# Patient Record
Sex: FEMALE | Race: WHITE | NOT HISPANIC OR LATINO | Employment: OTHER | URBAN - METROPOLITAN AREA
[De-identification: names, ages, dates, MRNs, and addresses within clinical notes are randomized per-mention and may not be internally consistent; named-entity substitution may affect disease eponyms.]

---

## 2018-09-06 ENCOUNTER — HOSPITAL ENCOUNTER (OUTPATIENT)
Dept: RADIOLOGY | Facility: HOSPITAL | Age: 63
Discharge: HOME | End: 2018-09-06
Payer: OTHER GOVERNMENT

## 2018-09-06 ENCOUNTER — TRANSCRIBE ORDERS (OUTPATIENT)
Dept: ADMINISTRATIVE | Facility: HOSPITAL | Age: 63
End: 2018-09-06

## 2018-09-06 ENCOUNTER — APPOINTMENT (OUTPATIENT)
Dept: LAB | Facility: HOSPITAL | Age: 63
End: 2018-09-06
Payer: OTHER GOVERNMENT

## 2018-09-06 DIAGNOSIS — E55.9 AVITAMINOSIS D: ICD-10-CM

## 2018-09-06 DIAGNOSIS — J45.20 MILD INTERMITTENT ASTHMATIC BRONCHITIS WITHOUT COMPLICATION: ICD-10-CM

## 2018-09-06 DIAGNOSIS — R05.9 COUGH: ICD-10-CM

## 2018-09-06 DIAGNOSIS — E78.5 HYPERLIPIDEMIA, UNSPECIFIED HYPERLIPIDEMIA TYPE: ICD-10-CM

## 2018-09-06 DIAGNOSIS — R31.9 HEMATURIA SYNDROME: ICD-10-CM

## 2018-09-06 DIAGNOSIS — R05.9 COUGH: Primary | ICD-10-CM

## 2018-09-06 LAB
25(OH)D3 SERPL-MCNC: 31.4 NG/ML (ref 30–100)
ALBUMIN SERPL BCP-MCNC: 3.9 G/DL (ref 3.5–5.7)
ALP SERPL-CCNC: 114 U/L (ref 55–165)
ALT SERPL W P-5'-P-CCNC: 20 U/L (ref 7–52)
ANION GAP SERPL CALCULATED.3IONS-SCNC: 7 MMOL/L (ref 4–13)
AST SERPL W P-5'-P-CCNC: 18 U/L (ref 13–39)
BASOPHILS # BLD AUTO: 0 THOUSANDS/ΜL (ref 0–0.1)
BASOPHILS NFR BLD AUTO: 0 % (ref 0–2)
BILIRUB SERPL-MCNC: 0.3 MG/DL (ref 0.2–1)
BILIRUB UR QL STRIP: NEGATIVE
BUN SERPL-MCNC: 14 MG/DL (ref 7–25)
CALCIUM SERPL-MCNC: 9.9 MG/DL (ref 8.6–10.5)
CHLORIDE SERPL-SCNC: 106 MMOL/L (ref 98–107)
CHOLEST SERPL-MCNC: 147 MG/DL (ref 0–200)
CLARITY UR: CLEAR
CO2 SERPL-SCNC: 24 MMOL/L (ref 21–31)
COLOR UR: YELLOW
CREAT SERPL-MCNC: 0.82 MG/DL (ref 0.6–1.2)
EOSINOPHIL # BLD AUTO: 0 THOUSAND/ΜL (ref 0–0.61)
EOSINOPHIL NFR BLD AUTO: 0 % (ref 0–5)
ERYTHROCYTE [DISTWIDTH] IN BLOOD BY AUTOMATED COUNT: 12.3 % (ref 11.5–14.5)
GFR SERPL CREATININE-BSD FRML MDRD: 76 ML/MIN/1.73SQ M
GLUCOSE P FAST SERPL-MCNC: 164 MG/DL (ref 65–99)
GLUCOSE UR STRIP-MCNC: NEGATIVE MG/DL
HCT VFR BLD AUTO: 38.3 % (ref 34.8–46.1)
HDLC SERPL-MCNC: 47 MG/DL (ref 40–60)
HGB BLD-MCNC: 12.6 G/DL (ref 12–16)
HGB UR QL STRIP.AUTO: NEGATIVE
KETONES UR STRIP-MCNC: NEGATIVE MG/DL
LDLC SERPL CALC-MCNC: 86 MG/DL (ref 75–193)
LEUKOCYTE ESTERASE UR QL STRIP: NEGATIVE
LYMPHOCYTES # BLD AUTO: 1.4 THOUSANDS/ΜL (ref 0.6–4.47)
LYMPHOCYTES NFR BLD AUTO: 12 % (ref 21–51)
MCH RBC QN AUTO: 29.7 PG (ref 26–34)
MCHC RBC AUTO-ENTMCNC: 32.8 G/DL (ref 31–37)
MCV RBC AUTO: 91 FL (ref 81–99)
MONOCYTES # BLD AUTO: 0.5 THOUSAND/ΜL (ref 0.17–1.22)
MONOCYTES NFR BLD AUTO: 4 % (ref 2–12)
NEUTROPHILS # BLD AUTO: 9.7 THOUSANDS/ΜL (ref 1.4–6.5)
NEUTS SEG NFR BLD AUTO: 84 % (ref 42–75)
NITRITE UR QL STRIP: NEGATIVE
NONHDLC SERPL-MCNC: 100 MG/DL
NRBC BLD AUTO-RTO: 0 /100 WBCS
PH UR STRIP.AUTO: 6 [PH] (ref 5–8)
PLATELET # BLD AUTO: 276 THOUSANDS/UL (ref 149–390)
PMV BLD AUTO: 10.1 FL (ref 8.6–11.7)
POTASSIUM SERPL-SCNC: 4.2 MMOL/L (ref 3.5–5.5)
PROT SERPL-MCNC: 7.2 G/DL (ref 6.4–8.9)
PROT UR STRIP-MCNC: NEGATIVE MG/DL
RBC # BLD AUTO: 4.23 MILLION/UL (ref 3.9–5.2)
SODIUM SERPL-SCNC: 137 MMOL/L (ref 134–143)
SP GR UR STRIP.AUTO: 1.02 (ref 1–1.03)
TRIGL SERPL-MCNC: 69 MG/DL (ref 44–166)
UROBILINOGEN UR QL STRIP.AUTO: 0.2 E.U./DL
WBC # BLD AUTO: 11.6 THOUSAND/UL (ref 4.8–10.8)

## 2018-09-06 PROCEDURE — 80053 COMPREHEN METABOLIC PANEL: CPT

## 2018-09-06 PROCEDURE — 71046 X-RAY EXAM CHEST 2 VIEWS: CPT

## 2018-09-06 PROCEDURE — 82306 VITAMIN D 25 HYDROXY: CPT

## 2018-09-06 PROCEDURE — 80061 LIPID PANEL: CPT

## 2018-09-06 PROCEDURE — 36415 COLL VENOUS BLD VENIPUNCTURE: CPT

## 2018-09-06 PROCEDURE — 85025 COMPLETE CBC W/AUTO DIFF WBC: CPT

## 2018-09-06 PROCEDURE — 81003 URINALYSIS AUTO W/O SCOPE: CPT

## 2019-09-11 ENCOUNTER — TELEPHONE (OUTPATIENT)
Dept: NEPHROLOGY | Facility: CLINIC | Age: 64
End: 2019-09-11

## 2019-09-11 NOTE — TELEPHONE ENCOUNTER
Also albuterol 0 083% 4 times a day     Pt was given 0 063% the last time it was filled can she use that?

## 2019-09-12 DIAGNOSIS — E78.2 MIXED HYPERLIPIDEMIA: Primary | ICD-10-CM

## 2019-09-12 DIAGNOSIS — J98.01 BRONCHOSPASM: ICD-10-CM

## 2019-09-12 RX ORDER — SIMVASTATIN 40 MG
40 TABLET ORAL
Qty: 90 TABLET | Refills: 1 | Status: SHIPPED | OUTPATIENT
Start: 2019-09-12 | End: 2019-09-26

## 2019-09-12 RX ORDER — ALBUTEROL SULFATE 2.5 MG/3ML
2.5 SOLUTION RESPIRATORY (INHALATION) 4 TIMES DAILY
Qty: 120 VIAL | Refills: 11 | Status: SHIPPED | OUTPATIENT
Start: 2019-09-12 | End: 2022-03-26 | Stop reason: SDUPTHER

## 2019-09-13 ENCOUNTER — APPOINTMENT (EMERGENCY)
Dept: RADIOLOGY | Facility: HOSPITAL | Age: 64
End: 2019-09-13
Payer: MEDICARE

## 2019-09-13 ENCOUNTER — HOSPITAL ENCOUNTER (EMERGENCY)
Facility: HOSPITAL | Age: 64
Discharge: HOME | End: 2019-09-13
Attending: FAMILY MEDICINE | Admitting: FAMILY MEDICINE
Payer: MEDICARE

## 2019-09-13 VITALS
HEIGHT: 60 IN | OXYGEN SATURATION: 98 % | TEMPERATURE: 98.6 F | DIASTOLIC BLOOD PRESSURE: 72 MMHG | SYSTOLIC BLOOD PRESSURE: 160 MMHG | HEART RATE: 80 BPM | BODY MASS INDEX: 25.52 KG/M2 | RESPIRATION RATE: 16 BRPM | WEIGHT: 130 LBS

## 2019-09-13 DIAGNOSIS — J44.1 ACUTE EXACERBATION OF CHRONIC OBSTRUCTIVE PULMONARY DISEASE (COPD) (HCC): Primary | ICD-10-CM

## 2019-09-13 DIAGNOSIS — I10 HYPERTENSION: ICD-10-CM

## 2019-09-13 DIAGNOSIS — J06.9 URI (UPPER RESPIRATORY INFECTION): ICD-10-CM

## 2019-09-13 LAB
ALBUMIN SERPL BCP-MCNC: 4.4 G/DL (ref 3.5–5.7)
ALP SERPL-CCNC: 110 U/L (ref 55–165)
ALT SERPL W P-5'-P-CCNC: 13 U/L (ref 7–52)
ANION GAP SERPL CALCULATED.3IONS-SCNC: 8 MMOL/L (ref 4–13)
APTT PPP: 32 SECONDS (ref 23–37)
AST SERPL W P-5'-P-CCNC: 15 U/L (ref 13–39)
BASOPHILS # BLD AUTO: 0.1 THOUSANDS/ΜL (ref 0–0.1)
BASOPHILS NFR BLD AUTO: 1 % (ref 0–2)
BILIRUB SERPL-MCNC: 1 MG/DL (ref 0.2–1)
BILIRUB UR QL STRIP: NEGATIVE
BUN SERPL-MCNC: 13 MG/DL (ref 7–25)
CALCIUM SERPL-MCNC: 9.6 MG/DL (ref 8.6–10.5)
CHLORIDE SERPL-SCNC: 107 MMOL/L (ref 98–107)
CLARITY UR: CLEAR
CO2 SERPL-SCNC: 23 MMOL/L (ref 21–31)
COLOR UR: YELLOW
CREAT SERPL-MCNC: 0.71 MG/DL (ref 0.6–1.2)
EOSINOPHIL # BLD AUTO: 0.5 THOUSAND/ΜL (ref 0–0.61)
EOSINOPHIL NFR BLD AUTO: 4 % (ref 0–5)
ERYTHROCYTE [DISTWIDTH] IN BLOOD BY AUTOMATED COUNT: 12.9 % (ref 11.5–14.5)
FLUAV AG SPEC QL: NOT DETECTED
FLUBV AG SPEC QL: NOT DETECTED
GFR SERPL CREATININE-BSD FRML MDRD: 90 ML/MIN/1.73SQ M
GLUCOSE SERPL-MCNC: 130 MG/DL (ref 65–99)
GLUCOSE UR STRIP-MCNC: NEGATIVE MG/DL
HCT VFR BLD AUTO: 41.5 % (ref 42–47)
HGB BLD-MCNC: 13.7 G/DL (ref 12–16)
HGB UR QL STRIP.AUTO: NEGATIVE
INR PPP: 1.01 (ref 0.9–1.5)
KETONES UR STRIP-MCNC: NEGATIVE MG/DL
LACTATE SERPL-SCNC: 1 MMOL/L (ref 0.5–2)
LEUKOCYTE ESTERASE UR QL STRIP: NEGATIVE
LYMPHOCYTES # BLD AUTO: 2.1 THOUSANDS/ΜL (ref 0.6–4.47)
LYMPHOCYTES NFR BLD AUTO: 14 % (ref 21–51)
MCH RBC QN AUTO: 30.5 PG (ref 26–34)
MCHC RBC AUTO-ENTMCNC: 33.1 G/DL (ref 31–37)
MCV RBC AUTO: 92 FL (ref 81–99)
MONOCYTES # BLD AUTO: 1.2 THOUSAND/ΜL (ref 0.17–1.22)
MONOCYTES NFR BLD AUTO: 8 % (ref 2–12)
NEUTROPHILS # BLD AUTO: 10.7 THOUSANDS/ΜL (ref 1.4–6.5)
NEUTS SEG NFR BLD AUTO: 73 % (ref 42–75)
NITRITE UR QL STRIP: NEGATIVE
PH UR STRIP.AUTO: 7 [PH]
PLATELET # BLD AUTO: 187 THOUSANDS/UL (ref 149–390)
PMV BLD AUTO: 9.6 FL (ref 8.6–11.7)
POTASSIUM SERPL-SCNC: 3.9 MMOL/L (ref 3.5–5.5)
PROT SERPL-MCNC: 7.3 G/DL (ref 6.4–8.9)
PROT UR STRIP-MCNC: NEGATIVE MG/DL
PROTHROMBIN TIME: 11.7 SECONDS (ref 10.2–13)
RBC # BLD AUTO: 4.51 MILLION/UL (ref 3.9–5.2)
RSV B RNA SPEC QL NAA+PROBE: NOT DETECTED
SODIUM SERPL-SCNC: 138 MMOL/L (ref 134–143)
SP GR UR STRIP.AUTO: <=1.005 (ref 1–1.03)
UROBILINOGEN UR QL STRIP.AUTO: 0.2 E.U./DL
WBC # BLD AUTO: 14.5 THOUSAND/UL (ref 4.8–10.8)

## 2019-09-13 PROCEDURE — 96361 HYDRATE IV INFUSION ADD-ON: CPT

## 2019-09-13 PROCEDURE — 83605 ASSAY OF LACTIC ACID: CPT | Performed by: PHYSICIAN ASSISTANT

## 2019-09-13 PROCEDURE — 99284 EMERGENCY DEPT VISIT MOD MDM: CPT

## 2019-09-13 PROCEDURE — 85610 PROTHROMBIN TIME: CPT | Performed by: PHYSICIAN ASSISTANT

## 2019-09-13 PROCEDURE — 87631 RESP VIRUS 3-5 TARGETS: CPT | Performed by: PHYSICIAN ASSISTANT

## 2019-09-13 PROCEDURE — 36415 COLL VENOUS BLD VENIPUNCTURE: CPT | Performed by: PHYSICIAN ASSISTANT

## 2019-09-13 PROCEDURE — 85730 THROMBOPLASTIN TIME PARTIAL: CPT | Performed by: PHYSICIAN ASSISTANT

## 2019-09-13 PROCEDURE — 93005 ELECTROCARDIOGRAM TRACING: CPT

## 2019-09-13 PROCEDURE — 85025 COMPLETE CBC W/AUTO DIFF WBC: CPT | Performed by: PHYSICIAN ASSISTANT

## 2019-09-13 PROCEDURE — 81003 URINALYSIS AUTO W/O SCOPE: CPT | Performed by: PHYSICIAN ASSISTANT

## 2019-09-13 PROCEDURE — 87040 BLOOD CULTURE FOR BACTERIA: CPT | Performed by: PHYSICIAN ASSISTANT

## 2019-09-13 PROCEDURE — 80053 COMPREHEN METABOLIC PANEL: CPT | Performed by: PHYSICIAN ASSISTANT

## 2019-09-13 PROCEDURE — 71045 X-RAY EXAM CHEST 1 VIEW: CPT

## 2019-09-13 PROCEDURE — 96365 THER/PROPH/DIAG IV INF INIT: CPT

## 2019-09-13 RX ORDER — CEFTRIAXONE 1 G/50ML
1000 INJECTION, SOLUTION INTRAVENOUS ONCE
Status: COMPLETED | OUTPATIENT
Start: 2019-09-13 | End: 2019-09-13

## 2019-09-13 RX ORDER — ASPIRIN 81 MG/1
81 TABLET, CHEWABLE ORAL DAILY
COMMUNITY

## 2019-09-13 RX ORDER — ALPRAZOLAM 0.25 MG/1
TABLET ORAL
COMMUNITY
End: 2021-03-02 | Stop reason: SDUPTHER

## 2019-09-13 RX ORDER — EPINEPHRINE 0.3 MG/.3ML
INJECTION SUBCUTANEOUS
Refills: 0 | COMMUNITY
Start: 2019-06-07 | End: 2020-05-04 | Stop reason: SDUPTHER

## 2019-09-13 RX ORDER — OMEPRAZOLE 20 MG/1
20 CAPSULE, DELAYED RELEASE ORAL 2 TIMES DAILY
COMMUNITY
End: 2020-05-04 | Stop reason: SDUPTHER

## 2019-09-13 RX ORDER — MONTELUKAST SODIUM 10 MG/1
TABLET ORAL
Refills: 0 | COMMUNITY
Start: 2019-08-30 | End: 2020-07-29

## 2019-09-13 RX ORDER — MELOXICAM 15 MG/1
15 TABLET ORAL DAILY
Refills: 0 | COMMUNITY
Start: 2019-06-06 | End: 2020-02-05 | Stop reason: SDUPTHER

## 2019-09-13 RX ORDER — CEFDINIR 300 MG/1
300 CAPSULE ORAL EVERY 12 HOURS SCHEDULED
Qty: 14 CAPSULE | Refills: 0 | Status: SHIPPED | OUTPATIENT
Start: 2019-09-13 | End: 2019-09-20

## 2019-09-13 RX ORDER — ALBUTEROL SULFATE 0.63 MG/3ML
SOLUTION RESPIRATORY (INHALATION)
Refills: 0 | COMMUNITY
Start: 2019-07-03 | End: 2020-11-05

## 2019-09-13 RX ORDER — THEOPHYLLINE 300 MG/1
TABLET, EXTENDED RELEASE ORAL
Refills: 0 | COMMUNITY
Start: 2019-08-30 | End: 2020-08-25

## 2019-09-13 RX ORDER — FEXOFENADINE HYDROCHLORIDE 60 MG/1
60 TABLET, FILM COATED ORAL DAILY
COMMUNITY
End: 2020-05-06 | Stop reason: SDUPTHER

## 2019-09-13 RX ADMIN — CEFTRIAXONE 1000 MG: 1 INJECTION, SOLUTION INTRAVENOUS at 11:17

## 2019-09-13 RX ADMIN — SODIUM CHLORIDE 1000 ML: 0.9 INJECTION, SOLUTION INTRAVENOUS at 10:12

## 2019-09-13 NOTE — ED PROVIDER NOTES
History  Chief Complaint   Patient presents with    Sore Throat     yesterday started     Wheezing     took albuterol neb at 0600     Cough     green sputum      79-year-old female presents to emergency room with complaint of shortness of breath cough with productive yellow-green sputum which she states began yesterday  She also admits to fevers and chills  Denies chest pressure or pain denies nausea vomiting abdominal pain or urinary complaints  Patient states I always get pneumonia  Patient denies tobacco abuse or history of smoking with states she worked in [employer] for many years and has lung damage  No known sick contacts, she tried ibuprofen at home last night for the aches and fever presents this morning for further evaluation  History provided by:  Patient  Cough   Cough characteristics:  Productive  Sputum characteristics:  Green and yellow  Severity:  Moderate  Onset quality:  Sudden  Duration:  1 day  Timing:  Constant  Progression:  Worsening  Chronicity:  New  Smoker: no    Context: upper respiratory infection    Relieved by:  Nothing  Worsened by:  Nothing  Ineffective treatments: Ibuprofen    Associated symptoms: chills, fever, rhinorrhea, shortness of breath, sinus congestion and wheezing    Associated symptoms: no chest pain, no diaphoresis, no ear pain, no headaches, no myalgias, no rash and no sore throat    Fever:     Duration:  1 day    Timing:  Constant    Temp source:  Subjective    Progression:  Unchanged  Rhinorrhea:     Quality:  Yellow    Severity:  Mild    Duration:  1 day    Timing:  Constant    Progression:  Worsening  Shortness of breath:     Severity:  Mild    Onset quality:  Sudden    Duration:  1 day    Timing:  Constant    Progression:  Unchanged  Wheezing:     Severity:  Mild    Onset quality:  Sudden    Duration:  1 day    Timing:  Constant    Progression:  Unchanged    Chronicity:  New  Risk factors: no recent travel          Allergies   Allergen Reactions    Azithromycin     Darvon [Propoxyphene]          Prior to Admission Medications   Prescriptions Last Dose Informant Patient Reported? Taking? ALPRAZolam (XANAX) 0 25 mg tablet   Yes Yes   Sig: Take by mouth daily at bedtime as needed for anxiety   EPINEPHrine (EPIPEN) 0 3 mg/0 3 mL SOAJ   Yes No   albuterol (2 5 mg/3 mL) 0 083 % nebulizer solution   No No   Sig: Take 1 vial (2 5 mg total) by nebulization 4 (four) times a day   albuterol (ACCUNEB) 0 63 MG/3ML nebulizer solution   Yes No   Sig: inhale contents of 1 vial in nebulizer every 6 hours if needed for shortness of breath   aspirin 81 mg chewable tablet   Yes Yes   Sig: Chew 81 mg daily   fexofenadine (ALLEGRA) 60 MG tablet   Yes Yes   Sig: Take 60 mg by mouth daily   meloxicam (MOBIC) 15 mg tablet   Yes No   Sig: Take 15 mg by mouth daily   montelukast (SINGULAIR) 10 mg tablet   Yes No   omeprazole (PriLOSEC) 20 mg delayed release capsule   Yes Yes   Sig: Take 20 mg by mouth 2 (two) times a day   simvastatin (ZOCOR) 40 mg tablet   No No   Sig: Take 1 tablet (40 mg total) by mouth daily at bedtime   theophylline (THEODUR) 300 mg 12 hr tablet   Yes No      Facility-Administered Medications: None       Past Medical History:   Diagnosis Date    Asthma        Past Surgical History:   Procedure Laterality Date    CHOLECYSTECTOMY      HYSTERECTOMY         History reviewed  No pertinent family history  I have reviewed and agree with the history as documented  Social History     Tobacco Use    Smoking status: Never Smoker    Smokeless tobacco: Never Used   Substance Use Topics    Alcohol use: Never     Frequency: Never    Drug use: Never        Review of Systems   Constitutional: Positive for chills and fever  Negative for diaphoresis and fatigue  HENT: Positive for rhinorrhea  Negative for congestion, ear pain, sneezing and sore throat  Respiratory: Positive for cough, shortness of breath and wheezing  Negative for stridor      Cardiovascular: Negative for chest pain, palpitations and leg swelling  Gastrointestinal: Negative for abdominal distention, abdominal pain, blood in stool, constipation, diarrhea, nausea and vomiting  Genitourinary: Negative for difficulty urinating, dysuria, frequency, hematuria and urgency  Musculoskeletal: Negative for gait problem, myalgias and neck pain  Skin: Negative for rash  Neurological: Negative for dizziness, syncope, weakness, light-headedness and headaches  All other systems reviewed and are negative  Physical Exam  Physical Exam   Constitutional: She is oriented to person, place, and time  She appears well-developed and well-nourished  HENT:   Head: Normocephalic and atraumatic  Right Ear: Hearing, tympanic membrane and ear canal normal  Tympanic membrane is not erythematous  Left Ear: Hearing, tympanic membrane and ear canal normal  Tympanic membrane is not erythematous  Nose: Mucosal edema and rhinorrhea present  No epistaxis  Mouth/Throat: Uvula is midline, oropharynx is clear and moist and mucous membranes are normal  No oropharyngeal exudate, posterior oropharyngeal edema or posterior oropharyngeal erythema  Eyes: Pupils are equal, round, and reactive to light  EOM are normal    Neck: Normal range of motion  Neck supple  No JVD present  No tracheal deviation present  Cardiovascular: Normal rate, regular rhythm, normal heart sounds and intact distal pulses  Pulmonary/Chest: Effort normal and breath sounds normal  No stridor  No respiratory distress  She has no wheezes  She has no rhonchi  Abdominal: Soft  Bowel sounds are normal  She exhibits no distension  There is no tenderness  Musculoskeletal: Normal range of motion  She exhibits no edema or tenderness  Neurological: She is alert and oriented to person, place, and time  Skin: Skin is warm and dry  No rash noted  No erythema  Nursing note and vitals reviewed        Vital Signs  ED Triage Vitals [09/13/19 0951] Temperature Pulse Respirations Blood Pressure SpO2   100 1 °F (37 8 °C) 86 16 162/74 98 %      Temp Source Heart Rate Source Patient Position - Orthostatic VS BP Location FiO2 (%)   Temporal Monitor Sitting Right arm --      Pain Score       No Pain           Vitals:    09/13/19 0951 09/13/19 1216   BP: 162/74 160/72   Pulse: 86 80   Patient Position - Orthostatic VS: Sitting Sitting         Visual Acuity      ED Medications  Medications   sodium chloride 0 9 % bolus 1,000 mL (0 mL Intravenous Stopped 9/13/19 1119)   cefTRIAXone (ROCEPHIN) IVPB (premix) 1,000 mg (0 mg Intravenous Stopped 9/13/19 1209)       Diagnostic Studies  Results Reviewed     Procedure Component Value Units Date/Time    UA w Reflex to Microscopic w Reflex to Culture [[id number]]  (Abnormal) Collected:  09/13/19 1122    Lab Status:  Final result Specimen:  Urine, Clean Catch Updated:  09/13/19 1128     Color, UA Yellow     Clarity, UA Clear     Specific Gravity, UA <=1 005     pH, UA 7 0     Leukocytes, UA Negative     Nitrite, UA Negative     Protein, UA Negative mg/dl      Glucose, UA Negative mg/dl      Ketones, UA Negative mg/dl      Urobilinogen, UA 0 2 E U /dl      Bilirubin, UA Negative     Blood, UA Negative    Lactic acid, plasma [[id number]]  (Normal) Collected:  09/13/19 1005    Lab Status:  Final result Specimen:  Blood from Hand, Left Updated:  09/13/19 1046     LACTIC ACID 1 0 mmol/L     Narrative:       Result may be elevated if tourniquet was used during collection      Comprehensive metabolic panel [[id number]]  (Abnormal) Collected:  09/13/19 1005    Lab Status:  Final result Specimen:  Blood from Hand, Left Updated:  09/13/19 1046     Sodium 138 mmol/L      Potassium 3 9 mmol/L      Chloride 107 mmol/L      CO2 23 mmol/L      ANION GAP 8 mmol/L      BUN 13 mg/dL      Creatinine 0 71 mg/dL      Glucose 130 mg/dL      Calcium 9 6 mg/dL      AST 15 U/L      ALT 13 U/L      Alkaline Phosphatase 110 U/L      Total Protein 7 3 g/dL Albumin 4 4 g/dL      Total Bilirubin 1 00 mg/dL      eGFR 90 ml/min/1 73sq m     Narrative:       National Kidney Disease Foundation guidelines for Chronic Kidney Disease (CKD):     Stage 1 with normal or high GFR (GFR > 90 mL/min/1 73 square meters)    Stage 2 Mild CKD (GFR = 60-89 mL/min/1 73 square meters)    Stage 3A Moderate CKD (GFR = 45-59 mL/min/1 73 square meters)    Stage 3B Moderate CKD (GFR = 30-44 mL/min/1 73 square meters)    Stage 4 Severe CKD (GFR = 15-29 mL/min/1 73 square meters)    Stage 5 End Stage CKD (GFR <15 mL/min/1 73 square meters)  Note: GFR calculation is accurate only with a steady state creatinine    Protime-INR [[id number]]  (Normal) Collected:  09/13/19 1005    Lab Status:  Final result Specimen:  Blood from Hand, Left Updated:  09/13/19 1037     Protime 11 7 seconds      INR 1 01    APTT [[id number]]  (Normal) Collected:  09/13/19 1005    Lab Status:  Final result Specimen:  Blood from Hand, Left Updated:  09/13/19 1037     PTT 32 seconds     CBC and differential [[id number]]  (Abnormal) Collected:  09/13/19 1005    Lab Status:  Final result Specimen:  Blood from Hand, Left Updated:  09/13/19 1020     WBC 14 50 Thousand/uL      RBC 4 51 Million/uL      Hemoglobin 13 7 g/dL      Hematocrit 41 5 %      MCV 92 fL      MCH 30 5 pg      MCHC 33 1 g/dL      RDW 12 9 %      MPV 9 6 fL      Platelets 440 Thousands/uL      Neutrophils Relative 73 %      Lymphocytes Relative 14 %      Monocytes Relative 8 %      Eosinophils Relative 4 %      Basophils Relative 1 %      Neutrophils Absolute 10 70 Thousands/µL      Lymphocytes Absolute 2 10 Thousands/µL      Monocytes Absolute 1 20 Thousand/µL      Eosinophils Absolute 0 50 Thousand/µL      Basophils Absolute 0 10 Thousands/µL     Blood culture #2 [[id number]] Collected:  09/13/19 1000    Lab Status:   In process Specimen:  Blood from Arm, Left Updated:  09/13/19 1013    Blood culture #1 [[id number]] Collected:  09/13/19 1005    Lab Status: In process Specimen:  Blood from Hand, Left Updated:  09/13/19 1013    Influenza A/B and RSV by PCR [[id number]] Collected:  09/13/19 1012    Lab Status: In process Specimen:  Nasopharyngeal Updated:  09/13/19 1012                 XR chest 1 view portable   ED Interpretation by Ericka Hess PA-C (09/13 1104)   No acute infiltrate noted      Final Result by Hussein Valentino DO (09/13 1216)      No acute cardiopulmonary disease  Workstation performed: [id number]                    Procedures  ECG 12 Lead Documentation Only  Date/Time: 9/13/2019 11:06 AM  Performed by: Ericka Hess PA-C  Authorized by: Ericka Hess PA-C     Indications / Diagnosis:  Shortness of breath  ECG reviewed by me, the ED Provider: yes (& Dr Katty Maier)    Patient location:  ED  Previous ECG:     Previous ECG:  Unavailable  Interpretation:     Interpretation: non-specific    Rate:     ECG rate:  79    ECG rate assessment: normal    Rhythm:     Rhythm: sinus rhythm    QRS:     QRS axis:  Normal  Conduction:     Conduction: normal    ST segments:     ST segments:  Normal  T waves:     T waves: non-specific             ED Course  ED Course as of Sep 13 1222   Fri Sep 13, 2019   1218 No acute infiltrate noted on chest x-ray  Will diagnosis COPD exacerbation in treat with IV Rocephin while inpatient and discharged on MADRID PASCUAL  Patient is allergic to azithromycin  Recommendation for patient to follow up outpatient with PCP return to emergency room for any acute worsening symptoms                                    MDM  Number of Diagnoses or Management Options  Acute exacerbation of chronic obstructive pulmonary disease (COPD) ([facility] [address] ): new and requires workup  Hypertension:   URI (upper respiratory infection): new and requires workup     Amount and/or Complexity of Data Reviewed  Clinical lab tests: ordered and reviewed  Tests in the radiology section of CPT®: ordered and reviewed  Independent visualization of images, tracings, or specimens: yes    Risk of Complications, Morbidity, and/or Mortality  Presenting problems: moderate  Diagnostic procedures: moderate  Management options: moderate    Patient Progress  Patient progress: stable      Disposition  Final diagnoses:   Acute exacerbation of chronic obstructive pulmonary disease (COPD) ([place] [address] )   Hypertension   URI (upper respiratory infection)     Time reflects when diagnosis was documented in both MDM as applicable and the Disposition within this note     Time User Action Codes Description Comment    9/13/2019 12:15 PM Kyle Uribe Add [J44 1] Acute exacerbation of chronic obstructive pulmonary disease (COPD) ([place] [address] )     9/13/2019 12:17 PM Kyle Morris Add [I10] Hypertension     9/13/2019 12:18 PM Kyle Morris Add [J06 9] URI (upper respiratory infection)       ED Disposition     ED Disposition Condition Date/Time Comment    Discharge Stable Fri Sep 13, 2019 12:15 PM Lakeisha Trejo discharge to home/self care              Follow-up Information     Follow up With Specialties Details Why Luis Snowden MD Nephrology Schedule an appointment as soon as possible for a visit in 1 week If symptoms worsen return to [address] [place] [address]  [phone]            Discharge Medication List as of 9/13/2019 12:18 PM      START taking these medications    Details   cefdinir (OMNICEF) 300 mg capsule Take 1 capsule (300 mg total) by mouth every 12 (twelve) hours for 7 days, Starting Fri 9/13/2019, Until Fri 9/20/2019, Normal         CONTINUE these medications which have NOT CHANGED    Details   ALPRAZolam (XANAX) 0 25 mg tablet Take by mouth daily at bedtime as needed for anxiety, Historical Med      aspirin 81 mg chewable tablet Chew 81 mg daily, Historical Med      fexofenadine (ALLEGRA) 60 MG tablet Take 60 mg by mouth daily, Historical Med      omeprazole (PriLOSEC) 20 mg delayed release capsule Take 20 mg by mouth 2 (two) times a day, Historical Med albuterol (2 5 mg/3 mL) 0 083 % nebulizer solution Take 1 vial (2 5 mg total) by nebulization 4 (four) times a day, Starting Thu 9/12/2019, Normal      albuterol (ACCUNEB) 0 63 MG/3ML nebulizer solution inhale contents of 1 vial in nebulizer every 6 hours if needed for shortness of breath, Historical Med      EPINEPHrine (EPIPEN) 0 3 mg/0 3 mL SOAJ Starting Fri 6/7/2019, Historical Med      meloxicam (MOBIC) 15 mg tablet Take 15 mg by mouth daily, Starting Thu 6/6/2019, Historical Med      montelukast (SINGULAIR) 10 mg tablet Starting Fri 8/30/2019, Historical Med      simvastatin (ZOCOR) 40 mg tablet Take 1 tablet (40 mg total) by mouth daily at bedtime, Starting Thu 9/12/2019, Normal      theophylline (THEODUR) 300 mg 12 hr tablet Starting Fri 8/30/2019, Historical Med           No discharge procedures on file      ED Provider  Electronically Signed by           Susan Holt PA-C  09/13/19 6760

## 2019-09-14 LAB
ATRIAL RATE: 79 BPM
P AXIS: 62 DEGREES
PR INTERVAL: 192 MS
QRS AXIS: 3 DEGREES
QRSD INTERVAL: 84 MS
QT INTERVAL: 374 MS
QTC INTERVAL: 428 MS
T WAVE AXIS: -6 DEGREES
VENTRICULAR RATE: 79 BPM

## 2019-09-14 PROCEDURE — 93010 ELECTROCARDIOGRAM REPORT: CPT | Performed by: INTERNAL MEDICINE

## 2019-09-18 LAB
BACTERIA BLD CULT: NORMAL
BACTERIA BLD CULT: NORMAL

## 2019-09-26 ENCOUNTER — OFFICE VISIT (OUTPATIENT)
Dept: NEPHROLOGY | Facility: CLINIC | Age: 64
End: 2019-09-26
Payer: MEDICARE

## 2019-09-26 VITALS
DIASTOLIC BLOOD PRESSURE: 80 MMHG | BODY MASS INDEX: 27.13 KG/M2 | WEIGHT: 138.2 LBS | SYSTOLIC BLOOD PRESSURE: 160 MMHG | HEIGHT: 60 IN

## 2019-09-26 DIAGNOSIS — J45.40 MODERATE PERSISTENT ASTHMA, UNSPECIFIED WHETHER COMPLICATED: ICD-10-CM

## 2019-09-26 DIAGNOSIS — E78.5 HYPERLIPIDEMIA LDL GOAL <100: ICD-10-CM

## 2019-09-26 DIAGNOSIS — I10 ESSENTIAL HYPERTENSION: Primary | ICD-10-CM

## 2019-09-26 DIAGNOSIS — Z12.31 ENCOUNTER FOR SCREENING MAMMOGRAM FOR BREAST CANCER: ICD-10-CM

## 2019-09-26 DIAGNOSIS — J45.901 ASTHMATIC BRONCHITIS WITH ACUTE EXACERBATION, UNSPECIFIED ASTHMA SEVERITY, UNSPECIFIED WHETHER PERSISTENT: ICD-10-CM

## 2019-09-26 PROBLEM — J45.909 MODERATE ASTHMA: Status: ACTIVE | Noted: 2019-09-26

## 2019-09-26 PROCEDURE — 99214 OFFICE O/P EST MOD 30 MIN: CPT | Performed by: INTERNAL MEDICINE

## 2019-09-26 RX ORDER — ROSUVASTATIN CALCIUM 20 MG/1
20 TABLET, COATED ORAL DAILY
Qty: 30 TABLET | Refills: 5 | Status: SHIPPED | OUTPATIENT
Start: 2019-09-26 | End: 2020-02-05 | Stop reason: SDUPTHER

## 2019-09-26 RX ORDER — AMLODIPINE BESYLATE 5 MG/1
5 TABLET ORAL DAILY
Qty: 30 TABLET | Refills: 5 | Status: SHIPPED | OUTPATIENT
Start: 2019-09-26 | End: 2020-02-05 | Stop reason: SDUPTHER

## 2019-09-26 NOTE — PATIENT INSTRUCTIONS
Start amlodipine 5mg dialy for blood pressure  Watch for leg puffiness  Your lungs are clear today  Changed simvastatin to rosuvastatin as there is no drug interaction

## 2019-09-26 NOTE — PROGRESS NOTES
[address] [facility], [place]    Name: Amadeo Adams  YOB: 1955      Assessment/Plan:    No problem-specific Assessment & Plan notes found for this encounter  Problem List Items Addressed This Visit        Respiratory    Asthmatic bronchitis with acute exacerbation    Relevant Medications    fluticasone-salmeterol (ADVAIR DISKUS, WIXELA INHUB) 250-50 mcg/dose inhaler    Moderate asthma    Relevant Medications    fluticasone-salmeterol (ADVAIR DISKUS, WIXELA INHUB) 250-50 mcg/dose inhaler       Other    Hyperlipidemia LDL goal <100    Relevant Medications    rosuvastatin (CRESTOR) 20 MG tablet    Encounter for screening mammogram for breast cancer      Other Visit Diagnoses     Essential hypertension    -  Primary    will start amlodipine 5mg daily  She may have a little puffiness in ankles end of day    Relevant Medications    amLODIPine (NORVASC) 5 mg tablet            Subjective:      Patient ID: Amadeo Adams is a 71 y o  female  HPI Has well controlled asthma  No current attacks  No issues with any medications  She is exercising by walking daily and following a low salt diet  She was in the ED with bronchitis and was treated with antibiotics  She s discharged on [address] and feels better        The following portions of the patient's history were reviewed and updated as appropriate: allergies, current medications, past family history, past medical history, past social history, past surgical history and problem list     Review of Systems   Constitutional: Negative for activity change, appetite change, fatigue and unexpected weight change  HENT: Negative for congestion, ear discharge, ear pain, trouble swallowing and voice change  Eyes: Negative for pain, discharge and visual disturbance  Respiratory: Negative for cough, chest tightness, shortness of breath and wheezing      Cardiovascular: Negative for chest pain, palpitations and leg swelling  Gastrointestinal: Negative for abdominal pain, blood in stool, constipation, diarrhea, nausea and vomiting  Endocrine: Negative for heat intolerance, polydipsia, polyphagia and polyuria  Genitourinary: Negative for decreased urine volume, difficulty urinating, frequency and urgency  Musculoskeletal: Negative for arthralgias, back pain and gait problem  Skin: Negative for color change and rash  Neurological: Negative for dizziness, weakness and headaches  Social History     Socioeconomic History    Marital status:       Spouse name: None    Number of children: None    Years of education: None    Highest education level: None   Occupational History    None   Social Needs    Financial resource strain: None    Food insecurity:     Worry: None     Inability: None    Transportation needs:     Medical: None     Non-medical: None   Tobacco Use    Smoking status: Never Smoker    Smokeless tobacco: Never Used   Substance and Sexual Activity    Alcohol use: Never     Frequency: Never    Drug use: Never    Sexual activity: None   Lifestyle    Physical activity:     Days per week: None     Minutes per session: None    Stress: None   Relationships    Social connections:     Talks on phone: None     Gets together: None     Attends [employer] service: None     Active member of club or organization: None     Attends meetings of clubs or organizations: None     Relationship status: None    Intimate partner violence:     Fear of current or ex partner: None     Emotionally abused: None     Physically abused: None     Forced sexual activity: None   Other Topics Concern    None   Social History Narrative    None     Past Medical History:   Diagnosis Date    Asthma      Past Surgical History:   Procedure Laterality Date    CHOLECYSTECTOMY      HYSTERECTOMY         Current Outpatient Medications:     albuterol (2 5 mg/3 mL) 0 083 % nebulizer solution, Take 1 vial (2 5 mg total) by nebulization 4 (four) times a day, Disp: 120 vial, Rfl: 11    albuterol (ACCUNEB) 0 63 MG/3ML nebulizer solution, inhale contents of 1 vial in nebulizer every 6 hours if needed for shortness of breath, Disp: , Rfl: 0    ALPRAZolam (XANAX) 0 25 mg tablet, Take by mouth daily at bedtime as needed for anxiety, Disp: , Rfl:     aspirin 81 mg chewable tablet, Chew 81 mg daily, Disp: , Rfl:     EPINEPHrine (EPIPEN) 0 3 mg/0 3 mL SOAJ, , Disp: , Rfl: 0    fexofenadine (ALLEGRA) 60 MG tablet, Take 60 mg by mouth daily, Disp: , Rfl:     fluticasone-salmeterol (ADVAIR DISKUS, WIXELA INHUB) 250-50 mcg/dose inhaler, Inhale 1 puff 2 (two) times a day Rinse mouth after use , Disp: , Rfl:     meloxicam (MOBIC) 15 mg tablet, Take 15 mg by mouth daily, Disp: , Rfl: 0    montelukast (SINGULAIR) 10 mg tablet, , Disp: , Rfl: 0    omeprazole (PriLOSEC) 20 mg delayed release capsule, Take 20 mg by mouth 2 (two) times a day, Disp: , Rfl:     theophylline (THEODUR) 300 mg 12 hr tablet, , Disp: , Rfl: 0    amLODIPine (NORVASC) 5 mg tablet, Take 1 tablet (5 mg total) by mouth daily, Disp: 30 tablet, Rfl: 5    rosuvastatin (CRESTOR) 20 MG tablet, Take 1 tablet (20 mg total) by mouth daily, Disp: 30 tablet, Rfl: 5     Lab Results   Component Value Date    SODIUM 138 09/13/2019    K 3 9 09/13/2019     09/13/2019    CO2 23 09/13/2019    AGAP 8 09/13/2019    BUN 13 09/13/2019    CREATININE 0 71 09/13/2019    GLUC 130 (H) 09/13/2019    GLUF 164 (H) 09/06/2018    CALCIUM 9 6 09/13/2019    AST 15 09/13/2019    ALT 13 09/13/2019    ALKPHOS 110 09/13/2019    TP 7 3 09/13/2019    TBILI 1 00 09/13/2019    EGFR 90 09/13/2019     Lab Results   Component Value Date    WBC 14 50 (H) 09/13/2019    HGB 13 7 09/13/2019    HCT 41 5 (L) 09/13/2019    MCV 92 09/13/2019     09/13/2019     Lab Results   Component Value Date    CHOLESTEROL 147 09/06/2018     Lab Results   Component Value Date    HDL 47 09/06/2018     Lab Results   Component Value Date    LDLCALC 86 09/06/2018     Lab Results   Component Value Date    TRIG 69 09/06/2018     No results found for: CHOLHDL  No results found for: [id number], TSH        Objective:      /80 (BP Location: Left arm, Patient Position: Sitting, Cuff Size: Standard)   Ht 5' (1 524 m)   Wt 62 7 kg (138 lb 3 2 oz)   BMI 26 99 kg/m²     158/80 end exam     Physical Exam   Constitutional: She is oriented to person, place, and time  She appears well-developed and well-nourished  No distress  HENT:   Head: Normocephalic  Right Ear: External ear normal    Left Ear: External ear normal    Nose: Nose normal    Mouth/Throat: Oropharynx is clear and moist    Eyes: Pupils are equal, round, and reactive to light  Conjunctivae are normal    Cardiovascular: Normal rate, regular rhythm and normal heart sounds  No murmur heard  Pulmonary/Chest: Effort normal and breath sounds normal  No respiratory distress  She has no wheezes  She has no rales  Abdominal: Soft  Bowel sounds are normal  She exhibits no distension  There is no tenderness  There is no rebound and no guarding  Musculoskeletal: Normal range of motion  She exhibits no edema  Neurological: She is alert and oriented to person, place, and time  Skin: Skin is warm and dry  Capillary refill takes less than 2 seconds  She is not diaphoretic  Psychiatric: She has a normal mood and affect

## 2019-10-03 ENCOUNTER — HOSPITAL ENCOUNTER (OUTPATIENT)
Dept: MAMMOGRAPHY | Facility: HOSPITAL | Age: 64
Discharge: HOME | End: 2019-10-03
Attending: INTERNAL MEDICINE
Payer: MEDICARE

## 2019-10-03 VITALS — BODY MASS INDEX: 27.48 KG/M2 | WEIGHT: 140 LBS | HEIGHT: 60 IN

## 2019-10-03 DIAGNOSIS — Z12.31 ENCOUNTER FOR SCREENING MAMMOGRAM FOR BREAST CANCER: ICD-10-CM

## 2019-10-03 PROCEDURE — 77063 BREAST TOMOSYNTHESIS BI: CPT

## 2019-10-03 PROCEDURE — 77067 SCR MAMMO BI INCL CAD: CPT

## 2019-10-04 ENCOUNTER — TELEPHONE (OUTPATIENT)
Dept: NEPHROLOGY | Facility: CLINIC | Age: 64
End: 2019-10-04

## 2019-10-07 ENCOUNTER — OFFICE VISIT (OUTPATIENT)
Dept: NEPHROLOGY | Facility: CLINIC | Age: 64
End: 2019-10-07
Payer: MEDICARE

## 2019-10-07 VITALS
HEIGHT: 60 IN | BODY MASS INDEX: 26.93 KG/M2 | SYSTOLIC BLOOD PRESSURE: 144 MMHG | DIASTOLIC BLOOD PRESSURE: 84 MMHG | HEART RATE: 67 BPM | WEIGHT: 137.2 LBS

## 2019-10-07 DIAGNOSIS — N18.2 STAGE 2 CHRONIC KIDNEY DISEASE: ICD-10-CM

## 2019-10-07 DIAGNOSIS — Z12.11 SCREENING FOR COLON CANCER: ICD-10-CM

## 2019-10-07 DIAGNOSIS — E78.5 HYPERLIPIDEMIA LDL GOAL <100: Primary | ICD-10-CM

## 2019-10-07 DIAGNOSIS — I10 ESSENTIAL HYPERTENSION: ICD-10-CM

## 2019-10-07 DIAGNOSIS — J45.40 MODERATE PERSISTENT ASTHMA WITHOUT COMPLICATION: ICD-10-CM

## 2019-10-07 PROCEDURE — 99214 OFFICE O/P EST MOD 30 MIN: CPT | Performed by: INTERNAL MEDICINE

## 2019-10-07 NOTE — PROGRESS NOTES
[address] [facility], [place]    Name: Radha Bee  YOB: 1955      Assessment/Plan:    No problem-specific Assessment & Plan notes found for this encounter  Problem List Items Addressed This Visit        Respiratory    Moderate asthma       Cardiovascular and Mediastinum    Essential hypertension       Other    Hyperlipidemia LDL goal <100 - Primary      Other Visit Diagnoses     Stage 2 chronic kidney disease        Screening for colon cancer        Relevant Orders    Ambulatory referral to Gastroenterology            Subjective:      Patient ID: Radha Bee is a 71 y o  female  HPI    She has been feeling well No problems with medications  She is getting exercise and following a low salt diet  The following portions of the patient's history were reviewed and updated as appropriate: allergies, current medications, past family history, past medical history, past social history, past surgical history and problem list     Review of Systems   Constitutional: Negative for activity change, appetite change, fatigue and unexpected weight change  HENT: Negative for congestion, ear discharge, ear pain, trouble swallowing and voice change  Eyes: Negative for pain, discharge and visual disturbance  Respiratory: Negative for cough, chest tightness, shortness of breath and wheezing  Cardiovascular: Negative for chest pain, palpitations and leg swelling  Gastrointestinal: Negative for abdominal pain, blood in stool, constipation, diarrhea, nausea and vomiting  Endocrine: Negative for heat intolerance, polydipsia, polyphagia and polyuria  Genitourinary: Negative for decreased urine volume, difficulty urinating, frequency and urgency  Musculoskeletal: Negative for arthralgias, back pain and gait problem  Skin: Negative for color change and rash  Neurological: Negative for dizziness, weakness and headaches           Social History Socioeconomic History    Marital status:       Spouse name: None    Number of children: None    Years of education: None    Highest education level: None   Occupational History    Occupation: Homemaker   Social Needs    Financial resource strain: None    Food insecurity:     Worry: None     Inability: None    Transportation needs:     Medical: None     Non-medical: None   Tobacco Use    Smoking status: Never Smoker    Smokeless tobacco: Never Used   Substance and Sexual Activity    Alcohol use: Never     Frequency: Never    Drug use: Never    Sexual activity: None   Lifestyle    Physical activity:     Days per week: None     Minutes per session: None    Stress: None   Relationships    Social connections:     Talks on phone: None     Gets together: None     Attends Baptism service: None     Active member of club or organization: None     Attends meetings of clubs or organizations: None     Relationship status: None    Intimate partner violence:     Fear of current or ex partner: None     Emotionally abused: None     Physically abused: None     Forced sexual activity: None   Other Topics Concern    None   Social History Narrative    None     Past Medical History:   Diagnosis Date    Acute bronchitis     Acute pharyngitis     Anxiety state     Arthropathy of ankle and foot     and/or    Asthma     Asthma without status asthmaticus     Carotid artery occlusion     [facility])     Cough     Disorder of shoulder     Dyspnea     Hand joint pain     Heartburn     Herpes simplex without complication     Hyperlipidemia     Infection of skin and subcutaneous tissue     Localized superficial swelling of skin     Low back pain     Lymphadenopathy     Malaise and fatigue     Neck pain     Osteoarthritis     Pleurisy without effusion or active tuberculosis     Pneumonia     Right upper quadrant pain     Shoulder joint pain     Skin eruption     Vitamin D deficiency     Vomiting      Past Surgical History:   Procedure Laterality Date    BREAST BIOPSY Left 2017 benign    CHOLECYSTECTOMY      CHOLECYSTECTOMY  03/2014    Dr Cloud Neither     COLONOSCOPY  02/2013    WNL    HYSTERECTOMY      Total        Current Outpatient Medications:     albuterol (2 5 mg/3 mL) 0 083 % nebulizer solution, Take 1 vial (2 5 mg total) by nebulization 4 (four) times a day, Disp: 120 vial, Rfl: 11    ALPRAZolam (XANAX) 0 25 mg tablet, Take by mouth daily at bedtime as needed for anxiety, Disp: , Rfl:     amLODIPine (NORVASC) 5 mg tablet, Take 1 tablet (5 mg total) by mouth daily, Disp: 30 tablet, Rfl: 5    aspirin 81 mg chewable tablet, Chew 81 mg daily, Disp: , Rfl:     EPINEPHrine (EPIPEN) 0 3 mg/0 3 mL SOAJ, , Disp: , Rfl: 0    fexofenadine (ALLEGRA) 60 MG tablet, Take 60 mg by mouth daily, Disp: , Rfl:     fluticasone-salmeterol (ADVAIR DISKUS, WIXELA INHUB) 250-50 mcg/dose inhaler, Inhale 1 puff 2 (two) times a day Rinse mouth after use , Disp: , Rfl:     meloxicam (MOBIC) 15 mg tablet, Take 15 mg by mouth daily, Disp: , Rfl: 0    montelukast (SINGULAIR) 10 mg tablet, , Disp: , Rfl: 0    omeprazole (PriLOSEC) 20 mg delayed release capsule, Take 20 mg by mouth 2 (two) times a day, Disp: , Rfl:     rosuvastatin (CRESTOR) 20 MG tablet, Take 1 tablet (20 mg total) by mouth daily, Disp: 30 tablet, Rfl: 5    theophylline (THEODUR) 300 mg 12 hr tablet, , Disp: , Rfl: 0    albuterol (ACCUNEB) 0 63 MG/3ML nebulizer solution, inhale contents of 1 vial in nebulizer every 6 hours if needed for shortness of breath, Disp: , Rfl: 0    Lab Results   Component Value Date    SODIUM 138 09/13/2019    K 3 9 09/13/2019     09/13/2019    CO2 23 09/13/2019    AGAP 8 09/13/2019    BUN 13 09/13/2019    CREATININE 0 71 09/13/2019    GLUC 130 (H) 09/13/2019    GLUF 164 (H) 09/06/2018    CALCIUM 9 6 09/13/2019    AST 15 09/13/2019    ALT 13 09/13/2019    ALKPHOS 110 09/13/2019    TP 7 3 09/13/2019    TBILI 1 00 09/13/2019    EGFR 90 09/13/2019     Lab Results   Component Value Date    WBC 14 50 (H) 09/13/2019    HGB 13 7 09/13/2019    HCT 41 5 (L) 09/13/2019    MCV 92 09/13/2019     09/13/2019     Lab Results   Component Value Date    CHOLESTEROL 147 09/06/2018     Lab Results   Component Value Date    HDL 47 09/06/2018     Lab Results   Component Value Date    LDLCALC 86 09/06/2018     Lab Results   Component Value Date    TRIG 69 09/06/2018     No results found for: CHOLHDL  No results found for: [id number], TSH  Lab Results   Component Value Date    CALCIUM 9 6 09/13/2019     No results found for: SPEP, UPEP  No results found for: Db [place]    Colonoscopy 2014  Normal  Mammogram Oct 3, 2019  Eye: will make appt    Objective:      /84 (BP Location: Left arm, Patient Position: Sitting, Cuff Size: Standard)   Pulse 67   Ht 5' (1 524 m)   Wt 62 2 kg (137 lb 3 2 oz)   BMI 26 80 kg/m²     128/78 end exam  Home - not monitored     Physical Exam   Constitutional: She is oriented to person, place, and time  She appears well-developed and well-nourished  No distress  HENT:   Head: Normocephalic  Right Ear: External ear normal    Left Ear: External ear normal    Nose: Nose normal    Mouth/Throat: Oropharynx is clear and moist    Eyes: Pupils are equal, round, and reactive to light  Conjunctivae are normal    Cardiovascular: Normal rate, regular rhythm and normal heart sounds  No murmur heard  Pulmonary/Chest: Effort normal and breath sounds normal  No respiratory distress  She has no wheezes  She has no rales  Abdominal: Soft  Bowel sounds are normal  She exhibits no distension  There is no tenderness  There is no rebound and no guarding  Musculoskeletal: Normal range of motion  She exhibits no edema  Neurological: She is alert and oriented to person, place, and time  Skin: Skin is warm and dry  Capillary refill takes less than 2 seconds  She is not diaphoretic  Psychiatric: She has a normal mood and affect

## 2019-10-10 ENCOUNTER — APPOINTMENT (EMERGENCY)
Dept: RADIOLOGY | Facility: HOSPITAL | Age: 64
End: 2019-10-10
Payer: COMMERCIAL

## 2019-10-10 ENCOUNTER — HOSPITAL ENCOUNTER (EMERGENCY)
Facility: HOSPITAL | Age: 64
Discharge: HOME | End: 2019-10-10
Attending: EMERGENCY MEDICINE | Admitting: EMERGENCY MEDICINE
Payer: COMMERCIAL

## 2019-10-10 VITALS
HEIGHT: 60 IN | OXYGEN SATURATION: 99 % | HEART RATE: 103 BPM | RESPIRATION RATE: 18 BRPM | SYSTOLIC BLOOD PRESSURE: 124 MMHG | TEMPERATURE: 98.6 F | BODY MASS INDEX: 26.9 KG/M2 | WEIGHT: 137 LBS | DIASTOLIC BLOOD PRESSURE: 75 MMHG

## 2019-10-10 DIAGNOSIS — S80.00XA KNEE CONTUSION: ICD-10-CM
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
## 2019-10-10 PROCEDURE — 73564 X-RAY EXAM KNEE 4 OR MORE: CPT

## 2019-10-10 PROCEDURE — 99284 EMERGENCY DEPT VISIT MOD MDM: CPT
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
## 2019-12-30 ENCOUNTER — OFFICE VISIT (OUTPATIENT)
Dept: URGENT CARE | Facility: CLINIC | Age: 64
End: 2019-12-30
Payer: MEDICARE

## 2019-12-30 VITALS
BODY MASS INDEX: 26.9 KG/M2 | HEIGHT: 60 IN | TEMPERATURE: 98.5 F | DIASTOLIC BLOOD PRESSURE: 70 MMHG | HEART RATE: 73 BPM | RESPIRATION RATE: 18 BRPM | OXYGEN SATURATION: 98 % | SYSTOLIC BLOOD PRESSURE: 148 MMHG | WEIGHT: 137 LBS

## 2019-12-30 DIAGNOSIS — J45.901 ASTHMATIC BRONCHITIS WITH ACUTE EXACERBATION, UNSPECIFIED ASTHMA SEVERITY, UNSPECIFIED WHETHER PERSISTENT: Primary | ICD-10-CM

## 2019-12-30 PROCEDURE — 99213 OFFICE O/P EST LOW 20 MIN: CPT | Performed by: NURSE PRACTITIONER

## 2019-12-30 PROCEDURE — G0463 HOSPITAL OUTPT CLINIC VISIT: HCPCS | Performed by: NURSE PRACTITIONER

## 2019-12-30 RX ORDER — CEFDINIR 300 MG/1
300 CAPSULE ORAL EVERY 12 HOURS SCHEDULED
Qty: 20 CAPSULE | Refills: 0 | Status: SHIPPED | OUTPATIENT
Start: 2019-12-30 | End: 2020-01-09

## 2019-12-30 RX ORDER — BENZONATATE 100 MG/1
100 CAPSULE ORAL 3 TIMES DAILY PRN
Qty: 30 CAPSULE | Refills: 0 | Status: SHIPPED | OUTPATIENT
Start: 2019-12-30 | End: 2020-01-09

## 2019-12-30 RX ORDER — PREDNISONE 50 MG/1
50 TABLET ORAL DAILY
Qty: 5 TABLET | Refills: 0 | Status: SHIPPED | OUTPATIENT
Start: 2019-12-30 | End: 2020-01-04

## 2019-12-30 NOTE — PROGRESS NOTES
[facility]        NAME: Yamileth Valdovinos is a 71 y o  female  : 1955    MRN: [id number]  DATE: 2019  TIME: 1:22 PM    Assessment and Plan   Asthmatic bronchitis with acute exacerbation, unspecified asthma severity, unspecified whether persistent [J45 [id number]]  1  Asthmatic bronchitis with acute exacerbation, unspecified asthma severity, unspecified whether persistent  cefdinir (OMNICEF) 300 mg capsule    predniSONE 50 mg tablet    benzonatate (TESSALON PERLES) 100 mg capsule         Patient Instructions     Patient Instructions     Take the cefdinir/omnicef (antibiotic) and prednisone (steroid) as ordered until completed  Use your albuterol nebulizer or inhaler every 4-6 hours as needed for shortness of breath, chest tightness, wheezing or persistent cough  Use the Tessalon Perles up to 3x/day as needed for cough  Acute Bronchitis   AMBULATORY CARE:   Acute bronchitis  is swelling and irritation in the air passages of your lungs  This irritation may cause you to cough or have other breathing problems  Acute bronchitis often starts because of another illness, such as a cold or the flu  The illness spreads from your nose and throat to your windpipe and airways  Bronchitis is often called a chest cold  Acute bronchitis lasts about 3 to 6 weeks and is usually not a serious illness  Your cough can last for several weeks  You may have any of the following symptoms:   · A cough with sputum that may be clear, yellow, or green    · Feeling more tired than usual, and body aches    · A fever and chills    · Wheezing when you breathe    · A tight chest or pain when you breathe or cough  Seek care immediately if:   · You cough up blood  · Your lips or fingernails turn blue  · You feel like you are not getting enough air when you breathe  Contact your healthcare provider if:   · You have a fever  · Your breathing problems do not go away or get worse      · Your cough does not get better within 4 weeks  · You have questions or concerns about your condition or care  Self-care:   · Get more rest   Rest helps your body to heal  Slowly start to do more each day  Rest when you feel it is needed  · Avoid irritants in the air  Avoid chemicals, fumes, and dust  Wear a face mask if you must work around dust or fumes  Stay inside on days when air pollution levels are high  If you have allergies, stay inside when pollen counts are high  Do not use aerosol products, such as spray-on deodorant, bug spray, and hair spray  · Do not smoke or be around others who smoke  Nicotine and other chemicals in cigarettes and cigars damages the cilia that move mucus out of your lungs  Ask your healthcare provider for information if you currently smoke and need help to quit  E-cigarettes or smokeless tobacco still contain nicotine  Talk to your healthcare provider before you use these products  · Drink liquids as directed  Liquids help keep your air passages moist and help you cough up mucus  You may need to drink more liquids when you have acute bronchitis  Ask how much liquid to drink each day and which liquids are best for you  · Use a humidifier or vaporizer  Use a cool mist humidifier or a vaporizer to increase air moisture in your home  This may make it easier for you to breathe and help decrease your cough  Prevent acute bronchitis by doing the following:   · Get the vaccinations you need  Ask your healthcare provider if you should get vaccinated against the flu or pneumonia  · Prevent the spread of germs  You can decrease your risk of acute bronchitis and other illnesses by doing the following:     [facility] your hands often with soap and water  Carry germ-killing hand lotion or gel with you  You can use the lotion or gel to clean your hands when soap and water are not available      ¨ Do not touch your eyes, nose, or mouth unless you have washed your hands first     ¨ Always cover your mouth when you cough to prevent the spread of germs  It is best to cough into a tissue or your shirt sleeve instead of into your hand  Ask those around you cover their mouths when they cough  ¨ Try to avoid people who have a cold or the flu  If you are sick, stay away from others as much as possible  Medicines: Your healthcare provider may  give you any of the following:  · Ibuprofen or acetaminophen  are medicines that help lower your fever  They are available without a doctor's order  Ask your healthcare provider which medicine is right for you  Ask how much to take and how often to take it  Follow directions  These medicines can cause stomach bleeding if not taken correctly  Ibuprofen can cause kidney damage  Do not take ibuprofen if you have kidney disease, an ulcer, or allergies to aspirin  Acetaminophen can cause liver damage  Do not take more than 4,000 milligrams in 24 hours  · Decongestants  help loosen mucus in your lungs and make it easier to cough up  This can help you breathe easier  · Cough suppressants  decrease your urge to cough  If your cough produces mucus, do not take a cough suppressant unless your healthcare provider tells you to  Your healthcare provider may suggest that you take a cough suppressant at night so you can rest     · Inhalers  may be given  Your healthcare provider may give you one or more inhalers to help you breathe easier and cough less  An inhaler gives your medicine to open your airways  Ask your healthcare provider to show you how to use your inhaler correctly  Follow up with your healthcare provider as directed:  Write down questions you have so you will remember to ask them during your follow-up visits  © 2017 [employer] [employer]  Information is for End User's use only and may not be sold, redistributed or otherwise used for commercial purposes   All illustrations and images included in CareNotes® are the copyrighted property of [employer]  or [facility]  The above information is an  only  It is not intended as medical advice for individual conditions or treatments  Talk to your doctor, nurse or pharmacist before following any medical regimen to see if it is safe and effective for you  Follow up with PCP in 3-5 days  Proceed to  ER if symptoms worsen  Chief Complaint     Chief Complaint   Patient presents with    Cough     x 1 week         History of Present Illness       The patient reports upper respiratory congestion, cough, chest tightness, shortness of breath, wheezing x1 week, getting worse not better  She has a history of asthma as well as bronchitis  She states that this feels like it is developing into bronchitis  She has needed her nebulizer and rescue inhaler more often than usual   She reports she has plenty of albuterol for both  She presents to be seen  Review of Systems   Review of Systems   Constitutional: Positive for fatigue  HENT: Positive for congestion, rhinorrhea, sinus pressure, sinus pain and sore throat  Respiratory: Positive for cough, chest tightness, shortness of breath and wheezing  All other systems reviewed and are negative          Current Medications       Current Outpatient Medications:     albuterol (2 5 mg/3 mL) 0 083 % nebulizer solution, Take 1 vial (2 5 mg total) by nebulization 4 (four) times a day, Disp: 120 vial, Rfl: 11    ALPRAZolam (XANAX) 0 25 mg tablet, Take by mouth daily at bedtime as needed for anxiety, Disp: , Rfl:     amLODIPine (NORVASC) 5 mg tablet, Take 1 tablet (5 mg total) by mouth daily, Disp: 30 tablet, Rfl: 5    aspirin 81 mg chewable tablet, Chew 81 mg daily, Disp: , Rfl:     EPINEPHrine (EPIPEN) 0 3 mg/0 3 mL SOAJ, , Disp: , Rfl: 0    fexofenadine (ALLEGRA) 60 MG tablet, Take 60 mg by mouth daily, Disp: , Rfl:     fluticasone-salmeterol (ADVAIR DISKUS, WIXELA INHUB) 250-50 mcg/dose inhaler, Inhale 1 puff 2 (two) times a day Rinse mouth after use , Disp: , Rfl:     meloxicam (MOBIC) 15 mg tablet, Take 15 mg by mouth daily, Disp: , Rfl: 0    montelukast (SINGULAIR) 10 mg tablet, , Disp: , Rfl: 0    omeprazole (PriLOSEC) 20 mg delayed release capsule, Take 20 mg by mouth 2 (two) times a day, Disp: , Rfl:     rosuvastatin (CRESTOR) 20 MG tablet, Take 1 tablet (20 mg total) by mouth daily, Disp: 30 tablet, Rfl: 5    theophylline (THEODUR) 300 mg 12 hr tablet, , Disp: , Rfl: 0    albuterol (ACCUNEB) 0 63 MG/3ML nebulizer solution, inhale contents of 1 vial in nebulizer every 6 hours if needed for shortness of breath, Disp: , Rfl: 0    benzonatate (TESSALON PERLES) 100 mg capsule, Take 1 capsule (100 mg total) by mouth 3 (three) times a day as needed for cough for up to 10 days, Disp: 30 capsule, Rfl: 0    cefdinir (OMNICEF) 300 mg capsule, Take 1 capsule (300 mg total) by mouth every 12 (twelve) hours for 10 days, Disp: 20 capsule, Rfl: 0    predniSONE 50 mg tablet, Take 1 tablet (50 mg total) by mouth daily for 5 days, Disp: 5 tablet, Rfl: 0    Current Allergies     Allergies as of 12/30/2019 - Reviewed 12/30/2019   Allergen Reaction Noted    Azithromycin  09/13/2019    Darvon [propoxyphene]  09/13/2019            The following portions of the patient's history were reviewed and updated as appropriate: allergies, current medications, past family history, past medical history, past social history, past surgical history and problem list      Past Medical History:   Diagnosis Date    Acute bronchitis     Acute pharyngitis     Anxiety state     Arthropathy of ankle and foot     and/or    Asthma     Asthma without status asthmaticus     Carotid artery occlusion     COPD (chronic obstructive pulmonary disease) (HCC)     Cough     Disorder of shoulder     Dyspnea     Hand joint pain     Heartburn     Herpes simplex without complication     Hyperlipidemia     Infection of skin and subcutaneous tissue     Localized superficial swelling of skin     Low back pain     Lymphadenopathy     Malaise and fatigue     Neck pain     Osteoarthritis     Pleurisy without effusion or active tuberculosis     Pneumonia     Right upper quadrant pain     Shoulder joint pain     Skin eruption     Vitamin D deficiency     Vomiting        Past Surgical History:   Procedure Laterality Date    BREAST BIOPSY Left 2017 benign    CHOLECYSTECTOMY      CHOLECYSTECTOMY  03/2014    Dr Khadra Smith COLONOSCOPY  02/2013    WNL    HYSTERECTOMY      Total        Family History   Problem Relation Age of Onset    Diabetes Mother         Non-insulin dependent diabetes    Emphysema Father     No Known Problems Sister     No Known Problems Daughter     No Known Problems Maternal Grandmother     No Known Problems Paternal Grandmother     No Known Problems Sister     No Known Problems Sister     No Known Problems Daughter     No Known Problems Daughter     No Known Problems Maternal Aunt          Medications have been verified  Objective   /70   Pulse 73   Temp 98 5 °F (36 9 °C)   Resp 18   Ht 5' (1 524 m)   Wt 62 1 kg (137 lb)   SpO2 98%   BMI 26 76 kg/m²        Physical Exam     Physical Exam   Constitutional: She is oriented to person, place, and time  She appears well-developed and well-nourished  No distress  HENT:   Head: Normocephalic and atraumatic  Right Ear: Hearing, tympanic membrane, external ear and ear canal normal    Left Ear: Hearing, tympanic membrane, external ear and ear canal normal    Nose: Mucosal edema, rhinorrhea and sinus tenderness present  Right sinus exhibits maxillary sinus tenderness  Right sinus exhibits no frontal sinus tenderness  Left sinus exhibits maxillary sinus tenderness  Left sinus exhibits no frontal sinus tenderness  Mouth/Throat: Uvula is midline and mucous membranes are normal  Posterior oropharyngeal erythema present   No oropharyngeal exudate, posterior oropharyngeal edema or tonsillar abscesses  Tonsils are 1+ on the right  Tonsils are 1+ on the left  No tonsillar exudate  Eyes: Pupils are equal, round, and reactive to light  Neck: Normal range of motion  Neck supple  Cardiovascular: Normal rate, regular rhythm and normal heart sounds  Pulmonary/Chest: Effort normal  No accessory muscle usage or stridor  No apnea, no tachypnea and no bradypnea  No respiratory distress  She has decreased breath sounds (Slightly tight throughout)  She has wheezes (Scattered inspiratory and expiratory wheezes; mild to moderate)  She has no rhonchi  She has no rales  Patient reassures me that she has nebulizer machine at home and will do another treatment when she gets there  While she is short of breath, she is not in acute distress, so I agree this is a reasonable plan of action   Abdominal: Soft  Bowel sounds are normal  She exhibits no distension  There is no tenderness  Musculoskeletal: Normal range of motion  Neurological: She is alert and oriented to person, place, and time  Skin: Skin is warm and dry  Capillary refill takes less than 2 seconds  She is not diaphoretic  Psychiatric: She has a normal mood and affect  Her behavior is normal  Judgment and thought content normal    Nursing note and vitals reviewed

## 2019-12-30 NOTE — PATIENT INSTRUCTIONS
Take the cefdinir/omnicef (antibiotic) and prednisone (steroid) as ordered until completed  Use your albuterol nebulizer or inhaler every 4-6 hours as needed for shortness of breath, chest tightness, wheezing or persistent cough  Use the Tessalon Perles up to 3x/day as needed for cough  Acute Bronchitis   AMBULATORY CARE:   Acute bronchitis  is swelling and irritation in the air passages of your lungs  This irritation may cause you to cough or have other breathing problems  Acute bronchitis often starts because of another illness, such as a cold or the flu  The illness spreads from your nose and throat to your windpipe and airways  Bronchitis is often called a chest cold  Acute bronchitis lasts about 3 to 6 weeks and is usually not a serious illness  Your cough can last for several weeks  You may have any of the following symptoms:   · A cough with sputum that may be clear, yellow, or green    · Feeling more tired than usual, and body aches    · A fever and chills    · Wheezing when you breathe    · A tight chest or pain when you breathe or cough  Seek care immediately if:   · You cough up blood  · Your lips or fingernails turn blue  · You feel like you are not getting enough air when you breathe  Contact your healthcare provider if:   · You have a fever  · Your breathing problems do not go away or get worse  · Your cough does not get better within 4 weeks  · You have questions or concerns about your condition or care  Self-care:   · Get more rest   Rest helps your body to heal  Slowly start to do more each day  Rest when you feel it is needed  · Avoid irritants in the air  Avoid chemicals, fumes, and dust  Wear a face mask if you must work around dust or fumes  Stay inside on days when air pollution levels are high  If you have allergies, stay inside when pollen counts are high  Do not use aerosol products, such as spray-on deodorant, bug spray, and hair spray      · Do not smoke or be around others who smoke  Nicotine and other chemicals in cigarettes and cigars damages the cilia that move mucus out of your lungs  Ask your healthcare provider for information if you currently smoke and need help to quit  E-cigarettes or smokeless tobacco still contain nicotine  Talk to your healthcare provider before you use these products  · Drink liquids as directed  Liquids help keep your air passages moist and help you cough up mucus  You may need to drink more liquids when you have acute bronchitis  Ask how much liquid to drink each day and which liquids are best for you  · Use a humidifier or vaporizer  Use a cool mist humidifier or a vaporizer to increase air moisture in your home  This may make it easier for you to breathe and help decrease your cough  Prevent acute bronchitis by doing the following:   · Get the vaccinations you need  Ask your healthcare provider if you should get vaccinated against the flu or pneumonia  · Prevent the spread of germs  You can decrease your risk of acute bronchitis and other illnesses by doing the following:     [facility] [place] [facility] your hands often with soap and water  Carry germ-killing hand lotion or gel with you  You can use the lotion or gel to clean your hands when soap and water are not available  ¨ Do not touch your eyes, nose, or mouth unless you have washed your hands first     ¨ Always cover your mouth when you cough to prevent the spread of germs  It is best to cough into a tissue or your shirt sleeve instead of into your hand  Ask those around you cover their mouths when they cough  ¨ Try to avoid people who have a cold or the flu  If you are sick, stay away from others as much as possible  Medicines: Your healthcare provider may  give you any of the following:  · Ibuprofen or acetaminophen  are medicines that help lower your fever  They are available without a doctor's order  Ask your healthcare provider which medicine is right for you   Ask how much to take and how often to take it  Follow directions  These medicines can cause stomach bleeding if not taken correctly  Ibuprofen can cause kidney damage  Do not take ibuprofen if you have kidney disease, an ulcer, or allergies to aspirin  Acetaminophen can cause liver damage  Do not take more than 4,000 milligrams in 24 hours  · Decongestants  help loosen mucus in your lungs and make it easier to cough up  This can help you breathe easier  · Cough suppressants  decrease your urge to cough  If your cough produces mucus, do not take a cough suppressant unless your healthcare provider tells you to  Your healthcare provider may suggest that you take a cough suppressant at night so you can rest     · Inhalers  may be given  Your healthcare provider may give you one or more inhalers to help you breathe easier and cough less  An inhaler gives your medicine to open your airways  Ask your healthcare provider to show you how to use your inhaler correctly  Follow up with your healthcare provider as directed:  Write down questions you have so you will remember to ask them during your follow-up visits  © 2017 [facility] Information is for End User's use only and may not be sold, redistributed or otherwise used for commercial purposes  All illustrations and images included in CareNotes® are the copyrighted property of [employer]  or [employer]  The above information is an  only  It is not intended as medical advice for individual conditions or treatments  Talk to your doctor, nurse or pharmacist before following any medical regimen to see if it is safe and effective for you

## 2020-02-05 DIAGNOSIS — I10 ESSENTIAL HYPERTENSION: ICD-10-CM

## 2020-02-05 DIAGNOSIS — M19.90 OSTEOARTHRITIS, UNSPECIFIED OSTEOARTHRITIS TYPE, UNSPECIFIED SITE: Primary | ICD-10-CM

## 2020-02-05 DIAGNOSIS — E78.5 HYPERLIPIDEMIA LDL GOAL <100: ICD-10-CM

## 2020-02-05 RX ORDER — AMLODIPINE BESYLATE 5 MG/1
5 TABLET ORAL DAILY
Qty: 30 TABLET | Refills: 5 | Status: SHIPPED | OUTPATIENT
Start: 2020-02-05 | End: 2020-08-05

## 2020-02-05 RX ORDER — ROSUVASTATIN CALCIUM 20 MG/1
20 TABLET, COATED ORAL DAILY
Qty: 30 TABLET | Refills: 5 | Status: SHIPPED | OUTPATIENT
Start: 2020-02-05 | End: 2020-08-05

## 2020-02-05 RX ORDER — MELOXICAM 15 MG/1
15 TABLET ORAL DAILY
Qty: 30 TABLET | Refills: 5 | Status: SHIPPED | OUTPATIENT
Start: 2020-02-05 | End: 2020-08-05

## 2020-02-20 ENCOUNTER — OFFICE VISIT (OUTPATIENT)
Dept: URGENT CARE | Facility: CLINIC | Age: 65
End: 2020-02-20
Payer: MEDICARE

## 2020-02-20 VITALS
WEIGHT: 137 LBS | OXYGEN SATURATION: 95 % | HEART RATE: 90 BPM | DIASTOLIC BLOOD PRESSURE: 68 MMHG | SYSTOLIC BLOOD PRESSURE: 102 MMHG | BODY MASS INDEX: 26.9 KG/M2 | RESPIRATION RATE: 22 BRPM | TEMPERATURE: 98.5 F | HEIGHT: 60 IN

## 2020-02-20 DIAGNOSIS — J20.9 ACUTE BRONCHITIS WITH BRONCHOSPASM: Primary | ICD-10-CM

## 2020-02-20 PROCEDURE — G0463 HOSPITAL OUTPT CLINIC VISIT: HCPCS | Performed by: EMERGENCY MEDICINE

## 2020-02-20 PROCEDURE — 99213 OFFICE O/P EST LOW 20 MIN: CPT | Performed by: EMERGENCY MEDICINE

## 2020-02-20 RX ORDER — CEFUROXIME AXETIL 500 MG/1
500 TABLET ORAL EVERY 12 HOURS SCHEDULED
Qty: 14 TABLET | Refills: 0 | Status: SHIPPED | OUTPATIENT
Start: 2020-02-20 | End: 2020-02-27

## 2020-02-20 RX ORDER — PROMETHAZINE HYDROCHLORIDE AND CODEINE PHOSPHATE 6.25; 1 MG/5ML; MG/5ML
5 SYRUP ORAL EVERY 6 HOURS PRN
Qty: 120 ML | Refills: 0 | Status: SHIPPED | OUTPATIENT
Start: 2020-02-20 | End: 2020-02-26

## 2020-02-20 RX ORDER — PREDNISONE 20 MG/1
TABLET ORAL
Qty: 18 TABLET | Refills: 0 | Status: SHIPPED | OUTPATIENT
Start: 2020-02-20 | End: 2020-05-04 | Stop reason: ALTCHOICE

## 2020-02-20 NOTE — PROGRESS NOTES
[facility]        NAME: Tim Wallace is a 71 y o  female  : 1955    MRN: [id number]  DATE: 2020  TIME: 11:01 AM    Assessment and Plan   Acute bronchitis with bronchospasm [J20 9]  1  Acute bronchitis with bronchospasm  predniSONE 20 mg tablet    cefuroxime (CEFTIN) 500 mg tablet    promethazine-codeine (PHENERGAN WITH CODEINE) 6 25-10 mg/5 mL syrup         Patient Instructions       Follow up with PCP in 3-5 days  Patient Instructions     1  If worsening of your symptoms, onset of fever, or worsening shortness of breath, go directly to the ER for evaluation  2   See your physician in 3-5 days for recheck  Acute Bronchitis   WHAT YOU NEED TO KNOW:   Acute bronchitis is swelling and irritation in the air passages of your lungs  This irritation may cause you to cough or have other breathing problems  Acute bronchitis often starts because of another illness, such as a cold or the flu  The illness spreads from your nose and throat to your windpipe and airways  Bronchitis is often called a chest cold  Acute bronchitis lasts about 3 to 6 weeks and is usually not a serious illness  Your cough can last for several weeks  DISCHARGE INSTRUCTIONS:   Return to the emergency department if:   · You cough up blood  · Your lips or fingernails turn blue  · You feel like you are not getting enough air when you breathe  Contact your healthcare provider if:   · You have a fever  · Your breathing problems do not go away or get worse  · Your cough does not get better within 4 weeks  · You have questions or concerns about your condition or care  Self-care:   · Get more rest   Rest helps your body to heal  Slowly start to do more each day  Rest when you feel it is needed  · Avoid irritants in the air  Avoid chemicals, fumes, and dust  Wear a face mask if you must work around dust or fumes  Stay inside on days when air pollution levels are high   If you have allergies, stay inside when pollen counts are high  Do not use aerosol products, such as spray-on deodorant, bug spray, and hair spray  · Do not smoke or be around others who smoke  Nicotine and other chemicals in cigarettes and cigars damages the cilia that move mucus out of your lungs  Ask your healthcare provider for information if you currently smoke and need help to quit  E-cigarettes or smokeless tobacco still contain nicotine  Talk to your healthcare provider before you use these products  · Drink liquids as directed  Liquids help keep your air passages moist and help you cough up mucus  You may need to drink more liquids when you have acute bronchitis  Ask how much liquid to drink each day and which liquids are best for you  · Use a humidifier or vaporizer  Use a cool mist humidifier or a vaporizer to increase air moisture in your home  This may make it easier for you to breathe and help decrease your cough  Decrease risk for acute bronchitis:   · Get the vaccinations you need  Ask your healthcare provider if you should get vaccinated against the flu or pneumonia  · Prevent the spread of germs  You can decrease your risk of acute bronchitis and other illnesses by doing the following:     [facility] your hands often with soap and water  Carry germ-killing hand lotion or gel with you  You can use the lotion or gel to clean your hands when soap and water are not available  ¨ Do not touch your eyes, nose, or mouth unless you have washed your hands first     ¨ Always cover your mouth when you cough to prevent the spread of germs  It is best to cough into a tissue or your shirt sleeve instead of into your hand  Ask those around you cover their mouths when they cough  ¨ Try to avoid people who have a cold or the flu  If you are sick, stay away from others as much as possible  Medicines:   Your healthcare provider may  give you any of the following:  · Ibuprofen or acetaminophen  are medicines that help lower your fever  They are available without a doctor's order  Ask your healthcare provider which medicine is right for you  Ask how much to take and how often to take it  Follow directions  These medicines can cause stomach bleeding if not taken correctly  Ibuprofen can cause kidney damage  Do not take ibuprofen if you have kidney disease, an ulcer, or allergies to aspirin  Acetaminophen can cause liver damage  Do not take more than 4,000 milligrams in 24 hours  · Decongestants  help loosen mucus in your lungs and make it easier to cough up  This can help you breathe easier  · Cough suppressants  decrease your urge to cough  If your cough produces mucus, do not take a cough suppressant unless your healthcare provider tells you to  Your healthcare provider may suggest that you take a cough suppressant at night so you can rest     · Inhalers  may be given  Your healthcare provider may give you one or more inhalers to help you breathe easier and cough less  An inhaler gives your medicine to open your airways  Ask your healthcare provider to show you how to use your inhaler correctly  · Take your medicine as directed  Contact your healthcare provider if you think your medicine is not helping or if you have side effects  Tell him of her if you are allergic to any medicine  Keep a list of the medicines, vitamins, and herbs you take  Include the amounts, and when and why you take them  Bring the list or the pill bottles to follow-up visits  Carry your medicine list with you in case of an emergency  Follow up with your healthcare provider as directed:  Write down questions you have so you will remember to ask them during your follow-up visits  © 2017 [employer]  Information is for End User's use only and may not be sold, redistributed or otherwise used for commercial purposes   All illustrations and images included in CareNotes® are the copyrighted property of [employer]  or [employer]  The above information is an  only  It is not intended as medical advice for individual conditions or treatments  Talk to your doctor, nurse or pharmacist before following any medical regimen to see if it is safe and effective for you  Proceed to  ER if symptoms worsen  Chief Complaint     Chief Complaint   Patient presents with    Cough     cough, wheezing for 2 days    Shortness of Breath     for 2 days         History of Present Illness       This is a 27-year-old female who presents with 3 days of cough and wheezing  She has been coughing up green mucus  She has a history of asthma and has been using her nebulizer on an increased basis although she states that is working well for her  She has been using her nebulizer every 4-6 hours  She does have to get up at night to use the nebulizer an additional time  She is using albuterol at 0 83%  Patient has had no fever with this  She did not get a flu shot this year secondary to ill affects which she sustained last year  Patient has been unable to get in to see her physician for an appointment  Review of Systems   Review of Systems   Constitutional: Negative for fever  HENT: Positive for congestion  Negative for ear pain, rhinorrhea and sore throat  Eyes: Negative for discharge and redness  Respiratory: Positive for cough, shortness of breath and wheezing  Cardiovascular: Negative for chest pain  Gastrointestinal: Negative for diarrhea, nausea and vomiting  Genitourinary: Negative for difficulty urinating  Musculoskeletal: Negative for arthralgias and myalgias  Skin: Negative for rash  Neurological: Negative for dizziness           Current Medications       Current Outpatient Medications:     albuterol (2 5 mg/3 mL) 0 083 % nebulizer solution, Take 1 vial (2 5 mg total) by nebulization 4 (four) times a day, Disp: 120 vial, Rfl: 11    albuterol (ACCUNEB) 0 63 MG/3ML nebulizer solution, inhale contents of 1 vial in nebulizer every 6 hours if needed for shortness of breath, Disp: , Rfl: 0    ALPRAZolam (XANAX) 0 25 mg tablet, Take by mouth daily at bedtime as needed for anxiety, Disp: , Rfl:     amLODIPine (NORVASC) 5 mg tablet, Take 1 tablet (5 mg total) by mouth daily, Disp: 30 tablet, Rfl: 5    aspirin 81 mg chewable tablet, Chew 81 mg daily, Disp: , Rfl:     EPINEPHrine (EPIPEN) 0 3 mg/0 3 mL SOAJ, , Disp: , Rfl: 0    fexofenadine (ALLEGRA) 60 MG tablet, Take 60 mg by mouth daily, Disp: , Rfl:     fluticasone-salmeterol (ADVAIR DISKUS, WIXELA INHUB) 250-50 mcg/dose inhaler, Inhale 1 puff 2 (two) times a day Rinse mouth after use , Disp: , Rfl:     meloxicam (MOBIC) 15 mg tablet, Take 1 tablet (15 mg total) by mouth daily, Disp: 30 tablet, Rfl: 5    montelukast (SINGULAIR) 10 mg tablet, , Disp: , Rfl: 0    omeprazole (PriLOSEC) 20 mg delayed release capsule, Take 20 mg by mouth 2 (two) times a day, Disp: , Rfl:     rosuvastatin (CRESTOR) 20 MG tablet, Take 1 tablet (20 mg total) by mouth daily, Disp: 30 tablet, Rfl: 5    theophylline (THEODUR) 300 mg 12 hr tablet, , Disp: , Rfl: 0    cefuroxime (CEFTIN) 500 mg tablet, Take 1 tablet (500 mg total) by mouth every 12 (twelve) hours for 7 days, Disp: 14 tablet, Rfl: 0    predniSONE 20 mg tablet, Take 3 tabs daily for 3 days, then 2 tabs daily for 3 days then 1 tab daily for 3 days  , Disp: 18 tablet, Rfl: 0    promethazine-codeine (PHENERGAN WITH CODEINE) 6 25-10 mg/5 mL syrup, Take 5 mL by mouth every 6 (six) hours as needed for cough for up to 6 days, Disp: 120 mL, Rfl: 0    Current Allergies     Allergies as of 02/20/2020 - Reviewed 02/20/2020   Allergen Reaction Noted    Azithromycin  09/13/2019    Darvon [propoxyphene]  09/13/2019            The following portions of the patient's history were reviewed and updated as appropriate: allergies, current medications, past family history, past medical history, past social history, past surgical history and problem list      Past Medical History:   Diagnosis Date    Acute bronchitis     Acute pharyngitis     Anxiety state     Arthropathy of ankle and foot     and/or    Asthma     Asthma without status asthmaticus     Carotid artery occlusion     COPD (chronic obstructive pulmonary disease) (HCC)     Cough     Disorder of shoulder     Dyspnea     Hand joint pain     Heartburn     Herpes simplex without complication     Hyperlipidemia     Infection of skin and subcutaneous tissue     Localized superficial swelling of skin     Low back pain     Lymphadenopathy     Malaise and fatigue     Neck pain     Osteoarthritis     Pleurisy without effusion or active tuberculosis     Pneumonia     Right upper quadrant pain     Shoulder joint pain     Skin eruption     Vitamin D deficiency     Vomiting        Past Surgical History:   Procedure Laterality Date    BREAST BIOPSY Left 2017 benign    CHOLECYSTECTOMY      CHOLECYSTECTOMY  03/2014    Dr Alverto Hickman     COLONOSCOPY  02/2013    WNL    HYSTERECTOMY      Total        Family History   Problem Relation Age of Onset    Diabetes Mother         Non-insulin dependent diabetes    Emphysema Father     No Known Problems Sister     No Known Problems Daughter     No Known Problems Maternal Grandmother     No Known Problems Paternal Grandmother     No Known Problems Sister     No Known Problems Sister     No Known Problems Daughter     No Known Problems Daughter     No Known Problems Maternal Aunt          Medications have been verified  Objective   /68   Pulse 90   Temp 98 5 °F (36 9 °C) (Tympanic)   Resp 22   Ht 5' (1 524 m)   Wt 62 1 kg (137 lb)   SpO2 95%   BMI 26 76 kg/m²        Physical Exam     Physical Exam   Constitutional: She is oriented to person, place, and time  She appears well-developed and well-nourished  Pulse ox is 95% on room air  Patient is pink in room air and able speak in full sentences  HENT:   Head: Normocephalic and atraumatic  Right Ear: External ear normal    Left Ear: External ear normal    Nose: Nose normal    Mouth/Throat: Oropharynx is clear and moist  No oropharyngeal exudate or posterior oropharyngeal edema  Eyes: Pupils are equal, round, and reactive to light  Conjunctivae and EOM are normal    Neck: Normal range of motion  Neck supple  No thyromegaly present  Cardiovascular: Normal rate, regular rhythm and normal heart sounds  Exam reveals no gallop and no friction rub  No murmur heard  Pulmonary/Chest: Effort normal  No respiratory distress  She has decreased breath sounds  She has no wheezes  She has no rhonchi  She has no rales  She exhibits no retraction  Abdominal: Soft  Bowel sounds are normal  She exhibits no distension and no mass  There is no splenomegaly or hepatomegaly  There is no tenderness  There is no rebound and no guarding  Musculoskeletal: Normal range of motion  Right lower leg: Normal         Left lower leg: Normal    Lymphadenopathy:     She has no cervical adenopathy  Neurological: She is alert and oriented to person, place, and time  Skin: Skin is warm and dry  No rash noted  No erythema  Psychiatric: She has a normal mood and affect  Her behavior is normal    Nursing note and vitals reviewed

## 2020-02-20 NOTE — PATIENT INSTRUCTIONS
1   If worsening of your symptoms, onset of fever, or worsening shortness of breath, go directly to the ER for evaluation  2   See your physician in 3-5 days for recheck  Acute Bronchitis   WHAT YOU NEED TO KNOW:   Acute bronchitis is swelling and irritation in the air passages of your lungs  This irritation may cause you to cough or have other breathing problems  Acute bronchitis often starts because of another illness, such as a cold or the flu  The illness spreads from your nose and throat to your windpipe and airways  Bronchitis is often called a chest cold  Acute bronchitis lasts about 3 to 6 weeks and is usually not a serious illness  Your cough can last for several weeks  DISCHARGE INSTRUCTIONS:   Return to the emergency department if:   · You cough up blood  · Your lips or fingernails turn blue  · You feel like you are not getting enough air when you breathe  Contact your healthcare provider if:   · You have a fever  · Your breathing problems do not go away or get worse  · Your cough does not get better within 4 weeks  · You have questions or concerns about your condition or care  Self-care:   · Get more rest   Rest helps your body to heal  Slowly start to do more each day  Rest when you feel it is needed  · Avoid irritants in the air  Avoid chemicals, fumes, and dust  Wear a face mask if you must work around dust or fumes  Stay inside on days when air pollution levels are high  If you have allergies, stay inside when pollen counts are high  Do not use aerosol products, such as spray-on deodorant, bug spray, and hair spray  · Do not smoke or be around others who smoke  Nicotine and other chemicals in cigarettes and cigars damages the cilia that move mucus out of your lungs  Ask your healthcare provider for information if you currently smoke and need help to quit  E-cigarettes or smokeless tobacco still contain nicotine   Talk to your healthcare provider before you use these products  · Drink liquids as directed  Liquids help keep your air passages moist and help you cough up mucus  You may need to drink more liquids when you have acute bronchitis  Ask how much liquid to drink each day and which liquids are best for you  · Use a humidifier or vaporizer  Use a cool mist humidifier or a vaporizer to increase air moisture in your home  This may make it easier for you to breathe and help decrease your cough  Decrease risk for acute bronchitis:   · Get the vaccinations you need  Ask your healthcare provider if you should get vaccinated against the flu or pneumonia  · Prevent the spread of germs  You can decrease your risk of acute bronchitis and other illnesses by doing the following:     [facility] your hands often with soap and water  Carry germ-killing hand lotion or gel with you  You can use the lotion or gel to clean your hands when soap and water are not available  ¨ Do not touch your eyes, nose, or mouth unless you have washed your hands first     ¨ Always cover your mouth when you cough to prevent the spread of germs  It is best to cough into a tissue or your shirt sleeve instead of into your hand  Ask those around you cover their mouths when they cough  ¨ Try to avoid people who have a cold or the flu  If you are sick, stay away from others as much as possible  Medicines: Your healthcare provider may  give you any of the following:  · Ibuprofen or acetaminophen  are medicines that help lower your fever  They are available without a doctor's order  Ask your healthcare provider which medicine is right for you  Ask how much to take and how often to take it  Follow directions  These medicines can cause stomach bleeding if not taken correctly  Ibuprofen can cause kidney damage  Do not take ibuprofen if you have kidney disease, an ulcer, or allergies to aspirin  Acetaminophen can cause liver damage  Do not take more than 4,000 milligrams in 24 hours       · Decongestants help loosen mucus in your lungs and make it easier to cough up  This can help you breathe easier  · Cough suppressants  decrease your urge to cough  If your cough produces mucus, do not take a cough suppressant unless your healthcare provider tells you to  Your healthcare provider may suggest that you take a cough suppressant at night so you can rest     · Inhalers  may be given  Your healthcare provider may give you one or more inhalers to help you breathe easier and cough less  An inhaler gives your medicine to open your airways  Ask your healthcare provider to show you how to use your inhaler correctly  · Take your medicine as directed  Contact your healthcare provider if you think your medicine is not helping or if you have side effects  Tell him of her if you are allergic to any medicine  Keep a list of the medicines, vitamins, and herbs you take  Include the amounts, and when and why you take them  Bring the list or the pill bottles to follow-up visits  Carry your medicine list with you in case of an emergency  Follow up with your healthcare provider as directed:  Write down questions you have so you will remember to ask them during your follow-up visits  © 2017 [employer]  Information is for End User's use only and may not be sold, redistributed or otherwise used for commercial purposes  All illustrations and images included in CareNotes® are the copyrighted property of [email or web address]  or Ayush [employer]  The above information is an  only  It is not intended as medical advice for individual conditions or treatments  Talk to your doctor, nurse or pharmacist before following any medical regimen to see if it is safe and effective for you

## 2020-05-04 ENCOUNTER — OFFICE VISIT (OUTPATIENT)
Dept: NEPHROLOGY | Facility: CLINIC | Age: 65
End: 2020-05-04
Payer: MEDICARE

## 2020-05-04 VITALS
OXYGEN SATURATION: 98 % | HEART RATE: 76 BPM | WEIGHT: 142 LBS | DIASTOLIC BLOOD PRESSURE: 68 MMHG | BODY MASS INDEX: 27.88 KG/M2 | SYSTOLIC BLOOD PRESSURE: 128 MMHG | TEMPERATURE: 98.4 F | HEIGHT: 60 IN

## 2020-05-04 DIAGNOSIS — I10 ESSENTIAL HYPERTENSION: ICD-10-CM

## 2020-05-04 DIAGNOSIS — Z12.31 ENCOUNTER FOR SCREENING MAMMOGRAM FOR BREAST CANCER: ICD-10-CM

## 2020-05-04 DIAGNOSIS — N18.2 CHRONIC KIDNEY DISEASE (CKD) STAGE G2/A1, MILDLY DECREASED GLOMERULAR FILTRATION RATE (GFR) BETWEEN 60-89 ML/MIN/1.73 SQUARE METER AND ALBUMINURIA CREATININE RATIO LESS THAN 30 MG/G: ICD-10-CM

## 2020-05-04 DIAGNOSIS — N18.2 STAGE 2 CHRONIC KIDNEY DISEASE: Primary | ICD-10-CM

## 2020-05-04 DIAGNOSIS — E78.5 HYPERLIPIDEMIA LDL GOAL <100: ICD-10-CM

## 2020-05-04 DIAGNOSIS — J45.40 MODERATE PERSISTENT ASTHMA WITHOUT COMPLICATION: ICD-10-CM

## 2020-05-04 DIAGNOSIS — K21.9 GASTROESOPHAGEAL REFLUX DISEASE WITHOUT ESOPHAGITIS: ICD-10-CM

## 2020-05-04 LAB
SL AMB  POCT GLUCOSE, UA: NORMAL
SL AMB LEUKOCYTE ESTERASE,UA: NORMAL
SL AMB POCT BILIRUBIN,UA: NORMAL
SL AMB POCT BLOOD,UA: NORMAL
SL AMB POCT CLARITY,UA: NORMAL
SL AMB POCT COLOR,UA: YELLOW
SL AMB POCT KETONES,UA: NORMAL
SL AMB POCT NITRITE,UA: NORMAL
SL AMB POCT PH,UA: 5
SL AMB POCT SPECIFIC GRAVITY,UA: 1.01
SL AMB POCT URINE PROTEIN: NORMAL
SL AMB POCT UROBILINOGEN: 0.2

## 2020-05-04 PROCEDURE — 99214 OFFICE O/P EST MOD 30 MIN: CPT | Performed by: INTERNAL MEDICINE

## 2020-05-04 PROCEDURE — 81002 URINALYSIS NONAUTO W/O SCOPE: CPT | Performed by: INTERNAL MEDICINE

## 2020-05-04 RX ORDER — EPINEPHRINE 0.3 MG/.3ML
0.3 INJECTION SUBCUTANEOUS ONCE
Qty: 2 EACH | Refills: 1 | Status: SHIPPED | OUTPATIENT
Start: 2020-05-04 | End: 2022-08-02

## 2020-05-04 RX ORDER — OMEPRAZOLE 20 MG/1
20 CAPSULE, DELAYED RELEASE ORAL 2 TIMES DAILY
Qty: 180 CAPSULE | Refills: 1 | Status: SHIPPED | OUTPATIENT
Start: 2020-05-04 | End: 2020-10-28

## 2020-05-06 DIAGNOSIS — J30.1 ALLERGIC RHINITIS DUE TO POLLEN, UNSPECIFIED SEASONALITY: Primary | ICD-10-CM

## 2020-05-06 RX ORDER — FEXOFENADINE HYDROCHLORIDE 60 MG/1
60 TABLET, FILM COATED ORAL DAILY
Qty: 90 TABLET | Refills: 3 | Status: SHIPPED | OUTPATIENT
Start: 2020-05-06 | End: 2021-03-02 | Stop reason: ALTCHOICE

## 2020-05-14 ENCOUNTER — APPOINTMENT (OUTPATIENT)
Dept: LAB | Facility: HOSPITAL | Age: 65
End: 2020-05-14
Attending: INTERNAL MEDICINE
Payer: MEDICARE

## 2020-05-14 DIAGNOSIS — N18.2 STAGE 2 CHRONIC KIDNEY DISEASE: ICD-10-CM

## 2020-05-14 DIAGNOSIS — E78.5 HYPERLIPIDEMIA LDL GOAL <100: ICD-10-CM

## 2020-05-14 DIAGNOSIS — R31.9 HEMATURIA, UNSPECIFIED TYPE: Primary | ICD-10-CM

## 2020-05-14 LAB
ALBUMIN SERPL BCP-MCNC: 4.1 G/DL (ref 3.5–5.7)
ALP SERPL-CCNC: 86 U/L (ref 55–165)
ALT SERPL W P-5'-P-CCNC: 14 U/L (ref 7–52)
ANION GAP SERPL CALCULATED.3IONS-SCNC: 8 MMOL/L (ref 4–13)
AST SERPL W P-5'-P-CCNC: 15 U/L (ref 13–39)
BACTERIA UR QL AUTO: ABNORMAL /HPF
BASOPHILS # BLD AUTO: 0.1 THOUSANDS/ΜL (ref 0–0.1)
BASOPHILS NFR BLD AUTO: 1 % (ref 0–2)
BILIRUB SERPL-MCNC: 0.4 MG/DL (ref 0.2–1)
BILIRUB UR QL STRIP: NEGATIVE
BUN SERPL-MCNC: 24 MG/DL (ref 7–25)
CALCIUM SERPL-MCNC: 9 MG/DL (ref 8.6–10.5)
CHLORIDE SERPL-SCNC: 108 MMOL/L (ref 98–107)
CHOLEST SERPL-MCNC: 128 MG/DL (ref 0–200)
CLARITY UR: CLEAR
CO2 SERPL-SCNC: 25 MMOL/L (ref 21–31)
COLOR UR: YELLOW
CREAT SERPL-MCNC: 0.75 MG/DL (ref 0.6–1.2)
CREAT UR-MCNC: 18.1 MG/DL
CREAT UR-MCNC: 18.1 MG/DL
EOSINOPHIL # BLD AUTO: 0.7 THOUSAND/ΜL (ref 0–0.61)
EOSINOPHIL NFR BLD AUTO: 8 % (ref 0–5)
ERYTHROCYTE [DISTWIDTH] IN BLOOD BY AUTOMATED COUNT: 13.4 % (ref 11.5–14.5)
GFR SERPL CREATININE-BSD FRML MDRD: 83 ML/MIN/1.73SQ M
GLUCOSE P FAST SERPL-MCNC: 112 MG/DL (ref 65–99)
GLUCOSE UR STRIP-MCNC: NEGATIVE MG/DL
HCT VFR BLD AUTO: 41.8 % (ref 42–47)
HDLC SERPL-MCNC: 57 MG/DL
HGB BLD-MCNC: 13.6 G/DL (ref 12–16)
HGB UR QL STRIP.AUTO: ABNORMAL
KETONES UR STRIP-MCNC: NEGATIVE MG/DL
LDLC SERPL CALC-MCNC: 57 MG/DL (ref 0–100)
LEUKOCYTE ESTERASE UR QL STRIP: ABNORMAL
LYMPHOCYTES # BLD AUTO: 2.5 THOUSANDS/ΜL (ref 0.6–4.47)
LYMPHOCYTES NFR BLD AUTO: 27 % (ref 21–51)
MCH RBC QN AUTO: 29.8 PG (ref 26–34)
MCHC RBC AUTO-ENTMCNC: 32.6 G/DL (ref 31–37)
MCV RBC AUTO: 91 FL (ref 81–99)
MICROALBUMIN UR-MCNC: <5 MG/L (ref 0–20)
MICROALBUMIN/CREAT 24H UR: <28 MG/G CREATININE (ref 0–30)
MONOCYTES # BLD AUTO: 0.8 THOUSAND/ΜL (ref 0.17–1.22)
MONOCYTES NFR BLD AUTO: 9 % (ref 2–12)
NEUTROPHILS # BLD AUTO: 5.2 THOUSANDS/ΜL (ref 1.4–6.5)
NEUTS SEG NFR BLD AUTO: 56 % (ref 42–75)
NITRITE UR QL STRIP: NEGATIVE
NON-SQ EPI CELLS URNS QL MICRO: ABNORMAL /HPF
NONHDLC SERPL-MCNC: 71 MG/DL
PH UR STRIP.AUTO: 7 [PH]
PLATELET # BLD AUTO: 185 THOUSANDS/UL (ref 149–390)
PMV BLD AUTO: 10.4 FL (ref 8.6–11.7)
POTASSIUM SERPL-SCNC: 4.2 MMOL/L (ref 3.5–5.5)
PROT SERPL-MCNC: 6.8 G/DL (ref 6.4–8.9)
PROT UR STRIP-MCNC: NEGATIVE MG/DL
PROT UR-MCNC: 6 MG/DL
PROT/CREAT UR: 0.33 MG/G{CREAT} (ref 0–0.1)
RBC # BLD AUTO: 4.57 MILLION/UL (ref 3.9–5.2)
RBC #/AREA URNS AUTO: ABNORMAL /HPF
SODIUM SERPL-SCNC: 141 MMOL/L (ref 134–143)
SP GR UR STRIP.AUTO: 1.01 (ref 1–1.03)
TRIGL SERPL-MCNC: 68 MG/DL (ref 44–166)
URATE SERPL-MCNC: 6 MG/DL (ref 2.3–7.6)
UROBILINOGEN UR QL STRIP.AUTO: 0.2 E.U./DL
WBC # BLD AUTO: 9.4 THOUSAND/UL (ref 4.8–10.8)
WBC #/AREA URNS AUTO: ABNORMAL /HPF

## 2020-05-14 PROCEDURE — 82043 UR ALBUMIN QUANTITATIVE: CPT

## 2020-05-14 PROCEDURE — 80053 COMPREHEN METABOLIC PANEL: CPT

## 2020-05-14 PROCEDURE — 84550 ASSAY OF BLOOD/URIC ACID: CPT

## 2020-05-14 PROCEDURE — 81001 URINALYSIS AUTO W/SCOPE: CPT

## 2020-05-14 PROCEDURE — 85025 COMPLETE CBC W/AUTO DIFF WBC: CPT

## 2020-05-14 PROCEDURE — 36415 COLL VENOUS BLD VENIPUNCTURE: CPT

## 2020-05-14 PROCEDURE — 84156 ASSAY OF PROTEIN URINE: CPT

## 2020-05-14 PROCEDURE — 82570 ASSAY OF URINE CREATININE: CPT

## 2020-05-14 PROCEDURE — 80061 LIPID PANEL: CPT

## 2020-06-01 ENCOUNTER — APPOINTMENT (OUTPATIENT)
Dept: LAB | Facility: HOSPITAL | Age: 65
End: 2020-06-01
Attending: INTERNAL MEDICINE
Payer: MEDICARE

## 2020-06-01 ENCOUNTER — TRANSCRIBE ORDERS (OUTPATIENT)
Dept: LAB | Facility: HOSPITAL | Age: 65
End: 2020-06-01

## 2020-06-01 DIAGNOSIS — R31.9 HEMATURIA, UNSPECIFIED TYPE: ICD-10-CM

## 2020-06-01 LAB
BACTERIA UR QL AUTO: ABNORMAL /HPF
BILIRUB UR QL STRIP: NEGATIVE
CLARITY UR: CLEAR
COLOR UR: YELLOW
GLUCOSE UR STRIP-MCNC: NEGATIVE MG/DL
HGB UR QL STRIP.AUTO: NEGATIVE
KETONES UR STRIP-MCNC: NEGATIVE MG/DL
LEUKOCYTE ESTERASE UR QL STRIP: ABNORMAL
NITRITE UR QL STRIP: NEGATIVE
NON-SQ EPI CELLS URNS QL MICRO: ABNORMAL /HPF
PH UR STRIP.AUTO: 6 [PH]
PROT UR STRIP-MCNC: NEGATIVE MG/DL
RBC #/AREA URNS AUTO: ABNORMAL /HPF
SP GR UR STRIP.AUTO: 1.01 (ref 1–1.03)
UROBILINOGEN UR QL STRIP.AUTO: 0.2 E.U./DL
WBC #/AREA URNS AUTO: ABNORMAL /HPF

## 2020-06-01 PROCEDURE — 81001 URINALYSIS AUTO W/SCOPE: CPT

## 2020-06-14 ENCOUNTER — OFFICE VISIT (OUTPATIENT)
Dept: URGENT CARE | Facility: CLINIC | Age: 65
End: 2020-06-14
Payer: MEDICARE

## 2020-06-14 VITALS
SYSTOLIC BLOOD PRESSURE: 134 MMHG | DIASTOLIC BLOOD PRESSURE: 67 MMHG | OXYGEN SATURATION: 96 % | HEIGHT: 60 IN | RESPIRATION RATE: 18 BRPM | HEART RATE: 80 BPM | WEIGHT: 146.6 LBS | BODY MASS INDEX: 28.78 KG/M2 | TEMPERATURE: 97.6 F

## 2020-06-14 DIAGNOSIS — B37.2 CUTANEOUS CANDIDIASIS: Primary | ICD-10-CM

## 2020-06-14 PROCEDURE — 99213 OFFICE O/P EST LOW 20 MIN: CPT | Performed by: PHYSICIAN ASSISTANT

## 2020-06-14 PROCEDURE — G0463 HOSPITAL OUTPT CLINIC VISIT: HCPCS | Performed by: PHYSICIAN ASSISTANT

## 2020-06-14 RX ORDER — LIDOCAINE HYDROCHLORIDE 20 MG/ML
JELLY TOPICAL AS NEEDED
Qty: 1 BOTTLE | Refills: 0 | Status: SHIPPED | OUTPATIENT
Start: 2020-06-14 | End: 2020-11-05

## 2020-06-14 RX ORDER — FLUCONAZOLE 100 MG/1
100 TABLET ORAL DAILY
Qty: 7 TABLET | Refills: 0 | Status: SHIPPED | OUTPATIENT
Start: 2020-06-14 | End: 2020-06-21

## 2020-06-14 RX ORDER — NYSTATIN 100000 U/G
CREAM TOPICAL 2 TIMES DAILY
Qty: 30 G | Refills: 0 | Status: SHIPPED | OUTPATIENT
Start: 2020-06-14

## 2020-07-29 DIAGNOSIS — J30.1 ALLERGIC RHINITIS DUE TO POLLEN, UNSPECIFIED SEASONALITY: Primary | ICD-10-CM

## 2020-07-29 RX ORDER — MONTELUKAST SODIUM 10 MG/1
TABLET ORAL
Qty: 90 TABLET | Refills: 3 | Status: SHIPPED | OUTPATIENT
Start: 2020-07-29 | End: 2021-07-20

## 2020-08-05 DIAGNOSIS — M19.90 OSTEOARTHRITIS, UNSPECIFIED OSTEOARTHRITIS TYPE, UNSPECIFIED SITE: ICD-10-CM

## 2020-08-05 DIAGNOSIS — E78.5 HYPERLIPIDEMIA LDL GOAL <100: ICD-10-CM

## 2020-08-05 DIAGNOSIS — I10 ESSENTIAL HYPERTENSION: ICD-10-CM

## 2020-08-05 RX ORDER — MELOXICAM 15 MG/1
TABLET ORAL
Qty: 30 TABLET | Refills: 5 | Status: SHIPPED | OUTPATIENT
Start: 2020-08-05 | End: 2021-01-22

## 2020-08-05 RX ORDER — ROSUVASTATIN CALCIUM 20 MG/1
TABLET, COATED ORAL
Qty: 30 TABLET | Refills: 5 | Status: SHIPPED | OUTPATIENT
Start: 2020-08-05 | End: 2020-11-05 | Stop reason: SDUPTHER

## 2020-08-05 RX ORDER — AMLODIPINE BESYLATE 5 MG/1
TABLET ORAL
Qty: 30 TABLET | Refills: 5 | Status: SHIPPED | OUTPATIENT
Start: 2020-08-05 | End: 2021-01-22

## 2020-08-25 DIAGNOSIS — J45.40 MODERATE PERSISTENT ASTHMA WITHOUT COMPLICATION: Primary | ICD-10-CM

## 2020-08-25 RX ORDER — THEOPHYLLINE 300 MG/1
TABLET, EXTENDED RELEASE ORAL
Qty: 180 TABLET | Refills: 5 | Status: SHIPPED | OUTPATIENT
Start: 2020-08-25 | End: 2021-05-26 | Stop reason: HOSPADM

## 2020-10-06 ENCOUNTER — HOSPITAL ENCOUNTER (OUTPATIENT)
Dept: MAMMOGRAPHY | Facility: HOSPITAL | Age: 65
Discharge: HOME | End: 2020-10-06
Attending: INTERNAL MEDICINE
Payer: MEDICARE

## 2020-10-06 VITALS — HEIGHT: 60 IN | BODY MASS INDEX: 28.66 KG/M2 | WEIGHT: 146 LBS

## 2020-10-06 DIAGNOSIS — Z12.31 ENCOUNTER FOR SCREENING MAMMOGRAM FOR BREAST CANCER: ICD-10-CM

## 2020-10-06 PROCEDURE — 77067 SCR MAMMO BI INCL CAD: CPT

## 2020-10-06 PROCEDURE — 77063 BREAST TOMOSYNTHESIS BI: CPT

## 2020-10-28 DIAGNOSIS — K21.9 GASTROESOPHAGEAL REFLUX DISEASE WITHOUT ESOPHAGITIS: ICD-10-CM

## 2020-10-28 RX ORDER — OMEPRAZOLE 20 MG/1
CAPSULE, DELAYED RELEASE ORAL
Qty: 180 CAPSULE | Refills: 1 | Status: SHIPPED | OUTPATIENT
Start: 2020-10-28 | End: 2021-03-02 | Stop reason: SDUPTHER

## 2020-11-05 ENCOUNTER — OFFICE VISIT (OUTPATIENT)
Dept: NEPHROLOGY | Facility: CLINIC | Age: 65
End: 2020-11-05
Payer: MEDICARE

## 2020-11-05 ENCOUNTER — DOCUMENTATION (OUTPATIENT)
Dept: NEPHROLOGY | Facility: CLINIC | Age: 65
End: 2020-11-05

## 2020-11-05 ENCOUNTER — HOSPITAL ENCOUNTER (OUTPATIENT)
Dept: RADIOLOGY | Facility: HOSPITAL | Age: 65
Discharge: HOME | End: 2020-11-05
Attending: INTERNAL MEDICINE
Payer: MEDICARE

## 2020-11-05 ENCOUNTER — LAB (OUTPATIENT)
Dept: LAB | Facility: HOSPITAL | Age: 65
End: 2020-11-05
Attending: INTERNAL MEDICINE
Payer: MEDICARE

## 2020-11-05 VITALS
SYSTOLIC BLOOD PRESSURE: 122 MMHG | OXYGEN SATURATION: 95 % | HEIGHT: 60 IN | HEART RATE: 97 BPM | WEIGHT: 141 LBS | BODY MASS INDEX: 27.68 KG/M2 | DIASTOLIC BLOOD PRESSURE: 70 MMHG | TEMPERATURE: 98.4 F

## 2020-11-05 DIAGNOSIS — E78.5 HYPERLIPIDEMIA LDL GOAL <100: ICD-10-CM

## 2020-11-05 DIAGNOSIS — J45.901 ASTHMATIC BRONCHITIS WITH ACUTE EXACERBATION, UNSPECIFIED ASTHMA SEVERITY, UNSPECIFIED WHETHER PERSISTENT: ICD-10-CM

## 2020-11-05 DIAGNOSIS — J45.40 MODERATE PERSISTENT ASTHMA WITHOUT COMPLICATION: ICD-10-CM

## 2020-11-05 DIAGNOSIS — I10 ESSENTIAL HYPERTENSION: ICD-10-CM

## 2020-11-05 DIAGNOSIS — E55.9 VITAMIN D DEFICIENCY: ICD-10-CM

## 2020-11-05 DIAGNOSIS — E55.9 VITAMIN D DEFICIENCY: Primary | ICD-10-CM

## 2020-11-05 DIAGNOSIS — N18.2 CHRONIC KIDNEY DISEASE (CKD) STAGE G2/A1, MILDLY DECREASED GLOMERULAR FILTRATION RATE (GFR) BETWEEN 60-89 ML/MIN/1.73 SQUARE METER AND ALBUMINURIA CREATININE RATIO LESS THAN 30 MG/G: ICD-10-CM

## 2020-11-05 DIAGNOSIS — Z12.11 ENCOUNTER FOR SCREENING COLONOSCOPY: Primary | ICD-10-CM

## 2020-11-05 LAB
25(OH)D3 SERPL-MCNC: 21.3 NG/ML (ref 30–100)
ALBUMIN SERPL BCP-MCNC: 4.2 G/DL (ref 3.5–5.7)
ALP SERPL-CCNC: 113 U/L (ref 55–165)
ALT SERPL W P-5'-P-CCNC: 24 U/L (ref 7–52)
ANION GAP SERPL CALCULATED.3IONS-SCNC: 10 MMOL/L (ref 4–13)
AST SERPL W P-5'-P-CCNC: 30 U/L (ref 13–39)
BACTERIA UR QL AUTO: ABNORMAL /HPF
BASOPHILS # BLD AUTO: 0 THOUSANDS/ΜL (ref 0–0.1)
BASOPHILS NFR BLD AUTO: 0 % (ref 0–2)
BILIRUB SERPL-MCNC: 0.9 MG/DL (ref 0.2–1)
BILIRUB UR QL STRIP: ABNORMAL
BUN SERPL-MCNC: 11 MG/DL (ref 7–25)
CALCIUM SERPL-MCNC: 9.5 MG/DL (ref 8.6–10.5)
CHLORIDE SERPL-SCNC: 103 MMOL/L (ref 98–107)
CHOLEST SERPL-MCNC: 111 MG/DL (ref 0–200)
CLARITY UR: CLEAR
CO2 SERPL-SCNC: 23 MMOL/L (ref 21–31)
COLOR UR: YELLOW
CREAT SERPL-MCNC: 0.93 MG/DL (ref 0.6–1.2)
CREAT UR-MCNC: 248 MG/DL
CREAT UR-MCNC: 248 MG/DL
EOSINOPHIL # BLD AUTO: 0.2 THOUSAND/ΜL (ref 0–0.61)
EOSINOPHIL NFR BLD AUTO: 1 % (ref 0–5)
ERYTHROCYTE [DISTWIDTH] IN BLOOD BY AUTOMATED COUNT: 13.3 % (ref 11.5–14.5)
GFR SERPL CREATININE-BSD FRML MDRD: 64 ML/MIN/1.73SQ M
GLUCOSE P FAST SERPL-MCNC: 105 MG/DL (ref 65–99)
GLUCOSE UR STRIP-MCNC: NEGATIVE MG/DL
HCT VFR BLD AUTO: 37.3 % (ref 42–47)
HDLC SERPL-MCNC: 42 MG/DL
HGB BLD-MCNC: 12.5 G/DL (ref 12–16)
HGB UR QL STRIP.AUTO: ABNORMAL
KETONES UR STRIP-MCNC: NEGATIVE MG/DL
LDLC SERPL CALC-MCNC: 52 MG/DL (ref 0–100)
LEUKOCYTE ESTERASE UR QL STRIP: ABNORMAL
LYMPHOCYTES # BLD AUTO: 2 THOUSANDS/ΜL (ref 0.6–4.47)
LYMPHOCYTES NFR BLD AUTO: 11 % (ref 21–51)
MCH RBC QN AUTO: 29.6 PG (ref 26–34)
MCHC RBC AUTO-ENTMCNC: 33.4 G/DL (ref 31–37)
MCV RBC AUTO: 88 FL (ref 81–99)
MICROALBUMIN UR-MCNC: 345 MG/L (ref 0–20)
MICROALBUMIN/CREAT 24H UR: 139 MG/G CREATININE (ref 0–30)
MONOCYTES # BLD AUTO: 1.3 THOUSAND/ΜL (ref 0.17–1.22)
MONOCYTES NFR BLD AUTO: 7 % (ref 2–12)
NEUTROPHILS # BLD AUTO: 15.1 THOUSANDS/ΜL (ref 1.4–6.5)
NEUTS SEG NFR BLD AUTO: 81 % (ref 42–75)
NITRITE UR QL STRIP: NEGATIVE
NON-SQ EPI CELLS URNS QL MICRO: ABNORMAL /HPF
NONHDLC SERPL-MCNC: 69 MG/DL
PH UR STRIP.AUTO: 6 [PH]
PLATELET # BLD AUTO: 189 THOUSANDS/UL (ref 149–390)
PMV BLD AUTO: 10.5 FL (ref 8.6–11.7)
POTASSIUM SERPL-SCNC: 3.4 MMOL/L (ref 3.5–5.5)
PROT SERPL-MCNC: 7.3 G/DL (ref 6.4–8.9)
PROT UR STRIP-MCNC: ABNORMAL MG/DL
PROT UR-MCNC: 108 MG/DL
PROT/CREAT UR: 0.44 MG/G{CREAT} (ref 0–0.1)
RBC # BLD AUTO: 4.22 MILLION/UL (ref 3.9–5.2)
RBC #/AREA URNS AUTO: ABNORMAL /HPF
SODIUM SERPL-SCNC: 136 MMOL/L (ref 134–143)
SP GR UR STRIP.AUTO: 1.02 (ref 1–1.03)
TRIGL SERPL-MCNC: 86 MG/DL (ref 44–166)
UROBILINOGEN UR QL STRIP.AUTO: 0.2 E.U./DL
WBC # BLD AUTO: 18.7 THOUSAND/UL (ref 4.8–10.8)
WBC #/AREA URNS AUTO: ABNORMAL /HPF

## 2020-11-05 PROCEDURE — 85025 COMPLETE CBC W/AUTO DIFF WBC: CPT

## 2020-11-05 PROCEDURE — 71046 X-RAY EXAM CHEST 2 VIEWS: CPT

## 2020-11-05 PROCEDURE — 80053 COMPREHEN METABOLIC PANEL: CPT

## 2020-11-05 PROCEDURE — 80061 LIPID PANEL: CPT

## 2020-11-05 PROCEDURE — 36415 COLL VENOUS BLD VENIPUNCTURE: CPT

## 2020-11-05 PROCEDURE — 82043 UR ALBUMIN QUANTITATIVE: CPT

## 2020-11-05 PROCEDURE — 82570 ASSAY OF URINE CREATININE: CPT

## 2020-11-05 PROCEDURE — 99214 OFFICE O/P EST MOD 30 MIN: CPT | Performed by: INTERNAL MEDICINE

## 2020-11-05 PROCEDURE — 81001 URINALYSIS AUTO W/SCOPE: CPT

## 2020-11-05 PROCEDURE — 82306 VITAMIN D 25 HYDROXY: CPT

## 2020-11-05 PROCEDURE — 84156 ASSAY OF PROTEIN URINE: CPT

## 2020-11-05 RX ORDER — AMOXICILLIN AND CLAVULANATE POTASSIUM 875; 125 MG/1; MG/1
1 TABLET, FILM COATED ORAL EVERY 12 HOURS SCHEDULED
Qty: 14 TABLET | Refills: 0 | Status: SHIPPED | OUTPATIENT
Start: 2020-11-05 | End: 2020-11-12

## 2020-11-05 RX ORDER — ROSUVASTATIN CALCIUM 20 MG/1
20 TABLET, COATED ORAL DAILY
Qty: 30 TABLET | Refills: 5 | Status: SHIPPED | OUTPATIENT
Start: 2020-11-05 | End: 2021-07-20

## 2020-11-05 RX ORDER — ERGOCALCIFEROL (VITAMIN D2) 1250 MCG
50000 CAPSULE ORAL WEEKLY
Qty: 12 CAPSULE | Refills: 1 | Status: SHIPPED | OUTPATIENT
Start: 2020-11-05 | End: 2021-06-15

## 2020-11-06 ENCOUNTER — TELEPHONE (OUTPATIENT)
Dept: NEPHROLOGY | Facility: CLINIC | Age: 65
End: 2020-11-06

## 2021-01-11 DIAGNOSIS — Z12.11 SCREEN FOR COLON CANCER: Primary | ICD-10-CM

## 2021-01-22 DIAGNOSIS — I10 ESSENTIAL HYPERTENSION: ICD-10-CM

## 2021-01-22 DIAGNOSIS — M19.90 OSTEOARTHRITIS, UNSPECIFIED OSTEOARTHRITIS TYPE, UNSPECIFIED SITE: ICD-10-CM

## 2021-01-22 RX ORDER — MELOXICAM 15 MG/1
TABLET ORAL
Qty: 30 TABLET | Refills: 5 | Status: SHIPPED | OUTPATIENT
Start: 2021-01-22 | End: 2021-07-20

## 2021-01-22 RX ORDER — AMLODIPINE BESYLATE 5 MG/1
TABLET ORAL
Qty: 30 TABLET | Refills: 5 | Status: SHIPPED | OUTPATIENT
Start: 2021-01-22 | End: 2021-07-20

## 2021-01-22 NOTE — TELEPHONE ENCOUNTER
Please tell Lakeisha that I filled them but she must find a new PCP  I told her that last time  Please give her phone numbers of closest to her

## 2021-03-02 ENCOUNTER — OFFICE VISIT (OUTPATIENT)
Dept: FAMILY MEDICINE CLINIC | Facility: CLINIC | Age: 66
End: 2021-03-02
Payer: COMMERCIAL

## 2021-03-02 ENCOUNTER — TELEPHONE (OUTPATIENT)
Dept: FAMILY MEDICINE CLINIC | Facility: CLINIC | Age: 66
End: 2021-03-02

## 2021-03-02 VITALS
TEMPERATURE: 98.4 F | OXYGEN SATURATION: 97 % | DIASTOLIC BLOOD PRESSURE: 72 MMHG | BODY MASS INDEX: 30.12 KG/M2 | HEART RATE: 78 BPM | SYSTOLIC BLOOD PRESSURE: 128 MMHG | HEIGHT: 60 IN | RESPIRATION RATE: 18 BRPM | WEIGHT: 153.4 LBS

## 2021-03-02 DIAGNOSIS — K21.9 GASTROESOPHAGEAL REFLUX DISEASE WITHOUT ESOPHAGITIS: ICD-10-CM

## 2021-03-02 DIAGNOSIS — E55.9 VITAMIN D DEFICIENCY: ICD-10-CM

## 2021-03-02 DIAGNOSIS — J30.1 ALLERGIC RHINITIS DUE TO POLLEN, UNSPECIFIED SEASONALITY: ICD-10-CM

## 2021-03-02 DIAGNOSIS — Z13.820 SCREENING FOR OSTEOPOROSIS: ICD-10-CM

## 2021-03-02 DIAGNOSIS — M25.551 RIGHT HIP PAIN: ICD-10-CM

## 2021-03-02 DIAGNOSIS — M79.89 SWELLING OF LOWER EXTREMITY: ICD-10-CM

## 2021-03-02 DIAGNOSIS — F41.9 ANXIETY: ICD-10-CM

## 2021-03-02 DIAGNOSIS — J45.40 MODERATE PERSISTENT ASTHMA WITHOUT COMPLICATION: ICD-10-CM

## 2021-03-02 DIAGNOSIS — R73.01 ELEVATED FASTING GLUCOSE: ICD-10-CM

## 2021-03-02 DIAGNOSIS — Z78.0 POST-MENOPAUSAL: ICD-10-CM

## 2021-03-02 DIAGNOSIS — Z00.00 MEDICARE ANNUAL WELLNESS VISIT, SUBSEQUENT: Primary | ICD-10-CM

## 2021-03-02 DIAGNOSIS — D72.829 LEUKOCYTOSIS, UNSPECIFIED TYPE: ICD-10-CM

## 2021-03-02 PROBLEM — J30.89 NON-SEASONAL ALLERGIC RHINITIS: Status: ACTIVE | Noted: 2021-03-02

## 2021-03-02 PROBLEM — J44.1 ACUTE EXACERBATION OF CHRONIC OBSTRUCTIVE PULMONARY DISEASE (COPD) (HCC): Status: ACTIVE | Noted: 2021-03-02

## 2021-03-02 PROBLEM — Z12.31 ENCOUNTER FOR SCREENING MAMMOGRAM FOR BREAST CANCER: Status: RESOLVED | Noted: 2019-09-26 | Resolved: 2021-03-02

## 2021-03-02 PROBLEM — J45.901 ASTHMATIC BRONCHITIS WITH ACUTE EXACERBATION: Status: RESOLVED | Noted: 2019-09-26 | Resolved: 2021-03-02

## 2021-03-02 PROCEDURE — 99204 OFFICE O/P NEW MOD 45 MIN: CPT | Performed by: FAMILY MEDICINE

## 2021-03-02 PROCEDURE — 1101F PT FALLS ASSESS-DOCD LE1/YR: CPT | Performed by: FAMILY MEDICINE

## 2021-03-02 PROCEDURE — 3288F FALL RISK ASSESSMENT DOCD: CPT | Performed by: FAMILY MEDICINE

## 2021-03-02 PROCEDURE — G0439 PPPS, SUBSEQ VISIT: HCPCS | Performed by: FAMILY MEDICINE

## 2021-03-02 PROCEDURE — 1125F AMNT PAIN NOTED PAIN PRSNT: CPT | Performed by: FAMILY MEDICINE

## 2021-03-02 PROCEDURE — 1170F FXNL STATUS ASSESSED: CPT | Performed by: FAMILY MEDICINE

## 2021-03-02 RX ORDER — ALPRAZOLAM 0.25 MG/1
0.25 TABLET ORAL
Qty: 30 TABLET | Refills: 0 | Status: SHIPPED | OUTPATIENT
Start: 2021-03-02 | End: 2021-10-18

## 2021-03-02 RX ORDER — ERGOCALCIFEROL (VITAMIN D2) 1250 MCG
50000 CAPSULE ORAL WEEKLY
Qty: 12 CAPSULE | Refills: 1 | Status: CANCELLED | OUTPATIENT
Start: 2021-03-02

## 2021-03-02 RX ORDER — LEVOCETIRIZINE DIHYDROCHLORIDE 5 MG/1
2.5 TABLET, FILM COATED ORAL EVERY EVENING
Qty: 90 TABLET | Refills: 1
Start: 2021-03-02 | End: 2021-09-08 | Stop reason: SDUPTHER

## 2021-03-02 RX ORDER — OMEPRAZOLE 20 MG/1
20 CAPSULE, DELAYED RELEASE ORAL 2 TIMES DAILY
Qty: 60 CAPSULE | Refills: 1 | Status: SHIPPED | OUTPATIENT
Start: 2021-03-02 | End: 2021-06-23

## 2021-03-02 RX ORDER — LEVOCETIRIZINE DIHYDROCHLORIDE 5 MG/1
5 TABLET, FILM COATED ORAL EVERY EVENING
Qty: 90 TABLET | Refills: 1 | Status: SHIPPED | OUTPATIENT
Start: 2021-03-02 | End: 2021-03-02 | Stop reason: SDUPTHER

## 2021-03-02 RX ORDER — FEXOFENADINE HYDROCHLORIDE 60 MG/1
60 TABLET, FILM COATED ORAL DAILY
Qty: 90 TABLET | Refills: 3 | Status: CANCELLED | OUTPATIENT
Start: 2021-03-02

## 2021-03-02 NOTE — PROGRESS NOTES
Assessment/Plan:       Problem List Items Addressed This Visit        Respiratory    Non-seasonal allergic rhinitis      Other Visit Diagnoses     Medicare annual wellness visit, subsequent    -  Primary    Allergic rhinitis due to pollen, unspecified seasonality        Relevant Medications    levocetirizine (XYZAL) 5 MG tablet    Vitamin D deficiency        Relevant Orders    Vitamin D 25 hydroxy    Anxiety        Relevant Medications    ALPRAZolam (XANAX) 0 25 mg tablet    Gastroesophageal reflux disease without esophagitis        Relevant Medications    omeprazole (PriLOSEC) 20 mg delayed release capsule    Right hip pain        Relevant Orders    XR hip/pelv 2-3 vws right if performed    Swelling of lower extremity        Relevant Orders    Echo complete with contrast if indicated    Comprehensive metabolic panel    NT-BNP PRO    Elevated fasting glucose        Relevant Orders    HEMOGLOBIN A1C W/ EAG ESTIMATION    Screening for osteoporosis        Relevant Orders    DXA bone density spine hip and pelvis    Post-menopausal        Relevant Orders    DXA bone density spine hip and pelvis    Leukocytosis, unspecified type        Relevant Orders    CBC and differential            Subjective:      Patient ID: Lidia Mcgill is a 76 y o  female  HPI     Lower extremity swelling- A week and a half ago, started with leg swelling, no difference with cutting out salt  No difference with elevation  No prior swelling  No shortness of breath  We did note a slight systolic murmur on exam, given this and lower extremity swelling will obtain ECHO to further evaluate  Advised trial of compression stocking in meantime  Asthma- Takes theophylline 300 mg, Advair, albuterol PRN  Breathing stable, no concerns  Anxiety- Xanax half tab of 0 25 mg HS PRN, works well for her  Allergic rhinitis- Taking Allegra, insurance will no longer cover, will switch switch to Xyzal  She also takes Singulair 10 mg daily       Right hip- A week and a half ago started with right hip pain  No falls or injuries  No prior pain  No back pain  No weakness of leg, numbness into side of leg but then goes away  No triggering factors, she has been putting topical Hemp cream and icy hot which helps a little  No fevers or chills  No groin pain  Seems to be muscular in etiology IT band/piriformis syndrome, however will obtain hip XR  Pending result likely referral to PT  The following portions of the patient's history were reviewed and updated as appropriate: allergies, current medications, past family history, past medical history, past social history, past surgical history and problem list     Review of Systems   Constitutional: Negative for chills and fever  HENT: Negative for congestion and sore throat  Eyes: Negative for discharge and redness  Respiratory: Negative for cough, chest tightness and shortness of breath  Cardiovascular: Positive for leg swelling  Negative for chest pain and palpitations  Gastrointestinal: Negative for abdominal pain, constipation and diarrhea  Genitourinary: Negative for decreased urine volume and difficulty urinating  Musculoskeletal: Positive for arthralgias and gait problem  Negative for back pain  Skin: Negative for rash  Neurological: Negative for dizziness and light-headedness  Psychiatric/Behavioral: The patient is not nervous/anxious  Objective:      /72   Pulse 78   Temp 98 4 °F (36 9 °C)   Resp 18   Ht 5' (1 524 m)   Wt 69 6 kg (153 lb 6 4 oz)   SpO2 97%   BMI 29 96 kg/m²          Physical Exam  Vitals signs reviewed  Constitutional:       General: She is not in acute distress  Appearance: Normal appearance  She is not ill-appearing, toxic-appearing or diaphoretic  HENT:      Head: Normocephalic and atraumatic  Mouth/Throat:      Comments: Mask in place  Eyes:      General:         Right eye: No discharge  Left eye: No discharge        Extraocular Movements: Extraocular movements intact  Conjunctiva/sclera: Conjunctivae normal    Neck:      Musculoskeletal: Normal range of motion and neck supple  No neck rigidity  Cardiovascular:      Rate and Rhythm: Normal rate and regular rhythm  Heart sounds: Murmur present  Systolic murmur present with a grade of 2/6  No friction rub  No gallop  Pulmonary:      Effort: Pulmonary effort is normal  No respiratory distress  Breath sounds: Normal breath sounds  No stridor  No wheezing or rhonchi  Musculoskeletal:         General: Tenderness present  No signs of injury  Right hip: She exhibits decreased range of motion and tenderness  She exhibits normal strength and no bony tenderness  Right lower leg: Edema present  Left lower leg: Edema present  Comments: 1+ pitting edema to mid-tibial region bilaterally  Tenderness along right IT band and piriformis tender point on right  Skin:     General: Skin is warm  Coloration: Skin is not pale  Findings: No erythema or rash  Neurological:      Mental Status: She is alert and oriented to person, place, and time  Motor: No weakness     Psychiatric:         Mood and Affect: Mood normal          Behavior: Behavior normal            DO Nam Roblero [address] [place] [address] Primary Care

## 2021-03-02 NOTE — PROGRESS NOTES
Assessment and Plan:     Problem List Items Addressed This Visit        Respiratory    Non-seasonal allergic rhinitis      Other Visit Diagnoses     Medicare annual wellness visit, subsequent    -  Primary    Allergic rhinitis due to pollen, unspecified seasonality        Relevant Medications    levocetirizine (XYZAL) 5 MG tablet    Vitamin D deficiency        Relevant Orders    Vitamin D 25 hydroxy    Anxiety        Relevant Medications    ALPRAZolam (XANAX) 0 25 mg tablet    Gastroesophageal reflux disease without esophagitis        Relevant Medications    omeprazole (PriLOSEC) 20 mg delayed release capsule    Right hip pain        Relevant Orders    XR hip/pelv 2-3 vws right if performed    Swelling of lower extremity        Relevant Orders    Echo complete with contrast if indicated    Comprehensive metabolic panel    NT-BNP PRO    Elevated fasting glucose        Relevant Orders    HEMOGLOBIN A1C W/ EAG ESTIMATION    Screening for osteoporosis        Relevant Orders    DXA bone density spine hip and pelvis    Post-menopausal        Relevant Orders    DXA bone density spine hip and pelvis    Leukocytosis, unspecified type        Relevant Orders    CBC and differential        BMI Counseling: Body mass index is 29 96 kg/m²  The BMI is above normal  Nutrition recommendations include decreasing portion sizes, encouraging healthy choices of fruits and vegetables, decreasing fast food intake, consuming healthier snacks, moderation in carbohydrate intake and increasing intake of lean protein  Exercise recommendations include exercising 3-5 times per week  Preventive health issues were discussed with patient, and age appropriate screening tests were ordered as noted in patient's After Visit Summary  Personalized health advice and appropriate referrals for health education or preventive services given if needed, as noted in patient's After Visit Summary       History of Present Illness:     Patient presents for Medicare Annual Wellness visit    Patient Care Team:  Warren Mckeon DO as PCP - General (Family Medicine)     Problem List:     Patient Active Problem List   Diagnosis    Hyperlipidemia LDL goal <100    Moderate asthma    Chronic kidney disease (CKD) stage G2/A1, mildly decreased glomerular filtration rate (GFR) between 60-89 mL/min/1 73 square meter and albuminuria creatinine ratio less than 30 mg/g    Essential hypertension    Non-seasonal allergic rhinitis      Past Medical and Surgical History:     Past Medical History:   Diagnosis Date    Acute bronchitis     Acute pharyngitis     Anxiety state     Arthropathy of ankle and foot     and/or    Asthma     Asthma without status asthmaticus     Carotid artery occlusion     COPD (chronic obstructive pulmonary disease) ([facility])     Cough     Disorder of shoulder     Dyspnea     Hand joint pain     Heartburn     Herpes simplex without complication     Hyperlipidemia     Infection of skin and subcutaneous tissue     Localized superficial swelling of skin     Low back pain     Lymphadenopathy     Malaise and fatigue     Neck pain     Osteoarthritis     Pleurisy without effusion or active tuberculosis     Pneumonia     Right upper quadrant pain     Shoulder joint pain     Skin eruption     Vitamin D deficiency     Vomiting      Past Surgical History:   Procedure Laterality Date    BREAST BIOPSY Left 2017 benign    CHOLECYSTECTOMY      CHOLECYSTECTOMY  03/2014    Dr Pena Canal     COLONOSCOPY  02/2013    WNL    HYSTERECTOMY      Total       Family History:     Family History   Problem Relation Age of Onset    Diabetes Mother         Non-insulin dependent diabetes    Emphysema Father     No Known Problems Sister     No Known Problems Daughter     No Known Problems Maternal Grandmother     No Known Problems Paternal Grandmother     No Known Problems Sister     No Known Problems Sister     No Known Problems Daughter     No Known Problems Daughter     No Known Problems Maternal Aunt       Social History:        Social History     Socioeconomic History    Marital status:       Spouse name: None    Number of children: None    Years of education: None    Highest education level: None   Occupational History    Occupation: Homemaker   Social Needs    Financial resource strain: None    Food insecurity     Worry: None     Inability: None    Transportation needs     Medical: None     Non-medical: None   Tobacco Use    Smoking status: Never Smoker    Smokeless tobacco: Never Used   Substance and Sexual Activity    Alcohol use: Not Currently     Frequency: Never     Comment: DAILY    Drug use: Never    Sexual activity: None   Lifestyle    Physical activity     Days per week: None     Minutes per session: None    Stress: None   Relationships    Social connections     Talks on phone: None     Gets together: None     Attends Hindu service: None     Active member of club or organization: None     Attends meetings of clubs or organizations: None     Relationship status: None    Intimate partner violence     Fear of current or ex partner: None     Emotionally abused: None     Physically abused: None     Forced sexual activity: None   Other Topics Concern    None   Social History Narrative    None      Medications and Allergies:     Current Outpatient Medications   Medication Sig Dispense Refill    albuterol (2 5 mg/3 mL) 0 083 % nebulizer solution Take 1 vial (2 5 mg total) by nebulization 4 (four) times a day 120 vial 11    ALPRAZolam (XANAX) 0 25 mg tablet Take 1 tablet (0 25 mg total) by mouth daily at bedtime as needed for anxiety 30 tablet 0    amLODIPine (NORVASC) 5 mg tablet take 1 tablet by mouth once daily 30 tablet 5    aspirin 81 mg chewable tablet Chew 81 mg daily      ergocalciferol (ERGOCALCIFEROL) 1 25 MG (40260 UT) capsule Take 1 capsule (50,000 Units total) by mouth once a week 12 capsule 1    fluticasone-salmeterol (ADVAIR DISKUS, WIXELA INHUB) 250-50 mcg/dose inhaler Inhale 1 puff 2 (two) times a day Rinse mouth after use   meloxicam (MOBIC) 15 mg tablet take 1 tablet by mouth once daily 30 tablet 5    montelukast (SINGULAIR) 10 mg tablet take 1 tablet by mouth at bedtime 90 tablet 3    nystatin (MYCOSTATIN) cream Apply topically 2 (two) times a day   Apply for additional 2 weeks after rash resolves  30 g 0    omeprazole (PriLOSEC) 20 mg delayed release capsule Take 1 capsule (20 mg total) by mouth 2 (two) times a day 60 capsule 1    rosuvastatin (CRESTOR) 20 MG tablet Take 1 tablet (20 mg total) by mouth daily At bedtime for cholesterol 30 tablet 5    theophylline (THEODUR) 300 mg 12 hr tablet take 1 tablet by mouth twice a day 180 tablet 5    EPINEPHrine (EPIPEN) 0 3 mg/0 3 mL SOAJ Inject 0 3 mL (0 3 mg total) into a muscle once for 1 dose 2 each 1    levocetirizine (XYZAL) 5 MG tablet Take 1 tablet (5 mg total) by mouth every evening 90 tablet 1     No current facility-administered medications for this visit  Allergies   Allergen Reactions    Azithromycin     Darvon [Propoxyphene]       Immunizations:     Immunization History   Administered Date(s) Administered    INFLUENZA 10/22/2015, 09/24/2018    Tdap 08/13/2015      Health Maintenance:         Topic Date Due    Hepatitis C Screening  1955    MAMMOGRAM  10/06/2021    Colorectal Cancer Screening  01/14/2024         Topic Date Due    Pneumococcal Vaccine: 65+ Years (1 of 1 - PPSV23) 04/10/2019    Influenza Vaccine (1) 09/01/2020      Medicare Health Risk Assessment:     /72   Pulse 78   Temp 98 4 °F (36 9 °C)   Resp 18   Ht 5' (1 524 m)   Wt 69 6 kg (153 lb 6 4 oz)   SpO2 97%   BMI 29 96 kg/m²      Lakeisha is here for her Subsequent Wellness visit  Health Risk Assessment:   Patient rates overall health as very good  Patient feels that their physical health rating is slightly better   Eyesight was rated as same  Hearing was rated as same  Patient feels that their emotional and mental health rating is same  Pain experienced in the last 7 days has been a lot  Patient's pain rating has been 10/10  Patient states that she has experienced no weight loss or gain in last 6 months  Depression Screening:   PHQ-2 Score: 0      Fall Risk Screening: In the past year, patient has experienced: no history of falling in past year      Urinary Incontinence Screening:   Patient has not leaked urine accidently in the last six months  Home Safety:  Patient does not have trouble with stairs inside or outside of their home  Patient has working smoke alarms and has working carbon monoxide detector  Home safety hazards include: none  Nutrition:   Current diet is Regular  Medications:   Patient is currently taking over-the-counter supplements  OTC medications include: see medication list  Patient is able to manage medications  Activities of Daily Living (ADLs)/Instrumental Activities of Daily Living (IADLs):   Walk and transfer into and out of bed and chair?: Yes  Dress and groom yourself?: Yes    Bathe or shower yourself?: Yes    Feed yourself? Yes  Do your laundry/housekeeping?: Yes  Manage your money, pay your bills and track your expenses?: Yes  Make your own meals?: Yes    Do your own shopping?: Yes    Previous Hospitalizations:   Any hospitalizations or ED visits within the last 12 months?: No      Advance Care Planning:   Living will: No    Durable POA for healthcare: No    Advanced directive: No      Comments: Daughter and son would make medical decisions if needed      Cognitive Screening:   Provider or family/friend/caregiver concerned regarding cognition?: No    PREVENTIVE SCREENINGS      Cardiovascular Screening:    General: Screening Not Indicated and History Lipid Disorder      Diabetes Screening:     General: Screening Current      Colorectal Cancer Screening:     General: Screening Current      Breast Cancer Screening:     General: Screening Current      Cervical Cancer Screening:    General: Screening Not Indicated      Osteoporosis Screening:    General: Risks and Benefits Discussed    Due for: DXA Axial      Abdominal Aortic Aneurysm (AAA) Screening:        General: Screening Not Indicated      Lung Cancer Screening:     General: Screening Not Indicated      Hepatitis C Screening:    General: Screening Current      Jessica Shane DO

## 2021-03-02 NOTE — PATIENT INSTRUCTIONS
Medicare Preventive Visit Patient Instructions  Thank you for completing your Welcome to Medicare Visit or Medicare Annual Wellness Visit today  Your next wellness visit will be due in one year (3/2/2022)  The screening/preventive services that you may require over the next 5-10 years are detailed below  Some tests may not apply to you based off risk factors and/or age  Screening tests ordered at today's visit but not completed yet may show as past due  Also, please note that scanned in results may not display below  Preventive Screenings:  Service Recommendations Previous Testing/Comments   Colorectal Cancer Screening  * Colonoscopy    * Fecal Occult Blood Test (FOBT)/Fecal Immunochemical Test (FIT)  * Fecal DNA/Cologuard Test  * Flexible Sigmoidoscopy Age: 54-65 years old   Colonoscopy: every 10 years (may be performed more frequently if at higher risk)  OR  FOBT/FIT: every 1 year  OR  Cologuard: every 3 years  OR  Sigmoidoscopy: every 5 years  Screening may be recommended earlier than age 48 if at higher risk for colorectal cancer  Also, an individualized decision between you and your healthcare provider will decide whether screening between the ages of 74-80 would be appropriate  Colonoscopy: Not on file  FOBT/FIT: Not on file  Cologuard: 01/14/2021  Sigmoidoscopy: Not on file    Screening Current     Breast Cancer Screening Age: 36 years old  Frequency: every 1-2 years  Not required if history of left and right mastectomy Mammogram: 10/06/2020    Screening Current   Cervical Cancer Screening Between the ages of 21-29, pap smear recommended once every 3 years  Between the ages of 33-67, can perform pap smear with HPV co-testing every 5 years     Recommendations may differ for women with a history of total hysterectomy, cervical cancer, or abnormal pap smears in past  Pap Smear: Not on file    Screening Not Indicated   Hepatitis C Screening Once for adults born between 1945 and 1965  More frequently in patients at high risk for Hepatitis C Hep C Antibody: Not on file       Diabetes Screening 1-2 times per year if you're at risk for diabetes or have pre-diabetes Fasting glucose: 105 mg/dL   A1C: No results in last 5 years    Screening Current   Cholesterol Screening Once every 5 years if you don't have a lipid disorder  May order more often based on risk factors  Lipid panel: 11/05/2020    Screening Not Indicated  History Lipid Disorder     Other Preventive Screenings Covered by Medicare:  1  Abdominal Aortic Aneurysm (AAA) Screening: covered once if your at risk  You're considered to be at risk if you have a family history of AAA  2  Lung Cancer Screening: covers low dose CT scan once per year if you meet all of the following conditions: (1) Age 50-69; (2) No signs or symptoms of lung cancer; (3) Current smoker or have quit smoking within the last 15 years; (4) You have a tobacco smoking history of at least 30 pack years (packs per day multiplied by number of years you smoked); (5) You get a written order from a healthcare provider  3  Glaucoma Screening: covered annually if you're considered high risk: (1) You have diabetes OR (2) Family history of glaucoma OR (3)  aged 48 and older OR (3)  American aged 72 and older  3  Osteoporosis Screening: covered every 2 years if you meet one of the following conditions: (1) You're estrogen deficient and at risk for osteoporosis based off medical history and other findings; (2) Have a vertebral abnormality; (3) On glucocorticoid therapy for more than 3 months; (4) Have primary hyperparathyroidism; (5) On osteoporosis medications and need to assess response to drug therapy  · Last bone density test (DXA Scan): Not on file  5  HIV Screening: covered annually if you're between the age of 12-76  Also covered annually if you are younger than 13 and older than 72 with risk factors for HIV infection   For pregnant patients, it is covered up to 3 times per pregnancy  Immunizations:  Immunization Recommendations   Influenza Vaccine Annual influenza vaccination during flu season is recommended for all persons aged >= 6 months who do not have contraindications   Pneumococcal Vaccine (Prevnar and Pneumovax)  * Prevnar = PCV13  * Pneumovax = PPSV23   Adults 25-60 years old: 1-3 doses may be recommended based on certain risk factors  Adults 72 years old: Prevnar (PCV13) vaccine recommended followed by Pneumovax (PPSV23) vaccine  If already received PPSV23 since turning 65, then PCV13 recommended at least one year after PPSV23 dose  Hepatitis B Vaccine 3 dose series if at intermediate or high risk (ex: diabetes, end stage renal disease, liver disease)   Tetanus (Td) Vaccine - COST NOT COVERED BY MEDICARE PART B Following completion of primary series, a booster dose should be given every 10 years to maintain immunity against tetanus  Td may also be given as tetanus wound prophylaxis  Tdap Vaccine - COST NOT COVERED BY MEDICARE PART B Recommended at least once for all adults  For pregnant patients, recommended with each pregnancy  Shingles Vaccine (Shingrix) - COST NOT COVERED BY MEDICARE PART B  2 shot series recommended in those aged 48 and above     Health Maintenance Due:      Topic Date Due    Hepatitis C Screening  1955    MAMMOGRAM  10/06/2021    Colorectal Cancer Screening  01/14/2024     Immunizations Due:      Topic Date Due    Pneumococcal Vaccine: 65+ Years (1 of 1 - PPSV23) 04/10/2019    Influenza Vaccine (1) 09/01/2020     Advance Directives   What are advance directives? Advance directives are legal documents that state your wishes and plans for medical care  These plans are made ahead of time in case you lose your ability to make decisions for yourself  Advance directives can apply to any medical decision, such as the treatments you want, and if you want to donate organs  What are the types of advance directives?   There are many types of advance directives, and each state has rules about how to use them  You may choose a combination of any of the following:  · Living will: This is a written record of the treatment you want  You can also choose which treatments you do not want, which to limit, and which to stop at a certain time  This includes surgery, medicine, IV fluid, and tube feedings  · Durable power of  for healthcare [facility]): This is a written record that states who you want to make healthcare choices for you when you are unable to make them for yourself  This person, called a proxy, is usually a family member or a friend  You may choose more than 1 proxy  · Do not resuscitate (DNR) order:  A DNR order is used in case your heart stops beating or you stop breathing  It is a request not to have certain forms of treatment, such as CPR  A DNR order may be included in other types of advance directives  · Medical directive: This covers the care that you want if you are in a coma, near death, or unable to make decisions for yourself  You can list the treatments you want for each condition  Treatment may include pain medicine, surgery, blood transfusions, dialysis, IV or tube feedings, and a ventilator (breathing machine)  · Values history: This document has questions about your views, beliefs, and how you feel and think about life  This information can help others choose the care that you would choose  Why are advance directives important? An advance directive helps you control your care  Although spoken wishes may be used, it is better to have your wishes written down  Spoken wishes can be misunderstood, or not followed  Treatments may be given even if you do not want them  An advance directive may make it easier for your family to make difficult choices about your care     Weight Management   Why it is important to manage your weight:  Being overweight increases your risk of health conditions such as heart disease, high blood pressure, type 2 diabetes, and certain types of cancer  It can also increase your risk for osteoarthritis, sleep apnea, and other respiratory problems  Aim for a slow, steady weight loss  Even a small amount of weight loss can lower your risk of health problems  How to lose weight safely:  A safe and healthy way to lose weight is to eat fewer calories and get regular exercise  You can lose up about 1 pound a week by decreasing the number of calories you eat by 500 calories each day  Healthy meal plan for weight management:  A healthy meal plan includes a variety of foods, contains fewer calories, and helps you stay healthy  A healthy meal plan includes the following:  · Eat whole-grain foods more often  A healthy meal plan should contain fiber  Fiber is the part of grains, fruits, and vegetables that is not broken down by your body  Whole-grain foods are healthy and provide extra fiber in your diet  Some examples of whole-grain foods are whole-wheat breads and pastas, oatmeal, brown rice, and bulgur  · Eat a variety of vegetables every day  Include dark, leafy greens such as spinach, kale, jody greens, and mustard greens  Eat yellow and orange vegetables such as carrots, sweet potatoes, and winter squash  · Eat a variety of fruits every day  Choose fresh or canned fruit (canned in its own juice or light syrup) instead of juice  Fruit juice has very little or no fiber  · Eat low-fat dairy foods  Drink fat-free (skim) milk or 1% milk  Eat fat-free yogurt and low-fat cottage cheese  Try low-fat cheeses such as mozzarella and other reduced-fat cheeses  · Choose meat and other protein foods that are low in fat  Choose beans or other legumes such as split peas or lentils  Choose fish, skinless poultry (chicken or turkey), or lean cuts of red meat (beef or pork)  Before you cook meat or poultry, cut off any visible fat  · Use less fat and oil  Try baking foods instead of frying them   Add less fat, such as margarine, sour cream, regular salad dressing and mayonnaise to foods  Eat fewer high-fat foods  Some examples of high-fat foods include french fries, doughnuts, ice cream, and cakes  · Eat fewer sweets  Limit foods and drinks that are high in sugar  This includes candy, cookies, regular soda, and sweetened drinks  Exercise:  Exercise at least 30 minutes per day on most days of the week  Some examples of exercise include walking, biking, dancing, and swimming  You can also fit in more physical activity by taking the stairs instead of the elevator or parking farther away from stores  Ask your healthcare provider about the best exercise plan for you  © Copyright [employer] 2018 Information is for End User's use only and may not be sold, redistributed or otherwise used for commercial purposes   All illustrations and images included in CareNotes® are the copyrighted property of A D A M , [employer]  or [address] [place] [address]

## 2021-03-03 NOTE — TELEPHONE ENCOUNTER
Please let the patient know I would like her to take half of a tablet of the Xyzal (levocetirizine) so 2 5 mg daily due to her kidney function   Let me know if she has any questions- thanks!     (Creatinine clearance 65, Creatinine 0 93, GFR 64)

## 2021-03-03 NOTE — TELEPHONE ENCOUNTER
Called and spoke with pt  Informed her of instructions  Pt verbalized an understanding and states she is going for blood work on Saturday morning

## 2021-03-05 ENCOUNTER — APPOINTMENT (OUTPATIENT)
Dept: RADIOLOGY | Facility: CLINIC | Age: 66
End: 2021-03-05
Payer: COMMERCIAL

## 2021-03-05 ENCOUNTER — LAB (OUTPATIENT)
Dept: LAB | Facility: CLINIC | Age: 66
End: 2021-03-05
Payer: COMMERCIAL

## 2021-03-05 DIAGNOSIS — M25.551 RIGHT HIP PAIN: ICD-10-CM

## 2021-03-05 DIAGNOSIS — D72.829 LEUKOCYTOSIS, UNSPECIFIED TYPE: ICD-10-CM

## 2021-03-05 DIAGNOSIS — E55.9 VITAMIN D DEFICIENCY: ICD-10-CM

## 2021-03-05 DIAGNOSIS — M79.89 SWELLING OF LOWER EXTREMITY: ICD-10-CM

## 2021-03-05 DIAGNOSIS — R73.01 ELEVATED FASTING GLUCOSE: ICD-10-CM

## 2021-03-05 LAB
25(OH)D3 SERPL-MCNC: 54.6 NG/ML (ref 30–100)
ALBUMIN SERPL BCP-MCNC: 3.6 G/DL (ref 3.5–5)
ALP SERPL-CCNC: 161 U/L (ref 46–116)
ALT SERPL W P-5'-P-CCNC: 34 U/L (ref 12–78)
ANION GAP SERPL CALCULATED.3IONS-SCNC: 10 MMOL/L (ref 4–13)
AST SERPL W P-5'-P-CCNC: 27 U/L (ref 5–45)
BASOPHILS # BLD AUTO: 0.06 THOUSANDS/ΜL (ref 0–0.1)
BASOPHILS NFR BLD AUTO: 1 % (ref 0–1)
BILIRUB SERPL-MCNC: 0.51 MG/DL (ref 0.2–1)
BUN SERPL-MCNC: 15 MG/DL (ref 5–25)
CALCIUM SERPL-MCNC: 9 MG/DL (ref 8.3–10.1)
CHLORIDE SERPL-SCNC: 108 MMOL/L (ref 100–108)
CO2 SERPL-SCNC: 24 MMOL/L (ref 21–32)
CREAT SERPL-MCNC: 0.84 MG/DL (ref 0.6–1.3)
EOSINOPHIL # BLD AUTO: 0.62 THOUSAND/ΜL (ref 0–0.61)
EOSINOPHIL NFR BLD AUTO: 6 % (ref 0–6)
ERYTHROCYTE [DISTWIDTH] IN BLOOD BY AUTOMATED COUNT: 13.1 % (ref 11.6–15.1)
EST. AVERAGE GLUCOSE BLD GHB EST-MCNC: 111 MG/DL
GFR SERPL CREATININE-BSD FRML MDRD: 73 ML/MIN/1.73SQ M
GLUCOSE P FAST SERPL-MCNC: 99 MG/DL (ref 65–99)
HBA1C MFR BLD: 5.5 %
HCT VFR BLD AUTO: 42.8 % (ref 34.8–46.1)
HGB BLD-MCNC: 13.5 G/DL (ref 11.5–15.4)
IMM GRANULOCYTES # BLD AUTO: 0.03 THOUSAND/UL (ref 0–0.2)
IMM GRANULOCYTES NFR BLD AUTO: 0 % (ref 0–2)
LYMPHOCYTES # BLD AUTO: 3.15 THOUSANDS/ΜL (ref 0.6–4.47)
LYMPHOCYTES NFR BLD AUTO: 33 % (ref 14–44)
MCH RBC QN AUTO: 28.8 PG (ref 26.8–34.3)
MCHC RBC AUTO-ENTMCNC: 31.5 G/DL (ref 31.4–37.4)
MCV RBC AUTO: 92 FL (ref 82–98)
MONOCYTES # BLD AUTO: 0.89 THOUSAND/ΜL (ref 0.17–1.22)
MONOCYTES NFR BLD AUTO: 9 % (ref 4–12)
NEUTROPHILS # BLD AUTO: 4.88 THOUSANDS/ΜL (ref 1.85–7.62)
NEUTS SEG NFR BLD AUTO: 51 % (ref 43–75)
NRBC BLD AUTO-RTO: 0 /100 WBCS
NT-PROBNP SERPL-MCNC: 61 PG/ML
PLATELET # BLD AUTO: 227 THOUSANDS/UL (ref 149–390)
PMV BLD AUTO: 11.7 FL (ref 8.9–12.7)
POTASSIUM SERPL-SCNC: 3.8 MMOL/L (ref 3.5–5.3)
PROT SERPL-MCNC: 7.2 G/DL (ref 6.4–8.2)
RBC # BLD AUTO: 4.68 MILLION/UL (ref 3.81–5.12)
SODIUM SERPL-SCNC: 142 MMOL/L (ref 136–145)
WBC # BLD AUTO: 9.63 THOUSAND/UL (ref 4.31–10.16)

## 2021-03-05 PROCEDURE — 82306 VITAMIN D 25 HYDROXY: CPT

## 2021-03-05 PROCEDURE — 83880 ASSAY OF NATRIURETIC PEPTIDE: CPT

## 2021-03-05 PROCEDURE — 85025 COMPLETE CBC W/AUTO DIFF WBC: CPT

## 2021-03-05 PROCEDURE — 36415 COLL VENOUS BLD VENIPUNCTURE: CPT

## 2021-03-05 PROCEDURE — 73502 X-RAY EXAM HIP UNI 2-3 VIEWS: CPT

## 2021-03-05 PROCEDURE — 80053 COMPREHEN METABOLIC PANEL: CPT

## 2021-03-05 PROCEDURE — 83036 HEMOGLOBIN GLYCOSYLATED A1C: CPT

## 2021-03-15 ENCOUNTER — HOSPITAL ENCOUNTER (OUTPATIENT)
Dept: BONE DENSITY | Facility: HOSPITAL | Age: 66
Discharge: HOME | End: 2021-03-15
Payer: COMMERCIAL

## 2021-03-15 DIAGNOSIS — Z78.0 POST-MENOPAUSAL: ICD-10-CM

## 2021-03-15 DIAGNOSIS — Z13.820 SCREENING FOR OSTEOPOROSIS: ICD-10-CM

## 2021-03-15 PROCEDURE — 77080 DXA BONE DENSITY AXIAL: CPT

## 2021-03-24 ENCOUNTER — RA CDI HCC (OUTPATIENT)
Dept: OTHER | Facility: HOSPITAL | Age: 66
End: 2021-03-24

## 2021-03-24 NOTE — PROGRESS NOTES
[facility] 75  coding oppertunities          Chart reviewed, no opportunity found: CHART REVIEWED, NO OPPORTUNITY FOUND

## 2021-03-31 ENCOUNTER — OFFICE VISIT (OUTPATIENT)
Dept: FAMILY MEDICINE CLINIC | Facility: CLINIC | Age: 66
End: 2021-03-31
Payer: COMMERCIAL

## 2021-03-31 VITALS
DIASTOLIC BLOOD PRESSURE: 76 MMHG | OXYGEN SATURATION: 96 % | HEIGHT: 60 IN | RESPIRATION RATE: 18 BRPM | SYSTOLIC BLOOD PRESSURE: 120 MMHG | BODY MASS INDEX: 29.84 KG/M2 | HEART RATE: 77 BPM | TEMPERATURE: 97.9 F | WEIGHT: 152 LBS

## 2021-03-31 DIAGNOSIS — J45.40 MODERATE PERSISTENT ASTHMA WITHOUT COMPLICATION: ICD-10-CM

## 2021-03-31 DIAGNOSIS — M25.551 RIGHT HIP PAIN: ICD-10-CM

## 2021-03-31 DIAGNOSIS — R74.8 ELEVATED ALKALINE PHOSPHATASE LEVEL: ICD-10-CM

## 2021-03-31 DIAGNOSIS — R06.02 SHORTNESS OF BREATH: ICD-10-CM

## 2021-03-31 DIAGNOSIS — M85.80 OSTEOPENIA, UNSPECIFIED LOCATION: Primary | ICD-10-CM

## 2021-03-31 PROCEDURE — 99214 OFFICE O/P EST MOD 30 MIN: CPT | Performed by: FAMILY MEDICINE

## 2021-03-31 PROCEDURE — 3008F BODY MASS INDEX DOCD: CPT | Performed by: FAMILY MEDICINE

## 2021-03-31 PROCEDURE — 1160F RVW MEDS BY RX/DR IN RCRD: CPT | Performed by: FAMILY MEDICINE

## 2021-03-31 PROCEDURE — 3725F SCREEN DEPRESSION PERFORMED: CPT | Performed by: FAMILY MEDICINE

## 2021-03-31 PROCEDURE — 1036F TOBACCO NON-USER: CPT | Performed by: FAMILY MEDICINE

## 2021-03-31 NOTE — PROGRESS NOTES
Assessment/Plan:       Problem List Items Addressed This Visit        Respiratory    Moderate asthma       Musculoskeletal and Integument    Osteopenia - Primary       Other    Right hip pain    Elevated alkaline phosphatase level      Other Visit Diagnoses     Shortness of breath                Subjective:      Patient ID: Jeff Guerra is a 76 y o  female  HPI     The patient presents today to review recent test results  Blood work reviewed and normal except for as outlined below: Alk phos slightly elevated- 161, will continue to trend with blood work in future  DEXA reviewed and showed osteopenia, no significant change from prior  "The 10 year risk of hip fracture is 2%, with the 10 year risk of major osteoporotic fracture being 10%, as calculated by the WHO fracture risk assessment tool (FRAX)  The current NOF guidelines recommend treating patients with FRAX 10 year risk score of >3% for hip fracture and >20% for major osteoporotic fracture " She is currently taking high-dose Vitamin D repletion, I advised once she finished that to take an OTC calcium/Vitamin D supplement daily  Exercise including weight-bearing exercise discussed  She does not smoke  She currently drinks 2 cans of beer each night, advised to cut back on this  Right hip pain- Hip XR reviewed and shows bilateral mild degenerative joints  "Bilateral mild degenerative joints  Bilateral greater trochanter mild degenerative changes  Bilateral lesser trochanter small spurs  Mild degenerative lower lumbar spine and pubic symphysis " Discussed options for physical therapy, referral to orthopedics/sports medicine for possible corticosteroid injection, likely trochanteric bursitis component  She will call if she would like to pursue this  She mentions her son was diagnosed with primary lateral sclerosis 3 years ago       Shortness of breath/moderate persistent asthma- Continue shortness of breath at baseline, no new symptoms or concerns  She is currently using her rescue inhaler every 6 hours or so, which is not new for her  She also uses Advair 250-50 1 puff BID and theophylline  She mentions she was told in the past she has 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 The following portions of the patient's history were reviewed and updated as appropriate: allergies, current medications, past family history, past medical history, past social history, past surgical history and problem list     Review of Systems   Constitutional: Negative for chills and fever  HENT: Negative for congestion and sore throat  Respiratory: Positive for shortness of breath  Negative for cough  Cardiovascular: Negative for chest pain  Musculoskeletal: Positive for arthralgias  Objective:      /76   Pulse 77   Temp 97 9 °F (36 6 °C) (Tympanic)   Resp 18   Ht 5' (1 524 m)   Wt 68 9 kg (152 lb)   SpO2 96%   BMI 29 69 kg/m²          Physical Exam  Vitals signs reviewed  Constitutional:       General: She is not in acute distress  Appearance: Normal appearance  She is not ill-appearing, toxic-appearing or diaphoretic  HENT:      Head: Normocephalic and atraumatic  Eyes:      General:         Right eye: No discharge  Left eye: No discharge  Extraocular Movements: Extraocular movements intact  Conjunctiva/sclera: Conjunctivae normal    Neck:      Musculoskeletal: Normal range of motion and neck supple  No neck rigidity  Cardiovascular:      Rate and Rhythm: Normal rate and regular rhythm  Heart sounds: Normal heart sounds  No murmur  No friction rub  No gallop  Pulmonary:      Effort: Pulmonary effort is normal  No respiratory distress  Breath sounds: No stridor  Wheezing present  No rhonchi        Comments: Diminished breath sounds at the bases, trace crackles and wheezing diffusely with good air movement   Musculoskeletal:      Right lower leg: Edema present  Left lower leg: Edema present  Comments: Trace pitting edema noted bilaterally, varicose vein appreciated posterior left leg  Tenderness over right greater trochanter  Skin:     General: Skin is warm  Coloration: Skin is not pale  Findings: No erythema or rash  Neurological:      Mental Status: She is alert and oriented to person, place, and time  Motor: No weakness     Psychiatric:         Mood and Affect: Mood normal          Behavior: Behavior normal            DO Anna Erickson [facility]

## 2021-04-01 PROBLEM — M85.80 OSTEOPENIA: Status: ACTIVE | Noted: 2021-04-01

## 2021-04-01 PROBLEM — M25.551 RIGHT HIP PAIN: Status: ACTIVE | Noted: 2021-04-01

## 2021-04-01 PROBLEM — R74.8 ELEVATED ALKALINE PHOSPHATASE LEVEL: Status: ACTIVE | Noted: 2021-04-01

## 2021-04-02 ENCOUNTER — IMMUNIZATIONS (OUTPATIENT)
Dept: FAMILY MEDICINE CLINIC | Facility: HOSPITAL | Age: 66
End: 2021-04-02

## 2021-04-02 DIAGNOSIS — Z23 ENCOUNTER FOR IMMUNIZATION: Primary | ICD-10-CM

## 2021-04-02 PROCEDURE — 91301 SARS-COV-2 / COVID-19 MRNA VACCINE (MODERNA) 100 MCG: CPT

## 2021-04-02 PROCEDURE — 0011A SARS-COV-2 / COVID-19 MRNA VACCINE (MODERNA) 100 MCG: CPT

## 2021-04-06 ENCOUNTER — TELEPHONE (OUTPATIENT)
Dept: FAMILY MEDICINE CLINIC | Facility: CLINIC | Age: 66
End: 2021-04-06

## 2021-04-18 DIAGNOSIS — E55.9 VITAMIN D DEFICIENCY: ICD-10-CM

## 2021-04-20 RX ORDER — ERGOCALCIFEROL 1.25 MG/1
CAPSULE ORAL
Qty: 12 CAPSULE | Refills: 1 | OUTPATIENT
Start: 2021-04-20

## 2021-05-04 ENCOUNTER — HOSPITAL ENCOUNTER (OUTPATIENT)
Dept: NON INVASIVE DIAGNOSTICS | Facility: HOSPITAL | Age: 66
Discharge: HOME | End: 2021-05-04
Payer: COMMERCIAL

## 2021-05-04 DIAGNOSIS — M79.89 SWELLING OF LOWER EXTREMITY: ICD-10-CM

## 2021-05-04 PROCEDURE — 93306 TTE W/DOPPLER COMPLETE: CPT

## 2021-05-04 PROCEDURE — 93306 TTE W/DOPPLER COMPLETE: CPT | Performed by: INTERNAL MEDICINE

## 2021-05-05 ENCOUNTER — IMMUNIZATIONS (OUTPATIENT)
Dept: FAMILY MEDICINE CLINIC | Facility: HOSPITAL | Age: 66
End: 2021-05-05

## 2021-05-05 DIAGNOSIS — Z23 ENCOUNTER FOR IMMUNIZATION: Primary | ICD-10-CM

## 2021-05-05 PROCEDURE — 91301 SARS-COV-2 / COVID-19 MRNA VACCINE (MODERNA) 100 MCG: CPT

## 2021-05-05 PROCEDURE — 0012A SARS-COV-2 / COVID-19 MRNA VACCINE (MODERNA) 100 MCG: CPT

## 2021-05-23 ENCOUNTER — APPOINTMENT (EMERGENCY)
Dept: CT IMAGING | Facility: HOSPITAL | Age: 66
End: 2021-05-23
Payer: COMMERCIAL

## 2021-05-23 ENCOUNTER — APPOINTMENT (EMERGENCY)
Dept: RADIOLOGY | Facility: HOSPITAL | Age: 66
End: 2021-05-23
Payer: COMMERCIAL

## 2021-05-23 ENCOUNTER — HOSPITAL ENCOUNTER (EMERGENCY)
Facility: HOSPITAL | Age: 66
End: 2021-05-24
Attending: EMERGENCY MEDICINE | Admitting: EMERGENCY MEDICINE
Payer: COMMERCIAL

## 2021-05-23 DIAGNOSIS — A41.9 SEPSIS (HCC): ICD-10-CM

## 2021-05-23 DIAGNOSIS — G93.40 ACUTE ENCEPHALOPATHY: Primary | ICD-10-CM

## 2021-05-23 DIAGNOSIS — J96.01 ACUTE RESPIRATORY FAILURE WITH HYPOXIA (HCC): ICD-10-CM

## 2021-05-23 LAB
AMPHETAMINES SERPL QL SCN: NEGATIVE
ANION GAP SERPL CALCULATED.3IONS-SCNC: 23 MMOL/L (ref 4–13)
APTT PPP: 25 SECONDS (ref 23–37)
ARTERIAL PATENCY WRIST A: YES
ATRIAL RATE: 123 BPM
BACTERIA UR QL AUTO: NORMAL /HPF
BARBITURATES UR QL: NEGATIVE
BASE EX.OXY STD BLDV CALC-SCNC: 88.1 %
BASE EXCESS BLDA CALC-SCNC: -6.2 MMOL/L (ref -2–3)
BASE EXCESS BLDV CALC-SCNC: -4.2 MMOL/L
BENZODIAZ UR QL: NEGATIVE
BILIRUB UR QL STRIP: NEGATIVE
BUN SERPL-MCNC: 16 MG/DL (ref 7–25)
CALCIUM SERPL-MCNC: 9.3 MG/DL (ref 8.6–10.5)
CHLORIDE SERPL-SCNC: 105 MMOL/L (ref 98–107)
CLARITY UR: CLEAR
CO2 SERPL-SCNC: 15 MMOL/L (ref 21–31)
COCAINE UR QL: NEGATIVE
COLOR UR: YELLOW
CREAT SERPL-MCNC: 1.09 MG/DL (ref 0.6–1.2)
ERYTHROCYTE [DISTWIDTH] IN BLOOD BY AUTOMATED COUNT: 13.9 % (ref 11.5–14.5)
ETHANOL SERPL-MCNC: <10 MG/DL
GFR SERPL CREATININE-BSD FRML MDRD: 53 ML/MIN/1.73SQ M
GLUCOSE SERPL-MCNC: 186 MG/DL (ref 65–99)
GLUCOSE UR STRIP-MCNC: NEGATIVE MG/DL
HCO3 BLDA-SCNC: 20.8 MMOL/L (ref 22–28)
HCO3 BLDV-SCNC: 22.2 MMOL/L (ref 24–30)
HCT VFR BLD AUTO: 42.8 % (ref 42–47)
HGB BLD-MCNC: 13.4 G/DL (ref 12–16)
HGB UR QL STRIP.AUTO: ABNORMAL
HOROWITZ INDEX BLDA+IHG-RTO: 80 MM[HG]
INR PPP: 1.09 (ref 0.84–1.19)
KETONES UR STRIP-MCNC: NEGATIVE MG/DL
LACTATE SERPL-SCNC: 2.9 MMOL/L (ref 0.5–2)
LEUKOCYTE ESTERASE UR QL STRIP: NEGATIVE
MAGNESIUM SERPL-MCNC: 1.9 MG/DL (ref 1.9–2.7)
MCH RBC QN AUTO: 28.9 PG (ref 26–34)
MCHC RBC AUTO-ENTMCNC: 31.2 G/DL (ref 31–37)
MCV RBC AUTO: 93 FL (ref 81–99)
METHADONE UR QL: NEGATIVE
NITRITE UR QL STRIP: NEGATIVE
NON-SQ EPI CELLS URNS QL MICRO: NORMAL /HPF
O2 CT BLDA-SCNC: 17.2 ML/DL
O2 CT BLDV-SCNC: 16.5 ML/DL
OPIATES UR QL SCN: NEGATIVE
OXYCODONE+OXYMORPHONE UR QL SCN: NEGATIVE
OXYHGB MFR BLDA: 98.2 % (ref 94–100)
P AXIS: 63 DEGREES
PCO2 BLDA: 50.2 MM HG (ref 35–45)
PCO2 BLDV: 46.9 MM HG
PCP UR QL: NEGATIVE
PEEP RESPIRATORY: 6 CM[H2O]
PH BLDA: 7.24 [PH] (ref 7.35–7.45)
PH BLDV: 7.3 [PH] (ref 7.3–7.4)
PH UR STRIP.AUTO: 5.5 [PH]
PLATELET # BLD AUTO: 242 THOUSANDS/UL (ref 149–390)
PMV BLD AUTO: 10.4 FL (ref 8.6–11.7)
PO2 BLDA: 321 MM HG (ref 80–100)
PO2 BLDV: 68 MM HG (ref 35–45)
POTASSIUM SERPL-SCNC: 3.3 MMOL/L (ref 3.5–5.5)
PR INTERVAL: 164 MS
PROT UR STRIP-MCNC: NEGATIVE MG/DL
PROTHROMBIN TIME: 14 SECONDS (ref 11.6–14.5)
QRS AXIS: 31 DEGREES
QRSD INTERVAL: 92 MS
QT INTERVAL: 312 MS
QTC INTERVAL: 446 MS
RBC # BLD AUTO: 4.62 MILLION/UL (ref 3.9–5.2)
RBC #/AREA URNS AUTO: NORMAL /HPF
SARS-COV-2 RNA RESP QL NAA+PROBE: NEGATIVE
SODIUM SERPL-SCNC: 143 MMOL/L (ref 134–143)
SP GR UR STRIP.AUTO: 1.01 (ref 1–1.03)
SPECIMEN SOURCE: ABNORMAL
T WAVE AXIS: 33 DEGREES
THC UR QL: NEGATIVE
TROPONIN I SERPL-MCNC: <0.03 NG/ML
UROBILINOGEN UR QL STRIP.AUTO: 0.2 E.U./DL
VENT AC: 16
VENTRICULAR RATE: 123 BPM
VT SETTING VENT: 400 ML
WBC # BLD AUTO: 18.7 THOUSAND/UL (ref 4.8–10.8)
WBC #/AREA URNS AUTO: NORMAL /HPF

## 2021-05-23 PROCEDURE — 84145 PROCALCITONIN (PCT): CPT | Performed by: EMERGENCY MEDICINE

## 2021-05-23 PROCEDURE — 96375 TX/PRO/DX INJ NEW DRUG ADDON: CPT

## 2021-05-23 PROCEDURE — 80307 DRUG TEST PRSMV CHEM ANLYZR: CPT | Performed by: EMERGENCY MEDICINE

## 2021-05-23 PROCEDURE — 80048 BASIC METABOLIC PNL TOTAL CA: CPT | Performed by: EMERGENCY MEDICINE

## 2021-05-23 PROCEDURE — 82077 ASSAY SPEC XCP UR&BREATH IA: CPT | Performed by: EMERGENCY MEDICINE

## 2021-05-23 PROCEDURE — 71045 X-RAY EXAM CHEST 1 VIEW: CPT

## 2021-05-23 PROCEDURE — 83735 ASSAY OF MAGNESIUM: CPT | Performed by: EMERGENCY MEDICINE

## 2021-05-23 PROCEDURE — 36600 WITHDRAWAL OF ARTERIAL BLOOD: CPT

## 2021-05-23 PROCEDURE — 93005 ELECTROCARDIOGRAM TRACING: CPT

## 2021-05-23 PROCEDURE — 84484 ASSAY OF TROPONIN QUANT: CPT | Performed by: EMERGENCY MEDICINE

## 2021-05-23 PROCEDURE — 96367 TX/PROPH/DG ADDL SEQ IV INF: CPT

## 2021-05-23 PROCEDURE — U0005 INFEC AGEN DETEC AMPLI PROBE: HCPCS | Performed by: EMERGENCY MEDICINE

## 2021-05-23 PROCEDURE — 84133 ASSAY OF URINE POTASSIUM: CPT | Performed by: STUDENT IN AN ORGANIZED HEALTH CARE EDUCATION/TRAINING PROGRAM

## 2021-05-23 PROCEDURE — 93010 ELECTROCARDIOGRAM REPORT: CPT | Performed by: INTERNAL MEDICINE

## 2021-05-23 PROCEDURE — 99285 EMERGENCY DEPT VISIT HI MDM: CPT

## 2021-05-23 PROCEDURE — 84300 ASSAY OF URINE SODIUM: CPT | Performed by: STUDENT IN AN ORGANIZED HEALTH CARE EDUCATION/TRAINING PROGRAM

## 2021-05-23 PROCEDURE — 96361 HYDRATE IV INFUSION ADD-ON: CPT

## 2021-05-23 PROCEDURE — 82436 ASSAY OF URINE CHLORIDE: CPT | Performed by: STUDENT IN AN ORGANIZED HEALTH CARE EDUCATION/TRAINING PROGRAM

## 2021-05-23 PROCEDURE — 85027 COMPLETE CBC AUTOMATED: CPT | Performed by: EMERGENCY MEDICINE

## 2021-05-23 PROCEDURE — 94760 N-INVAS EAR/PLS OXIMETRY 1: CPT

## 2021-05-23 PROCEDURE — 96368 THER/DIAG CONCURRENT INF: CPT

## 2021-05-23 PROCEDURE — 99285 EMERGENCY DEPT VISIT HI MDM: CPT | Performed by: EMERGENCY MEDICINE

## 2021-05-23 PROCEDURE — 85730 THROMBOPLASTIN TIME PARTIAL: CPT | Performed by: EMERGENCY MEDICINE

## 2021-05-23 PROCEDURE — 36415 COLL VENOUS BLD VENIPUNCTURE: CPT | Performed by: EMERGENCY MEDICINE

## 2021-05-23 PROCEDURE — 94002 VENT MGMT INPAT INIT DAY: CPT

## 2021-05-23 PROCEDURE — 83605 ASSAY OF LACTIC ACID: CPT | Performed by: EMERGENCY MEDICINE

## 2021-05-23 PROCEDURE — 81001 URINALYSIS AUTO W/SCOPE: CPT | Performed by: EMERGENCY MEDICINE

## 2021-05-23 PROCEDURE — 94644 CONT INHLJ TX 1ST HOUR: CPT

## 2021-05-23 PROCEDURE — G1004 CDSM NDSC: HCPCS

## 2021-05-23 PROCEDURE — 70498 CT ANGIOGRAPHY NECK: CPT

## 2021-05-23 PROCEDURE — 70496 CT ANGIOGRAPHY HEAD: CPT

## 2021-05-23 PROCEDURE — 96365 THER/PROPH/DIAG IV INF INIT: CPT

## 2021-05-23 PROCEDURE — 82805 BLOOD GASES W/O2 SATURATION: CPT | Performed by: EMERGENCY MEDICINE

## 2021-05-23 PROCEDURE — 87040 BLOOD CULTURE FOR BACTERIA: CPT | Performed by: EMERGENCY MEDICINE

## 2021-05-23 PROCEDURE — U0003 INFECTIOUS AGENT DETECTION BY NUCLEIC ACID (DNA OR RNA); SEVERE ACUTE RESPIRATORY SYNDROME CORONAVIRUS 2 (SARS-COV-2) (CORONAVIRUS DISEASE [COVID-19]), AMPLIFIED PROBE TECHNIQUE, MAKING USE OF HIGH THROUGHPUT TECHNOLOGIES AS DESCRIBED BY CMS-2020-01-R: HCPCS | Performed by: EMERGENCY MEDICINE

## 2021-05-23 PROCEDURE — 85610 PROTHROMBIN TIME: CPT | Performed by: EMERGENCY MEDICINE

## 2021-05-23 RX ORDER — DEXAMETHASONE SODIUM PHOSPHATE 4 MG/ML
5 INJECTION, SOLUTION INTRA-ARTICULAR; INTRALESIONAL; INTRAMUSCULAR; INTRAVENOUS; SOFT TISSUE ONCE
Status: COMPLETED | OUTPATIENT
Start: 2021-05-23 | End: 2021-05-23

## 2021-05-23 RX ORDER — SUCCINYLCHOLINE/SOD CL,ISO/PF 100 MG/5ML
1.5 SYRINGE (ML) INTRAVENOUS ONCE
Status: COMPLETED | OUTPATIENT
Start: 2021-05-23 | End: 2021-05-23

## 2021-05-23 RX ORDER — CEFEPIME HYDROCHLORIDE 2 G/50ML
2000 INJECTION, SOLUTION INTRAVENOUS ONCE
Status: COMPLETED | OUTPATIENT
Start: 2021-05-23 | End: 2021-05-23

## 2021-05-23 RX ORDER — VANCOMYCIN HYDROCHLORIDE 1 G/200ML
15 INJECTION, SOLUTION INTRAVENOUS ONCE
Status: COMPLETED | OUTPATIENT
Start: 2021-05-23 | End: 2021-05-23

## 2021-05-23 RX ORDER — ETOMIDATE 2 MG/ML
20 INJECTION INTRAVENOUS ONCE
Status: COMPLETED | OUTPATIENT
Start: 2021-05-23 | End: 2021-05-23

## 2021-05-23 RX ORDER — SODIUM CHLORIDE FOR INHALATION 0.9 %
3 VIAL, NEBULIZER (ML) INHALATION ONCE
Status: COMPLETED | OUTPATIENT
Start: 2021-05-23 | End: 2021-05-23

## 2021-05-23 RX ORDER — PROPOFOL 10 MG/ML
5-50 INJECTION, EMULSION INTRAVENOUS
Status: DISCONTINUED | OUTPATIENT
Start: 2021-05-23 | End: 2021-05-24 | Stop reason: HOSPADM

## 2021-05-23 RX ORDER — LORAZEPAM 2 MG/ML
2 INJECTION INTRAMUSCULAR ONCE
Status: COMPLETED | OUTPATIENT
Start: 2021-05-23 | End: 2021-05-23

## 2021-05-23 RX ORDER — MAGNESIUM SULFATE 1 G/100ML
1 INJECTION INTRAVENOUS ONCE
Status: COMPLETED | OUTPATIENT
Start: 2021-05-23 | End: 2021-05-23

## 2021-05-23 RX ORDER — METHYLPREDNISOLONE SODIUM SUCCINATE 125 MG/2ML
125 INJECTION, POWDER, LYOPHILIZED, FOR SOLUTION INTRAMUSCULAR; INTRAVENOUS ONCE
Status: COMPLETED | OUTPATIENT
Start: 2021-05-23 | End: 2021-05-23

## 2021-05-23 RX ORDER — SODIUM CHLORIDE 9 MG/ML
125 INJECTION, SOLUTION INTRAVENOUS CONTINUOUS
Status: DISCONTINUED | OUTPATIENT
Start: 2021-05-23 | End: 2021-05-24 | Stop reason: HOSPADM

## 2021-05-23 RX ADMIN — ALBUTEROL SULFATE 10 MG: 2.5 SOLUTION RESPIRATORY (INHALATION) at 21:33

## 2021-05-23 RX ADMIN — AMPICILLIN SODIUM 2000 MG: 2 INJECTION, POWDER, FOR SOLUTION INTRAMUSCULAR; INTRAVENOUS at 22:40

## 2021-05-23 RX ADMIN — SODIUM CHLORIDE 2000 ML: 0.9 INJECTION, SOLUTION INTRAVENOUS at 20:24

## 2021-05-23 RX ADMIN — LEVETIRACETAM 1500 MG: 100 INJECTION, SOLUTION INTRAVENOUS at 21:10

## 2021-05-23 RX ADMIN — DEXAMETHASONE SODIUM PHOSPHATE 5 MG: 4 INJECTION, SOLUTION INTRA-ARTICULAR; INTRALESIONAL; INTRAMUSCULAR; INTRAVENOUS; SOFT TISSUE at 23:12

## 2021-05-23 RX ADMIN — IPRATROPIUM BROMIDE 1 MG: 0.5 SOLUTION RESPIRATORY (INHALATION) at 21:33

## 2021-05-23 RX ADMIN — ACYCLOVIR SODIUM 450 MG: 1000 INJECTION, SOLUTION INTRAVENOUS at 22:53

## 2021-05-23 RX ADMIN — PROPOFOL 10 MCG/KG/MIN: 10 INJECTION, EMULSION INTRAVENOUS at 21:20

## 2021-05-23 RX ADMIN — VANCOMYCIN HYDROCHLORIDE 1000 MG: 1 INJECTION, SOLUTION INTRAVENOUS at 21:26

## 2021-05-23 RX ADMIN — PROPOFOL 50 MCG/KG/MIN: 10 INJECTION, EMULSION INTRAVENOUS at 23:12

## 2021-05-23 RX ADMIN — DEXAMETHASONE SODIUM PHOSPHATE 5 MG: 4 INJECTION, SOLUTION INTRA-ARTICULAR; INTRALESIONAL; INTRAMUSCULAR; INTRAVENOUS; SOFT TISSUE at 22:39

## 2021-05-23 RX ADMIN — IOHEXOL 100 ML: 350 INJECTION, SOLUTION INTRAVENOUS at 19:42

## 2021-05-23 RX ADMIN — METHYLPREDNISOLONE SODIUM SUCCINATE 125 MG: 125 INJECTION, POWDER, FOR SOLUTION INTRAMUSCULAR; INTRAVENOUS at 21:58

## 2021-05-23 RX ADMIN — ETOMIDATE 20 MG: 20 INJECTION, SOLUTION INTRAVENOUS at 21:01

## 2021-05-23 RX ADMIN — Medication 104 MG: at 21:03

## 2021-05-23 RX ADMIN — CEFEPIME HYDROCHLORIDE 2000 MG: 2 INJECTION, SOLUTION INTRAVENOUS at 20:31

## 2021-05-23 RX ADMIN — MIDAZOLAM HYDROCHLORIDE 1 MG/HR: 5 INJECTION, SOLUTION INTRAMUSCULAR; INTRAVENOUS at 23:43

## 2021-05-23 RX ADMIN — MAGNESIUM SULFATE HEPTAHYDRATE 1 G: 1 INJECTION, SOLUTION INTRAVENOUS at 21:26

## 2021-05-23 RX ADMIN — ISODIUM CHLORIDE 3 ML: 0.03 SOLUTION RESPIRATORY (INHALATION) at 21:33

## 2021-05-23 RX ADMIN — SODIUM CHLORIDE 125 ML/HR: 0.9 INJECTION, SOLUTION INTRAVENOUS at 23:11

## 2021-05-23 RX ADMIN — LORAZEPAM 2 MG: 2 INJECTION INTRAMUSCULAR; INTRAVENOUS at 19:23

## 2021-05-24 ENCOUNTER — APPOINTMENT (INPATIENT)
Dept: RADIOLOGY | Facility: HOSPITAL | Age: 66
DRG: 070 | End: 2021-05-24
Payer: COMMERCIAL

## 2021-05-24 ENCOUNTER — APPOINTMENT (INPATIENT)
Dept: NEUROLOGY | Facility: CLINIC | Age: 66
DRG: 070 | End: 2021-05-24
Payer: COMMERCIAL

## 2021-05-24 ENCOUNTER — HOSPITAL ENCOUNTER (INPATIENT)
Facility: HOSPITAL | Age: 66
LOS: 2 days | Discharge: HOME | DRG: 070 | End: 2021-05-26
Attending: ANESTHESIOLOGY | Admitting: ANESTHESIOLOGY
Payer: COMMERCIAL

## 2021-05-24 VITALS
RESPIRATION RATE: 16 BRPM | HEART RATE: 108 BPM | TEMPERATURE: 98.7 F | OXYGEN SATURATION: 100 % | DIASTOLIC BLOOD PRESSURE: 59 MMHG | SYSTOLIC BLOOD PRESSURE: 125 MMHG

## 2021-05-24 DIAGNOSIS — E83.39 HYPOPHOSPHATEMIA: ICD-10-CM

## 2021-05-24 DIAGNOSIS — I67.1 ANEURYSM OF LEFT INTERNAL CAROTID ARTERY: ICD-10-CM

## 2021-05-24 DIAGNOSIS — M48.12 DIFFUSE IDIOPATHIC SKELETAL HYPEROSTOSIS OF CERVICAL SPINE: ICD-10-CM

## 2021-05-24 DIAGNOSIS — R74.8 ELEVATED ALKALINE PHOSPHATASE LEVEL: ICD-10-CM

## 2021-05-24 DIAGNOSIS — R56.9 SEIZURE-LIKE ACTIVITY (HCC): Primary | ICD-10-CM

## 2021-05-24 PROBLEM — G93.40 ACUTE ENCEPHALOPATHY: Status: ACTIVE | Noted: 2021-05-24

## 2021-05-24 PROBLEM — J96.01 ACUTE RESPIRATORY FAILURE WITH HYPOXIA (HCC): Status: ACTIVE | Noted: 2021-05-24

## 2021-05-24 LAB
ALBUMIN SERPL BCP-MCNC: 2.9 G/DL (ref 3.5–5)
ALP SERPL-CCNC: 410 U/L (ref 46–116)
ALT SERPL W P-5'-P-CCNC: 55 U/L (ref 12–78)
ANION GAP SERPL CALCULATED.3IONS-SCNC: 7 MMOL/L (ref 4–13)
ARTERIAL PATENCY WRIST A: YES
ARTERIAL PATENCY WRIST A: YES
AST SERPL W P-5'-P-CCNC: 54 U/L (ref 5–45)
BASE EXCESS BLDA CALC-SCNC: -6.5 MMOL/L
BASE EXCESS BLDA CALC-SCNC: -7.7 MMOL/L
BASOPHILS # BLD MANUAL: 0 THOUSAND/UL (ref 0–0.1)
BASOPHILS NFR MAR MANUAL: 0 % (ref 0–1)
BILIRUB DIRECT SERPL-MCNC: 0.1 MG/DL (ref 0–0.2)
BILIRUB SERPL-MCNC: 0.64 MG/DL (ref 0.2–1)
BUN SERPL-MCNC: 10 MG/DL (ref 5–25)
CALCIUM SERPL-MCNC: 7.3 MG/DL (ref 8.3–10.1)
CHLORIDE SERPL-SCNC: 116 MMOL/L (ref 100–108)
CHLORIDE UR-SCNC: 136 MMOL/L
CO2 SERPL-SCNC: 19 MMOL/L (ref 21–32)
CREAT SERPL-MCNC: 0.74 MG/DL (ref 0.6–1.3)
EOSINOPHIL # BLD MANUAL: 0 THOUSAND/UL (ref 0–0.4)
EOSINOPHIL NFR BLD MANUAL: 0 % (ref 0–6)
ERYTHROCYTE [DISTWIDTH] IN BLOOD BY AUTOMATED COUNT: 13.3 % (ref 11.6–15.1)
GFR SERPL CREATININE-BSD FRML MDRD: 84 ML/MIN/1.73SQ M
GGT SERPL-CCNC: 1255 U/L (ref 5–85)
GLUCOSE SERPL-MCNC: 190 MG/DL (ref 65–140)
HCO3 BLDA-SCNC: 18.9 MMOL/L (ref 22–28)
HCO3 BLDA-SCNC: 19.9 MMOL/L (ref 22–28)
HCT VFR BLD AUTO: 34.2 % (ref 34.8–46.1)
HGB BLD-MCNC: 11.4 G/DL (ref 11.5–15.4)
HOROWITZ INDEX BLDA+IHG-RTO: 50 MM[HG]
HOROWITZ INDEX BLDA+IHG-RTO: 60 MM[HG]
I-TIME: 1
I-TIME: 1
IGA SERPL-MCNC: 377 MG/DL (ref 70–400)
IGG SERPL-MCNC: 645 MG/DL (ref 700–1600)
IGM SERPL-MCNC: 44 MG/DL (ref 40–230)
LACTATE SERPL-SCNC: 1.8 MMOL/L (ref 0.5–2)
LACTATE SERPL-SCNC: 2.3 MMOL/L (ref 0.5–2)
LACTATE SERPL-SCNC: 2.7 MMOL/L (ref 0.5–2)
LYMPHOCYTES # BLD AUTO: 0.65 THOUSAND/UL (ref 0.6–4.47)
LYMPHOCYTES # BLD AUTO: 5 % (ref 14–44)
MAGNESIUM SERPL-MCNC: 2.3 MG/DL (ref 1.6–2.6)
MCH RBC QN AUTO: 31.1 PG (ref 26.8–34.3)
MCHC RBC AUTO-ENTMCNC: 33.3 G/DL (ref 31.4–37.4)
MCV RBC AUTO: 93 FL (ref 82–98)
MONOCYTES # BLD AUTO: 0.13 THOUSAND/UL (ref 0–1.22)
MONOCYTES NFR BLD: 1 % (ref 4–12)
NEUTROPHILS # BLD MANUAL: 12.29 THOUSAND/UL (ref 1.85–7.62)
NEUTS SEG NFR BLD AUTO: 94 % (ref 43–75)
NRBC BLD AUTO-RTO: 0 /100 WBCS
O2 CT BLDA-SCNC: 16.7 ML/DL (ref 16–23)
O2 CT BLDA-SCNC: 17.6 ML/DL (ref 16–23)
OSMOLALITY UR/SERPL-RTO: 307 MMOL/KG (ref 282–298)
OXYHGB MFR BLDA: 98.2 % (ref 94–97)
OXYHGB MFR BLDA: 98.7 % (ref 94–97)
PCO2 BLDA: 42.2 MM HG (ref 36–44)
PCO2 BLDA: 43.2 MM HG (ref 36–44)
PEEP RESPIRATORY: 6 CM[H2O]
PEEP RESPIRATORY: 6 CM[H2O]
PH BLDA: 7.27 [PH] (ref 7.35–7.45)
PH BLDA: 7.28 [PH] (ref 7.35–7.45)
PHOSPHATE SERPL-MCNC: 2.5 MG/DL (ref 2.3–4.1)
PLATELET # BLD AUTO: 184 THOUSANDS/UL (ref 149–390)
PLATELET # BLD AUTO: 185 THOUSANDS/UL (ref 149–390)
PLATELET BLD QL SMEAR: ADEQUATE
PMV BLD AUTO: 12 FL (ref 8.9–12.7)
PMV BLD AUTO: 12.3 FL (ref 8.9–12.7)
PO2 BLDA: 160.1 MM HG (ref 75–129)
PO2 BLDA: 239.6 MM HG (ref 75–129)
POTASSIUM SERPL-SCNC: 5 MMOL/L (ref 3.5–5.3)
POTASSIUM UR-SCNC: 12.5 MMOL/L
PROCALCITONIN SERPL-MCNC: <0.05 NG/ML
PROT SERPL-MCNC: 6.1 G/DL (ref 6.4–8.2)
RBC # BLD AUTO: 3.66 MILLION/UL (ref 3.81–5.12)
SALICYLATES SERPL-MCNC: <3 MG/DL (ref 3–20)
SODIUM 24H UR-SCNC: 125 MOL/L
SODIUM SERPL-SCNC: 142 MMOL/L (ref 136–145)
SPECIMEN SOURCE: ABNORMAL
SPECIMEN SOURCE: ABNORMAL
THEOPHYLLINE SERPL-MCNC: 13.3 UG/ML (ref 10–20)
TOTAL CELLS COUNTED SPEC: 100
VENT AC: 16
VENT AC: 16
VENT- AC: AC
VENT- AC: AC
VT SETTING VENT: 350 ML
VT SETTING VENT: 400 ML
WBC # BLD AUTO: 13.07 THOUSAND/UL (ref 4.31–10.16)

## 2021-05-24 PROCEDURE — 82784 ASSAY IGA/IGD/IGG/IGM EACH: CPT | Performed by: STUDENT IN AN ORGANIZED HEALTH CARE EDUCATION/TRAINING PROGRAM

## 2021-05-24 PROCEDURE — 94640 AIRWAY INHALATION TREATMENT: CPT | Performed by: SOCIAL WORKER

## 2021-05-24 PROCEDURE — 85027 COMPLETE CBC AUTOMATED: CPT | Performed by: EMERGENCY MEDICINE

## 2021-05-24 PROCEDURE — 80048 BASIC METABOLIC PNL TOTAL CA: CPT | Performed by: EMERGENCY MEDICINE

## 2021-05-24 PROCEDURE — 83735 ASSAY OF MAGNESIUM: CPT | Performed by: EMERGENCY MEDICINE

## 2021-05-24 PROCEDURE — 94640 AIRWAY INHALATION TREATMENT: CPT

## 2021-05-24 PROCEDURE — 82805 BLOOD GASES W/O2 SATURATION: CPT | Performed by: EMERGENCY MEDICINE

## 2021-05-24 PROCEDURE — 80198 ASSAY OF THEOPHYLLINE: CPT | Performed by: EMERGENCY MEDICINE

## 2021-05-24 PROCEDURE — 95715 VEEG EA 12-26HR INTMT MNTR: CPT

## 2021-05-24 PROCEDURE — 82977 ASSAY OF GGT: CPT | Performed by: STUDENT IN AN ORGANIZED HEALTH CARE EDUCATION/TRAINING PROGRAM

## 2021-05-24 PROCEDURE — 94760 N-INVAS EAR/PLS OXIMETRY 1: CPT | Performed by: SOCIAL WORKER

## 2021-05-24 PROCEDURE — 94760 N-INVAS EAR/PLS OXIMETRY 1: CPT

## 2021-05-24 PROCEDURE — 70553 MRI BRAIN STEM W/O & W/DYE: CPT

## 2021-05-24 PROCEDURE — 85049 AUTOMATED PLATELET COUNT: CPT | Performed by: EMERGENCY MEDICINE

## 2021-05-24 PROCEDURE — 94002 VENT MGMT INPAT INIT DAY: CPT

## 2021-05-24 PROCEDURE — 84100 ASSAY OF PHOSPHORUS: CPT | Performed by: EMERGENCY MEDICINE

## 2021-05-24 PROCEDURE — 36600 WITHDRAWAL OF ARTERIAL BLOOD: CPT

## 2021-05-24 PROCEDURE — 95700 EEG CONT REC W/VID EEG TECH: CPT

## 2021-05-24 PROCEDURE — 80076 HEPATIC FUNCTION PANEL: CPT | Performed by: EMERGENCY MEDICINE

## 2021-05-24 PROCEDURE — 86038 ANTINUCLEAR ANTIBODIES: CPT | Performed by: STUDENT IN AN ORGANIZED HEALTH CARE EDUCATION/TRAINING PROGRAM

## 2021-05-24 PROCEDURE — 99291 CRITICAL CARE FIRST HOUR: CPT | Performed by: ANESTHESIOLOGY

## 2021-05-24 PROCEDURE — 80179 DRUG ASSAY SALICYLATE: CPT | Performed by: STUDENT IN AN ORGANIZED HEALTH CARE EDUCATION/TRAINING PROGRAM

## 2021-05-24 PROCEDURE — 99222 1ST HOSP IP/OBS MODERATE 55: CPT | Performed by: PSYCHIATRY & NEUROLOGY

## 2021-05-24 PROCEDURE — 83930 ASSAY OF BLOOD OSMOLALITY: CPT | Performed by: STUDENT IN AN ORGANIZED HEALTH CARE EDUCATION/TRAINING PROGRAM

## 2021-05-24 PROCEDURE — 83605 ASSAY OF LACTIC ACID: CPT | Performed by: EMERGENCY MEDICINE

## 2021-05-24 PROCEDURE — A9585 GADOBUTROL INJECTION: HCPCS | Performed by: STUDENT IN AN ORGANIZED HEALTH CARE EDUCATION/TRAINING PROGRAM

## 2021-05-24 PROCEDURE — G1004 CDSM NDSC: HCPCS

## 2021-05-24 PROCEDURE — 85007 BL SMEAR W/DIFF WBC COUNT: CPT | Performed by: EMERGENCY MEDICINE

## 2021-05-24 RX ORDER — FENTANYL CITRATE-0.9 % NACL/PF 10 MCG/ML
75 PLASTIC BAG, INJECTION (ML) INTRAVENOUS CONTINUOUS
Status: DISCONTINUED | OUTPATIENT
Start: 2021-05-24 | End: 2021-05-25

## 2021-05-24 RX ORDER — PROPOFOL 10 MG/ML
INJECTION, EMULSION INTRAVENOUS
Status: COMPLETED
Start: 2021-05-24 | End: 2021-05-24

## 2021-05-24 RX ORDER — ALPRAZOLAM 0.25 MG/1
0.25 TABLET ORAL
Status: DISCONTINUED | OUTPATIENT
Start: 2021-05-24 | End: 2021-05-26 | Stop reason: HOSPADM

## 2021-05-24 RX ORDER — PRAVASTATIN SODIUM 80 MG/1
80 TABLET ORAL
Status: DISCONTINUED | OUTPATIENT
Start: 2021-05-24 | End: 2021-05-25

## 2021-05-24 RX ORDER — SODIUM CHLORIDE, SODIUM GLUCONATE, SODIUM ACETATE, POTASSIUM CHLORIDE, MAGNESIUM CHLORIDE, SODIUM PHOSPHATE, DIBASIC, AND POTASSIUM PHOSPHATE .53; .5; .37; .037; .03; .012; .00082 G/100ML; G/100ML; G/100ML; G/100ML; G/100ML; G/100ML; G/100ML
75 INJECTION, SOLUTION INTRAVENOUS CONTINUOUS
Status: DISCONTINUED | OUTPATIENT
Start: 2021-05-24 | End: 2021-05-25

## 2021-05-24 RX ORDER — CHLORHEXIDINE GLUCONATE 0.12 MG/ML
15 RINSE ORAL EVERY 12 HOURS SCHEDULED
Status: DISCONTINUED | OUTPATIENT
Start: 2021-05-24 | End: 2021-05-26 | Stop reason: HOSPADM

## 2021-05-24 RX ORDER — PRAVASTATIN SODIUM 40 MG
40 TABLET ORAL
Status: DISCONTINUED | OUTPATIENT
Start: 2021-05-24 | End: 2021-05-24

## 2021-05-24 RX ORDER — ALBUTEROL SULFATE 2.5 MG/3ML
2.5 SOLUTION RESPIRATORY (INHALATION) EVERY 6 HOURS PRN
Status: DISCONTINUED | OUTPATIENT
Start: 2021-05-24 | End: 2021-05-26 | Stop reason: HOSPADM

## 2021-05-24 RX ORDER — BISACODYL 10 MG
10 SUPPOSITORY, RECTAL RECTAL DAILY PRN
Status: DISCONTINUED | OUTPATIENT
Start: 2021-05-24 | End: 2021-05-26 | Stop reason: HOSPADM

## 2021-05-24 RX ORDER — ONDANSETRON 2 MG/ML
4 INJECTION INTRAMUSCULAR; INTRAVENOUS EVERY 6 HOURS PRN
Status: DISCONTINUED | OUTPATIENT
Start: 2021-05-24 | End: 2021-05-26 | Stop reason: HOSPADM

## 2021-05-24 RX ORDER — FLUTICASONE FUROATE AND VILANTEROL 200; 25 UG/1; UG/1
1 POWDER RESPIRATORY (INHALATION)
Status: DISCONTINUED | OUTPATIENT
Start: 2021-05-24 | End: 2021-05-26 | Stop reason: HOSPADM

## 2021-05-24 RX ORDER — LEVALBUTEROL 1.25 MG/.5ML
1.25 SOLUTION, CONCENTRATE RESPIRATORY (INHALATION)
Status: DISCONTINUED | OUTPATIENT
Start: 2021-05-24 | End: 2021-05-25

## 2021-05-24 RX ORDER — PROPOFOL 10 MG/ML
5-50 INJECTION, EMULSION INTRAVENOUS
Status: DISCONTINUED | OUTPATIENT
Start: 2021-05-24 | End: 2021-05-25

## 2021-05-24 RX ORDER — ACETAMINOPHEN 325 MG/1
975 TABLET ORAL EVERY 6 HOURS PRN
Status: DISCONTINUED | OUTPATIENT
Start: 2021-05-24 | End: 2021-05-26 | Stop reason: HOSPADM

## 2021-05-24 RX ORDER — POTASSIUM CHLORIDE 20MEQ/15ML
40 LIQUID (ML) ORAL ONCE
Status: COMPLETED | OUTPATIENT
Start: 2021-05-24 | End: 2021-05-24

## 2021-05-24 RX ORDER — POLYETHYLENE GLYCOL 3350 17 G/17G
17 POWDER, FOR SOLUTION ORAL DAILY
Status: DISCONTINUED | OUTPATIENT
Start: 2021-05-24 | End: 2021-05-26 | Stop reason: HOSPADM

## 2021-05-24 RX ORDER — SODIUM CHLORIDE FOR INHALATION 0.9 %
3 VIAL, NEBULIZER (ML) INHALATION
Status: DISCONTINUED | OUTPATIENT
Start: 2021-05-24 | End: 2021-05-25

## 2021-05-24 RX ADMIN — SODIUM CHLORIDE, SODIUM GLUCONATE, SODIUM ACETATE, POTASSIUM CHLORIDE, MAGNESIUM CHLORIDE, SODIUM PHOSPHATE, DIBASIC, AND POTASSIUM PHOSPHATE 75 ML/HR: .53; .5; .37; .037; .03; .012; .00082 INJECTION, SOLUTION INTRAVENOUS at 16:44

## 2021-05-24 RX ADMIN — SODIUM CHLORIDE, SODIUM GLUCONATE, SODIUM ACETATE, POTASSIUM CHLORIDE, MAGNESIUM CHLORIDE, SODIUM PHOSPHATE, DIBASIC, AND POTASSIUM PHOSPHATE 75 ML/HR: .53; .5; .37; .037; .03; .012; .00082 INJECTION, SOLUTION INTRAVENOUS at 03:46

## 2021-05-24 RX ADMIN — Medication 75 MCG/HR: at 12:13

## 2021-05-24 RX ADMIN — PROPOFOL 50 MCG/KG/MIN: 10 INJECTION, EMULSION INTRAVENOUS at 01:56

## 2021-05-24 RX ADMIN — PROPOFOL 50 MCG/KG/MIN: 10 INJECTION, EMULSION INTRAVENOUS at 05:37

## 2021-05-24 RX ADMIN — ISODIUM CHLORIDE 3 ML: 0.03 SOLUTION RESPIRATORY (INHALATION) at 07:20

## 2021-05-24 RX ADMIN — ISODIUM CHLORIDE 3 ML: 0.03 SOLUTION RESPIRATORY (INHALATION) at 14:45

## 2021-05-24 RX ADMIN — LEVALBUTEROL HYDROCHLORIDE 1.25 MG: 1.25 SOLUTION, CONCENTRATE RESPIRATORY (INHALATION) at 19:12

## 2021-05-24 RX ADMIN — PROPOFOL 35 MCG/KG/MIN: 10 INJECTION, EMULSION INTRAVENOUS at 11:14

## 2021-05-24 RX ADMIN — ENOXAPARIN SODIUM 40 MG: 40 INJECTION SUBCUTANEOUS at 08:29

## 2021-05-24 RX ADMIN — Medication 75 MCG/HR: at 02:07

## 2021-05-24 RX ADMIN — GADOBUTROL 6 ML: 604.72 INJECTION INTRAVENOUS at 21:23

## 2021-05-24 RX ADMIN — LEVALBUTEROL HYDROCHLORIDE 1.25 MG: 1.25 SOLUTION, CONCENTRATE RESPIRATORY (INHALATION) at 07:20

## 2021-05-24 RX ADMIN — LEVETIRACETAM 750 MG: 100 INJECTION, SOLUTION INTRAVENOUS at 20:17

## 2021-05-24 RX ADMIN — POLYETHYLENE GLYCOL 3350 17 G: 17 POWDER, FOR SOLUTION ORAL at 08:29

## 2021-05-24 RX ADMIN — LEVETIRACETAM 750 MG: 100 INJECTION, SOLUTION INTRAVENOUS at 11:11

## 2021-05-24 RX ADMIN — CHLORHEXIDINE GLUCONATE 0.12% ORAL RINSE 15 ML: 1.2 LIQUID ORAL at 08:29

## 2021-05-24 RX ADMIN — Medication 20 MG: at 08:29

## 2021-05-24 RX ADMIN — CHLORHEXIDINE GLUCONATE 0.12% ORAL RINSE 15 ML: 1.2 LIQUID ORAL at 02:18

## 2021-05-24 RX ADMIN — ALPRAZOLAM 0.25 MG: 0.25 TABLET ORAL at 19:45

## 2021-05-24 RX ADMIN — ISODIUM CHLORIDE 3 ML: 0.03 SOLUTION RESPIRATORY (INHALATION) at 19:12

## 2021-05-24 RX ADMIN — POTASSIUM CHLORIDE 40 MEQ: 20 SOLUTION ORAL at 03:46

## 2021-05-24 RX ADMIN — LEVALBUTEROL HYDROCHLORIDE 1.25 MG: 1.25 SOLUTION, CONCENTRATE RESPIRATORY (INHALATION) at 14:45

## 2021-05-24 NOTE — RESPIRATORY THERAPY NOTE
Pt removed from [id number] vent and placed on [employer]  transport vent for transport to [facility]

## 2021-05-24 NOTE — ED NOTES
When bp 90 systolic I turned propofol down to 25mcg/kg/min but she began to wake and seo vent, now at 35 mcg kg/min-dr romero aware        Marce Álvarez, RN  05/24/21 8422

## 2021-05-24 NOTE — PROGRESS NOTES
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

## 2021-05-24 NOTE — ED NOTES
For approx 30 minutes has been making attempts to speak, barely inteligible, squeezes with left hand and raises right hand when asked     Sarah Doan RN  05/23/21 2100

## 2021-05-24 NOTE — PROGRESS NOTES
Spoke to patient's daughter Chin Taylor to obtain consent for the fluoroscopy guided LP tomorrow o Lake[facility] Trejo  Per Jessica Topete, her brother Constantino De Jesus is the  for his mother's care  I left two messages for Earla Goodpasture with no response  Will continue to attempt to reach him for consent      Angela Lawson [facility]

## 2021-05-24 NOTE — ED PROVIDER NOTES
History  Chief Complaint   Patient presents with    Altered Mental Status     Patient was in bingo tent all day, was looking at her phone and family noticed she was just staring and started not responding  EMS reports decerebrate posturing in ambulance for approximatley 15 seconds  Pt brought by EMS for AMS  She was picked up at a [facility] tent, she was reported to have been there all day    There was no reported trauma or illness    Pt unable to give hx due to AMS      History provided by:  Patient  Altered Mental Status  Presenting symptoms: confusion, disorientation and lethargy    Severity:  Severe  Episode history:  Single  Timing:  Constant      Prior to Admission Medications   Prescriptions Last Dose Informant Patient Reported? Taking? ALPRAZolam (XANAX) 0 25 mg tablet   No No   Sig: Take 1 tablet (0 25 mg total) by mouth daily at bedtime as needed for anxiety   EPINEPHrine (EPIPEN) 0 3 mg/0 3 mL SOAJ   No No   Sig: Inject 0 3 mL (0 3 mg total) into a muscle once for 1 dose   albuterol (2 5 mg/3 mL) 0 083 % nebulizer solution  Self No No   Sig: Take 1 vial (2 5 mg total) by nebulization 4 (four) times a day   amLODIPine (NORVASC) 5 mg tablet   No No   Sig: take 1 tablet by mouth once daily   aspirin 81 mg chewable tablet  Self Yes No   Sig: Chew 81 mg daily   ergocalciferol (ERGOCALCIFEROL) 1 25 MG (86314 UT) capsule   No No   Sig: Take 1 capsule (50,000 Units total) by mouth once a week   fluticasone-salmeterol (ADVAIR DISKUS, WIXELA INHUB) 250-50 mcg/dose inhaler  Self Yes No   Sig: Inhale 1 puff 2 (two) times a day Rinse mouth after use  levocetirizine (XYZAL) 5 MG tablet   No No   Sig: Take 0 5 tablets (2 5 mg total) by mouth every evening   meloxicam (MOBIC) 15 mg tablet   No No   Sig: take 1 tablet by mouth once daily   montelukast (SINGULAIR) 10 mg tablet  Self No No   Sig: take 1 tablet by mouth at bedtime   nystatin (MYCOSTATIN) cream  Self No No   Sig: Apply topically 2 (two) times a day   Apply for additional 2 weeks after rash resolves     omeprazole (PriLOSEC) 20 mg delayed release capsule   No No   Sig: Take 1 capsule (20 mg total) by mouth 2 (two) times a day   rosuvastatin (CRESTOR) 20 MG tablet   No No   Sig: Take 1 tablet (20 mg total) by mouth daily At bedtime for cholesterol   theophylline (THEODUR) 300 mg 12 hr tablet  Self No No   Sig: take 1 tablet by mouth twice a day      Facility-Administered Medications: None       Past Medical History:   Diagnosis Date    Acute bronchitis     Acute pharyngitis     Anxiety state     Arthropathy of ankle and foot     and/or    Asthma     Asthma without status asthmaticus     Carotid artery occlusion     [facility]ch[facility] obstructive [facility] disease[facility]     Cough     Disorder of shoulder     Dyspnea     Hand joint pain     Heartburn     Herpes simplex without complication     Hyperlipidemia     Infection of skin and subcutaneous tissue     Localized superficial swelling of skin     Low back pain     Lymphadenopathy     Malaise and fatigue     Neck pain     Osteoarthritis     Pleurisy without effusion or active tuberculosis     Pneumonia     Right upper quadrant pain     Shoulder joint pain     Skin eruption     Vitamin D deficiency     Vomiting        Past Surgical History:   Procedure Laterality Date    BREAST BIOPSY Left 2017 benign    CHOLECYSTECTOMY      CHOLECYSTECTOMY  03/2014    Dr Boykin      COLONOSCOPY  02/2013    WNL    HYSTERECTOMY      Total        Family History   Problem Relation Age of Onset    Diabetes Mother         Non-insulin dependent diabetes    Emphysema Father     No Known Problems Sister     No Known Problems Daughter     No Known Problems Maternal Grandmother     No Known Problems Paternal Grandmother     No Known Problems Sister     No Known Problems Sister     No Known Problems Daughter     No Known Problems Daughter     No Known Problems Maternal Aunt      I have reviewed and agree with the history as documented  E-Cigarette/Vaping    E-Cigarette Use Never User      E-Cigarette/Vaping Substances     Social History     Tobacco Use    Smoking status: Never Smoker    Smokeless tobacco: Never Used   Substance Use Topics    Alcohol use: Not Currently     Frequency: Never     Comment: DAILY    Drug use: Never       Review of Systems   Unable to perform ROS: Mental status change   Psychiatric/Behavioral: Positive for confusion  Physical Exam  Physical Exam  Constitutional:       General: She is in acute distress  Appearance: She is ill-appearing  HENT:      Mouth/Throat:      Mouth: Mucous membranes are moist       Pharynx: Oropharynx is clear  Eyes:      Comments: Patient's gaze is fixed up into the right   Neck:      Musculoskeletal: No neck rigidity  Cardiovascular:      Heart sounds: Normal heart sounds  Pulmonary:      Effort: Pulmonary effort is normal  No respiratory distress  Breath sounds: No stridor  No wheezing, rhonchi or rales  Abdominal:      General: There is no distension  Palpations: Abdomen is soft  Tenderness: There is no abdominal tenderness  Musculoskeletal:         General: No swelling or tenderness  Skin:     Coloration: Skin is not cyanotic  Neurological:      GCS: GCS eye subscore is 1  GCS verbal subscore is 1  GCS motor subscore is 1        Comments: Pt unresponsive appears to be having stroke vs seizure  She is having clonic like activity, fixed gaze to the right    Psychiatric:      Comments: Patient's encephalopathic         Vital Signs  ED Triage Vitals [05/23/21 1930]   Temperature Pulse Respirations Blood Pressure SpO2   99 °F (37 2 °C) (!) 125 (!) 26 157/66 94 %      Temp Source Heart Rate Source Patient Position - Orthostatic VS BP Location FiO2 (%)   Tympanic Monitor Lying Left arm --      Pain Score       --           Vitals:    05/23/21 2145 05/23/21 2200 05/24/21 0000 05/24/21 0030   BP: 108/55 132/63 (!) 90/48 125/59   Pulse: 104 (!) 126 (!) 106 (!) 108   Patient Position - Orthostatic VS:             Visual Acuity  Visual Acuity      Most Recent Value   L Pupil Size (mm)  4   R Pupil Size (mm)  4          ED Medications  Medications   LORazepam (ATIVAN) injection 2 mg (2 mg Intravenous Given 5/23/21 1923)   iohexol (OMNIPAQUE) 350 MG/ML injection (SINGLE-DOSE) 100 mL (100 mL Intravenous Given 5/23/21 1942)   sodium chloride 0 9 % bolus 2,000 mL (0 mL Intravenous Stopped 5/23/21 2239)   cefepime (MAXIPIME) IVPB (premix in dextrose) 2,000 mg 50 mL (0 mg Intravenous Stopped 5/23/21 2059)   vancomycin (VANCOCIN) IVPB (premix in dextrose) 1,000 mg 200 mL (0 mg/kg × 68 9 kg Intravenous Stopped 5/23/21 2240)   albuterol inhalation solution 10 mg (10 mg Nebulization Given 5/23/21 2133)     And   ipratropium (ATROVENT) 0 02 % inhalation solution 1 mg (1 mg Nebulization Given 5/23/21 2133)     And   sodium chloride 0 9 % inhalation solution 3 mL (3 mL Nebulization Given 5/23/21 2133)   methylPREDNISolone sodium succinate (Solu-MEDROL) injection 125 mg (125 mg Intravenous Given 5/23/21 2158)   magnesium sulfate IVPB (premix) SOLN 1 g (0 g Intravenous Stopped 5/23/21 2239)   etomidate (AMIDATE) 2 mg/mL injection 20 mg (20 mg Intravenous Given 5/23/21 2101)   Succinylcholine Chloride 100 mg/5 mL syringe 104 mg (104 mg Intravenous Given 5/23/21 2103)   levETIRAcetam (KEPPRA) 1,500 mg in sodium chloride 0 9 % 100 mL IVPB (0 mg Intravenous Stopped 5/23/21 2140)   acyclovir (ZOVIRAX) 450 mg in sodium chloride 0 9 % 100 mL IVPB (0 mg/kg × 45 5 kg (Ideal) Intravenous Stopped 5/24/21 0016)   ampicillin (OMNIPEN) 2,000 mg in sodium chloride 0 9 % 100 mL IVPB (0 mg Intravenous Stopped 5/23/21 2312)   dexamethasone (DECADRON) injection 5 mg (5 mg Intravenous Given 5/23/21 2239)   dexamethasone (DECADRON) injection 5 mg (5 mg Intravenous Given 5/23/21 2101)       Diagnostic Studies  Results Reviewed     Procedure Component Value Units Date/Time Lactic acid 2 Hours [[id number]]  (Abnormal) Collected: 05/23/21 2327    Lab Status: Final result Specimen: Blood Updated: 05/24/21 0021     LACTIC ACID 2 3 mmol/L     Narrative:      Result may be elevated if tourniquet was used during collection  Result may be elevated if tourniquet was used during collection  Blood gas, arterial [[id number]]  (Abnormal) Collected: 05/23/21 2202    Lab Status: Final result Specimen: Blood, Arterial from Radial, Left Updated: 05/23/21 2214     pH, Arterial 7 240     pCO2, Arterial 50 2 mm Hg      pO2, Arterial 321 0 mm Hg      HCO3, Arterial 20 8 mmol/L      Base Excess, Arterial -6 2 mmol/L      O2 Content, Arterial 17 2 mL/dL      O2 HGB,Arterial  98 2 %      SOURCE Radial, Left     DIONNE TEST Yes     AC Rate 16     Tidal Volume 400 ml      Inspired Air (FIO2) 80     PEEP 6    Rapid drug screen, urine [[id number]]  (Normal) Collected: 05/23/21 2144    Lab Status: Final result Specimen: Urine, Catheter Updated: 05/23/21 2202     Amph/Meth UR Negative     Barbiturate Ur Negative     Benzodiazepine Urine Negative     Cocaine Urine Negative     Methadone Urine Negative     Opiate Urine Negative     PCP Ur Negative     THC Urine Negative     Oxycodone Urine Negative    Narrative:      FOR MEDICAL PURPOSES ONLY  IF CONFIRMATION NEEDED PLEASE CONTACT THE LAB WITHIN 5 DAYS      Drug Screen Cutoff Levels:  AMPHETAMINE/METHAMPHETAMINES  1000 ng/mL  BARBITURATES     200 ng/mL  BENZODIAZEPINES     200 ng/mL  COCAINE      300 ng/mL  METHADONE      300 ng/mL  OPIATES      300 ng/mL  PHENCYCLIDINE     25 ng/mL  THC       50 ng/mL  OXYCODONE      100 ng/mL    Urine Microscopic [[id number]]  (Normal) Collected: 05/23/21 2144    Lab Status: Final result Specimen: Urine, Indwelling Quinonez Catheter Updated: 05/23/21 2158     RBC, UA 0-1 /hpf      WBC, UA 0-1 /hpf      Epithelial Cells Occasional /hpf      Bacteria, UA None Seen /hpf     UA w Reflex to Microscopic w Reflex to Culture [[id number]] (Abnormal) Collected: 05/23/21 2144    Lab Status: Final result Specimen: Urine, Indwelling Quinonez Catheter Updated: 05/23/21 2152     Color, UA Yellow     Clarity, UA Clear     Specific Gravity, UA 1 010     pH, UA 5 5     Leukocytes, UA Negative     Nitrite, UA Negative     Protein, UA Negative mg/dl      Glucose, UA Negative mg/dl      Ketones, UA Negative mg/dl      Urobilinogen, UA 0 2 E U /dl      Bilirubin, UA Negative     Blood, UA Trace-Intact    Magnesium [[id number]]  (Normal) Collected: 05/23/21 1943    Lab Status: Final result Specimen: Blood from Arm, Right Updated: 05/23/21 2107     Magnesium 1 9 mg/dL     Novel Coronavirus (Covid-19),PCR [facility] - 2 hour stat [[id number]]  (Normal) Collected: 05/1954    Lab Status: Final result Specimen: Nares from Nose Updated: 05/23/21 2049     SARS-CoV-2 Negative    Narrative: The specimen collection materials, transport medium, and/or testing methodology utilized in the production of these test results have been proven to be reliable in a limited validation with an abbreviated program under the Emergency Utilization Authorization provided by the FDA  Testing reported as "Presumptive positive" will be confirmed with secondary testing to ensure result accuracy  Clinical caution and judgement should be used with the interpretation of these results with consideration of the clinical impression and other laboratory testing  Testing reported as "Positive" or "Negative" has been proven to be accurate according to standard laboratory validation requirements  All testing is performed with control materials showing appropriate reactivity at standard intervals  Lactic acid, plasma [[id number]]  (Abnormal) Collected: 05/23/21 2014    Lab Status: Final result Specimen: Blood Updated: 05/23/21 2040     LACTIC ACID 2 9 mmol/L     Narrative:      Result may be elevated if tourniquet was used during collection      Ethanol [[id number]]  (Normal) Collected: 05/23/21 2014    Lab Status: Final result Specimen: Blood Updated: 05/23/21 2038     Ethanol Lvl <10 mg/dL     Blood gas, venous [[id number]]  (Abnormal) Collected: 05/23/21 2027    Lab Status: Final result Specimen: Blood from Arm, Right Updated: 05/23/21 2032     pH, Gael 7 300     pCO2, Gael 46 9 mm Hg      pO2, Gael 68 0 mm Hg      HCO3, Gael 22 2 mmol/L      Base Excess, Gael -4 2 mmol/L      O2 Content, Gael 16 5 ml/dL      O2 HGB, VENOUS 88 1 %     Blood culture #1 [[id number]] Collected: 05/23/21 2027    Lab Status: In process Specimen: Blood from Arm, Right Updated: 05/23/21 2031    Blood culture #2 [[id number]] Collected: 05/23/21 2027    Lab Status: In process Specimen: Blood from Arm, Right Updated: 05/23/21 2028    Procalcitonin with AM Reflex [[id number]] Collected: 05/23/21 2014    Lab Status:  In process Specimen: Blood Updated: 05/23/21 2014    Troponin I [[id number]]  (Normal) Collected: 05/23/21 1943    Lab Status: Final result Specimen: Blood from Arm, Right Updated: 05/23/21 2006     Troponin I <0 03 ng/mL     Basic metabolic panel [[id number]]  (Abnormal) Collected: 05/23/21 1943    Lab Status: Final result Specimen: Blood from Arm, Right Updated: 05/23/21 2003     Sodium 143 mmol/L      Potassium 3 3 mmol/L      Chloride 105 mmol/L      CO2 15 mmol/L      ANION GAP 23 mmol/L      BUN 16 mg/dL      Creatinine 1 09 mg/dL      Glucose 186 mg/dL      Calcium 9 3 mg/dL      eGFR 53 ml/min/1 73sq m     Narrative:      [place] guidelines for Chronic Kidney Disease (CKD):     Stage 1 with normal or high GFR (GFR > 90 mL/min/1 73 square meters)    Stage 2 Mild CKD (GFR = 60-89 mL/min/1 73 square meters)    Stage 3A Moderate CKD (GFR = 45-59 mL/min/1 73 square meters)    Stage 3B Moderate CKD (GFR = 30-44 mL/min/1 73 square meters)    Stage 4 Severe CKD (GFR = 15-29 mL/min/1 73 square meters)    Stage 5 End Stage CKD (GFR <15 mL/min/1 73 square meters)  Note: GFR calculation is accurate only with a steady state creatinine    Protime-INR [[id number]]  (Normal) Collected: 05/23/21 1943    Lab Status: Final result Specimen: Blood from Arm, Right Updated: 05/23/21 1959     Protime 14 0 seconds      INR 1 09    APTT [[id number]]  (Normal) Collected: 05/23/21 1943    Lab Status: Final result Specimen: Blood from Arm, Right Updated: 05/23/21 1959     PTT 25 seconds     CBC and Platelet [[id number]]  (Abnormal) Collected: 05/23/21 1943    Lab Status: Final result Specimen: Blood from Arm, Right Updated: 05/23/21 1949     WBC 18 70 Thousand/uL      RBC 4 62 Million/uL      Hemoglobin 13 4 g/dL      Hematocrit 42 8 %      MCV 93 fL      MCH 28 9 pg      MCHC 31 2 g/dL      RDW 13 9 %      Platelets 330 Thousands/uL      MPV 10 4 fL                  X-ray chest 1 view portable   ED Interpretation by Iesha Bolaños MD (05/23 2055)   nad      CTA stroke alert (head/neck)   Final Result by Mary Hernandez MD (05/23 2001)      No large vessel occlusion/flow limiting stenosis  2 mm aneurysm projecting off the posterior aspect of the left internal carotid arterial terminus  Findings were directly discussed with Thalia Vizcarra on 5/23/2021 7:45 PM                      Workstation performed: [id number]         CT stroke alert brain   Final Result by Mary Hernandez MD (05/23 1945)      No acute intracranial abnormality           Findings were directly discussed with Thalia Vizcarra on 5/23/2021 7:41 PM       Workstation performed: [id number]         XR chest 1 view portable    (Results Pending)              Procedures  Procedures         ED Course  ED Course as of May 24 0521   Sun May 23, 2021   1925 Stroke Alert called for altered mental status, posturing like activity, fixed upward right gaze  This lasted about 1-2 min and pt started screaming, was unresponsive    She is now calm after 2 mg Ativan       1928  Neurology, Dr Rex Alcantara   Reviewed the case      2007 Pt flags for sepsis - leukocytolysis, ams, tachycardia  Ivf and cefepime ordered       2008 CTA NECK AND BRAIN WITH CONTRAST     IMPRESSION:  No large vessel occlusion/flow limiting stenosis     2 mm aneurysm projecting off the posterior aspect of the left internal carotid arterial terminus  2009 CT BRAIN - STROKE ALERT PROTOCOL     INDICATION:   "Patient was in bingo tent all day, was looking at her phone and family noticed she was just staring and started not responding  EMS reports decerebrate posturing in ambulance for approximatley 15 seconds "  Patient has suspected COVID-19      COMPARISON:  None      TECHNIQUE:  CT examination of the brain was performed  In addition to axial images, coronal reformatted images were created and submitted for interpretation        Radiation dose length product (DLP) for this visit:  450 5770 mGy-cm   This examination, like all CT scans performed in the [place], was performed utilizing techniques to minimize radiation dose exposure, including the use of iterative   reconstruction and automated exposure control        IMAGE QUALITY:  Diagnostic      FINDINGS:     PARENCHYMA:  No intracranial mass, mass effect or midline shift  No CT signs of acute infarction  No acute parenchymal hemorrhage      Normal intracranial vasculature      VENTRICLES AND EXTRA-AXIAL SPACES:  Normal for patient's age      VISUALIZED ORBITS AND PARANASAL SINUSES:  Unremarkable      CALVARIUM AND EXTRACRANIAL SOFT TISSUES:   Normal      IMPRESSION:     No acute intracranial abnormality        2011 [place]  [facility]    Pt is stable and back from CT  She is wakign up, she is frequently moaning and crying      2039 Over the phone I Discussed with Keegan Brar (), patient's daughter-in-law  Updated her regarding the patient's status which remains guarded but stable with un known etiology for patient's encephalopathy  She is making the medical decisions for the pt  She confirms the Patient is a full code  Consent to intubation, should the patient need      2041 Patient remains stable   She is now answering 1 word responses to questions  Vital signs stable  She is coughing frequently  I will order neb tx       2046 Patient has frequent coughing now  She is in self remains encephalopathic  She is tachypneic now  I am concerned about her ability to protect her airway  GCS remains 7-9  Will proceed to intubation as the patient is not only not improving she is now getting worse      2055 MEDICAL ALCOHOL: <10   2055 pH, Gael: 7 300   2055 LACTIC ACID(!!): 2 9   2109 Patient intubated on 1st attempt      2119 Patient needed a dose of propofol for post intubation sedation  She is stable now and comfortable      2148 ET tube appears deep  Will have respiratory therapy pull back the tube by 2 cm      2152 Call out to Dr Livia Laird, ICU attending to discuss       2154 Dw ICU Dr Livia Laird  Reviewed the case  He Recommends LP   Also recommends ampicillin and acyclovir   She will need Decadrone 5mg q 8mg    She can stay here, due to no further seizure activity       2214 Blood gas, arterial(!!)   2214 Dw Respiratory  Fio2 to drop from 80% to 50%      2231 Dr Livia Laird has modified his recommendation on Decadron  Wants 10mg q 6 hours      2232 D/w Pt's daughter, Umu Toney in law who is making decisions  She consents to [address] for transfer  Concern for ongoing seizure activity    Versed drip ordered      2251 Dw [place] w/ PACs  She will get Epileptology      2308 Dw Dr Mario Young, Epileptology  Agrees w/ management  Pt should go to [address] for further evaluation for Status epilepticus       [address] [place] from [facility] is getting ICU       2325 Dw Dr Tawny Thompson, ICU  Accepts the pt      2338 Umu Toney, patient's daughter-in-law, who is making decisions for the patient, gave verbal consent for EMTALA for transfer       Mon May 24, 2021   0008 I dw Dr Eliza Junior   I relayed my inability to obtain LP based on the volume here in the ED and myself being a solo practitioner   He understands and LP will be evaluated when she gets there                                               MDM    Disposition  Final diagnoses:   Acute encephalopathy   Sepsis ([address] )   Acute respiratory failure with hypoxia ([facility] [address] )     Time reflects when diagnosis was documented in both MDM as applicable and the Disposition within this note     Time User Action Codes Description Comment    5/23/2021  9:10 PM Sherrel Flight Add [G93 40] Acute encephalopathy     5/23/2021  9:10 PM Lonzell Linear [A41 9] Sepsis ([facility] [address] )     5/23/2021  9:10 PM Sherrel Flight Add [J96 01] Acute respiratory failure with hypoxia [facility])       ED Disposition     ED Disposition Condition Date/Time Comment    Transfer to Another Facility  Mon May 24, 2021 12:58 AM Jeancarlos Le should be transferred out to [facility]         MD Documentation      Most Recent Value   Patient Condition  The patient has been stabilized such that within reasonable medical probability, no material deterioration of the patient condition or the condition of the unborn child(barby) is likely to result from the transfer   Benefits of Transfer  Specialized equipment and/or services available at the receiving facility (Include comment)________________________   Risks of Transfer  Potential for delay in receiving treatment   Accepting Physician  [address] [facility] Name, [address]   Sending MD romero   Provider Certification  General risk, such as traffic hazards, adverse weather conditions, rough terrain or turbulence, possible failure of equipment (including vehicle or aircraft), or consequences of actions of persons outside the control of the transport personnel, Risk of worsening condition      RN Documentation      Most [address] [facility] [address] Name, [address] [facility] Assignment  micu 5    Report Given to angelia      Follow-up Information    None         Discharge Medication List as of 5/24/2021 12:59 AM      CONTINUE these medications which have NOT CHANGED    Details   albuterol (2 5 mg/3 mL) 0 083 % nebulizer solution Take 1 vial (2 5 mg total) by nebulization 4 (four) times a day, Starting Thu 9/12/2019, Normal      ALPRAZolam (XANAX) 0 25 mg tablet Take 1 tablet (0 25 mg total) by mouth daily at bedtime as needed for anxiety, Starting Tue 3/2/2021, Until Thu 4/1/2021, Normal      amLODIPine (NORVASC) 5 mg tablet take 1 tablet by mouth once daily, Normal      aspirin 81 mg chewable tablet Chew 81 mg daily, Historical Med      EPINEPHrine (EPIPEN) 0 3 mg/0 3 mL SOAJ Inject 0 3 mL (0 3 mg total) into a muscle once for 1 dose, Starting Mon 5/4/2020, Normal      ergocalciferol (ERGOCALCIFEROL) 1 25 MG (94932 UT) capsule Take 1 capsule (50,000 Units total) by mouth once a week, Starting Thu 11/5/2020, Normal      fluticasone-salmeterol (ADVAIR DISKUS, WIXELA INHUB) 250-50 mcg/dose inhaler Inhale 1 puff 2 (two) times a day Rinse mouth after use , Historical Med      levocetirizine (XYZAL) 5 MG tablet Take 0 5 tablets (2 5 mg total) by mouth every evening, Starting Tue 3/2/2021, No Print      meloxicam (MOBIC) 15 mg tablet take 1 tablet by mouth once daily, Normal      montelukast (SINGULAIR) 10 mg tablet take 1 tablet by mouth at bedtime, Normal      nystatin (MYCOSTATIN) cream Apply topically 2 (two) times a day   Apply for additional 2 weeks after rash resolves  , Starting Sun 6/14/2020, Normal      omeprazole (PriLOSEC) 20 mg delayed release capsule Take 1 capsule (20 mg total) by mouth 2 (two) times a day, Starting Tue 3/2/2021, Normal      rosuvastatin (CRESTOR) 20 MG tablet Take 1 tablet (20 mg total) by mouth daily At bedtime for cholesterol, Starting Thu 11/5/2020, Normal      theophylline (THEODUR) 300 mg 12 hr tablet take 1 tablet by mouth twice a day, Normal           No discharge procedures on file      PDMP Review       Value Time User    PDMP Reviewed  Yes 3/2/2021  2:28 PM Maria Del Carmen Talamantes DO          ED Provider  Electronically Signed by           Khalida Rae MD  05/24/21 [address], [place]  05/24/21 0097

## 2021-05-24 NOTE — RESPIRATORY THERAPY NOTE
Called to ed to assist in intubating pt, pt was intubated w/ size 7 5 ett by dr Jagjit Waters after 1 attempt @0937, ett secured at 25@ lips w/ tube ortiz after positive etco2 detection, ausciltation, and cxr to be done, pt then placed on vent w/ settings of a/c 16/400/100%/peep6, abg and cont neb to be done, at 2140 ett was retracted by 2 cm to 23@ lip, awaiting decision on transfer vs admission of pt

## 2021-05-24 NOTE — QUICK NOTE
Called by ED physician to discuss patient presentation, management and disposition  Recommendations limited by the dynamics of phone consultation  In brief, the patient is a 78year-old female with hx of HTN, HLD, GERD and asthma on maintenance inhaler with daily rescue requirement, who became unresponsive while playing bingo in a tent today  EMS and ED physician both reported decerebrate-like posturing  ED physician also noted right upward gaze deviation  Patient subsequently became agitated and was screaming  She was given 2 mg IV lorazepam  She was intubated for airway protection  A stroke alert was called and ED physician discussed case with neurology  CT head was unremarkable, CTA head/neck revealed 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, but no large vessel occlusion or stenosis  Neurology recommended keppra load for possible seizure  Patient has been tachycardic since presentation  She has been hemodynamically stable  She has been normothermic  Work-up has otherwise been notable for leukocytosis with white count 18 7, as well as a small elevation in lactate to 2 9  Urinalysis was negative for pyuria or bacteriuria  Portable chest reveals no obvious infiltrate (I did notify ED physician that ETT is deep and needs to be adjusted)  Anion gap is 23, base deficit 6 2  She has received vancomycin and cefepime in consideration for sepsis of undetermined source  Per documentation by ED nurse, patient has been following commands and attempting to speak  Physician assistant with [employer] reports patient continues to have involuntary movements/posturing concerning for seizure      Plan:  - Perform LP to rule out CNS infection  - Add ampicillin and acyclovir to already administered vancomycin and cefepime  - Start dexamethasone 10 mg IV q 6 hours  - Decrease tidal volume to 350 mL in keeping with lung protective ventilatory strategy (IBW 46 kg), increase rate to adjust for change in volume as well as respiratory component of current acidosis  - EMU consult for transfer for continuous EEG given reported concern for continued seizure-like activity

## 2021-05-24 NOTE — ED NOTES
Propofol needed to increase to 40 mcg/kg/min with good resu;tsd  brian wrist restrainst on  og 18 FR NSERTED BY DR FALK-PLACEMENT BY AIR HEARD luq AND LARGE AMOUNT OF LIGHT TAN ASPIRATE, THEN cxr DONE FOR ett PLACEMENT   NTUBATED WITH 7 5 ETT 25 CM AT THE LIP 2104- COLOR CHANGE ON CO2 DETECTOR, BRIAN BREATH SOUNDS HEARD      Nicole Meyers RN  05/23/21 5708

## 2021-05-24 NOTE — ED PROCEDURE NOTE
PROCEDURE  CriticalCare Time  Performed by: Rod Grubbs MD  Authorized by: Rod Grubbs MD     Critical care provider statement:     Critical care time (minutes):  95    Critical care start time:  5/23/2021 8:08 PM    Critical care end time:  5/24/2021 12:20 AM    Critical care time was exclusive of:  Separately billable procedures and treating other patients    Critical care was necessary to treat or prevent imminent or life-threatening deterioration of the following conditions:  Respiratory failure, CNS failure or compromise and metabolic crisis    Critical care was time spent personally by me on the following activities:  Review of old charts, discussions with primary provider, evaluation of patient's response to treatment, examination of patient, discussions with consultants, development of treatment plan with patient or surrogate, obtaining history from patient or surrogate, ordering and performing treatments and interventions, ordering and review of laboratory studies, ordering and review of radiographic studies, re-evaluation of patient's condition and ventilator management         Rod Grubbs MD  05/24/21 0960

## 2021-05-24 NOTE — UTILIZATION REVIEW
Initial Clinical Review    Admission: Date/Time/Statement:   Admission Orders (From admission, onward)     Ordered        05/24/21 0148  Inpatient Admission  Once                   Orders Placed This Encounter   Procedures    Inpatient Admission     Standing Status:   Standing     Number of Occurrences:   1     Order Specific Question:   Level of Care     Answer:   Critical Care [15]     Order Specific Question:   Estimated length of stay     Answer:   More than 2 Midnights     Order Specific Question:   Certification     Answer:   I certify that inpatient services are medically necessary for this patient for a duration of greater than two midnights  See H&P and MD Progress Notes for additional information about the patient's course of treatment  5/23/2021 - 5/24/2021 (5 hours)  [facility]  Acute encephalopathy, sepsis, respiratory failure,   FIXED GAZE TO RIGHT,UNRESPONSIVE 1-2 MINUTES   Transfer to [facility] for higher level of care  Prior to arrival:INTUBATED for airway protection ,  iv dexamethasone, iv propofol, iv ns, iv midazolam, iv acyclovir, iv ampicillin, iv methylprednisolone,iv keppra, IV LORAZEPAM     Initial Presentation:     78year old female received from [facility] ed for inpatient critical care to evaluate and treat acute encephalopathy and status epilepticus  GCS 6-10  Patient arrived intubated  Plan includes video EEG, MRI, lumbar puncture, mechanical ventilation, neuro checks q1 hr, iv fluids, iv keppra, iv acyclovir, iv dexamethasone  and iv sedation           Arrival    05/24/21 0157 05/24/21 0157 05/24/21 0157 05/24/21 0157 05/24/21 0157   99 3 °F (37 4 °C) 100 17 106/51 100 %      Oral Monitor         Pain Score          05/24/21 62 kg (136 lb 11 oz)     Additional Vital Signs:     Date/Time  Temp  Pulse  Resp  BP  MAP (mmHg)  SpO2   O2 Device    05/24/21 1500  --  74  19  102/49Abnormal   62  100 %   --    05/24/21 1445  --  -- --  --  --  100 %   --    05/24/21 1400  --  50Abnormal   18  94/45Abnormal   65  99 %   --    05/24/21 1300  --  54Abnormal   16  93/46Abnormal   66  99 %   --    05/24/21 1250  --  56  --  96/47Abnormal   62  99 %   --    05/24/21 1223  --  --  --  --  --  99 %   --    05/24/21 1221  97 9 °F (36 6 °C)  56  16  94/48Abnormal   67  99 %   Ventilator    05/24/21 1200  --  56  16  99/51  77  99 %   --    05/24/21 1100  --  58  16  97/50  70  99 %   --    05/24/21 1000  --  56  16  89/45Abnormal   62  99 %   --    05/24/21 0900  --  64  16  95/47Abnormal   68  99 %   --    05/24/21 0813  97 5 °F (36 4 °C)  70  16  98/46Abnormal   64  99 %   Ventilator    05/24/21 0800  --  74  16  96/51  64  98 %   --    05/24/21 0700  --  68  16  102/52  64  99 %   --    05/24/21 0600  --  74  --  100/52  67  99 %   --    05/24/21 0500  --  80  --  101/51  69  99 %   --    05/24/21 0400  99 3 °F (37 4 °C)  86  20  97/51  64  99 %   Ventilator    05/24/21 0334  --  --  --  --  --  99 %   --    05/24/21 0300  --  92  --  104/51  65  99 %   --    05/24/21 0200  --  100  --  112/55  69  100 %   --    05/24/21 0157  99 3 °F (37 4 °C)  100  17  106/51  70  100 %   Ventilator        Date and Time Eye Opening Best Verbal Response Best Motor Response Gisela Coma Scale Score   05/24/21 1409 3 1 6 10   05/24/21 1221 1 1 4 6   05/24/21 1031 1 1 4 6   05/24/21 0813 1 1 4 6   05/24/21 0400 1 1 4 6   05/24/21 0200 1 1 4 6   05/23/21 1945 2 2 4 8   05/23/21 1930 2 1 5 8               Pertinent Labs/Diagnostic Test Results:     Results from last 7 days   Lab Units 05/1954   SARS-COV-2  Negative     Results from last 7 days   Lab Units 05/24/21  0517 05/24/21 0216 05/23/21 1943   WBC Thousand/uL 13 07*  --  18 70*   HEMOGLOBIN g/dL 11 4*  --  13 4   HEMATOCRIT % 34 2*  --  42 8   PLATELETS Thousands/uL 184 185 242         Results from last 7 days   Lab Units 05/24/21 0517 05/23/21 1943   SODIUM mmol/L 142 143   POTASSIUM mmol/L 5 0 3 3* CHLORIDE mmol/L 116* 105   CO2 mmol/L 19* 15*   ANION GAP mmol/L 7 23*   BUN mg/dL 10 16   CREATININE mg/dL 0 74 1 09   EGFR ml/min/1 73sq m 84 53   CALCIUM mg/dL 7 3* 9 3   MAGNESIUM mg/dL 2 3 1 9   PHOSPHORUS mg/dL 2 5  --      Results from last 7 days   Lab Units 05/24/21  0517   AST U/L 54*   ALT U/L 55   ALK PHOS U/L 410*   TOTAL PROTEIN g/dL 6 1*   ALBUMIN g/dL 2 9*   TOTAL BILIRUBIN mg/dL 0 64   BILIRUBIN DIRECT mg/dL 0 10         Results from last 7 days   Lab Units 05/24/21  0517 05/23/21  1943   GLUCOSE RANDOM mg/dL 190* 186*       Results from last 7 days   Lab Units 05/24/21  0555 05/24/21  0202 05/23/21  2202   PH ART  7 282* 7 268* 7 240*   PCO2 ART mm Hg 43 2 42 2 50 2*   PO2 ART mm Hg 160 1* 239 6* 321 0*   HCO3 ART mmol/L 19 9* 18 9* 20 8*   BASE EXC ART mmol/L -6 5 -7 7 -6 2*   O2 CONTENT ART mL/dL 16 7 17 6 17 2   O2 HGB, ARTERIAL % 98 2* 98 7* 98 2   ABG SOURCE  Radial, Right Radial, Right Radial, Left     Results from last 7 days   Lab Units 05/23/21 2027   PH INO  7 300   PCO2 INO mm Hg 46 9   PO2 INO mm Hg 68 0*   HCO3 INO mmol/L 22 2*   BASE EXC INO mmol/L -4 2   O2 CONTENT INO ml/dL 16 5   O2 HGB, VENOUS % 88 1             Results from last 7 days   Lab Units 05/23/21  1943   TROPONIN I ng/mL <0 03         Results from last 7 days   Lab Units 05/23/21  1943   PROTIME seconds 14 0   INR  1 09   PTT seconds 25         Results from last 7 days   Lab Units 05/23/21 2014   PROCALCITONIN ng/ml <0 05     Results from last 7 days   Lab Units 05/24/21  0517 05/24/21  0216 05/23/21  2327 05/23/21 2014   LACTIC ACID mmol/L 1 8 2 7* 2 3* 2 9*       Results from last 7 days   Lab Units 05/23/21  2144   CLARITY UA  Clear   COLOR UA  Yellow   SPEC GRAV UA  1 010   PH UA  5 5   GLUCOSE UA mg/dl Negative   KETONES UA mg/dl Negative   BLOOD UA  Trace-Intact*   PROTEIN UA mg/dl Negative   NITRITE UA  Negative   BILIRUBIN UA  Negative   UROBILINOGEN UA E U /dl 0 2   LEUKOCYTES UA  Negative   WBC UA /hpf 0-1   RBC UA /hpf 0-1   BACTERIA UA /hpf None Seen   EPITHELIAL CELLS WET PREP /hpf Occasional       Results from last 7 days   Lab Units 05/23/21  2144   AMPH/METH  Negative   BARBITURATE UR  Negative   BENZODIAZEPINE UR  Negative   COCAINE UR  Negative   METHADONE URINE  Negative   OPIATE UR  Negative   PCP UR  Negative   THC UR  Negative     Results from last 7 days   Lab Units 05/24/21  1424 05/23/21 2014   ETHANOL LVL mg/dL  --  <86   SALICYLATE LVL mg/dL <3*  --        Results from last 7 days   Lab Units 05/23/21 2027   BLOOD CULTURE  Received in Microbiology Lab  Culture in Progress  Received in Microbiology Lab  Culture in Progress       ED Treatment:   Medication Administration - No Administrations Displayed (No Start Event Found)     None        Past Medical History:   Diagnosis    Acute bronchitis    Acute pharyngitis    Anxiety state    Arthropathy of ankle and foot    and/or    Asthma    Asthma without status asthmaticus    Carotid artery occlusion    [facility] obstructive [facility] disease) ([facility])    Cough    Disorder of shoulder    Dyspnea    Hand joint pain    Heartburn    Herpes simplex without complication    Hyperlipidemia    Infection of skin and subcutaneous tissue    Localized superficial swelling of skin    Low back pain    Lymphadenopathy    Malaise and fatigue    Neck pain    Osteoarthritis    Pleurisy without effusion or active tuberculosis    Pneumonia    Right upper quadrant pain    Shoulder joint pain    Skin eruption    Vitamin D deficiency    Vomiting     Present on Admission:   Moderate asthma   Hyperlipidemia LDL goal <100   Chronic kidney disease (CKD) stage G2/A1, mildly decreased glomerular filtration rate (GFR) between 60-89 mL/min/1 73 square meter and albuminuria creatinine ratio less than 30 mg/g      Admitting Diagnosis:     Acute encephalopathy [G93 40]  Sepsis ([facility] [address] ) [A41 9]  Acute respiratory failure with hypoxia [J96 01]    Age/Sex: 78 y o  female    Scheduled Medications:  chlorhexidine, 15 mL, Mouth/Throat, Q12H JOZEF  enoxaparin, 40 mg, Subcutaneous, Daily  fluticasone-vilanterol, 1 puff, Inhalation, Daily  levalbuterol, 1 25 mg, Nebulization, TID  levETIRAcetam, 750 mg, Intravenous, Q12H JOZEF  omeprazole (PRILOSEC) suspension 2 mg/mL, 20 mg, Oral, Daily  polyethylene glycol, 17 g, Oral, Daily  pravastatin, 80 mg, Oral, Daily With Dinner  sodium chloride, 3 mL, Nebulization, TID      Continuous IV Infusions:  fentaNYL, 75 mcg/hr, Intravenous, Continuous  multi-electrolyte, 75 mL/hr, Intravenous, Continuous  propofol, 5-50 mcg/kg/min, Intravenous, Titrated      PRN Meds:  acetaminophen, 975 mg, Per NG Tube, Q6H PRN  albuterol, 2 5 mg, Nebulization, Q6H PRN  bisacodyl, 10 mg, Rectal, Daily PRN  ondansetron, 4 mg, Intravenous, Q6H PRN        IP CONSULT TO CASE MANAGEMENT  IP CONSULT TO NEUROLOGY    Network Utilization Review Department  ATTENTION: Please call with any questions or concerns to [phone] and carefully listen to the prompts so that you are directed to the right person  All voicemails are confidential   Kailee Arias all requests for admission clinical reviews, approved or denied determinations and any other requests to dedicated fax number below belonging to the campus where the patient is receiving treatment   List of dedicated fax numbers for the Facilities:  [address] DENIALS (Administrative/Medical Necessity) [phone]   [address] [place] [address] (Maternity/NICU/Pediatrics) 261 [facility] [place],[address]Th Floor [address] Stella Martinez 3744 [id number] [place] [address] Rosalba Helm [phone] [phone]

## 2021-05-24 NOTE — EMTALA/ACUTE CARE TRANSFER
[facility]  [address] [address] [phone]  Dept: [phone]      EMTALA TRANSFER CONSENT    NAME Daria ARMAS 1955                              MRN [id number]    I have been informed of my rights regarding examination, treatment, and transfer   by Dr Jacqui Carias MD    Benefits: Specialized equipment and/or services available at the receiving facility (Include comment)________________________    Risks: Potential for delay in receiving treatment      Consent for Transfer:  I acknowledge that my medical condition has been evaluated and explained to me by the emergency department physician or other qualified medical person and/or my attending physician, who has recommended that I be transferred to the service of  Accepting Physician: Dr Antonio Ha at [address] [facility] Name, [address] : [facility]   The above potential benefits of such transfer, the potential risks associated with such transfer, and the probable risks of not being transferred have been explained to me, and I fully understand them  The doctor has explained that, in my case, the benefits of transfer outweigh the risks  I agree to be transferred  I authorize the performance of emergency medical procedures and treatments upon me in both transit and upon arrival at the receiving facility  Additionally, I authorize the release of any and all medical records to the receiving facility and request they be transported with me, if possible  I understand that the safest mode of transportation during a medical emergency is an ambulance and that the Hospital advocates the use of this mode of transport  Risks of traveling to the receiving facility by car, including absence of medical control, life sustaining equipment, such as oxygen, and medical personnel has been explained to me and I fully understand them      ([place] BELOW)  [  ]  I consent to the stated transfer and to be transported by ambulance/helicopter  [  ]  I consent to the stated transfer, but refuse transportation by ambulance and accept full responsibility for my transportation by car  I understand the risks of non-ambulance transfers and I exonerate the Hospital and its staff from any deterioration in my condition that results from this refusal     X___________________________________________    DATE  21  TIME________  Signature of patient or legally responsible individual signing on patient behalf           RELATIONSHIP TO PATIENT_________________________          Provider Certification    NAME Shar Pak 1955                              MRN [id number]    A medical screening exam was performed on the above named patient  Based on the examination:    Condition Necessitating Transfer The primary encounter diagnosis was Acute encephalopathy  Diagnoses of Sepsis ([facility] [address] ) and Acute respiratory failure with hypoxia ([facility] [address] ) were also pertinent to this visit  Patient Condition: The patient has been stabilized such that within reasonable medical probability, no material deterioration of the patient condition or the condition of the unborn child(barby) is likely to result from the transfer    Reason for Transfer:      Transfer Requirements: [place]    · Space available and qualified personnel available for treatment as acknowledged by    · Agreed to accept transfer and to provide appropriate medical treatment as acknowledged by       Dr Maranda Olsen  · Appropriate medical records of the examination and treatment of the patient are provided at the time of transfer   [address], [address] _______  · Transfer will be performed by qualified personnel from    and appropriate transfer equipment as required, including the use of necessary and appropriate life support measures      Provider Certification: I have examined the patient and explained the following risks and benefits of being transferred/refusing transfer to the patient/family:  General risk, such as traffic hazards, adverse weather conditions, rough terrain or turbulence, possible failure of equipment (including vehicle or aircraft), or consequences of actions of persons outside the control of the transport personnel, Risk of worsening condition      Based on these reasonable risks and benefits to the patient and/or the unborn child(barby), and based upon the information available at the time of the patients examination, I certify that the medical benefits reasonably to be expected from the provision of appropriate medical treatments at another medical facility outweigh the increasing risks, if any, to the individuals medical condition, and in the case of labor to the unborn child, from effecting the transfer      X____________________________________________ DATE 05/23/21        TIME_______      ORIGINAL - SEND TO MEDICAL RECORDS   COPY - SEND WITH PATIENT DURING TRANSFER

## 2021-05-24 NOTE — QUICK NOTE
Stroke Alert Note   Basil Murrell 78 y o  female  MRN: [id number]   Unit/Bed#: ED 03 Encounter: [id number]     Stroke alert called at 7:25pm  Neurology response by phone was immediate    77 y/o woman with history of HTN, HLD, CKD, COPD, was outside in a tent for about 8 hours today playing bingo when suddenly she was noted to be staring off, and not responding  Noted by EMS to be posturing for about 15 seconds  In the ED, she had an episode of becoming tonic, with arched back, and gaze deviation up and to the right  This lasted about 1-2 minutes, after which pt was severely agitated and confused  Given 2mg IV ativan at that time, which helped transiently    NIHSSS ~12    CT head wo: no acute findings  CTA head/neck: no acute findings, no LVO, no significant stenosis noted  IV tPA was not given due to low suspicion for stroke, more concerning for seizure activity    - recommend loading with keppra and continuing it 750mg Q12h  - U/A, Utox  - confirm with family if she has EtOH abuse history  - MRI brain w/wo  - EEG      Miriam Lea MD

## 2021-05-24 NOTE — RESPIRATORY THERAPY NOTE
RT Ventilator Management Note  Helen Seneca 78 y o  female MRN: [id number]  Unit/Bed#: MICU 05 Encounter: [id number]      Daily Screen     No data found in the last 10 encounters  Physical Exam:   Assessment Type: Assess only      Resp Comments: Pt  brought by EMS  Placed on [id number]%+6  Pt  doing well on current settings  Will continue to monitor

## 2021-05-24 NOTE — QUICK NOTE
I was contacted by ER physician for possible admission of this patient  After presentation by ER I briefly examined her and reviewed her labs and imaging and discussed with on call critical care on-call Dr Corry Castellon  On exam patient is sedated and intubated and has bilateral upper extremity contracture posturing and given history as presented by ER there is a concern that she may be having ongoing seizure activity  I discussed this with Dr Corry Castellon as well as our inability to obtain an EEG and decision was made to transfer patient to another facility that could do continuous EEG monitoring  ER physician made aware of critical cares recommendations and transfer initiated

## 2021-05-24 NOTE — ASSESSMENT & PLAN NOTE
Patient reportedly presenting with seizure-like activity which was described as staring episode with unresponsiveness and possible shaking    No previous history of seizure or other pertinent past medical history    Current AEDs:  - Keppra 750 mg q 12 started 5/23    Workup:  - CT/CTA negative for acute findings  - MRI brain with and without contrast pending  - video EEG findings as above    Plan:  - Continue to monitor on video EEG for at least 24 hours  - toxic metabolic workup as per primary team  - MRI brain with and without contrast  - strongly recommend LP to rule out infectious versus auto immune etiology

## 2021-05-24 NOTE — ED PROCEDURE NOTE
PROCEDURE  Intubation    Date/Time: 5/23/2021 9:09 PM  Performed by: Charly Perez MD  Authorized by: Charly Perez MD     Patient location:  ED  Consent:     Consent obtained:  Emergent situation    Consent given by: Sister and law and Brother via phone     Risks discussed:  Aspiration, brain injury and dental trauma  [place] protocol:     Procedure explained and questions answered to patient or proxy's satisfaction: yes      Patient identity confirmed:  Arm band  Pre-procedure details:     Patient status:  Altered mental status    Mallampati score:  2    Pretreatment medications:  None    Paralytics:  None  Indications:     Indications for intubation: respiratory failure and airway protection    Procedure details:     Preoxygenation:  Nasal cannula    Intubation method:  Oral    Oral intubation technique:  Glidescope    Laryngoscope blade: Mac 3    Tube size (mm):  7 5    Tube type:  Cuffed    Number of attempts:  1    Cricoid pressure: yes      Tube visualized through cords: yes    Placement assessment:     ETT to lip:  23    Tube secured with:  ETT ortiz and adhesive tape    Breath sounds:  Equal    Placement verification: chest rise, condensation, CXR verification, direct visualization, equal breath sounds, ETCO2 detector, fiberoptic scope, tube exhalation and capnography      CXR findings:  ETT in right main stem    Tube repositioned: yes    Post-procedure details:     Patient tolerance of procedure:   Tolerated well, no immediate complications         Charly Perez MD  05/24/21 7336

## 2021-05-24 NOTE — RESPIRATORY THERAPY NOTE
RT Ventilator Management Note  Shar Velazco 78 y o  female MRN: [id number]  Unit/Bed#: MICU 05 Encounter: [id number]      Daily Screen     No data found in the last 10 encounters  Physical Exam:   Assessment Type: (P) Assess only  Bilateral Breath Sounds: (P) Clear      Resp Comments: (P) Pt  doing well on CMV  Vt increased to 400  No other changes throughout the night

## 2021-05-24 NOTE — CONSULTS
NEUROLOGY RESIDENCY CONSULT NOTE     Name: Rocco Peña   Age & Sex: 78 y o  female   MRN: [id number]  [facility]   Encounter: [id number]  Length of Stay: 0    ASSESSMENT & PLAN     * Acute encephalopathy  Assessment & Plan  Rocco Peña presented with new onset acute mental status change with posturing and full body shaking concerning for seizure-like activity with posturing on 5/24/2021  Current known medical history is significant for hypertension and hyperlipidemia  Workup:  - CT head and CTA as part of stroke alert were both unremarkable  - MRI w wo is pending  - patient is on video EEG  - GGT is 1255 (RUQ US is pending per critical care)    Continuous EEG findings:   - 5/24:  Diffuse alpha and beta  Sedation background  No seizures and no epileptiform discharges  Impression:  Unclear etiology of acute encephalopathy, clinical picture is consistent with seizure but unclear reason for new onset seizure  Differentials at this time include but not limited to mass lesion, CNS infection versus inflammation, metabolic derangement, other toxicologic derangement  Plan:  - Recommend LP with infectious/inflammatory workup  - Labs and other metabolic workup deferred to primary team  - agree with deferring antibiotics as per critical care team at this time  - MRI brain w wo contrast and EEG for at least 24 hours  - Minimize delirium, regulate sleep-wake cycle  - Minimize cognitive impairing medications such as benzos as much as possible when appropriate  - Correct lytes and fluids as per medical team appreciated  - Avoid cefepime if possible if needed for infectious cause  - Discussed recommendations with critical care resident    Seizure-like activity [facility])  Assessment & Plan  Patient reportedly presenting with seizure-like activity which was described as staring episode with unresponsiveness and possible shaking    No previous history of seizure or other pertinent past medical history    Current AEDs:  - Keppra 750 mg q 12 started 5/23    Workup:  - CT/CTA negative for acute findings  - MRI brain with and without contrast pending  - video EEG findings as above    Plan:  - Continue to monitor on video EEG for at least 24 hours  - toxic metabolic workup as per primary team  - MRI brain with and without contrast  - strongly recommend LP to rule out infectious versus auto immune etiology    Recommendations for outpatient neurological follow up have yet to be determined  SUBJECTIVE     Reason for Consult / Principal Problem:  Acute encephalopathy rule out seizure  Hx and PE limited by:  Patient is intubated and sedated    HPI    Octaviano Rosario is a 78 y o  female past medical history of hypertension, hyperlipidemia who initially presented to [facility] on 05/23 with an acute episode of unresponsiveness  Reportedly she had been playing bingo outdoors for 8 hours prior to presentation when she became acutely altered, she was staring at her cellphone and not responding  On EMS arrival she was noted to have decerebrate posturing and full body shaking  On presentation in the emergency department she had right upward gaze and at 1 point became agitated with screaming and required lorazepam   She was ultimately intubated for airway protection  Stroke alert was called during her presentation at [place]  Other than an incidental 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 there were no significant findings on CT or CTA  Clinical picture did appear more consistent seizure, Neurology recommended Keppra load and continued maintenance dosing with transferred to [facility] for video EEG  There was concern at [place] for meningitis versus encephalitis and patient received Solu-Medrol, Decadron, ampicillin, acyclovir, vancomycin, cefepime, however on presentation to [facility] these medications were deferred as clinical picture is more consistent with seizure    At this point, given the negative imaging thus far and vitals/labs which are not consistent with infection, and tox screen was also negative, unclear as to why the patient would have new onset seizure  Will observe on video EEG and obtain MRI brain with and without contrast to rule out underlying lesion  Since admission patient's vitals have been stable, she is afebrile with a blood pressure ranging in the low 579U systolic, she remains on a ventilator at this time  Labs have been remarkable for mild elevation of WBCs at 13 07 which is decreased from initial of 18 7, and ABG consistent with gap metabolic acidosis, additionally her lactic acid had remained around 2 5 for the first 12 hours now declining  UA, U tox were negative            Inpatient consult to Neurology     Date/Time 5/24/2021 7:42 AM     Performed by  Bobby Ruiz MD     Authorized by Donnie Vaughn DO              Historical Information   Past Medical History:   Diagnosis Date    Acute bronchitis     Acute pharyngitis     Anxiety state     Arthropathy of ankle and foot     and/or    Asthma     Asthma without status asthmaticus     Carotid artery occlusion     COPD (chronic obstructive pulmonary disease) (HCC)     Cough     Disorder of shoulder     Dyspnea     Hand joint pain     Heartburn     Herpes simplex without complication     Hyperlipidemia     Infection of skin and subcutaneous tissue     Localized superficial swelling of skin     Low back pain     Lymphadenopathy     Malaise and fatigue     Neck pain     Osteoarthritis     Pleurisy without effusion or active tuberculosis     Pneumonia     Right upper quadrant pain     Shoulder joint pain     Skin eruption     Vitamin D deficiency     Vomiting      Past Surgical History:   Procedure Laterality Date    BREAST BIOPSY Left 2017 benign    CHOLECYSTECTOMY      CHOLECYSTECTOMY  03/2014    Dr Benji Day     COLONOSCOPY  02/2013    WNL    HYSTERECTOMY      Total Social History   Social History     Substance and Sexual Activity   Alcohol Use Not Currently    Frequency: Never    Comment: DAILY     Social History     Substance and Sexual Activity   Drug Use Never     E-Cigarette/Vaping    E-Cigarette Use Never User      E-Cigarette/Vaping Substances     Social History     Tobacco Use   Smoking Status Never Smoker   Smokeless Tobacco Never Used     Family History:   Family History   Problem Relation Age of Onset    Diabetes Mother         Non-insulin dependent diabetes    Emphysema Father     No Known Problems Sister     No Known Problems Daughter     No Known Problems Maternal Grandmother     No Known Problems Paternal Grandmother     No Known Problems Sister     No Known Problems Sister     No Known Problems Daughter     No Known Problems Daughter     No Known Problems Maternal Aunt      Meds/Allergies   all current active meds have been reviewed, current meds:   Current Facility-Administered Medications   Medication Dose Route Frequency    acetaminophen (TYLENOL) tablet 975 mg  975 mg Per NG Tube Q6H PRN    albuterol inhalation solution 2 5 mg  2 5 mg Nebulization Q6H PRN    bisacodyl (DULCOLAX) rectal suppository 10 mg  10 mg Rectal Daily PRN    chlorhexidine (PERIDEX) 0 12 % oral rinse 15 mL  15 mL Mouth/Throat Q12H [facility]    enoxaparin (LOVENOX) subcutaneous injection 40 mg  40 mg Subcutaneous Daily    fentaNYL 1000 mcg in sodium chloride 0 9% 100mL infusion  75 mcg/hr Intravenous Continuous    fluticasone-vilanterol (BREO ELLIPTA) 200-25 MCG/INH inhaler 1 puff  1 puff Inhalation Daily    levalbuterol (XOPENEX) inhalation solution 1 25 mg  1 25 mg Nebulization TID    levETIRAcetam (KEPPRA) 750 mg in sodium chloride 0 9 % 100 mL IVPB  750 mg Intravenous Q12H [facility]    multi-electrolyte (PLASMALYTE-A/ISOLYTE-S PH 7 4) IV solution  75 mL/hr Intravenous Continuous    omeprazole (PRILOSEC) suspension 2 mg/mL  20 mg Oral Daily    ondansetron (ZOFRAN) injection 4 mg  4 mg Intravenous Q6H PRN    polyethylene glycol (MIRALAX) packet 17 g  17 g Oral Daily    pravastatin (PRAVACHOL) tablet 80 mg  80 mg Oral Daily With Dinner    propofol (DIPRIVAN) 1000 mg in 100 mL infusion (premix)  5-50 mcg/kg/min Intravenous Titrated    sodium chloride 0 9 % inhalation solution 3 mL  3 mL Nebulization TID    and PTA meds:   Prior to Admission Medications   Prescriptions Last Dose Informant Patient Reported? Taking? ALPRAZolam (XANAX) 0 25 mg tablet   No No   Sig: Take 1 tablet (0 25 mg total) by mouth daily at bedtime as needed for anxiety   EPINEPHrine (EPIPEN) 0 3 mg/0 3 mL SOAJ   No No   Sig: Inject 0 3 mL (0 3 mg total) into a muscle once for 1 dose   albuterol (2 5 mg/3 mL) 0 083 % nebulizer solution  Self No No   Sig: Take 1 vial (2 5 mg total) by nebulization 4 (four) times a day   amLODIPine (NORVASC) 5 mg tablet   No No   Sig: take 1 tablet by mouth once daily   aspirin 81 mg chewable tablet  Self Yes No   Sig: Chew 81 mg daily   ergocalciferol (ERGOCALCIFEROL) 1 25 MG (86982 UT) capsule   No No   Sig: Take 1 capsule (50,000 Units total) by mouth once a week   fluticasone-salmeterol (ADVAIR DISKUS, WIXELA INHUB) 250-50 mcg/dose inhaler  Self Yes No   Sig: Inhale 1 puff 2 (two) times a day Rinse mouth after use  levocetirizine (XYZAL) 5 MG tablet   No No   Sig: Take 0 5 tablets (2 5 mg total) by mouth every evening   meloxicam (MOBIC) 15 mg tablet   No No   Sig: take 1 tablet by mouth once daily   montelukast (SINGULAIR) 10 mg tablet  Self No No   Sig: take 1 tablet by mouth at bedtime   nystatin (MYCOSTATIN) cream  Self No No   Sig: Apply topically 2 (two) times a day   Apply for additional 2 weeks after rash resolves     omeprazole (PriLOSEC) 20 mg delayed release capsule   No No   Sig: Take 1 capsule (20 mg total) by mouth 2 (two) times a day   rosuvastatin (CRESTOR) 20 MG tablet   No No   Sig: Take 1 tablet (20 mg total) by mouth daily At bedtime for cholesterol theophylline (THEODUR) 300 mg 12 hr tablet  Self No No   Sig: take 1 tablet by mouth twice a day      Facility-Administered Medications: None     Allergies   Allergen Reactions    Azithromycin     Darvon [Propoxyphene]        Review of previous medical records was completed  Review of Systems   Unable to perform ROS: Acuity of condition       OBJECTIVE     Patient ID: Helen Prieto is a 78 y o  female  Vitals:   Vitals:    05/24/21 0813 05/24/21 0900 05/24/21 1000 05/24/21 1100   BP: (!) 98/46 (!) 95/47 (!) 89/45 97/50   BP Location: Left arm      Pulse: 70 64 56 58   Resp: 16 16 16 16   Temp: 97 5 °F (36 4 °C)      TempSrc: Oral      SpO2: 99% 99% 99% 99%   Weight:       Height:          Body mass index is 26 69 kg/m²  Intake/Output Summary (Last 24 hours) at 5/24/2021 1146  Last data filed at 5/24/2021 1111  Gross per 24 hour   Intake 889 2 ml   Output 540 ml   Net 349 2 ml       Physical Exam  Vitals signs and nursing note reviewed  Constitutional:       Appearance: She is ill-appearing  Interventions: She is sedated, intubated and restrained  HENT:      Head: Normocephalic and atraumatic  Eyes:      Pupils:      Right eye: Pupil is not reactive  Left eye: Pupil is not reactive  Comments: Pinpoint b/l   Cardiovascular:      Rate and Rhythm: Normal rate  Heart sounds: S1 normal and S2 normal    Pulmonary:      Effort: She is intubated  Skin:     General: Skin is warm  Capillary Refill: Capillary refill takes less than 2 seconds            Neurologic Exam     Mental Status   Examined on propofol at 45 and fentanyl 75  Not arousable to noxious stim  No response to commands     Cranial Nerves   Pinpoint pupils, nonreactive  Gag intact  Corneals diminished  Face appears symmetric     Motor Exam No spontaneous movement  No purposeful movement to noxious stim in the uppers  Some movement to noxious stim in the lowers asymmetrically  No focality     Gait, Coordination, and Reflexes 2+ throughout        LABORATORY DATA     Labs:  I have personally reviewed pertinent films in PACS  Results from last 7 days   Lab Units 05/24/21  0517 05/24/21  0216 05/23/21 1943   WBC Thousand/uL 13 07*  --  18 70*   HEMOGLOBIN g/dL 11 4*  --  13 4   HEMATOCRIT % 34 2*  --  42 8   PLATELETS Thousands/uL 184 185 242   MONO PCT % 1*  --   --       Results from last 7 days   Lab Units 05/24/21 0517 05/23/21 1943   POTASSIUM mmol/L 5 0 3 3*   CHLORIDE mmol/L 116* 105   CO2 mmol/L 19* 15*   BUN mg/dL 10 16   CREATININE mg/dL 0 74 1 09   CALCIUM mg/dL 7 3* 9 3   ALK PHOS U/L 410*  --    ALT U/L 55  --    AST U/L 54*  --      Results from last 7 days   Lab Units 05/24/21 0517 05/23/21 1943   MAGNESIUM mg/dL 2 3 1 9     Results from last 7 days   Lab Units 05/24/21 0517   PHOSPHORUS mg/dL 2 5      Results from last 7 days   Lab Units 05/23/21  1943   INR  1 09   PTT seconds 25     Results from last 7 days   Lab Units 05/24/21 0517   LACTIC ACID mmol/L 1 8     Results from last 7 days   Lab Units 05/23/21 1943   TROPONIN I ng/mL <0 03       IMAGING & DIAGNOSTIC TESTING     Radiology Results: I have personally reviewed pertinent films in PACS  MRI inpatient order    (Results Pending)       Other Diagnostic Testing: I have personally reviewed pertinent films in PACS    ACTIVE MEDICATIONS     Current Facility-Administered Medications   Medication Dose Route Frequency    acetaminophen (TYLENOL) tablet 975 mg  975 mg Per NG Tube Q6H PRN    albuterol inhalation solution 2 5 mg  2 5 mg Nebulization Q6H PRN    bisacodyl (DULCOLAX) rectal suppository 10 mg  10 mg Rectal Daily PRN    chlorhexidine (PERIDEX) 0 12 % oral rinse 15 mL  15 mL Mouth/Throat Q12H [facility]    enoxaparin (LOVENOX) subcutaneous injection 40 mg  40 mg Subcutaneous Daily    fentaNYL 1000 mcg in sodium chloride 0 9% 100mL infusion  75 mcg/hr Intravenous Continuous    fluticasone-vilanterol (BREO ELLIPTA) 200-25 MCG/INH inhaler 1 puff  1 puff Inhalation Daily    levalbuterol (XOPENEX) inhalation solution 1 25 mg  1 25 mg Nebulization TID    levETIRAcetam (KEPPRA) 750 mg in sodium chloride 0 9 % 100 mL IVPB  750 mg Intravenous Q12H [address]    multi-electrolyte (PLASMALYTE-A/ISOLYTE-S PH 7 4) IV solution  75 mL/hr Intravenous Continuous    omeprazole (PRILOSEC) suspension 2 mg/mL  20 mg Oral Daily    ondansetron (ZOFRAN) injection 4 mg  4 mg Intravenous Q6H PRN    polyethylene glycol (MIRALAX) packet 17 g  17 g Oral Daily    pravastatin (PRAVACHOL) tablet 80 mg  80 mg Oral Daily With Dinner    propofol (DIPRIVAN) 1000 mg in 100 mL infusion (premix)  5-50 mcg/kg/min Intravenous Titrated    sodium chloride 0 9 % inhalation solution 3 mL  3 mL Nebulization TID       Prior to Admission medications    Medication Sig Start Date End Date Taking? Authorizing Provider   albuterol (2 5 mg/3 mL) 0 083 % nebulizer solution Take 1 vial (2 5 mg total) by nebulization 4 (four) times a day 9/12/19   Abby Araiza DO   ALPRAZolam [employer]) 0 25 mg tablet Take 1 tablet (0 25 mg total) by mouth daily at bedtime as needed for anxiety 3/2/21 4/1/21  Oziel Amador DO   amLODIPine (NORVASC) 5 mg tablet take 1 tablet by mouth once daily 1/22/21   Major Anglin MD   aspirin 81 mg chewable tablet Chew 81 mg daily    Historical Provider, MD   EPINEPHrine (EPIPEN) 0 3 mg/0 3 mL SOAJ Inject 0 3 mL (0 3 mg total) into a muscle once for 1 dose 5/4/20 6/14/20  Major Anglin MD   ergocalciferol (ERGOCALCIFEROL) 1 25 MG (39604 UT) capsule Take 1 capsule (50,000 Units total) by mouth once a week 11/5/20   Major Anglin MD   fluticasone-salmeterol (ADVAIR DISKUS, WIXELA INHUB) 250-50 mcg/dose inhaler Inhale 1 puff 2 (two) times a day Rinse mouth after use      Historical Provider, MD   levocetirizine (XYZAL) 5 MG tablet Take 0 5 tablets (2 5 mg total) by mouth every evening 3/2/21   Oziel Amador DO   meloxicam (MOBIC) 15 mg tablet take 1 tablet by mouth once daily 1/22/21   Tigist East MD   montelukast (SINGULAIR) 10 mg tablet take 1 tablet by mouth at bedtime 7/29/20   Tigist East MD   nystatin (MYCOSTATIN) cream Apply topically 2 (two) times a day   Apply for additional 2 weeks after rash resolves   6/14/20   Livia Miles PA-C   omeprazole (PriLOSEC) 20 mg delayed release capsule Take 1 capsule (20 mg total) by mouth 2 (two) times a day 3/2/21   Steward Soulier, DO   rosuvastatin (CRESTOR) 20 MG tablet Take 1 tablet (20 mg total) by mouth daily At bedtime for cholesterol 11/5/20   Tigist East MD   theophylline (THEODUR) 300 mg 12 hr tablet take 1 tablet by mouth twice a day 8/25/20   Tigist East MD       CODE STATUS & ADVANCED DIRECTIVES     Code Status: Level 1 - Full Code  Advance Directive and Living Will:      Power of :    POLST:        VTE Pharmacologic Prophylaxis: Enoxaparin (Lovenox)  VTE Mechanical Prophylaxis: sequential compression device    ==  MD Nam Rosario 72 Neurology Residency, PGY-2

## 2021-05-24 NOTE — RESPIRATORY THERAPY NOTE
RT Ventilator Management Note  Reji Flight 78 y o  female MRN: [id number]  Unit/Bed#: [facility] 05 Encounter: [id number]      Daily Screen       5/24/2021  0723             Patient safety screen outcome[de-identified]  Failed    Not Ready for Weaning due to[de-identified]  Underline problem not resolved            Physical Exam:   Assessment Type: Assess only  Bilateral Breath Sounds: Clear      Resp Comments: Pt sedate  Not breathing over vent  BS clear, sx for no secretions  02 decreased to 40%  SBT not indicated due to pts mental status at this time

## 2021-05-24 NOTE — H&P
H&P Exam - Critical Care   Jeff Guerra 78 y o  female MRN: [id number]  Unit/Bed#: MICU 05 Encounter: [id number]      -------------------------------------------------------------------------------------------------------------  Chief Complaint: None    History of Present Illness   HX and PE limited by: intubation/ sedation  Jeff Guerra is a 78 y o  female who presents secondary to encephalopathy  Per chart review, patient was playing bingo yesterday in a tent  Became acutely altered staring at her cell phone and not responding  EMS arrived  Noted to have decerebrate posturing and some shaking  History of hypertension, hyperlipidemia, gastroesophageal reflux disease, asthma on maintenance inhaler  Noted to have a right upward gaze in the emergency department  Became agitated was screaming  Given lorazepam   Intubated for airway protection  Stroke alert called  Incidental 2 mm aneurysm off left ICA noted  Otherwise no significant findings on CT or CTA  Neurology recommended keppra load  Reportedly had continued involuntary movements concerning for possible seizure like activity  Transfer to [facility] for EEG  Given 125 mg solumedrol, 10mg decardron, 1500 mg keppra, ampicillin, acyclovir, vancomycin, cefepime  History obtained from chart review and unobtainable from patient due to intubated  -------------------------------------------------------------------------------------------------------------  Assessment and Plan:    Neuro:    Diagnosis: Seizure Like Activity/ Encephalopathy/ Posturing  o Origin Unkown (Infectious less likely as afebrile and acute onset, new onset seizure, mass, metabolic derangement, toxin, withdrawal as patient drinks 2 beers daily, PNES as patient reportedly yelling during seizure)  Leukocytosis on labwork but afebrile     o Video EEG  o Neurology Consult  o Will defer LP, antibiotics, antiviral, steroids at this juncture as story less consistent with encephalitis/ meningitis  o MRI  o Theophylline level     Analgesia/ Sedation  o Propofol and fentanyl ggt     Incidental 2mm Left ICA aneurysm  o Outpatient NSG follow-up      CV:    Diagnosis: HTN  o Plan: Continue home amlodipine once no longer on propofol  o HLD  - Continue home statin      Pulm   Diagnosis: Moderate Asthma - home meds Theophylline 300mg q12hr,  Advair 250-50 1 puff BID, and uses albuterol q6h   Stop theophylline secondary to decreased seizure threshold   Continue inhaled corticosteroid   Respiratory protocol - TID scheduled nebs plus PRN nebs     Diagnosis: Respiratory Failure  o Plan: Intubated for airway protection  o SBT tomorrow  o Repeat ABG shows metabolic acidosis without compensation  Will increase volume from 350->400  PH 7 268 with PCO2 of 42  2   o AC 16/400/60/6      GI:    Diagnosis: GERD  o Plan: Continue home omeprazole      :   o Diagnosis: No acute issues  - Quinonez in place  - Baseline Cr of 0 9      F/E/N:    Fluids: Isolyte 75 ml/hr   Electrolytes: Hypokalemia on previous blood work  Will replete   Nutrition: NPO for now      Heme/Onc:   o DVT Px: Lovenox and SCDs      Endo:   o No acute issues  Monitor POCT glucose while NPO      ID:   o Monitor WBC count and fever curve  - Currently afebrile  Lekocytosis of 18 7 last night  Repeat in the am  - Negative Covid  - Hold antibiotics/ steroids for now      MSK/Skin:   o Frequent turns      Disposition: Admit to Critical Care   Code Status: Level 1 - Full Code  --------------------------------------------------------------------------------------------------------------  Review of Systems   Unable to perform ROS: Acuity of condition       Review of systems was unable to be performed secondary to intubated    Physical Exam  Vitals signs and nursing note reviewed  Constitutional:       General: She is not in acute distress  Appearance: Normal appearance  She is obese  She is not ill-appearing        Comments: Intubated/ Sedated     HENT:      Head: Normocephalic and atraumatic  Right Ear: External ear normal       Left Ear: External ear normal       Nose: Nose normal       Mouth/Throat:      Mouth: Mucous membranes are moist    Eyes:      General:         Right eye: No discharge  Left eye: No discharge  Conjunctiva/sclera: Conjunctivae normal    Neck:      Musculoskeletal: Normal range of motion  Cardiovascular:      Rate and Rhythm: Normal rate and regular rhythm  Pulses: Normal pulses  Heart sounds: No murmur  Pulmonary:      Effort: Pulmonary effort is normal       Breath sounds: Rhonchi (Left side) present  Abdominal:      General: Abdomen is flat  There is no distension  Palpations: There is no mass  Genitourinary:     Comments: Quinonez in place    Musculoskeletal: Normal range of motion  Skin:     General: Skin is warm  Capillary Refill: Capillary refill takes less than 2 seconds  Findings: No rash  Neurological:      Mental Status: She is alert  Comments: Withdraws in UE and LE b/l  Pupils 2mm and reactive b/l   Psychiatric:         Mood and Affect: Mood normal          Behavior: Behavior normal        --------------------------------------------------------------------------------------------------------------  Vitals:   Vitals:    05/24/21 0157 05/24/21 0200   BP: 106/51 112/55   BP Location: Left arm    Pulse: 100 100   Resp: 17    Temp: 99 3 °F (37 4 °C)    TempSrc: Oral    SpO2: 100% 100%   Weight: 62 kg (136 lb 11 oz)    Height: 5' (1 524 m)      Temp  Min: 98 3 °F (36 8 °C)  Max: 99 3 °F (37 4 °C)  IBW (Ideal Body Weight): 45 5 kg  Height: 5' (152 4 cm)  Body mass index is 26 69 kg/m²    N/A    Laboratory and Diagnostics:  Results from last 7 days   Lab Units 05/24/21  0216 05/23/21  1943   WBC Thousand/uL  --  18 70*   HEMOGLOBIN g/dL  --  13 4   HEMATOCRIT %  --  42 8   PLATELETS Thousands/uL 185 242     Results from last 7 days   Lab Units 05/23/21  1943   SODIUM mmol/L 143   POTASSIUM mmol/L 3 3*   CHLORIDE mmol/L 105   CO2 mmol/L 15*   ANION GAP mmol/L 23*   BUN mg/dL 16   CREATININE mg/dL 1 09   CALCIUM mg/dL 9 3   GLUCOSE RANDOM mg/dL 186*     Results from last 7 days   Lab Units 05/23/21 1943   MAGNESIUM mg/dL 1 9      Results from last 7 days   Lab Units 05/23/21 1943   INR  1 09   PTT seconds 25      Results from last 7 days   Lab Units 05/23/21 1943   TROPONIN I ng/mL <0 03     Results from last 7 days   Lab Units 05/24/21  0216 05/23/21 2327 05/23/21 2014   LACTIC ACID mmol/L 2 7* 2 3* 2 9*     ABG:  Results from last 7 days   Lab Units 05/24/21 0202   PH ART  7 268*   PCO2 ART mm Hg 42 2   PO2 ART mm Hg 239 6*   HCO3 ART mmol/L 18 9*   BASE EXC ART mmol/L -7 7   ABG SOURCE  Radial, Right     VBG:  Results from last 7 days   Lab Units 05/24/21 0202 05/23/21 2027   PH INO   --   --  7 300   PCO2 INO mm Hg  --   --  46 9   PO2 INO mm Hg  --   --  68 0*   HCO3 INO mmol/L  --   --  22 2*   BASE EXC INO mmol/L  --   --  -4 2   ABG SOURCE  Radial, Right   < >  --     < > = values in this interval not displayed  Micro:        EKG:  May 23rd at 7:27 p m  sinus tachycardia, ventricular rate 123 beats per minute, normal axis,  Imaging:  Chest x-ray shows ET tube in the right mainstem  Reportedly ET tube has been retracted since this point    I have personally reviewed pertinent films in PACS    Historical Information   Past Medical History:   Diagnosis Date    Acute bronchitis     Acute pharyngitis     Anxiety state     Arthropathy of ankle and foot     and/or    Asthma     Asthma without status asthmaticus     Carotid artery occlusion     COPD (chronic obstructive pulmonary disease) (HCC)     Cough     Disorder of shoulder     Dyspnea     Hand joint pain     Heartburn     Herpes simplex without complication     Hyperlipidemia     Infection of skin and subcutaneous tissue     Localized superficial swelling of skin  Low back pain     Lymphadenopathy     Malaise and fatigue     Neck pain     Osteoarthritis     Pleurisy without effusion or active tuberculosis     Pneumonia     Right upper quadrant pain     Shoulder joint pain     Skin eruption     Vitamin D deficiency     Vomiting      Past Surgical History:   Procedure Laterality Date    BREAST BIOPSY Left 2017 benign    CHOLECYSTECTOMY      CHOLECYSTECTOMY  03/2014    Dr Maria Del Carmen Carrero     COLONOSCOPY  02/2013    WNL    HYSTERECTOMY      Total      Social History   Social History     Substance and Sexual Activity   Alcohol Use Not Currently    Frequency: Never    Comment: DAILY     Social History     Substance and Sexual Activity   Drug Use Never     Social History     Tobacco Use   Smoking Status Never Smoker   Smokeless Tobacco Never Used     Exercise History:   Family History:   Family History   Problem Relation Age of Onset    Diabetes Mother         Non-insulin dependent diabetes    Emphysema Father     No Known Problems Sister     No Known Problems Daughter     No Known Problems Maternal Grandmother     No Known Problems Paternal Grandmother     No Known Problems Sister     No Known Problems Sister     No Known Problems Daughter     No Known Problems Daughter     No Known Problems Maternal Aunt      Family history unknown      Medications:  Current Facility-Administered Medications   Medication Dose Route Frequency    acetaminophen (TYLENOL) tablet 975 mg  975 mg Per NG Tube Q6H PRN    albuterol inhalation solution 2 5 mg  2 5 mg Nebulization Q6H PRN    bisacodyl (DULCOLAX) rectal suppository 10 mg  10 mg Rectal Daily PRN    chlorhexidine (PERIDEX) 0 12 % oral rinse 15 mL  15 mL Mouth/Throat Q12H [address]    enoxaparin (LOVENOX) subcutaneous injection 40 mg  40 mg Subcutaneous Daily    fentaNYL 1000 mcg in sodium chloride 0 9% 100mL infusion  75 mcg/hr Intravenous Continuous    fluticasone-vilanterol (BREO ELLIPTA) 200-25 MCG/INH inhaler 1 puff 1 puff Inhalation Daily    levalbuterol (XOPENEX) inhalation solution 1 25 mg  1 25 mg Nebulization TID    levETIRAcetam (KEPPRA) 750 mg in sodium chloride 0 9 % 100 mL IVPB  750 mg Intravenous Q12H [address]    multi-electrolyte (PLASMALYTE-A/ISOLYTE-S PH 7 4) IV solution  75 mL/hr Intravenous Continuous    omeprazole (PRILOSEC) suspension 2 mg/mL  20 mg Oral Daily    ondansetron (ZOFRAN) injection 4 mg  4 mg Intravenous Q6H PRN    polyethylene glycol (MIRALAX) packet 17 g  17 g Oral Daily    potassium chloride oral solution 40 mEq  40 mEq Per NG Tube Once    pravastatin (PRAVACHOL) tablet 80 mg  80 mg Oral Daily With Dinner    propofol (DIPRIVAN) 1000 mg in 100 mL infusion (premix)  5-50 mcg/kg/min Intravenous Titrated    sodium chloride 0 9 % inhalation solution 3 mL  3 mL Nebulization TID     Home medications:  Prior to Admission Medications   Prescriptions Last Dose Informant Patient Reported? Taking? ALPRAZolam (XANAX) 0 25 mg tablet   No No   Sig: Take 1 tablet (0 25 mg total) by mouth daily at bedtime as needed for anxiety   EPINEPHrine (EPIPEN) 0 3 mg/0 3 mL SOAJ   No No   Sig: Inject 0 3 mL (0 3 mg total) into a muscle once for 1 dose   albuterol (2 5 mg/3 mL) 0 083 % nebulizer solution  Self No No   Sig: Take 1 vial (2 5 mg total) by nebulization 4 (four) times a day   amLODIPine (NORVASC) 5 mg tablet   No No   Sig: take 1 tablet by mouth once daily   aspirin 81 mg chewable tablet  Self Yes No   Sig: Chew 81 mg daily   ergocalciferol (ERGOCALCIFEROL) 1 25 MG (06083 UT) capsule   No No   Sig: Take 1 capsule (50,000 Units total) by mouth once a week   fluticasone-salmeterol (ADVAIR DISKUS, WIXELA INHUB) 250-50 mcg/dose inhaler  Self Yes No   Sig: Inhale 1 puff 2 (two) times a day Rinse mouth after use     levocetirizine (XYZAL) 5 MG tablet   No No   Sig: Take 0 5 tablets (2 5 mg total) by mouth every evening   meloxicam (MOBIC) 15 mg tablet   No No   Sig: take 1 tablet by mouth once daily   montelukast (SINGULAIR) 10 mg tablet  Self No No   Sig: take 1 tablet by mouth at bedtime   nystatin (MYCOSTATIN) cream  Self No No   Sig: Apply topically 2 (two) times a day   Apply for additional 2 weeks after rash resolves  omeprazole (PriLOSEC) 20 mg delayed release capsule   No No   Sig: Take 1 capsule (20 mg total) by mouth 2 (two) times a day   rosuvastatin (CRESTOR) 20 MG tablet   No No   Sig: Take 1 tablet (20 mg total) by mouth daily At bedtime for cholesterol   theophylline (THEODUR) 300 mg 12 hr tablet  Self No No   Sig: take 1 tablet by mouth twice a day      Facility-Administered Medications: None     Allergies: Allergies   Allergen Reactions    Azithromycin     Darvon [Propoxyphene]      ------------------------------------------------------------------------------------------------------------  Advance Directive and Living Will:      Power of :    POLST:    ------------------------------------------------------------------------------------------------------------  Anticipated Length of Stay is > 2 midnights    Care Time Delivered:         Inna Room, DO        Portions of the record may have been created with voice recognition software  Occasional wrong word or "sound a like" substitutions may have occurred due to the inherent limitations of voice recognition software    Read the chart carefully and recognize, using context, where substitutions have occurred

## 2021-05-24 NOTE — ED PROCEDURE NOTE
PROCEDURE  ECG 12 Lead Documentation Only    Date/Time: 5/23/2021 7:27 PM  Performed by: Charly Perez MD  Authorized by: Charly Perez MD     Indications / Diagnosis:  Altered mental status  ECG reviewed by me, the ED Provider: yes    Patient location:  ED  Interpretation:     Interpretation: abnormal    Rate:     ECG rate:  123    ECG rate assessment: tachycardic    Rhythm:     Rhythm: sinus tachycardia    Ectopy:     Ectopy: none    QRS:     QRS axis:  Normal  Conduction:     Conduction: normal    ST segments:     ST segments:  Non-specific  T waves:     T waves: non-specific           Charly Perez MD  05/23/21 5977

## 2021-05-24 NOTE — ASSESSMENT & PLAN NOTE
Rocco Peña presented with new onset acute mental status change with posturing and full body shaking concerning for seizure-like activity with posturing on 5/24/2021  Current known medical history is significant for hypertension and hyperlipidemia  Workup:  - CT head and CTA as part of stroke alert were both unremarkable  - MRI w wo is pending  - patient is on video EEG  - GGT is 1255 (RUQ US is pending per critical care)    Continuous EEG findings:   - 5/24:  Diffuse alpha and beta  Sedation background  No seizures and no epileptiform discharges  Impression:  Unclear etiology of acute encephalopathy, clinical picture is consistent with seizure but unclear reason for new onset seizure  Differentials at this time include but not limited to mass lesion, CNS infection versus inflammation, metabolic derangement, other toxicologic derangement      Plan:  - Recommend LP with infectious/inflammatory workup  - Labs and other metabolic workup deferred to primary team  - agree with deferring antibiotics as per critical care team at this time  - MRI brain w wo contrast and EEG for at least 24 hours  - Minimize delirium, regulate sleep-wake cycle  - Minimize cognitive impairing medications such as benzos as much as possible when appropriate  - Correct lytes and fluids as per medical team appreciated  - Avoid cefepime if possible if needed for infectious cause  - Discussed recommendations with critical care resident

## 2021-05-25 ENCOUNTER — APPOINTMENT (INPATIENT)
Dept: NEUROLOGY | Facility: CLINIC | Age: 66
DRG: 070 | End: 2021-05-25
Payer: COMMERCIAL

## 2021-05-25 ENCOUNTER — APPOINTMENT (OUTPATIENT)
Dept: RADIOLOGY | Facility: HOSPITAL | Age: 66
DRG: 070 | End: 2021-05-25
Payer: COMMERCIAL

## 2021-05-25 ENCOUNTER — APPOINTMENT (INPATIENT)
Dept: RADIOLOGY | Facility: HOSPITAL | Age: 66
DRG: 070 | End: 2021-05-25
Payer: COMMERCIAL

## 2021-05-25 PROBLEM — D64.9 NORMOCYTIC ANEMIA: Status: ACTIVE | Noted: 2021-05-25

## 2021-05-25 PROBLEM — D72.829 LEUKOCYTOSIS: Status: ACTIVE | Noted: 2021-05-25

## 2021-05-25 PROBLEM — I67.1 ANEURYSM OF LEFT INTERNAL CAROTID ARTERY: Status: ACTIVE | Noted: 2021-05-25

## 2021-05-25 PROBLEM — M48.12: Status: ACTIVE | Noted: 2021-05-25

## 2021-05-25 LAB
25(OH)D3 SERPL-MCNC: 36.7 NG/ML (ref 30–100)
ALBUMIN SERPL BCP-MCNC: 2.6 G/DL (ref 3.5–5)
ALP SERPL-CCNC: 348 U/L (ref 46–116)
ALT SERPL W P-5'-P-CCNC: 40 U/L (ref 12–78)
ANION GAP SERPL CALCULATED.3IONS-SCNC: 7 MMOL/L (ref 4–13)
APPEARANCE CSF: CLEAR
AST SERPL W P-5'-P-CCNC: 26 U/L (ref 5–45)
BASOPHILS # BLD AUTO: 0.02 THOUSANDS/ΜL (ref 0–0.1)
BASOPHILS NFR BLD AUTO: 0 % (ref 0–1)
BILIRUB DIRECT SERPL-MCNC: 0.09 MG/DL (ref 0–0.2)
BILIRUB SERPL-MCNC: 0.29 MG/DL (ref 0.2–1)
BUN SERPL-MCNC: 11 MG/DL (ref 5–25)
CA-I BLD-SCNC: 0.99 MMOL/L (ref 1.12–1.32)
CALCIUM SERPL-MCNC: 7.9 MG/DL (ref 8.3–10.1)
CHLORIDE SERPL-SCNC: 117 MMOL/L (ref 100–108)
CHOLEST SERPL-MCNC: 109 MG/DL (ref 50–200)
CO2 SERPL-SCNC: 20 MMOL/L (ref 21–32)
CREAT SERPL-MCNC: 0.55 MG/DL (ref 0.6–1.3)
EOSINOPHIL # BLD AUTO: 0 THOUSAND/ΜL (ref 0–0.61)
EOSINOPHIL NFR BLD AUTO: 0 % (ref 0–6)
ERYTHROCYTE [DISTWIDTH] IN BLOOD BY AUTOMATED COUNT: 13.8 % (ref 11.6–15.1)
GFR SERPL CREATININE-BSD FRML MDRD: 97 ML/MIN/1.73SQ M
GLUCOSE CSF-MCNC: 74 MG/DL (ref 50–80)
GLUCOSE SERPL-MCNC: 121 MG/DL (ref 65–140)
GRAM STN SPEC: NORMAL
HCT VFR BLD AUTO: 35.5 % (ref 34.8–46.1)
HDLC SERPL-MCNC: 54 MG/DL
HGB BLD-MCNC: 10.7 G/DL (ref 11.5–15.4)
IMM GRANULOCYTES # BLD AUTO: 0.11 THOUSAND/UL (ref 0–0.2)
IMM GRANULOCYTES NFR BLD AUTO: 1 % (ref 0–2)
INR PPP: 1.04 (ref 0.84–1.19)
LDLC SERPL CALC-MCNC: 32 MG/DL (ref 0–100)
LYMPHOCYTES # BLD AUTO: 1.56 THOUSANDS/ΜL (ref 0.6–4.47)
LYMPHOCYTES NFR BLD AUTO: 9 % (ref 14–44)
LYMPHOCYTES NFR CSF MANUAL: 29 %
MAGNESIUM SERPL-MCNC: 2.7 MG/DL (ref 1.6–2.6)
MCH RBC QN AUTO: 29.3 PG (ref 26.8–34.3)
MCHC RBC AUTO-ENTMCNC: 30.1 G/DL (ref 31.4–37.4)
MCV RBC AUTO: 97 FL (ref 82–98)
MONOCYTES # BLD AUTO: 0.95 THOUSAND/ΜL (ref 0.17–1.22)
MONOCYTES NFR BLD AUTO: 6 % (ref 4–12)
MONOS+MACROS CSF MANUAL: 27 %
NEUTROPHILS # BLD AUTO: 14.35 THOUSANDS/ΜL (ref 1.85–7.62)
NEUTROPHILS NFR CSF MANUAL: 44 %
NEUTS SEG NFR BLD AUTO: 84 % (ref 43–75)
NRBC BLD AUTO-RTO: 0 /100 WBCS
PHOSPHATE SERPL-MCNC: 2.7 MG/DL (ref 2.3–4.1)
PLATELET # BLD AUTO: 158 THOUSANDS/UL (ref 149–390)
PMV BLD AUTO: 12.2 FL (ref 8.9–12.7)
POTASSIUM SERPL-SCNC: 4.5 MMOL/L (ref 3.5–5.3)
PROT CSF-MCNC: 38 MG/DL (ref 15–45)
PROT SERPL-MCNC: 5.5 G/DL (ref 6.4–8.2)
PROTHROMBIN TIME: 13.6 SECONDS (ref 11.6–14.5)
PTH-INTACT SERPL-MCNC: 99.9 PG/ML (ref 18.4–80.1)
RBC # BLD AUTO: 3.65 MILLION/UL (ref 3.81–5.12)
RBC # CSF MANUAL: 2 UL (ref 0–10)
RETICS # AUTO: ABNORMAL 10*3/UL (ref 14097–95744)
RETICS # CALC: 3.22 % (ref 0.37–1.87)
SODIUM SERPL-SCNC: 144 MMOL/L (ref 136–145)
TOTAL CELLS COUNTED BLD: NO
TOTAL CELLS COUNTED SPEC: 100
TRIGL SERPL-MCNC: 115 MG/DL
TUBE # CSF: 4
WBC # BLD AUTO: 16.99 THOUSAND/UL (ref 4.31–10.16)
WBC # CSF AUTO: 2 /UL (ref 0–5)

## 2021-05-25 PROCEDURE — 80061 LIPID PANEL: CPT | Performed by: STUDENT IN AN ORGANIZED HEALTH CARE EDUCATION/TRAINING PROGRAM

## 2021-05-25 PROCEDURE — 85025 COMPLETE CBC W/AUTO DIFF WBC: CPT | Performed by: PHYSICIAN ASSISTANT

## 2021-05-25 PROCEDURE — 82945 GLUCOSE OTHER FLUID: CPT | Performed by: FAMILY MEDICINE

## 2021-05-25 PROCEDURE — 89050 BODY FLUID CELL COUNT: CPT | Performed by: FAMILY MEDICINE

## 2021-05-25 PROCEDURE — 95720 EEG PHY/QHP EA INCR W/VEEG: CPT | Performed by: PSYCHIATRY & NEUROLOGY

## 2021-05-25 PROCEDURE — 95712 VEEG 2-12 HR INTMT MNTR: CPT

## 2021-05-25 PROCEDURE — 82306 VITAMIN D 25 HYDROXY: CPT | Performed by: INTERNAL MEDICINE

## 2021-05-25 PROCEDURE — NC001 PR NO CHARGE: Performed by: INTERNAL MEDICINE

## 2021-05-25 PROCEDURE — 80076 HEPATIC FUNCTION PANEL: CPT | Performed by: STUDENT IN AN ORGANIZED HEALTH CARE EDUCATION/TRAINING PROGRAM

## 2021-05-25 PROCEDURE — 85610 PROTHROMBIN TIME: CPT | Performed by: FAMILY MEDICINE

## 2021-05-25 PROCEDURE — 99232 SBSQ HOSP IP/OBS MODERATE 35: CPT | Performed by: PSYCHIATRY & NEUROLOGY

## 2021-05-25 PROCEDURE — 76705 ECHO EXAM OF ABDOMEN: CPT

## 2021-05-25 PROCEDURE — 62328 DX LMBR SPI PNXR W/FLUOR/CT: CPT

## 2021-05-25 PROCEDURE — 82330 ASSAY OF CALCIUM: CPT | Performed by: PHYSICIAN ASSISTANT

## 2021-05-25 PROCEDURE — 99232 SBSQ HOSP IP/OBS MODERATE 35: CPT | Performed by: INTERNAL MEDICINE

## 2021-05-25 PROCEDURE — 84100 ASSAY OF PHOSPHORUS: CPT | Performed by: PHYSICIAN ASSISTANT

## 2021-05-25 PROCEDURE — 85045 AUTOMATED RETICULOCYTE COUNT: CPT | Performed by: STUDENT IN AN ORGANIZED HEALTH CARE EDUCATION/TRAINING PROGRAM

## 2021-05-25 PROCEDURE — 83735 ASSAY OF MAGNESIUM: CPT | Performed by: PHYSICIAN ASSISTANT

## 2021-05-25 PROCEDURE — 84157 ASSAY OF PROTEIN OTHER: CPT | Performed by: FAMILY MEDICINE

## 2021-05-25 PROCEDURE — 86235 NUCLEAR ANTIGEN ANTIBODY: CPT | Performed by: STUDENT IN AN ORGANIZED HEALTH CARE EDUCATION/TRAINING PROGRAM

## 2021-05-25 PROCEDURE — 83970 ASSAY OF PARATHORMONE: CPT | Performed by: INTERNAL MEDICINE

## 2021-05-25 PROCEDURE — 86256 FLUORESCENT ANTIBODY TITER: CPT | Performed by: STUDENT IN AN ORGANIZED HEALTH CARE EDUCATION/TRAINING PROGRAM

## 2021-05-25 PROCEDURE — 89051 BODY FLUID CELL COUNT: CPT | Performed by: FAMILY MEDICINE

## 2021-05-25 PROCEDURE — 80048 BASIC METABOLIC PNL TOTAL CA: CPT | Performed by: PHYSICIAN ASSISTANT

## 2021-05-25 PROCEDURE — 009U3ZX DRAINAGE OF SPINAL CANAL, PERCUTANEOUS APPROACH, DIAGNOSTIC: ICD-10-PCS | Performed by: RADIOLOGY

## 2021-05-25 RX ORDER — ATORVASTATIN CALCIUM 40 MG/1
40 TABLET, FILM COATED ORAL
Status: DISCONTINUED | OUTPATIENT
Start: 2021-05-25 | End: 2021-05-26 | Stop reason: HOSPADM

## 2021-05-25 RX ORDER — LEVETIRACETAM 100 MG/ML
750 SOLUTION ORAL EVERY 12 HOURS SCHEDULED
Status: DISCONTINUED | OUTPATIENT
Start: 2021-05-25 | End: 2021-05-25

## 2021-05-25 RX ORDER — LEVETIRACETAM 750 MG/1
750 TABLET ORAL EVERY 12 HOURS SCHEDULED
Status: DISCONTINUED | OUTPATIENT
Start: 2021-05-25 | End: 2021-05-26

## 2021-05-25 RX ORDER — PANTOPRAZOLE SODIUM 40 MG/1
40 TABLET, DELAYED RELEASE ORAL
Status: DISCONTINUED | OUTPATIENT
Start: 2021-05-26 | End: 2021-05-26 | Stop reason: HOSPADM

## 2021-05-25 RX ORDER — LIDOCAINE HYDROCHLORIDE 10 MG/ML
5 INJECTION, SOLUTION EPIDURAL; INFILTRATION; INTRACAUDAL; PERINEURAL
Status: COMPLETED | OUTPATIENT
Start: 2021-05-25 | End: 2021-05-25

## 2021-05-25 RX ORDER — CALCIUM GLUCONATE 20 MG/ML
2 INJECTION, SOLUTION INTRAVENOUS ONCE
Status: COMPLETED | OUTPATIENT
Start: 2021-05-25 | End: 2021-05-25

## 2021-05-25 RX ADMIN — CALCIUM GLUCONATE 2 G: 20 INJECTION, SOLUTION INTRAVENOUS at 08:35

## 2021-05-25 RX ADMIN — ENOXAPARIN SODIUM 40 MG: 40 INJECTION SUBCUTANEOUS at 14:07

## 2021-05-25 RX ADMIN — CHLORHEXIDINE GLUCONATE 0.12% ORAL RINSE 15 ML: 1.2 LIQUID ORAL at 20:05

## 2021-05-25 RX ADMIN — Medication 20 MG: at 10:40

## 2021-05-25 RX ADMIN — ATORVASTATIN CALCIUM 40 MG: 40 TABLET, FILM COATED ORAL at 15:57

## 2021-05-25 RX ADMIN — LEVETIRACETAM 750 MG: 100 SOLUTION ORAL at 10:39

## 2021-05-25 RX ADMIN — LEVETIRACETAM 750 MG: 750 TABLET ORAL at 20:05

## 2021-05-25 RX ADMIN — CHLORHEXIDINE GLUCONATE 0.12% ORAL RINSE 15 ML: 1.2 LIQUID ORAL at 10:40

## 2021-05-25 RX ADMIN — LIDOCAINE HYDROCHLORIDE 5 ML: 10 INJECTION, SOLUTION EPIDURAL; INFILTRATION; INTRACAUDAL; PERINEURAL at 09:43

## 2021-05-25 RX ADMIN — SODIUM CHLORIDE, SODIUM GLUCONATE, SODIUM ACETATE, POTASSIUM CHLORIDE, MAGNESIUM CHLORIDE, SODIUM PHOSPHATE, DIBASIC, AND POTASSIUM PHOSPHATE 75 ML/HR: .53; .5; .37; .037; .03; .012; .00082 INJECTION, SOLUTION INTRAVENOUS at 08:21

## 2021-05-25 NOTE — ASSESSMENT & PLAN NOTE
MCV 90s, Hgb 10-11; normal RDW implies other than blood loss-->Chronic disease or inflammation? Slightly dilutional?  UA results show trace blood, lack of anything on micro  Retic count elevated, but corrected number 2 2 was appropriate    · Daily CBC inpatient  · Follow-up with PCP for further workup if indicated

## 2021-05-25 NOTE — CASE MANAGEMENT
Pt is not a <30 day readmission or current bundle  CM met with pt bedside to introduce CM role and begin discharge planning  Pt lives with children in a rancher style house that has 6 JUAN R  Pt reports being independent with ADLs PTA, ambulating independently w/ no devices  No DME  Pt reports no history of VNA, STR, inpatient MH treatment or D/A treatment  Pt reports she drives to all medical appts  PCP is Dr Donovan Russell and pt uses [facility] for [facility]  No AD/LW and declined information  Pt confirmed first emergency contact as Joan ([phone])  Pt reports preferred d/c plan is to go home  CM to follow for d/c planning  CM reviewed d/c planning process including the following: identifying help at home, patient preference for d/c planning needs, Discharge Lounge, [facility] Meds to Bed program, availability of treatment team to discuss questions or concerns patient and/or family may have regarding understanding medications and recognizing signs and symptoms once discharged  CM also encouraged patient to follow up with all recommended appointments after discharge  Patient advised of importance for patient and family to participate in managing patients medical well being

## 2021-05-25 NOTE — INCIDENTAL FINDINGS
The following findings require follow up:  Radiographic finding   Finding: CTA from 5/23 revealed 2 mm aneurysm projecting off the posterior aspect of the left internal carotid arterial terminus  Follow up required: Yes-->will be with Neurosurgery-->schedule a follow-up appointment with them to establish care  Follow up should be done within 1 month, then again every 6 months-1 year      Please notify the following clinician to assist with the follow up:   Dr Jasper Adame

## 2021-05-25 NOTE — ASSESSMENT & PLAN NOTE
Lab Results   Component Value Date    EGFR 97 05/25/2021    EGFR 84 05/24/2021    EGFR 53 05/23/2021    CREATININE 0 55 (L) 05/25/2021    CREATININE 0 74 05/24/2021    CREATININE 1 09 05/23/2021     Baseline Cr 0 7-0 9 based on past labwork  Better during ICU admission but she was getting fluids

## 2021-05-25 NOTE — PROGRESS NOTES
NEUROLOGY RESIDENCY PROGRESS NOTE     Name: Amber Elise   Age & Sex: 78 y o  female   MRN: [id number]  [facility]   Encounter: [id number]    ASSESSMENT & PLAN     * Acute encephalopathy  Assessment & Plan  Amber Elise presented with new onset acute mental status change with posturing and full body shaking concerning for seizure-like activity with posturing on 5/24/2021  Current known medical history is significant for hypertension and hyperlipidemia  Workup:  - CT head and CTA as part of stroke alert were both unremarkable  - MRI w wo was negative for acute findings  - patient is on video EEG which did not demonstrate any seizure activity or tendency - will d/c today  - LP performed 5/25 - awaiting labs, so far protein and glucose wnl    Continuous EEG findings:   - 5/24:  Diffuse alpha and beta  Sedation background  No seizures and no epileptiform discharges  - 5/25: continued no seizures    Impression:  Unclear etiology of acute encephalopathy, clinical picture did appear consistent with seizure however patient has not been on AEDs and did not have any seizure or seizure tendency during 28 hours continuous monitoring  She has not presented any metabolic derangements at this point that would explain her encephalopathy which is now resolving  Patient is now extubated and alert and oriented x3  Plan:  - Labs and other metabolic workup deferred to primary team  - Minimize delirium, regulate sleep-wake cycle  - Minimize cognitive impairing medications such as benzos as much as possible when appropriate  - Correct lytes and fluids as per medical team appreciated  - Avoid cefepime if possible if needed for infectious cause  - Will follow up on LP labs  - Discussed recommendations with critical care resident      Recommendations for outpatient neurological follow up have yet to be determined  SUBJECTIVE     Patient was seen and examined  No acute events overnight    Patient was extubated and is now alert and oriented x3  Her last memory was playing [employer], she had no aura or other indication that she was feeling unwell  She denies any recent medical changes, did not take any substances that she is aware of  She denies history of anything similar  Review of Systems   Unable to perform ROS: Acuity of condition       OBJECTIVE     Patient ID: Manan Bundy is a 78 y o  female  Vitals:    21 0500 21 0700 21 0800 21 1021   BP: 117/52 127/63 128/67 133/66   BP Location:   Left arm    Pulse: 62 (!) 52 (!) 52 (!) 52   Resp: 14 (!) 11 21 12   Temp:   97 8 °F (36 6 °C)    TempSrc:   Oral    SpO2: 98% 97% 98% 98%   Weight:       Height:          Temperature:   Temp (24hrs), Av 8 °F (36 6 °C), Min:97 6 °F (36 4 °C), Max:97 9 °F (36 6 °C)    Temperature: 97 8 °F (36 6 °C)    Physical Exam  Vitals signs and nursing note reviewed  Constitutional:       Appearance: She is well-developed  Eyes:      Extraocular Movements: EOM normal       Pupils: Pupils are equal, round, and reactive to light  Neck:      Musculoskeletal: Normal range of motion  Cardiovascular:      Rate and Rhythm: Normal rate and regular rhythm  Heart sounds: Normal heart sounds  Pulmonary:      Effort: Pulmonary effort is normal    Musculoskeletal: Normal range of motion  Skin:     General: Skin is warm and dry  Neurological:      Mental Status: She is oriented to person, place, and time  Psychiatric:         Speech: Speech normal           Neurologic Exam     Mental Status   Oriented to person, place, and time  Attention: normal  Concentration: normal    Speech: speech is normal   Level of consciousness: alert ,  drowsy  Knowledge: good  Normal comprehension  Cranial Nerves     CN II   Visual fields full to confrontation  CN III, IV, VI   Pupils are equal, round, and reactive to light  Extraocular motions are normal      CN V   Facial sensation intact       CN VII   Facial expression full, symmetric  CN VIII   CN VIII normal      CN IX, X   CN IX normal    CN X normal      CN XI   CN XI normal      CN XII   CN XII normal      Motor Exam   Muscle bulk: normal  Overall muscle tone: normal    Sensory Exam   Light touch normal           LABORATORY DATA     Labs: I have personally reviewed pertinent films in PACS  Results from last 7 days   Lab Units 05/25/21  0510 05/24/21  0517 05/24/21 0216 05/23/21 1943   WBC Thousand/uL 16 99* 13 07*  --  18 70*   HEMOGLOBIN g/dL 10 7* 11 4*  --  13 4   HEMATOCRIT % 35 5 34 2*  --  42 8   PLATELETS Thousands/uL 158 184 185 242   NEUTROS PCT % 84*  --   --   --    MONOS PCT % 6  --   --   --    MONO PCT %  --  1*  --   --       Results from last 7 days   Lab Units 05/25/21  0510 05/24/21  0517 05/23/21  1943   POTASSIUM mmol/L 4 5 5 0 3 3*   CHLORIDE mmol/L 117* 116* 105   CO2 mmol/L 20* 19* 15*   BUN mg/dL 11 10 16   CREATININE mg/dL 0 55* 0 74 1 09   CALCIUM mg/dL 7 9* 7 3* 9 3   ALK PHOS U/L 348* 410*  --    ALT U/L 40 55  --    AST U/L 26 54*  --      Results from last 7 days   Lab Units 05/25/21  0510 05/24/21  0517 05/23/21  1943   MAGNESIUM mg/dL 2 7* 2 3 1 9     Results from last 7 days   Lab Units 05/25/21 0510 05/24/21 0517   PHOSPHORUS mg/dL 2 7 2 5      Results from last 7 days   Lab Units 05/25/21  0510 05/23/21 1943   INR  1 04 1 09   PTT seconds  --  25     Results from last 7 days   Lab Units 05/24/21 0517   LACTIC ACID mmol/L 1 8     Results from last 7 days   Lab Units 05/23/21 1943   TROPONIN I ng/mL <0 03       IMAGING & DIAGNOSTIC TESTING     Radiology Results: I have personally reviewed pertinent films in PACS    MRI brain w wo contrast   Final Result by Nessa Crow MD (05/25 0470)      No acute intracranial abnormality or pathologic enhancement              Workstation performed: [id number]         [facility] IN-patient lumbar puncture    (Results Pending)   US right upper quadrant with liver dopplers    (Results Pending) Other Diagnostic Testing: I have personally reviewed pertinent films in PACS    ACTIVE MEDICATIONS     Current Facility-Administered Medications   Medication Dose Route Frequency    acetaminophen (TYLENOL) tablet 975 mg  975 mg Per NG Tube Q6H PRN    albuterol inhalation solution 2 5 mg  2 5 mg Nebulization Q6H PRN    ALPRAZolam (XANAX) tablet 0 25 mg  0 25 mg Oral HS PRN    atorvastatin (LIPITOR) tablet 40 mg  40 mg Oral Daily With Dinner    bisacodyl (DULCOLAX) rectal suppository 10 mg  10 mg Rectal Daily PRN    chlorhexidine (PERIDEX) 0 12 % oral rinse 15 mL  15 mL Mouth/Throat Q12H [address]    fentaNYL 1000 mcg in sodium chloride 0 9% 100mL infusion  75 mcg/hr Intravenous Continuous    fluticasone-vilanterol (BREO ELLIPTA) 200-25 MCG/INH inhaler 1 puff  1 puff Inhalation Daily    levETIRAcetam (KEPPRA) oral solution 750 mg  750 mg Oral Q12H [address]    multi-electrolyte (PLASMALYTE-A/ISOLYTE-S PH 7 4) IV solution  75 mL/hr Intravenous Continuous    omeprazole (PRILOSEC) suspension 2 mg/mL  20 mg Oral Daily    ondansetron (ZOFRAN) injection 4 mg  4 mg Intravenous Q6H PRN    polyethylene glycol (MIRALAX) packet 17 g  17 g Oral Daily    propofol (DIPRIVAN) 1000 mg in 100 mL infusion (premix)  5-50 mcg/kg/min Intravenous Titrated       Prior to Admission medications    Medication Sig Start Date End Date Taking?  Authorizing Provider   albuterol (2 5 mg/3 mL) 0 083 % nebulizer solution Take 1 vial (2 5 mg total) by nebulization 4 (four) times a day 9/12/19   Lake Heimlich, DO   ALPRAZolam Cheyenne Edwards) 0 25 mg tablet Take 1 tablet (0 25 mg total) by mouth daily at bedtime as needed for anxiety 3/2/21 4/1/21  Dewayne Penaloza DO   amLODIPine (NORVASC) 5 mg tablet take 1 tablet by mouth once daily 1/22/21   Di Thomas MD   aspirin 81 mg chewable tablet Chew 81 mg daily    Historical Provider, MD   EPINEPHrine (EPIPEN) 0 3 mg/0 3 mL SOAJ Inject 0 3 mL (0 3 mg total) into a muscle once for 1 dose 5/4/20 6/14/20  Bryce Horne MD   ergocalciferol (ERGOCALCIFEROL) 1 25 MG (41602 UT) capsule Take 1 capsule (50,000 Units total) by mouth once a week 11/5/20   Bryce Horne MD   fluticasone-salmeterol (ADVAIR DISKUS, WIXELA INHUB) 250-50 mcg/dose inhaler Inhale 1 puff 2 (two) times a day Rinse mouth after use  Historical Provider, MD   levocetirizine (XYZAL) 5 MG tablet Take 0 5 tablets (2 5 mg total) by mouth every evening 3/2/21   Rosie Bentley DO   meloxicam [facility]IL [facility] 15 mg tablet take 1 tablet by mouth once daily 1/22/21   Bryce Horne MD   montelukast (SINGULAIR) 10 mg tablet take 1 tablet by mouth at bedtime 7/29/20   Bryce Horne MD   nystatin (MYCOSTATIN) cream Apply topically 2 (two) times a day   Apply for additional 2 weeks after rash resolves   6/14/20   Livia Miles PA-C   omeprazole (PriLOSEC) 20 mg delayed release capsule Take 1 capsule (20 mg total) by mouth 2 (two) times a day 3/2/21   Rosie Bentley,    rosuvastatin (CRESTOR) 20 MG tablet Take 1 tablet (20 mg total) by mouth daily At bedtime for cholesterol 11/5/20   Bryce Horne MD   theophylline (THEODUR) 300 mg 12 hr tablet take 1 tablet by mouth twice a day 8/25/20   Bryce Horne MD         VTE Pharmacologic Prophylaxis: Enoxaparin (Lovenox)  VTE Mechanical Prophylaxis: sequential compression device    ==  Carmelia Husky, MD   [facility], PGY-2

## 2021-05-25 NOTE — BRIEF OP NOTE (RAD/CATH)
FLUOROSCOPICALLY GUIDED LUMBAR PUNCTURE     INDICATION:  Acute metabolic encephalopathy  FLUOROSCOPY TIME:  1 07 minutes    IMAGES:  2      TECHNIQUE:       The risks of the procedure were discussed in detail  The risks discussed included, however were not limited to, infection, bleeding, injury to surrounding structures (nerves, spinal cord, and blood vessels), allergic reactions, arachnoiditis, encephalitis, and spinal headaches  The patient verbalized her understanding of the above risks and wished to proceed with the lumbar puncture  1% lidocaine was infiltrated at the puncture site  The patient was placed in the left lateral decubitus position  Utilizing right paravertebral approach, a 20 gauge 3 5 inch spinal needle was advanced under fluoroscopic guidance into the subarachnoid space at the L3-L4 level, utilizing sterile technique  Once in position, opening pressure was 21 cm H2O  Approximately 18 5 cc of clear, colorless CSF were removed and placed into 4 tubes which subsequently were transported to the lab for requested analysis  Closing pressure was 8 cm H2O     The needle was removed  The patient tolerated the procedure well  The patient was discharged from the department with appropriate instructions  IMPRESSION:    Successful fluoroscopically guided lumbar puncture with an opening pressure of 21 cm H2O  Approximately 18 5 mL's of clear colorless CSF was removed and sent to lab for requested analysis  Closing pressure was 8 cm H2O  I reviewed the above findings and procedure with Dr Eufemia Drake       PERFORMED, DICTATED AND SIGNED BY: Zo Walsh PA-C

## 2021-05-25 NOTE — ASSESSMENT & PLAN NOTE
Found on initial CTA 5/23-->2mm  · See incidental findings note from 5/25  · Outpatient Neurosurgery follow-up

## 2021-05-25 NOTE — PROGRESS NOTES
Daily Progress Note - Critical Care   Lakeisha Trejo 78 y o  female MRN: [id number]  Unit/Bed#: MICU 05 Encounter: [id number]        ----------------------------------------------------------------------------------------  HPI/24hr events:   -No overnight events, just bradycardic during sleep     ---------------------------------------------------------------------------------------  SUBJECTIVE  Patient seen and examined in her ICU bed, she appears comfortable  She was easily woken and feels fine this morning, no acute complaints other than being tired  She was very scared after extubation yesterday because one minute she was at [facility] and the next in her ICU bed  Her family explained what happened and afterwards she felt better  She denies any recent new-strange symptoms, new medications, or unusual foods/drinks/substances at [facility]  Review of Systems   Constitutional: Negative for chills and fever  HENT: Negative for ear pain and sore throat  Eyes: Negative for pain and visual disturbance  Respiratory: Negative for cough and shortness of breath  Cardiovascular: Negative for chest pain and palpitations  Gastrointestinal: Negative for abdominal pain and vomiting  Genitourinary: Negative for dysuria and hematuria  Musculoskeletal: Negative for arthralgias and back pain  Skin: Negative for color change and rash  Neurological: Negative for seizures and syncope  All other systems reviewed and are negative  Review of systems was reviewed and negative unless stated above in HPI/24-hour events   ---------------------------------------------------------------------------------------  Assessment and Plan:    Neuro:   · Diagnosis: Seizure Like Activity/ Encephalopathy/ Posturing  ? Origin Unkown (Infectious less likely as afebrile and acute onset, new onset seizure, mass, metabolic derangement, toxin, withdrawal as patient drinks 2 beers daily, PNES as patient reportedly yelling during seizure)  Leukocytosis on labwork but afebrile  ? Theophylline, Salicylate, UDS, ethanol negative  ? Keppra 750mg BID  ? Video EEG-->no activity via EMU after 24hrs-->will check with Neuro on how long to continue  ? Neurology Consult; follow recs  ? LP by IR today--will followup  ? MRI--formal read pending     · Incidental 2mm Left ICA aneurysm  ? Outpatient NSG follow-up        CV:   · Diagnosis: HTN, HLD  ? Restart home amlodipine once blood pressure self-regulates  ? Continue home statin        Pulm  · Diagnosis: Moderate Asthma - home meds Theophylline 300mg q12hr,  Advair 250-50 1 puff BID, and uses albuterol q6h  · Stopping theophylline secondary to decreased seizure threshold  · Continue inhaled corticosteroid  · Respiratory protocol - TID scheduled nebs plus PRN nebs     · Diagnosis: Respiratory Failure  ? Extubated 5/24-->on 2L NC  ? Continue to wean to ambient air, monitor breathing status and SaO2        GI:   · Elevated ALP (400), GGT (1200); normal ALT, AST--concern for inflammatory process, chay with leukocytosis  ? RUQ US with liver dopplers  ? Curb-sided GI-->will obtain JANETH, AMA, ASMA, IgM/IgG/IgA  ? Consider GI consult  · Diagnosis: GERD  ? Plan: Continue home omeprazole        :   ? Diagnosis: No acute issues; history of CKD2  ? Came with combined increased AG met acidosis with respiratory acidosis; then non-AG gap met acidosis   ? Osmol gap 7-8 (<10) unlikely alt  Alcohol ingestion  ? UAG + 1 5-->suggests low urinary NH4+ and possible RTA--though RTA usually >10 UAG and this is inaccurate in CKD  ? UA unremarkable--Trace/intact blood, negative RBC on micro  § Quinonez in place  § Monitor I/O, BUN/Cr  § Baseline Cr of 0 7-0  9        F/E/N:   · Fluids: Isolyte 75 ml/hr  · Electrolytes: Goal Na 135-145, K 4 0  · Nutrition: clear liquid; RD will see patient again        Heme/Onc:  · Normocytic anemia, 10-11; normal RDW implies something other than blood loss; chronic disease?  Given UA results, concern for intravascular hemolysis though normal bili  · Reticulocyte count  · DVP ppx   ? Lovenox and SCDs-->hold lovenox for LP        Endo:   ? No acute issues  Monitor POCT glucose         ID:   ? Monitor WBC count and fever curve  ? Leukocytosis 17 up from 13 (diff shows increased PMNs and abs PMNs)  § Currently afebrile  Lekocytosis of 18 7 last night  Repeat in the am  § Negative Covid and blood cx  § Hold antibiotics/ steroids for now        MSK/Skin:   ? Frequent turns    Disposition: Continue CC to finish vEEG then plan for transfer to Med-Surg   Code Status: Level 1 - Full Code  ---------------------------------------------------------------------------------------  ICU CORE MEASURES    Prophylaxis   VTE Pharmacologic Prophylaxis: Held for LP today  VTE Mechanical Prophylaxis: sequential compression device  Stress Ulcer Prophylaxis: Prophylaxis Not Indicated     ABCDE Protocol (if indicated)  Plan to perform spontaneous awakening trial today? Not applicable  Plan to perform spontaneous breathing trial today? Not applicable  Obvious barriers to extubation? Not applicable  CAM-ICU: Negative    Invasive Devices Review  Invasive Devices     Peripheral Intravenous Line            Peripheral IV 21 Right Antecubital 1 day    Peripheral IV 21 Right Forearm 1 day    Peripheral IV 21 Right Hand 1 day          Drain            Urethral Catheter Straight-tip 18 Fr  1 day              Can any invasive devices be discontinued today?  Not applicable  ---------------------------------------------------------------------------------------  OBJECTIVE    Vitals   Vitals:    21 0200 21 0300 21 0400 21 0500   BP: 103/54 118/52 113/50 117/52   Pulse: (!) 54 (!) 52 (!) 52 62   Resp: 22 14 12 14   Temp:       TempSrc:       SpO2: 96% 96% 93% 98%   Weight:       Height:         Temp (24hrs), Av 7 °F (36 5 °C), Min:97 5 °F (36 4 °C), Max:97 9 °F (36 6 °C)  Current: Temperature: 97 7 °F (36 5 °C)  HR: 49-80  BP: 101-133/44-76  MAP:   RR: 14-22  SpO2: % ambient air  I/O: +2104, -1310=+794  Micro: Blood cx negative from 5/23    Respiratory:  2L NC SaO2       Invasive/non-invasive ventilation settings   Respiratory    Lab Data (Last 4 hours)    None         O2/Vent Data (Last 4 hours)    None                Physical Exam  Constitutional:       General: She is not in acute distress  Appearance: She is well-developed  She is not diaphoretic  HENT:      Head: Normocephalic and atraumatic  Mouth/Throat:      Mouth: Mucous membranes are moist       Pharynx: Oropharynx is clear  No oropharyngeal exudate  Eyes:      General: No scleral icterus  Conjunctiva/sclera: Conjunctivae normal    Neck:      Musculoskeletal: Neck supple  No neck rigidity or muscular tenderness  Thyroid: No thyromegaly  Trachea: No tracheal deviation  Cardiovascular:      Rate and Rhythm: Normal rate and regular rhythm  Heart sounds: Normal heart sounds  No murmur  Pulmonary:      Effort: Pulmonary effort is normal       Breath sounds: Normal breath sounds  No wheezing, rhonchi or rales  Abdominal:      General: Bowel sounds are normal  There is no distension  Palpations: Abdomen is soft  Tenderness: There is no abdominal tenderness  Musculoskeletal:         General: No tenderness  Right lower leg: No edema  Left lower leg: No edema  Lymphadenopathy:      Cervical: No cervical adenopathy  Skin:     General: Skin is warm  Capillary Refill: Capillary refill takes less than 2 seconds  Coloration: Skin is not pale  Findings: No erythema  Neurological:      Mental Status: She is alert and oriented to person, place, and time  Sensory: No sensory deficit  Motor: No weakness  Psychiatric:         Mood and Affect: Mood normal          Behavior: Behavior normal          Thought Content:  Thought content normal          Judgment: Judgment normal  Laboratory and Diagnostics:  Results from last 7 days   Lab Units 05/25/21  0510 05/24/21  0517 05/24/21 0216 05/23/21 1943   WBC Thousand/uL 16 99* 13 07*  --  18 70*   HEMOGLOBIN g/dL 10 7* 11 4*  --  13 4   HEMATOCRIT % 35 5 34 2*  --  42 8   PLATELETS Thousands/uL 158 184 185 242   NEUTROS PCT % 84*  --   --   --    MONOS PCT % 6  --   --   --    MONO PCT %  --  1*  --   --      Results from last 7 days   Lab Units 05/25/21 0510 05/24/21 0517 05/23/21 1943   SODIUM mmol/L 144 142 143   POTASSIUM mmol/L 4 5 5 0 3 3*   CHLORIDE mmol/L 117* 116* 105   CO2 mmol/L 20* 19* 15*   ANION GAP mmol/L 7 7 23*   BUN mg/dL 11 10 16   CREATININE mg/dL 0 55* 0 74 1 09   CALCIUM mg/dL 7 9* 7 3* 9 3   GLUCOSE RANDOM mg/dL 121 190* 186*   ALT U/L 40 55  --    AST U/L 26 54*  --    ALK PHOS U/L 348* 410*  --    ALBUMIN g/dL 2 6* 2 9*  --    TOTAL BILIRUBIN mg/dL 0 29 0 64  --      Results from last 7 days   Lab Units 05/25/21 0510 05/24/21 0517 05/23/21 1943   MAGNESIUM mg/dL 2 7* 2 3 1 9   PHOSPHORUS mg/dL 2 7 2 5  --       Results from last 7 days   Lab Units 05/25/21 0510 05/23/21 1943   INR  1 04 1 09   PTT seconds  --  25      Results from last 7 days   Lab Units 05/23/21 1943   TROPONIN I ng/mL <0 03     Results from last 7 days   Lab Units 05/24/21 0517 05/24/21 0216 05/23/21 2327 05/23/21 2014   LACTIC ACID mmol/L 1 8 2 7* 2 3* 2 9*     ABG:  Results from last 7 days   Lab Units 05/24/21  0555   PH ART  7 282*   PCO2 ART mm Hg 43 2   PO2 ART mm Hg 160 1*   HCO3 ART mmol/L 19 9*   BASE EXC ART mmol/L -6 5   ABG SOURCE  Radial, Right     VBG:  Results from last 7 days   Lab Units 05/24/21  0555  05/23/21 2027   PH INO   --   --  7 300   PCO2 INO mm Hg  --   --  46 9   PO2 INO mm Hg  --   --  68 0*   HCO3 INO mmol/L  --   --  22 2*   BASE EXC INO mmol/L  --   --  -4 2   ABG SOURCE  Radial, Right   < >  --     < > = values in this interval not displayed       Results from last 7 days   Lab Units 05/23/21 2014   PROCALCITONIN ng/ml <0 05       Micro  Results from last 7 days   Lab Units 05/23/21 2027   BLOOD CULTURE  Received in Microbiology Lab  Culture in Progress  Received in Microbiology Lab  Culture in Progress  EKG: Sinus alvarado overnight (40s)  Imaging: MRI and RUQ w/liver dopplers pending I have personally reviewed pertinent reports  and I have personally reviewed pertinent films in PACS    Intake and Output  I/O       05/23 0701 - 05/24 0700 05/24 0701 - 05/25 0700 05/25 0701 - 05/26 0700    P  O   100     I V  (mL/kg) 280 5 (4 5) 1924 1 (31)     NG/GT  80     Total Intake(mL/kg) 280 5 (4 5) 2104 1 (33 9)     Urine (mL/kg/hr) 180 1160 (0 8)     Emesis/NG output  150     Total Output 180 1310     Net +100 5 +794 1                UOP: 48 ml/hr     Height and Weights   Height: 5' (152 4 cm)  IBW (Ideal Body Weight): 45 5 kg  Body mass index is 26 69 kg/m²  Weight (last 2 days)     Date/Time   Weight    05/24/21 0545   62 (136 69)    05/24/21 0157   62 (136 69)                Nutrition       Diet Orders   (From admission, onward)             Start     Ordered    05/24/21 2216  Diet Clear Liquid  Diet effective now     Question Answer Comment   Diet Type Clear Liquid    RD to adjust diet per protocol?  Yes        05/24/21 2216                  Active Medications  Scheduled Meds:  Current Facility-Administered Medications   Medication Dose Route Frequency Provider Last Rate    acetaminophen  975 mg Per NG Tube Q6H PRN Keo Medicus, DO      albuterol  2 5 mg Nebulization Q6H PRN Viridiana Heck MD      ALPRAZolam  0 25 mg Oral HS PRN Candie Santana MD      atorvastatin  40 mg Oral Daily With [employer], DO      bisacodyl  10 mg Rectal Daily PRN Keo Medicus, DO      calcium gluconate  2 g Intravenous Once Jordi Preethi, DO      chlorhexidine  15 mL Mouth/Throat Q12H [address] Ruben Branch, DO      fentaNYL  75 mcg/hr Intravenous Continuous Ruben Branch, DO Stopped (05/24/21 1435)    fluticasone-vilanterol  1 puff Inhalation Daily Ruben Branch DO      levalbuterol  1 25 mg Nebulization TID Liang Funk MD      levETIRAcetam  750 mg Oral Q12H [address] Janina Powell, DO      multi-electrolyte  75 mL/hr Intravenous Continuous Judge Francis Branch DO 75 mL/hr (05/24/21 1644)    omeprazole (PRILOSEC) suspension 2 mg/mL  20 mg Oral Daily Ruben Branch DO      ondansetron  4 mg Intravenous Q6H PRN  Francis Branch DO      polyethylene glycol  17 g Oral Daily Ruben Branch DO      propofol  5-50 mcg/kg/min Intravenous Titrated Judge Francis Branch DO Stopped (05/24/21 1249)    sodium chloride  3 mL Nebulization TID Liang Funk MD       Continuous Infusions:  fentaNYL, 75 mcg/hr, Last Rate: Stopped (05/24/21 1435)  multi-electrolyte, 75 mL/hr, Last Rate: 75 mL/hr (05/24/21 1644)  propofol, 5-50 mcg/kg/min, Last Rate: Stopped (05/24/21 1249)      PRN Meds:   acetaminophen, 975 mg, Q6H PRN  albuterol, 2 5 mg, Q6H PRN  ALPRAZolam, 0 25 mg, HS PRN  bisacodyl, 10 mg, Daily PRN  ondansetron, 4 mg, Q6H PRN        Allergies   Allergies   Allergen Reactions    Azithromycin     Darvon [Propoxyphene]      ---------------------------------------------------------------------------------------  Advance Directive and Living Will:      Power of :    POLST:    ---------------------------------------------------------------------------------------  Janina Powell,       Portions of the record may have been created with voice recognition software  Occasional wrong word or "sound a like" substitutions may have occurred due to the inherent limitations of voice recognition software    Read the chart carefully and recognize, using context, where substitutions have occurred

## 2021-05-25 NOTE — ASSESSMENT & PLAN NOTE
18 on presentation-->13-->16  Likely reactive given presentation but concern for inflammatory process  Afebrile, no s/s of infection

## 2021-05-25 NOTE — PROGRESS NOTES
[facility]  ICU Transfer Note - Critical Care Medicine    Lakeisha Trejo 1955, 78 y o  female  MRN: [id number]  [facility] [id number]  Primary Care Provider: Jun Kirk DO      Admission Date: 5/24/2021 0136  Length of Stay: 1 days  Code Status: Level 1 - Full Code  Diet: Diet Regular; Regular House; Regular House    Assessment/Plan:    Seizure-like activity [facility])  Assessment & Plan  See A/P Acute Encephalopathy    * Acute encephalopathy  Assessment & Plan  Patient presented with seizure-like activity/cerebrate posturing on 5/23 that required intubation and sedation  ? Origin unknown and unclear  Leukocytosis and increased AG met acidosis/lactic acidosis then non-AG acidosis on presentation  ? Patient denies any aura, new strange-recent symptoms, new medications, or ingestion of unusual substances  ? Theophylline, Salicylate, UDS, ethanol negative  ? vEEG negative after 28 hours  ? MRI revealed diffuse idiopathic skeletal hyperostosis-->which normally just causes pain but can sometimes lead to acute cervical myelopathy   ? LP done 5/25-->CSF studies unremarkable  ? Keppra 750mg BID  ? Neurology Consult; follow recs    Diffuse idiopathic skeletal hyperostosis of cervical spine  Assessment & Plan  See A/P Acute Encephalopathy    Leukocytosis  Assessment & Plan  18 on presentation-->13-->16  Likely reactive given presentation but concern for inflammatory process  Afebrile, no s/s of infection  Normocytic anemia  Assessment & Plan  MCV 90s, Hgb 10-11; normal RDW implies other than blood loss-->Chronic disease or inflammation? Slightly dilutional?  UA results show trace blood, lack of anything on micro  Retic count elevated, but corrected number 2 2 was appropriate    · Daily CBC inpatient  · Follow-up with PCP for further workup if indicated    Aneurysm of left internal carotid artery  Assessment & Plan  Found on initial CTA 5/23-->2mm  · See incidental findings note from 5/25  · Outpatient Neurosurgery follow-up    Acute respiratory failure with hypoxia [facility])  Assessment & Plan  Intubated 5/23, extubated 5/24, now on 2L NC  ? Continue to wean to ambient air, monitor breathing status and SaO2    Elevated alkaline phosphatase level  Assessment & Plan  · Elevated ALP (400), GGT (1200); normal ALT, AST--concern for inflammatory process, chay with leukocytosis  ? RUQ US with liver dopplers--pending  ? Curb-sided GI-->will obtain JANETH, AMA, ASMA, IgM/IgG/IgA  ? Consider GI consult    Essential hypertension  Assessment & Plan  Blood pressure has been stable since extubation; -130  · Holding home antihypertensives in unit, can restart before or at discharge    Chronic kidney disease (CKD) stage G2/A1, mildly decreased glomerular filtration rate (GFR) between 60-89 mL/min/1 73 square meter and albuminuria creatinine ratio less than 30 mg/g  Assessment & Plan  Lab Results   Component Value Date    EGFR 97 05/25/2021    EGFR 84 05/24/2021    EGFR 53 05/23/2021    CREATININE 0 55 (L) 05/25/2021    CREATININE 0 74 05/24/2021    CREATININE 1 09 05/23/2021     Baseline Cr 0 7-0 9 based on past labwork  Better during ICU admission but she was getting fluids  Moderate asthma  Assessment & Plan  Breathing and oxyg  status currently stable  · Holding home theophylline-->lowers seizure threshold-->patient likely needs different alternative   · Continue albuterol neb and fluticasone-vilanterol inpatient  · Continue home inhalers minus joss and follow-up with PCP as soon as able    Hyperlipidemia LDL goal <100  Assessment & Plan  New lipid panel this admission unremarkable    · Continue statin inpatient and at discharge          Code Status: Level 1 - Full Code  POA:    POLST:      Reason for ICU admission:   Acute encephalopathy/seizure-like activity    Active problems:   Principal Problem:    Acute encephalopathy  Active Problems:    Seizure-like activity ([facility] [address] )    Hyperlipidemia LDL goal <100    Moderate asthma    Chronic kidney disease (CKD) stage G2/A1, mildly decreased glomerular filtration rate (GFR) between 60-89 mL/min/1 73 square meter and albuminuria creatinine ratio less than 30 mg/g    Essential hypertension    Elevated alkaline phosphatase level    Acute respiratory failure with hypoxia (HCC)    Aneurysm of left internal carotid artery    Normocytic anemia    Leukocytosis    Diffuse idiopathic skeletal hyperostosis of cervical spine  Resolved Problems:    * No resolved hospital problems  *      Consultants:   Neurology  Interventional Radiology    History of Present Illness:   Per Dr Cheryle Horsfall on 5/24: "HX and PE limited by: intubation/ sedation  Helen Barber is a 78 y o  female who presents secondary to encephalopathy      Per chart review, patient was playing bingo yesterday in a tent  Became acutely altered staring at her cell phone and not responding  EMS arrived  Noted to have decerebrate posturing and some shaking      History of hypertension, hyperlipidemia, gastroesophageal reflux disease, asthma on maintenance inhaler  Noted to have a right upward gaze in the emergency department  Became agitated was screaming  Given lorazepam   Intubated for airway protection  Stroke alert called  Incidental 2 mm aneurysm off left ICA noted  Otherwise no significant findings on CT or CTA  Neurology recommended keppra load  Reportedly had continued involuntary movements concerning for possible seizure like activity  Transfer to hospitals for EEG      Given 125 mg solumedrol, 10mg decardron, 1500 mg keppra, ampicillin, acyclovir, vancomycin, cefepime          History obtained from chart review and unobtainable from patient due to intubated "    Summary of clinical course: The morning of being transferred to [facility] MICU, the patient was placed on vEEG monitoring with neurology consult   They recommended MRI brain w wo contrast and LP to rule-out any source of CNS pathology  The patient was also started on maintenance dose of Keppra and was successfully extubated later that same afternoon  No other acute issues arose during her stay with normal hemodynamics and resolution of her metabolic acidosis/lactic acidosis with intravenous fluid hydration  IR successfully performed LP on the morning of 5/25  vEEG was stopped after 28hrs of monitoring and the patient was medically cleared for discharge that same day  Recent or scheduled procedures:   LP done by IR 5/25    Outstanding/pending diagnostics:   RUQ with liver dopplers    Cultures:   Blood 2x 5/23-->negative so far       Mobilization Plan:   PT/OT    Nutrition Plan:   Regular diet    Invasive Devices Review  Invasive Devices     Peripheral Intravenous Line            Peripheral IV 05/23/21 Right Hand 1 day    Peripheral IV 05/25/21 Left;Upper Forearm less than 1 day          Drain            Urethral Catheter Straight-tip 18 Fr  1 day                Rationale for remaining devices: N/A    VTE Pharmacologic Prophylaxis: Enoxaparin (Lovenox)  VTE Mechanical Prophylaxis: sequential compression device    Discharge Plan:   Patient should be ready for discharge to home on 5/25-27 after RUQ US with liver dopplers and cleared by neurology, PT/OT, and case management  Initial Physical Therapy Recommendations: Eval and treat pending  Initial Occupational Therapy Recommendations: Eval and treat pending  Initial /Plan: Patient prefers to go home  Home medications that are not reordered and reason why:   Amlodipine-->blood pressure had been soft in unit  Theophylline-->decreases seizure threshold    [facility]  Please contact critical care via [employer] with any questions or concerns

## 2021-05-25 NOTE — ASSESSMENT & PLAN NOTE
Patient presented with seizure-like activity/cerebrate posturing on 5/23 that required intubation and sedation  ? Origin unknown and unclear  Leukocytosis and increased AG met acidosis/lactic acidosis then non-AG acidosis on presentation  ? Patient denies any aura, new strange-recent symptoms, new medications, or ingestion of unusual substances  ? Theophylline, Salicylate, UDS, ethanol negative  ? vEEG negative after 28 hours  ? MRI revealed diffuse idiopathic skeletal hyperostosis-->which normally just causes pain but can sometimes lead to acute cervical myelopathy   ? LP done 5/25-->CSF studies unremarkable  ? Keppra 750mg BID  ?  Neurology Consult; follow recs

## 2021-05-25 NOTE — ASSESSMENT & PLAN NOTE
· Elevated ALP (400), GGT (1200); normal ALT, AST--concern for inflammatory process, chay with leukocytosis  ? RUQ US with liver dopplers--pending  ? Curb-sided GI-->will obtain JANETH, AMA, ASMA, IgM/IgG/IgA  ?  Consider GI consult

## 2021-05-25 NOTE — ASSESSMENT & PLAN NOTE
Barry Crawford presented with new onset acute mental status change with posturing and full body shaking concerning for seizure-like activity with posturing on 5/24/2021  Current known medical history is significant for hypertension and hyperlipidemia  Workup:  - CT head and CTA as part of stroke alert were both unremarkable  - MRI w wo was negative for acute findings  - patient is on video EEG which did not demonstrate any seizure activity or tendency - will d/c today  - LP performed 5/25 - awaiting labs, so far protein and glucose wnl    Continuous EEG findings:   - 5/24:  Diffuse alpha and beta  Sedation background  No seizures and no epileptiform discharges  - 5/25: continued no seizures    Impression:  Unclear etiology of acute encephalopathy, clinical picture did appear consistent with seizure however patient has not been on AEDs and did not have any seizure or seizure tendency during 28 hours continuous monitoring  She has not presented any metabolic derangements at this point that would explain her encephalopathy which is now resolving  Patient is now extubated and alert and oriented x3      Plan:  - Labs and other metabolic workup deferred to primary team  - Minimize delirium, regulate sleep-wake cycle  - Minimize cognitive impairing medications such as benzos as much as possible when appropriate  - Correct lytes and fluids as per medical team appreciated  - Avoid cefepime if possible if needed for infectious cause  - Will follow up on LP labs  - Discussed recommendations with critical care resident

## 2021-05-25 NOTE — ASSESSMENT & PLAN NOTE
Intubated 5/23, extubated 5/24, now on 2L NC  ?  Continue to wean to ambient air, monitor breathing status and SaO2

## 2021-05-25 NOTE — ASSESSMENT & PLAN NOTE
Breathing and oxyg  status currently stable    · Holding home theophylline-->lowers seizure threshold-->patient likely needs different alternative   · Continue albuterol neb and fluticasone-vilanterol inpatient  · Continue home inhalers minus joss and follow-up with PCP as soon as able

## 2021-05-26 VITALS
HEART RATE: 87 BPM | DIASTOLIC BLOOD PRESSURE: 75 MMHG | WEIGHT: 146.39 LBS | TEMPERATURE: 98.6 F | HEIGHT: 64 IN | OXYGEN SATURATION: 91 % | BODY MASS INDEX: 24.99 KG/M2 | RESPIRATION RATE: 18 BRPM | SYSTOLIC BLOOD PRESSURE: 139 MMHG

## 2021-05-26 PROBLEM — G93.40 ACUTE ENCEPHALOPATHY: Status: RESOLVED | Noted: 2021-05-24 | Resolved: 2021-05-26

## 2021-05-26 PROBLEM — J96.01 ACUTE RESPIRATORY FAILURE WITH HYPOXIA (HCC): Status: RESOLVED | Noted: 2021-05-24 | Resolved: 2021-05-26

## 2021-05-26 PROBLEM — E83.39 HYPOPHOSPHATEMIA: Status: ACTIVE | Noted: 2021-05-26

## 2021-05-26 LAB
ACTIN IGG SERPL-ACNC: 8 UNITS (ref 0–19)
ALBUMIN SERPL BCP-MCNC: 3.2 G/DL (ref 3.5–5)
ALP SERPL-CCNC: 385 U/L (ref 46–116)
ALT SERPL W P-5'-P-CCNC: 43 U/L (ref 12–78)
ANION GAP SERPL CALCULATED.3IONS-SCNC: 4 MMOL/L (ref 4–13)
AST SERPL W P-5'-P-CCNC: 31 U/L (ref 5–45)
BASOPHILS # BLD AUTO: 0.06 THOUSANDS/ΜL (ref 0–0.1)
BASOPHILS NFR BLD AUTO: 1 % (ref 0–1)
BILIRUB DIRECT SERPL-MCNC: 0.15 MG/DL (ref 0–0.2)
BILIRUB SERPL-MCNC: 0.42 MG/DL (ref 0.2–1)
BUN SERPL-MCNC: 13 MG/DL (ref 5–25)
CALCIUM SERPL-MCNC: 9 MG/DL (ref 8.3–10.1)
CHLORIDE SERPL-SCNC: 109 MMOL/L (ref 100–108)
CO2 SERPL-SCNC: 29 MMOL/L (ref 21–32)
CREAT SERPL-MCNC: 0.6 MG/DL (ref 0.6–1.3)
EOSINOPHIL # BLD AUTO: 0.5 THOUSAND/ΜL (ref 0–0.61)
EOSINOPHIL NFR BLD AUTO: 4 % (ref 0–6)
ERYTHROCYTE [DISTWIDTH] IN BLOOD BY AUTOMATED COUNT: 13.2 % (ref 11.6–15.1)
GFR SERPL CREATININE-BSD FRML MDRD: 95 ML/MIN/1.73SQ M
GLUCOSE SERPL-MCNC: 104 MG/DL (ref 65–140)
HCT VFR BLD AUTO: 41.1 % (ref 34.8–46.1)
HGB BLD-MCNC: 12.9 G/DL (ref 11.5–15.4)
IMM GRANULOCYTES # BLD AUTO: 0.1 THOUSAND/UL (ref 0–0.2)
IMM GRANULOCYTES NFR BLD AUTO: 1 % (ref 0–2)
LYMPHOCYTES # BLD AUTO: 3.35 THOUSANDS/ΜL (ref 0.6–4.47)
LYMPHOCYTES NFR BLD AUTO: 25 % (ref 14–44)
MAGNESIUM SERPL-MCNC: 2.3 MG/DL (ref 1.6–2.6)
MCH RBC QN AUTO: 28.8 PG (ref 26.8–34.3)
MCHC RBC AUTO-ENTMCNC: 31.4 G/DL (ref 31.4–37.4)
MCV RBC AUTO: 92 FL (ref 82–98)
MONOCYTES # BLD AUTO: 0.96 THOUSAND/ΜL (ref 0.17–1.22)
MONOCYTES NFR BLD AUTO: 7 % (ref 4–12)
NEUTROPHILS # BLD AUTO: 8.31 THOUSANDS/ΜL (ref 1.85–7.62)
NEUTS SEG NFR BLD AUTO: 62 % (ref 43–75)
NRBC BLD AUTO-RTO: 0 /100 WBCS
PHOSPHATE SERPL-MCNC: 1.3 MG/DL (ref 2.3–4.1)
PLATELET # BLD AUTO: 190 THOUSANDS/UL (ref 149–390)
PMV BLD AUTO: 11.9 FL (ref 8.9–12.7)
POTASSIUM SERPL-SCNC: 3.6 MMOL/L (ref 3.5–5.3)
PROT SERPL-MCNC: 6.9 G/DL (ref 6.4–8.2)
RBC # BLD AUTO: 4.48 MILLION/UL (ref 3.81–5.12)
RYE IGE QN: NEGATIVE
SODIUM SERPL-SCNC: 142 MMOL/L (ref 136–145)
WBC # BLD AUTO: 13.28 THOUSAND/UL (ref 4.31–10.16)

## 2021-05-26 PROCEDURE — 95718 EEG PHYS/QHP 2-12 HR W/VEEG: CPT | Performed by: PSYCHIATRY & NEUROLOGY

## 2021-05-26 PROCEDURE — 97166 OT EVAL MOD COMPLEX 45 MIN: CPT

## 2021-05-26 PROCEDURE — 99239 HOSP IP/OBS DSCHRG MGMT >30: CPT | Performed by: INTERNAL MEDICINE

## 2021-05-26 PROCEDURE — 83735 ASSAY OF MAGNESIUM: CPT | Performed by: STUDENT IN AN ORGANIZED HEALTH CARE EDUCATION/TRAINING PROGRAM

## 2021-05-26 PROCEDURE — 85025 COMPLETE CBC W/AUTO DIFF WBC: CPT | Performed by: STUDENT IN AN ORGANIZED HEALTH CARE EDUCATION/TRAINING PROGRAM

## 2021-05-26 PROCEDURE — 80048 BASIC METABOLIC PNL TOTAL CA: CPT | Performed by: STUDENT IN AN ORGANIZED HEALTH CARE EDUCATION/TRAINING PROGRAM

## 2021-05-26 PROCEDURE — 99232 SBSQ HOSP IP/OBS MODERATE 35: CPT | Performed by: PSYCHIATRY & NEUROLOGY

## 2021-05-26 PROCEDURE — 97162 PT EVAL MOD COMPLEX 30 MIN: CPT

## 2021-05-26 PROCEDURE — 84100 ASSAY OF PHOSPHORUS: CPT | Performed by: STUDENT IN AN ORGANIZED HEALTH CARE EDUCATION/TRAINING PROGRAM

## 2021-05-26 PROCEDURE — 80076 HEPATIC FUNCTION PANEL: CPT | Performed by: INTERNAL MEDICINE

## 2021-05-26 RX ORDER — AMLODIPINE BESYLATE 5 MG/1
5 TABLET ORAL DAILY
Status: DISCONTINUED | OUTPATIENT
Start: 2021-05-26 | End: 2021-05-26 | Stop reason: HOSPADM

## 2021-05-26 RX ORDER — MONTELUKAST SODIUM 10 MG/1
10 TABLET ORAL
Status: DISCONTINUED | OUTPATIENT
Start: 2021-05-26 | End: 2021-05-26 | Stop reason: HOSPADM

## 2021-05-26 RX ORDER — LORATADINE 10 MG/1
5 TABLET ORAL DAILY
Status: DISCONTINUED | OUTPATIENT
Start: 2021-05-26 | End: 2021-05-26 | Stop reason: HOSPADM

## 2021-05-26 RX ADMIN — CHLORHEXIDINE GLUCONATE 0.12% ORAL RINSE 15 ML: 1.2 LIQUID ORAL at 09:06

## 2021-05-26 RX ADMIN — AMLODIPINE BESYLATE 5 MG: 5 TABLET ORAL at 10:14

## 2021-05-26 RX ADMIN — ATORVASTATIN CALCIUM 40 MG: 40 TABLET, FILM COATED ORAL at 16:10

## 2021-05-26 RX ADMIN — POTASSIUM PHOSPHATE, MONOBASIC AND POTASSIUM PHOSPHATE, DIBASIC 21 MMOL: 224; 236 INJECTION, SOLUTION, CONCENTRATE INTRAVENOUS at 13:04

## 2021-05-26 RX ADMIN — LEVETIRACETAM 750 MG: 750 TABLET ORAL at 09:06

## 2021-05-26 RX ADMIN — LORATADINE 5 MG: 10 TABLET ORAL at 10:14

## 2021-05-26 RX ADMIN — PANTOPRAZOLE SODIUM 40 MG: 40 TABLET, DELAYED RELEASE ORAL at 06:26

## 2021-05-26 NOTE — ASSESSMENT & PLAN NOTE
Care as above  Though clinical suspicion, S/p VEEG though no seizure activity,  Neurology on board, dcd keppra  No recurring episodes since admission

## 2021-05-26 NOTE — UTILIZATION REVIEW
Continued Stay Review    Date: 5-26-21                         Current Patient Class: inpatient  Current Level of Care: med surg    HPI:66 y o  female initially admitted on 5- 24 for acute encephalopathy    Assessment/Plan:     Neurology had discontinued keppra  Mental status has returned to baseline  PT evaluation completed recommendation to return home with no therapy services required     Date/Time  Temp  Pulse  Resp  BP  MAP (mmHg)  SpO2     05/26/21 10:14:10  --  85  --  135/75  95  91 %     05/26/21 1014  --  --  --  135/75  --  --     05/26/21 0800  --  --  --  --  --  --     05/26/21 07:51:59  98 1 °F (36 7 °C)  98  18  159/80  106  92 %         Pertinent Labs/Diagnostic Results:   Results from last 7 days   Lab Units 05/1954   SARS-COV-2  Negative     Results from last 7 days   Lab Units 05/26/21 0528 05/25/21  0510 05/24/21 0517 05/23/21  1943   WBC Thousand/uL 13 28* 16 99* 13 07*  --  18 70*   HEMOGLOBIN g/dL 12 9 10 7* 11 4*  --  13 4   HEMATOCRIT % 41 1 35 5 34 2*  --  42 8   PLATELETS Thousands/uL 190 158 184   < > 242   NEUTROS ABS Thousands/µL 8 31* 14 35*  --   --   --     < > = values in this interval not displayed       Results from last 7 days   Lab Units 05/25/21  0510   RETIC CT ABS  114,500*   RETIC CT PCT % 3 22*     Results from last 7 days   Lab Units 05/26/21 0528 05/25/21  0510 05/24/21  0517 05/23/21 1943   SODIUM mmol/L 142 144 142 143   POTASSIUM mmol/L 3 6 4 5 5 0 3 3*   CHLORIDE mmol/L 109* 117* 116* 105   CO2 mmol/L 29 20* 19* 15*   ANION GAP mmol/L 4 7 7 23*   BUN mg/dL 13 11 10 16   CREATININE mg/dL 0 60 0 55* 0 74 1 09   EGFR ml/min/1 73sq m 95 97 84 53   CALCIUM mg/dL 9 0 7 9* 7 3* 9 3   CALCIUM, IONIZED mmol/L  --  0 99*  --   --    MAGNESIUM mg/dL 2 3 2 7* 2 3 1 9   PHOSPHORUS mg/dL 1 3* 2 7 2 5  --      Results from last 7 days   Lab Units 05/26/21  0528 05/25/21  0510 05/24/21  0517   AST U/L 31 26 54*   ALT U/L 43 40 55   ALK PHOS U/L 385* 348* 410*   TOTAL PROTEIN g/dL 6 9 5 5* 6 1*   ALBUMIN g/dL 3 2* 2 6* 2 9*   TOTAL BILIRUBIN mg/dL 0 42 0 29 0 64   BILIRUBIN DIRECT mg/dL 0 15 0 09 0 10         Results from last 7 days   Lab Units 05/26/21  0528 05/25/21  0510 05/24/21  0517 05/23/21 1943   GLUCOSE RANDOM mg/dL 104 121 190* 186*     Results from last 7 days   Lab Units 05/24/21  1424   OSMOLALITY, SERUM mmol/*         No results found for: BETA-HYDROXYBUTYRATE   Results from last 7 days   Lab Units 05/24/21  0555 05/24/21  0202 05/23/21  2202   PH ART  7 282* 7 268* 7 240*   PCO2 ART mm Hg 43 2 42 2 50 2*   PO2 ART mm Hg 160 1* 239 6* 321 0*   HCO3 ART mmol/L 19 9* 18 9* 20 8*   BASE EXC ART mmol/L -6 5 -7 7 -6 2*   O2 CONTENT ART mL/dL 16 7 17 6 17 2   O2 HGB, ARTERIAL % 98 2* 98 7* 98 2   ABG SOURCE  Radial, Right Radial, Right Radial, Left     Results from last 7 days   Lab Units 05/23/21 2027   PH INO  7 300   PCO2 INO mm Hg 46 9   PO2 INO mm Hg 68 0*   HCO3 INO mmol/L 22 2*   BASE EXC INO mmol/L -4 2   O2 CONTENT INO ml/dL 16 5   O2 HGB, VENOUS % 88 1             Results from last 7 days   Lab Units 05/23/21 1943   TROPONIN I ng/mL <0 03         Results from last 7 days   Lab Units 05/25/21  0510 05/23/21 1943   PROTIME seconds 13 6 14 0   INR  1 04 1 09   PTT seconds  --  25         Results from last 7 days   Lab Units 05/23/21 2014   PROCALCITONIN ng/ml <0 05     Results from last 7 days   Lab Units 05/24/21  0517 05/24/21  0216 05/23/21  2327 05/23/21 2014   LACTIC ACID mmol/L 1 8 2 7* 2 3* 2 9*       Results from last 7 days   Lab Units 05/23/21  2144   CLARITY UA  Clear   COLOR UA  Yellow   SPEC GRAV UA  1 010   PH UA  5 5   GLUCOSE UA mg/dl Negative   KETONES UA mg/dl Negative   BLOOD UA  Trace-Intact*   PROTEIN UA mg/dl Negative   NITRITE UA  Negative   BILIRUBIN UA  Negative   UROBILINOGEN UA E U /dl 0 2   LEUKOCYTES UA  Negative   WBC UA /hpf 0-1   RBC UA /hpf 0-1   BACTERIA UA /hpf None Seen   EPITHELIAL CELLS WET PREP /hpf Occasional SODIUM UR  125             Results from last 7 days   Lab Units 05/23/21  2144   AMPH/METH  Negative   BARBITURATE UR  Negative   BENZODIAZEPINE UR  Negative   COCAINE UR  Negative   METHADONE URINE  Negative   OPIATE UR  Negative   PCP UR  Negative   THC UR  Negative     Results from last 7 days   Lab Units 05/24/21  1424 05/23/21 2014   ETHANOL LVL mg/dL  --  <39   SALICYLATE LVL mg/dL <3*  --                  Results from last 7 days   Lab Units 05/25/21  0948 05/23/21 2027   BLOOD CULTURE   --  No Growth at 48 hrs  No Growth at 48 hrs  GRAM STAIN RESULT  No Polys or Bacteria seen  --      Results from last 7 days   Lab Units 05/25/21  0948 05/24/21  0517   TOTAL COUNTED  100 100     Results from last 7 days   Lab Units 05/25/21  0948   APPEARANCE CSF  clear   TUBE NUM CSF  4   WBC CSF /uL 2   XANTHOCHROMIA  No   NEUTROPHILS % (CSF) % 44   LYMPHS % (CSF) % 29   MONOCYTES % (CSF) % 27   GLUCOSE CSF mg/dL 74   PROTEIN CSF mg/dL 38   RBC CSF uL 2         Medications:   Scheduled Medications:  amLODIPine, 5 mg, Oral, Daily  atorvastatin, 40 mg, Oral, Daily With Dinner  chlorhexidine, 15 mL, Mouth/Throat, Q12H JOZEF  enoxaparin, 40 mg, Subcutaneous, Q24H [address]  fluticasone-vilanterol, 1 puff, Inhalation, Daily  loratadine, 5 mg, Oral, Daily  montelukast, 10 mg, Oral, HS  pantoprazole, 40 mg, Oral, Early Morning  polyethylene glycol, 17 g, Oral, Daily  potassium phosphate, 21 mmol, Intravenous, Once      Continuous IV Infusions:     PRN Meds:  acetaminophen, 975 mg, Per NG Tube, Q6H PRN  albuterol, 2 5 mg, Nebulization, Q6H PRN  ALPRAZolam, 0 25 mg, Oral, HS PRN  bisacodyl, 10 mg, Rectal, Daily PRN  ondansetron, 4 mg, Intravenous, Q6H PRN        Discharge Plan: to be determined     Network Utilization Review Department  ATTENTION: Please call with any questions or concerns to [phone] and carefully listen to the prompts so that you are directed to the right person   All voicemails are confidential   Cecelia Corleyy all requests for admission clinical reviews, approved or denied determinations and any other requests to dedicated fax number below belonging to the campus where the patient is receiving treatment   List of dedicated fax numbers for the Facilities:  [address] DENIALS (Administrative/Medical Necessity) [phone]   [address] (Maternity/NICU/Pediatrics) [phone] [address] [facility] [phone] [address] Jasmin Yin 1481 [address] [facility] [place] [address] [phone]

## 2021-05-26 NOTE — PHYSICAL THERAPY NOTE
Physical Therapy Evaluation     Patient's Name: Job Kapadia    Admitting Diagnosis  Acute encephalopathy [G93 40]  Sepsis ([address] ) [A41 9]  Acute respiratory failure with hypoxia [J96 01]    Problem List  Patient Active Problem List   Diagnosis    Hyperlipidemia LDL goal <100    Moderate asthma    Chronic kidney disease (CKD) stage G2/A1, mildly decreased glomerular filtration rate (GFR) between 60-89 mL/min/1 73 square meter and albuminuria creatinine ratio less than 30 mg/g    Essential hypertension    Non-seasonal allergic rhinitis    Right hip pain    Elevated alkaline phosphatase level    Osteopenia    Acute encephalopathy    Acute respiratory failure with hypoxia ([address] )    Seizure-like activity ([address] )    Aneurysm of left internal carotid artery    Normocytic anemia    Leukocytosis    Diffuse idiopathic skeletal hyperostosis of cervical spine       Past Medical History  Past Medical History:   Diagnosis Date    Acute bronchitis     Acute pharyngitis     Anxiety state     Arthropathy of ankle and foot     and/or    Asthma     Asthma without status asthmaticus     Carotid artery occlusion     COPD (chronic obstructive pulmonary disease) (HCC)     Cough     Disorder of shoulder     Dyspnea     Hand joint pain     Heartburn     Herpes simplex without complication     Hyperlipidemia     Infection of skin and subcutaneous tissue     Localized superficial swelling of skin     Low back pain     Lymphadenopathy     Malaise and fatigue     Neck pain     Osteoarthritis     Pleurisy without effusion or active tuberculosis     Pneumonia     Right upper quadrant pain     Shoulder joint pain     Skin eruption     Vitamin D deficiency     Vomiting        Past Surgical History  Past Surgical History:   Procedure Laterality Date    BREAST BIOPSY Left 2017 benign    CHOLECYSTECTOMY      CHOLECYSTECTOMY  03/2014    Dr [facility]     COLONOSCOPY  02/2013    WNL    FL LUMBAR PUNCTURE DIAGNOSTIC  5/25/2021    HYSTERECTOMY      Total         05/26/21 0844   PT Last Visit   PT Visit Date 05/26/21   Note Type   Note type Evaluation   Pain Assessment   Pain Assessment Tool 0-10   Pain Score No Pain   Home Living   Type of [address] One level;Stairs to enter with rails   Home Equipment   (denies DME)   Additional Comments Pt resides in [facility] w/ 12 JUAN R  Pt reports ambulating w/out AD PTA   Prior Function   Level of [place] Independent with ADLs and functional mobility   Lives With Son;Daughter   Receives Help From Family   ADL Assistance Independent   IADLs Independent   Falls in the last 6 months 0   Restrictions/Precautions   Weight Bearing Precautions Per Order No   Other Precautions Telemetry   General   Family/Caregiver Present No   Cognition   Overall Cognitive Status WFL   Arousal/Participation Alert   Orientation Level Oriented X4   Memory Within functional limits   Following Commands Follows all commands and directions without difficulty   Comments Pt pleasant and cooperative to work w/ therapy   RLE Assessment   RLE Assessment WFL   LLE Assessment   LLE Assessment WFL   Coordination   Movements are Fluid and Coordinated 1   Bed Mobility   Supine to Sit Unable to assess   Sit to Supine Unable to assess   Additional Comments Pt seated OOB upon PT arrival  Pt returned seated OOB at end of session w/ all needs within reach   Transfers   Sit to Stand 5  Supervision   Additional items Verbal cues   Stand to Sit 5  Supervision   Additional items Verbal cues   Additional Comments Transfers w/out AD   Ambulation/Elevation   Gait pattern Excessively slow; Short stride   Gait Assistance 5  Supervision   Additional items Verbal cues   Assistive Device None   Distance 100ft   Stair Management Assistance 5  Supervision   Additional items Verbal cues   Stair Management Technique One rail R;Alternating pattern; Foreward   Number of Stairs 7   Balance   Static Sitting Good   Dynamic Sitting Fair +   Static Standing Fair   Dynamic Standing Fair   Ambulatory Fair   Endurance Deficit   Endurance Deficit No   Activity Tolerance   Activity Tolerance Patient tolerated treatment well   Medical Staff Made Aware OT Ni   Nurse Made Aware yes   Assessment   Assessment Pt is 78 y o  female seen for PT evaluation s/p admit to [facility] on 5/24/2021 w/ Acute encephalopathy  PT consulted to assess pt's functional mobility and d/c needs  Order placed for PT eval and tx, w/ up w/ A order  Comorbidities affecting pt's physical performance at time of assessment include: asthma, CKD, HTN, seizure like activity, osteopenia  PTA, pt was independent w/ all functional mobility w/ no AD and lives w/ son and daughter in one level house w/ 12 JUAN R  Pt reports being IND w/ ADLs  Personal factors affecting pt at time of IE include: inaccessible home environment, stairs to enter home and inability to perform IADLs  Please find objective findings from PT assessment regarding body systems outlined above with impairments and limitations including impaired balance, gait deviations and decreased functional mobility tolerance  Pt performed STS at supervision, ambulated 100ft w/ no AD at supervision, and performed 7 steps at supervision  The following objective measures performed on IE also reveal limitations: Barthel Index: 182/641 and AM-PAC 6-Clicks: 02/06  Pt's clinical presentation is currently evolving seen in pt's presentation of recent admission for acute encephalopathy requiring medical attention, recent decline in function as compared to baseline, multiple lines, fall risk  Pt appears to be functioning at baseline level with functional mobility; therefore, requires no immediate acute skilled PT services at this time  Pt with no further questions or concerns regarding PT services  Will D/C PT at this time  Please re-consult if pt's medical status changes   From PT/mobility standpoint, recommendation at time of d/c would be no rehabilitation needs pending progress in order to facilitate return to PLOF  Goals   Patient Goals to return home   Plan   PT Frequency   (D/C PT)   Recommendation   PT Discharge Recommendation No rehabilitation needs   PT - OK to Discharge Yes   AM-PAC Basic Mobility Inpatient   Turning in Bed Without Bedrails 4   Lying on Back to Sitting on Edge of Flat Bed 4   Moving Bed to Chair 4   Standing Up From Chair 4   Walk in Room 4   Climb 3-5 Stairs 4   Basic Mobility Inpatient Raw Score 24   Basic Mobility Standardized Score 57 68   Modified [place] Scale   Modified [place] Scale 2   Barthel Index   Feeding 10   Bathing 5   Grooming Score 5   Dressing Score 10   Bladder Score 10   Bowels Score 10   Toilet Use Score 10   Transfers (Bed/Chair) Score 15   Mobility (Level Surface) Score 15   Stairs Score 10   Barthel Index Score 100     The patient's AM-PAC Basic Mobility Inpatient Short Form Raw Score is 24, Standardized Score is 57 68  A standardized score greater than 42 9 suggests the patient may benefit from discharge to home  Please also refer to the recommendation of the Physical Therapist for safe discharge planning      Jefferson Saleh, PT, DPT

## 2021-05-26 NOTE — PLAN OF CARE
Problem: Prexisting or High Potential for Compromised Skin Integrity  Goal: Skin integrity is maintained or improved  Description: INTERVENTIONS:  - Identify patients at risk for skin breakdown  - Assess and monitor skin integrity  - Assess and monitor nutrition and hydration status  - Monitor labs   - Assess for incontinence   - Turn and reposition patient  - Assist with mobility/ambulation  - Relieve pressure over bony prominences  - Avoid friction and shearing  - Provide appropriate hygiene as needed including keeping skin clean and dry  - Evaluate need for skin moisturizer/barrier cream  - Collaborate with interdisciplinary team   - Patient/family teaching  - Consider wound care consult   Outcome: Progressing     Problem: PAIN - ADULT  Goal: Verbalizes/displays adequate comfort level or baseline comfort level  Description: Interventions:  - Encourage patient to monitor pain and request assistance  - Assess pain using appropriate pain scale  - Administer analgesics based on type and severity of pain and evaluate response  - Implement non-pharmacological measures as appropriate and evaluate response  - Consider cultural and social influences on pain and pain management  - Notify physician/advanced practitioner if interventions unsuccessful or patient reports new pain  Outcome: Progressing     Problem: INFECTION - ADULT  Goal: Absence or prevention of progression during hospitalization  Description: INTERVENTIONS:  - Assess and monitor for signs and symptoms of infection  - Monitor lab/diagnostic results  - Monitor all insertion sites, i e  indwelling lines, tubes, and drains  - Monitor endotracheal if appropriate and nasal secretions for changes in amount and color  - [place] appropriate cooling/warming therapies per order  - Administer medications as ordered  - Instruct and encourage patient and family to use good hand hygiene technique  - Identify and instruct in appropriate isolation precautions for identified infection/condition  Outcome: Progressing     Problem: SAFETY ADULT  Goal: Patient will remain free of falls  Description: INTERVENTIONS:  - Assess patient frequently for physical needs  -  Identify cognitive and physical deficits and behaviors that affect risk of falls    -  [place] fall precautions as indicated by assessment   - Educate patient/family on patient safety including physical limitations  - Instruct patient to call for assistance with activity based on assessment  - Modify environment to reduce risk of injury  - Consider OT/PT consult to assist with strengthening/mobility  Outcome: Progressing  Goal: Maintain or return to baseline ADL function  Description: INTERVENTIONS:  -  Assess patient's ability to carry out ADLs; assess patient's baseline for ADL function and identify physical deficits which impact ability to perform ADLs (bathing, care of mouth/teeth, toileting, grooming, dressing, etc )  - Assess/evaluate cause of self-care deficits   - Assess range of motion  - Assess patient's mobility; develop plan if impaired  - Assess patient's need for assistive devices and provide as appropriate  - Encourage maximum independence but intervene and supervise when necessary  - Involve family in performance of ADLs  - Assess for home care needs following discharge   - Consider OT consult to assist with ADL evaluation and planning for discharge  - Provide patient education as appropriate  Outcome: Progressing  Goal: Maintain or return mobility status to optimal level  Description: INTERVENTIONS:  - Assess patient's baseline mobility status (ambulation, transfers, stairs, etc )    - Identify cognitive and physical deficits and behaviors that affect mobility  - Identify mobility aids required to assist with transfers and/or ambulation (gait belt, sit-to-stand, lift, walker, cane, etc )  - [place] fall precautions as indicated by assessment  - Record patient progress and toleration of activity level on Mobility SBAR; progress patient to next Phase/Stage  - Instruct patient to call for assistance with activity based on assessment  - Consider rehabilitation consult to assist with strengthening/weightbearing, etc   Outcome: Progressing     Problem: DISCHARGE PLANNING  Goal: Discharge to home or other facility with appropriate resources  Description: INTERVENTIONS:  - Identify barriers to discharge w/patient and caregiver  - Arrange for needed discharge resources and transportation as appropriate  - Identify discharge learning needs (meds, wound care, etc )  - Arrange for interpretive services to assist at discharge as needed  - Refer to Case Management Department for coordinating discharge planning if the patient needs post-hospital services based on physician/advanced practitioner order or complex needs related to functional status, cognitive ability, or social support system  Outcome: Progressing     Problem: Knowledge Deficit  Goal: Patient/family/caregiver demonstrates understanding of disease process, treatment plan, medications, and discharge instructions  Description: Complete learning assessment and assess knowledge base    Interventions:  - Provide teaching at level of understanding  - Provide teaching via preferred learning methods  Outcome: Progressing     Problem: NEUROSENSORY - ADULT  Goal: Achieves stable or improved neurological status  Description: INTERVENTIONS  - Monitor and report changes in neurological status  - Monitor vital signs such as temperature, blood pressure, glucose, and any other labs ordered   - Initiate measures to prevent increased intracranial pressure  - Monitor for seizure activity and implement precautions if appropriate      Outcome: Progressing  Goal: Remains free of injury related to seizures activity  Description: INTERVENTIONS  - Maintain airway, patient safety  and administer oxygen as ordered  - Monitor patient for seizure activity, document and report duration and description of seizure to physician/advanced practitioner  - If seizure occurs,  ensure patient safety during seizure  - Reorient patient post seizure  - Seizure pads on all 4 side rails  - Instruct patient/family to notify RN of any seizure activity including if an aura is experienced  - Instruct patient/family to call for assistance with activity based on nursing assessment  - Administer anti-seizure medications if ordered    Outcome: Progressing  Goal: Achieves maximal functionality and self care  Description: INTERVENTIONS  - Monitor swallowing and airway patency with patient fatigue and changes in neurological status  - Encourage and assist patient to increase activity and self care     - Encourage visually impaired, hearing impaired and aphasic patients to use assistive/communication devices  Outcome: Progressing

## 2021-05-26 NOTE — DISCHARGE INSTRUCTIONS
Hypophosphatemia   WHAT YOU NEED TO KNOW:   Hypophosphatemia is a low level of phosphate in your blood  Phosphate is an electrolyte (mineral) that works with calcium to help build bones  It also helps produce energy  Hypophosphatemia can be acute or chronic  Acute means the level in your blood drops suddenly  Chronic means the level has been low or drops slowly, over time  DISCHARGE INSTRUCTIONS:   Call your local emergency number (911 in the [facility] [facility]) or have someone call if:   · You have a seizure  Seek care immediately if:   · You are confused or have severe trouble thinking  · You break a bone  Call your doctor if:   · You have new or worsening symptoms  · You have nausea or are vomiting  · You have diarrhea or constipation that continues for more than 2 days  · You have muscle pain  · You have questions or concerns about your condition or care  Medicines: You may need any of the following:  · Medicines  may be given to increase your phosphate level  Medicines may also be used to lower your calcium level or to raise other mineral levels  You will be given instructions on how to take these medicines  · Take your medicine as directed  Contact your healthcare provider if you think your medicine is not helping or if you have side effects  Tell him or her if you are allergic to any medicine  Keep a list of the medicines, vitamins, and herbs you take  Include the amounts, and when and why you take them  Bring the list or the pill bottles to follow-up visits  Carry your medicine list with you in case of an emergency  Prevent or manage hypophosphatemia:   · Manage health conditions that can lead to hypophosphatemia  If you have diabetes, it is important to follow your management plan so you prevent DKA  Ask your healthcare provider for information if you are having problems with alcoholism and need help to stop drinking   Obesity and eating disorders such as bulimia or anorexia can cause malnutrition  This increases your risk for hypophosphatemia  Your provider can help you manage these health conditions or give you information on treatment plans  · Do not take more antacids or water pills than directed  Follow the directions on the label or that are given to you by your healthcare provider  These medicines are often available without a prescription  It can be easy to take too many at one time or in the same day  · Eat a variety of healthy foods  Healthy foods include fruits, vegetables, low-fat dairy foods, beans, meats, fish, and whole-wheat breads and cereals  You can prevent malnutrition by eating enough healthy foods every day  Your healthcare provider or dietitian can help you plan meals and snacks  Your provider may recommend that you have more food or drinks that contain phosphate  Examples include breads that contain yeast, dairy products, meat, eggs, peas, nuts, and beans  Your provider or a dietitian can tell you how much of these to have each day  · Exercise as directed  Your phosphate level may drop suddenly if you exercise too much or too intensely  Always warm up before you exercise and cool down after  Your healthcare provider can help you create a safe exercise plan  · Drink liquids as directed  Liquids help your kidneys function well  Liquids can also help prevent dehydration, especially if you have diarrhea  Talk to your healthcare provider about how much liquid to have every day  Too much or too little liquid can affect the balance of phosphate and other minerals in your body  Follow up with your healthcare provider as directed:  Write down your questions so you remember to ask them during your visits  © Copyright [address] Information is for End User's use only and may not be sold, redistributed or otherwise used for commercial purposes   All illustrations and images included in CareNotes® are the copyrighted property of [employer]  or [address]  The above information is an  only  It is not intended as medical advice for individual conditions or treatments  Talk to your doctor, nurse or pharmacist before following any medical regimen to see if it is safe and effective for you

## 2021-05-26 NOTE — ASSESSMENT & PLAN NOTE
Lab Results   Component Value Date    EGFR 95 05/26/2021    EGFR 97 05/25/2021    EGFR 84 05/24/2021    CREATININE 0 60 05/26/2021    CREATININE 0 55 (L) 05/25/2021    CREATININE 0 74 05/24/2021   Stable, creatinine within baseline

## 2021-05-26 NOTE — UTILIZATION REVIEW
Continued Stay Review    Date: 5-25-21                       Current Patient Class: inpatient  Current Level of Care: med surg    HPI:66 y o  female initially admitted on 5-24-21 for acute encephalopathy     Continuous EEG findings:   - 5/24:  Diffuse alpha and beta  Sedation background  No seizures and no epileptiform discharges  - 5/25: continued no seizures    Assessment/Plan:     Lumbar puncture completed  WBC increased to 16 99  Continue 24 hour video EEG  GCS 15 to 14  Plan PT/OT evaluations  05/24/21 1525  JANETH Screen w/ Reflex to Titer/Pattern Once     Status: In process      05/24/21 1525  Antimitochondrial antibody Once     Status: In process      05/24/21 1525  Anti-smooth muscle antibody, IgG Once     Status: In process          Consult Neurology   New onset acute mental status change with posturing and full body shaking concerning for seizure-like activity with posturing on 5/24/2021  Current known medical history is significant for hypertension and hyperlipidemia  Impression:  Unclear etiology of acute encephalopathy, clinical picture did appear consistent with seizure however patient has not been on AEDs and did not have any seizure or seizure tendency during 28 hours continuous monitoring  She has not presented any metabolic derangements at this point that would explain her encephalopathy which is now resolving    Patient is now extubated and alert and oriented x3      Plan:  - Labs and other metabolic workup deferred to primary team  - Minimize delirium, regulate sleep-wake cycle  - Minimize cognitive impairing medications such as benzos as much as possible when appropriate  - Correct lytes and fluids as per medical team appreciated  - Avoid cefepime if possible if needed for infectious cause  - Will follow up on LP labs        Vital Signs:   Date/  Time  Temp  Pulse  Resp  BP  MAP (mmHg)  SpO2  FiO2 (%)   O2 Device    05/25/21 21:27:20  98 5 °F (36 9 °C)  88  --  162/84  110  92 % --   --    05/25/21 2100  --  --  --  --  --  91 %  --   None (Room air)    05/25/21 18:41:01  97 9 °F (36 6 °C)  98  18  152/78  103  93 %  --   --    05/25/21 1600  98 7 °F (37 1 °C)  78  18  --  --  95 %  --   --    05/25/21 1500  --  82  18  135/69  91  95 %  --   --    05/25/21 1400  98 2 °F (36 8 °C)  66  14  117/61  78  94 %  --   --    05/25/21 1300  --  82  27  Abnormal   136/65  93  93 %  --   --    05/25/21 1200  --  78  16  141/73  104  97 %  --   --    05/25/21 1158  --  86  22  144/69  96  98 %  --   --    05/25/21 1021  --  52Abnormal   12  133/66  97  98 %  --   --    05/25/21 0800  97 8 °F (36 6 °C)  52Abnormal   21  128/67  93  98 %  --   Nasal cannula    05/25/21 0700  --  52Abnormal   11  Abnormal   127/63  80  97 %  --   --    05/25/21 0500  --  62  14  117/52  66  98 %  --   --    05/25/21 0400  --  52Abnormal   12  113/50  66  93 %  --   --    05/25/21 0300  --  52Abnormal   14  118/52  72  96 %  --   --    05/25/21 0200  --  54Abnormal   22  103/54  74  96 %  --   --    05/25/21 0100  --  56  12  101/56  79  97 %  --   --    05/25/21 0000  --  60  16  --  --  97 %  --   --          Date and Time Eye Opening Best Verbal Response Best Motor Response [place] Scale Score   05/25/21 2100 4 4 6 14   05/25/21 1159 4 5 6 15   05/25/21 0830 4 5 6 15   05/25/21 0400 4 5 6 15   05/25/21 0000 4 5 6 15             Pertinent Labs/Diagnostic Results:   Results from last 7 days   Lab Units 05/1954   SARS-COV-2  Negative     Results from last 7 days   Lab Units 05/26/21  0528 05/25/21  0510 05/24/21  0517  05/23/21 1943   WBC Thousand/uL 13 28* 16 99* 13 07*  --  18 70*   HEMOGLOBIN g/dL 12 9 10 7* 11 4*  --  13 4   HEMATOCRIT % 41 1 35 5 34 2*  --  42 8   PLATELETS Thousands/uL 190 158 184   < > 242   NEUTROS ABS Thousands/µL 8 31* 14 35*  --   --   --     < > = values in this interval not displayed       Results from last 7 days   Lab Units 05/25/21  0510   RETIC CT ABS  114,500*   RETIC CT PCT % 3 22*     Results from last 7 days   Lab Units 05/26/21  0528 05/25/21  0510 05/24/21  0517 05/23/21  1943   SODIUM mmol/L 142 144 142 143   POTASSIUM mmol/L 3 6 4 5 5 0 3 3*   CHLORIDE mmol/L 109* 117* 116* 105   CO2 mmol/L 29 20* 19* 15*   ANION GAP mmol/L 4 7 7 23*   BUN mg/dL 13 11 10 16   CREATININE mg/dL 0 60 0 55* 0 74 1 09   EGFR ml/min/1 73sq m 95 97 84 53   CALCIUM mg/dL 9 0 7 9* 7 3* 9 3   CALCIUM, IONIZED mmol/L  --  0 99*  --   --    MAGNESIUM mg/dL 2 3 2 7* 2 3 1 9   PHOSPHORUS mg/dL 1 3* 2 7 2 5  --      Results from last 7 days   Lab Units 05/25/21  0510 05/24/21  0517   AST U/L 26 54*   ALT U/L 40 55   ALK PHOS U/L 348* 410*   TOTAL PROTEIN g/dL 5 5* 6 1*   ALBUMIN g/dL 2 6* 2 9*   TOTAL BILIRUBIN mg/dL 0 29 0 64   BILIRUBIN DIRECT mg/dL 0 09 0 10         Results from last 7 days   Lab Units 05/26/21  0528 05/25/21  0510 05/24/21  0517 05/23/21  1943   GLUCOSE RANDOM mg/dL 104 121 190* 186*     Results from last 7 days   Lab Units 05/24/21  1424   OSMOLALITY, SERUM mmol/*     Results from last 7 days   Lab Units 05/24/21  0555 05/24/21  0202 05/23/21  2202   PH ART  7 282* 7 268* 7 240*   PCO2 ART mm Hg 43 2 42 2 50 2*   PO2 ART mm Hg 160 1* 239 6* 321 0*   HCO3 ART mmol/L 19 9* 18 9* 20 8*   BASE EXC ART mmol/L -6 5 -7 7 -6 2*   O2 CONTENT ART mL/dL 16 7 17 6 17 2   O2 HGB, ARTERIAL % 98 2* 98 7* 98 2   ABG SOURCE  Radial, Right Radial, Right Radial, Left     Results from last 7 days   Lab Units 05/23/21 2027   PH INO  7 300   PCO2 INO mm Hg 46 9   PO2 INO mm Hg 68 0*   HCO3 INO mmol/L 22 2*   BASE EXC INO mmol/L -4 2   O2 CONTENT INO ml/dL 16 5   O2 HGB, VENOUS % 88 1             Results from last 7 days   Lab Units 05/23/21 1943   TROPONIN I ng/mL <0 03         Results from last 7 days   Lab Units 05/25/21  0510 05/23/21  1943   PROTIME seconds 13 6 14 0   INR  1 04 1 09   PTT seconds  --  25         Results from last 7 days   Lab Units 05/23/21 2014   PROCALCITONIN ng/ml <0 05 Results from last 7 days   Lab Units 05/24/21  0517 05/24/21  0216 05/23/21  2327 05/23/21 2014   LACTIC ACID mmol/L 1 8 2 7* 2 3* 2 9*     Results from last 7 days   Lab Units 05/23/21  2144   CLARITY UA  Clear   COLOR UA  Yellow   SPEC GRAV UA  1 010   PH UA  5 5   GLUCOSE UA mg/dl Negative   KETONES UA mg/dl Negative   BLOOD UA  Trace-Intact*   PROTEIN UA mg/dl Negative   NITRITE UA  Negative   BILIRUBIN UA  Negative   UROBILINOGEN UA E U /dl 0 2   LEUKOCYTES UA  Negative   WBC UA /hpf 0-1   RBC UA /hpf 0-1   BACTERIA UA /hpf None Seen   EPITHELIAL CELLS WET PREP /hpf Occasional   SODIUM UR  125       Results from last 7 days   Lab Units 05/23/21  2144   AMPH/METH  Negative   BARBITURATE UR  Negative   BENZODIAZEPINE UR  Negative   COCAINE UR  Negative   METHADONE URINE  Negative   OPIATE UR  Negative   PCP UR  Negative   THC UR  Negative     Results from last 7 days   Lab Units 05/24/21  1424 05/23/21 2014   ETHANOL LVL mg/dL  --  <08   SALICYLATE LVL mg/dL <3*  --        Results from last 7 days   Lab Units 05/25/21  0948 05/23/21 2027   BLOOD CULTURE   --  No Growth at 24 hrs  No Growth at 24 hrs     GRAM STAIN RESULT  No Polys or Bacteria seen  --        Results from last 7 days   Lab Units 05/25/21  0948   APPEARANCE CSF  clear   TUBE NUM CSF  4   WBC CSF /uL 2   XANTHOCHROMIA  No   NEUTROPHILS % (CSF) % 44   LYMPHS % (CSF) % 29   MONOCYTES % (CSF) % 27   GLUCOSE CSF mg/dL 74   PROTEIN CSF mg/dL 38   RBC CSF uL 2         Scheduled Medications:  atorvastatin, 40 mg, Oral, Daily With Dinner  chlorhexidine, 15 mL, Mouth/Throat, Q12H JOZEF  enoxaparin, 40 mg, Subcutaneous, Q24H [address]  fluticasone-vilanterol, 1 puff, Inhalation, Daily  levETIRAcetam, 750 mg, Oral, Q12H JOZEF  pantoprazole, 40 mg, Oral, Early Morning  polyethylene glycol, 17 g, Oral, Daily      Continuous IV Infusions:     PRN Meds:  acetaminophen, 975 mg, Per NG Tube, Q6H PRN  albuterol, 2 5 mg, Nebulization, Q6H PRN  ALPRAZolam, 0 25 mg, Oral, HS PRN  bisacodyl, 10 mg, Rectal, Daily PRN  ondansetron, 4 mg, Intravenous, Q6H PRN        Discharge Plan: to be determined     Network Utilization Review Department  ATTENTION: Please call with any questions or concerns to [phone] and carefully listen to the prompts so that you are directed to the right person  All voicemails are confidential   Maria Luisa Bluepper all requests for admission clinical reviews, approved or denied determinations and any other requests to dedicated fax number below belonging to the campus where the patient is receiving treatment   List of dedicated fax numbers for the Facilities:  [address] DENIALS (Administrative/Medical Necessity) [phone]   [address] (Maternity/NICU/Pediatrics) [phone]   [address] Stella Martinez [address] [place] [address] [address] Jasmin [address] [facility] [place] [address] [phone]

## 2021-05-26 NOTE — OCCUPATIONAL THERAPY NOTE
Occupational Therapy Evaluation     Patient Name: Cooper DIAZ Date: 5/26/2021  Problem List  Principal Problem:    Acute encephalopathy  Active Problems:     Moderate asthma    Chronic kidney disease (CKD) stage G2/A1, mildly decreased glomerular filtration rate (GFR) between 60-89 mL/min/1 73 square meter and albuminuria creatinine ratio less than 30 mg/g    Essential hypertension    Elevated alkaline phosphatase level    Acute respiratory failure with hypoxia (HCC)    Seizure-like activity (HCC)    Aneurysm of left internal carotid artery    Normocytic anemia    Leukocytosis    Diffuse idiopathic skeletal hyperostosis of cervical spine    Past Medical History  Past Medical History:   Diagnosis Date    Acute bronchitis     Acute pharyngitis     Anxiety state     Arthropathy of ankle and foot     and/or    Asthma     Asthma without status asthmaticus     Carotid artery occlusion     COPD (chronic obstructive pulmonary disease) (HCC)     Cough     Disorder of shoulder     Dyspnea     Hand joint pain     Heartburn     Herpes simplex without complication     Hyperlipidemia     Infection of skin and subcutaneous tissue     Localized superficial swelling of skin     Low back pain     Lymphadenopathy     Malaise and fatigue     Neck pain     Osteoarthritis     Pleurisy without effusion or active tuberculosis     Pneumonia     Right upper quadrant pain     Shoulder joint pain     Skin eruption     Vitamin D deficiency     Vomiting      Past Surgical History  Past Surgical History:   Procedure Laterality Date    BREAST BIOPSY Left 2017 benign    CHOLECYSTECTOMY      CHOLECYSTECTOMY  03/2014    Dr Clau Pritchett     COLONOSCOPY  02/2013    WNL    FL LUMBAR PUNCTURE DIAGNOSTIC  5/25/2021    HYSTERECTOMY      Total            05/26/21 0834   OT Last Visit   OT Visit Date 05/26/21   Note Type   Note type Evaluation   Restrictions/Precautions   Weight Bearing Precautions Per Order No Other Precautions Telemetry   Pain Assessment   Pain Assessment Tool Pain Assessment not indicated - pt denies pain   Pain Score No Pain   Home Living   Type of Home House  (ranch style home)   Home Layout One level;Stairs to enter with rails  (+12 JUAN R)   Bathroom Shower/Tub Tub/shower unit   Bathroom Toilet Standard   Bathroom Equipment Grab bars in shower   Bathroom Accessibility Accessible   Prior Function   Level of [place] Independent with ADLs and functional mobility   Lives With [employer] Help From Family   ADL Assistance Independent   IADLs Independent   Falls in the last 6 months 0   Vocational Retired   Lifestyle   Autonomy I with ADL's/IADL's, no AD with functional ambulation, +drives   Reciprocal Relationships son,daughter, granddaughters   Service to Others retired   Intrinsic Gratification dancing   Psychosocial   Psychosocial (WDL) WDL   ADL   Eating Assistance 7  Independent   Grooming Assistance 7  Independent   UB Bathing Assistance 5  Supervision/Setup   UB Bathing Deficit Setup   LB Bathing Assistance 5  Supervision/Setup   LB Bathing Deficit Setup   UB Dressing Assistance 5  Supervision/Setup   UB Dressing Deficit Setup   LB Dressing Assistance 5  Supervision/Setup   LB Dressing Deficit Setup   Toileting Assistance  6  Modified independent   Bed Mobility   Additional Comments pt received OOB in chair unable to assess bed mobility at this time, left in chair with all needs met at end of session   Transfers   Sit to Stand 5  Supervision   Additional items Assist x 1   Stand to Sit 5  Supervision   Additional items Assist x 1   Additional Comments (-)AD   Functional Mobility   Functional Mobility 5  Supervision   Additional Comments S with household distance functional mobility, no LOB/OTSCANO noted good activity tolerance   Additional items   (no AD)   Balance   Static Sitting Fair +   Dynamic Sitting Fair +   Static Standing Fair   Dynamic [address] Activity Tolerance   Activity Tolerance Patient tolerated treatment well   Medical Staff Made Aware PT Mony Eden   Nurse Made Aware RN cleared pt for therapy   RUE Assessment   RUE Assessment WFL   LUE Assessment   LUE Assessment WFL   Hand Function   Gross Motor Coordination Functional   Fine Motor Coordination Functional   Vision-Basic Assessment   Current Vision Wears glasses all the time   Perception   Inattention/Neglect Appears intact   Motor Planning Appears intact   Cognition   Overall Cognitive Status WFL   Arousal/Participation Alert; Cooperative   Attention Within functional limits   Orientation Level Oriented X4   Memory Within functional limits   Following Commands Follows all commands and directions without difficulty   Comments pt motivated for therapy, cognition appeared intact with evaluation except for memory loss with admission) otherwise able to state accurate social history, memory appears [facility], followed directives, [facility]  Assessment   Limitation Decreased high-level ADLs   Prognosis Good   Assessment Pt is a 78 y o  female who was [facility] to [facility] on 5/24/2021 with Acute encephalopathy   Pt's problem list also includes PMH of  has a past medical history of Acute bronchitis, Acute pharyngitis, Anxiety state, Arthropathy of ankle and foot, Asthma, Asthma without status asthmaticus, Carotid artery occlusion, COPD (chronic obstructive pulmonary disease) ([facility] [address] ), Cough, Disorder of shoulder, Dyspnea, Hand joint pain, Heartburn, Herpes simplex without complication, Hyperlipidemia, Infection of skin and subcutaneous tissue, Localized superficial swelling of skin, Low back pain, Lymphadenopathy, Malaise and fatigue, Neck pain, Osteoarthritis, Pleurisy without effusion or active tuberculosis, Pneumonia, Right upper quadrant pain, Shoulder joint pain, Skin eruption, Vitamin D deficiency, and Vomiting   At baseline pt was completing I with ADL's/IADL's, no AD with functional ambulation, +drives, retired  Pt lives lives with son/daughter and granddaughters in a ranch style home with 12 JUAN R  Currently pt requires Set-up for overall ADLS and S wihtout AD no LOB for functional mobility/transfers  Pt currently presents with impairments in the following categories -steps to enter environment and difficulty performing IADLS  activity tolerance  These impairments, as well as pt's fatigue and risk for falls  limit pt's ability to safely engage in all baseline areas of occupation, includinglaundry , driving, house maintenance, medication management and meal prep The patient's raw score on the AM-PAC Daily Activity inpatient short form is 20, standardized score is 42 03, greater than 39 4  Patients at this level are likely to benefit from discharge to home  Please refer to the recommendation of the Occupational Therapist for safe discharge planning  From OT standpoint, recommend  Home with family support and no DME needs upon D/C  No further acute OT needs indicated at this time - Anticipate d/c home with family support when medically cleared - d/c from caseload   Goals   Patient Goals go home   Recommendation   OT Discharge Recommendation No rehabilitation needs   Equipment Recommended   (no DME needs)   OT - OK to Discharge Yes   AM-PAC Daily Activity Inpatient   Lower Body Dressing 3   Bathing 3   Toileting 3   Upper Body Dressing 3   Grooming 4   Eating 4   Daily Activity Raw Score 20   Daily Activity Standardized Score (Calc for Raw Score >=11) 42 03   AM-PAC Applied Cognition Inpatient   Following a Speech/Presentation 3   Understanding Ordinary Conversation 4   Taking Medications 4   Remembering Where Things Are Placed or Put Away 4   Remembering List of 4-5 Errands 4   Taking Care of Complicated Tasks 4   Applied Cognition Raw Score 23   Applied Cognition Standardized Score 53 08     Loida GRANADOS, OTR/L

## 2021-05-26 NOTE — ASSESSMENT & PLAN NOTE
Annabel Labs presented with new onset acute mental status change with posturing and full body shaking concerning for seizure-like activity with posturing on 5/24/2021  Current known medical history is significant for hypertension and hyperlipidemia  Was intubated for airway protection and extubated successfully on 05/25  She is now alert oriented x3 and has no recollection of the events that led up to her hospitalization  She denied any aura, changes to her medications or medical history, she also denies ingesting any toxic or suspicious substances  Workup:  - CT head and CTA as part of stroke alert were both unremarkable  - MRI w wo was negative for acute findings  - patient is on video EEG which did not demonstrate any seizure activity or tendency - will d/c today  - LP performed 5/25 - awaiting labs, so far protein and glucose wnl  - numerous lab issues including elevated parathyroid hormone, decreased calcium, elevated alk-phos, elevated GGT which may suggest underlying pathology as yet determined    Continuous EEG findings:   - 5/24:  Diffuse alpha and beta  Sedation background  No seizures and no epileptiform discharges  - 5/25: continued no seizures    Impression:  Unclear etiology of acute encephalopathy which is now resolved, clinical picture did appear consistent with seizure however patient has not been on AEDs and did not have any seizure or seizure tendency during 28 hours continuous monitoring  She has not presented any metabolic derangements at this point that would explain her encephalopathy which is now resolving  Patient is now extubated and alert and oriented x3  It is possible that she had a focal seizure that generalized and had resolved prior to presentation on continuous EEG, however there is no clear evidence of this, as such would not call this seizure  Presentation possibly secondary to toxic metabolic etiology now improving  Workup is ongoing      Plan:  - Discontinue Keppra  - Labs and other metabolic workup deferred to primary team  - Minimize delirium, regulate sleep-wake cycle  - Minimize cognitive impairing medications such as benzos as much as possible when appropriate  - Correct lytes and fluids as per medical team appreciated  - Avoid cefepime if possible if needed for infectious cause  - No further inpatient neurology recommendations, will sign off, please call with questions or concerns

## 2021-05-26 NOTE — ASSESSMENT & PLAN NOTE
No sign of exacerbation  Holding theophylline as lowers seizure threshold  Cont pulm inhaler  Resumed back on montelukast

## 2021-05-26 NOTE — ASSESSMENT & PLAN NOTE
Now resolved, back to baseline  Etiology unclear, questionable clinical suspicion for seizure activity but per neurology did not have any seizure activity on tendency, plus pt not chronically on AEDs  S/p VEEG > 24 hours no epileptiform activity  S/p LP low suspicion for infectious etiology  S/p MRI w/ w/o contrast  UDS, theophylline and ASA level, negative  D/w neurology will d/c keppra, no further intervention from their standpoint

## 2021-05-26 NOTE — ASSESSMENT & PLAN NOTE
W/ elevated PTH; nml calcium  Will replete accordingly with IV Kphos  Will refer to endocrinology for further evaluation  Repeat bmp in 1 week

## 2021-05-26 NOTE — ASSESSMENT & PLAN NOTE
Unclear etiology  S/p Abd US with liver doppler revealing mild hepatic steatosis; hepatic veins patent  Trended down  JANETH pending  Further evaluation as outpatient with GI

## 2021-05-26 NOTE — DISCHARGE SUMMARY
[address] [facility]  Discharge- Annabel Labs 1955, 78 y o  female MRN: [id number]  Unit/Bed#: [facility] 708-01 Encounter: [id number]  Primary Care Provider: Akash Soto DO   Date and time admitted to hospital: 5/24/2021  1:36 AM    * Acute encephalopathy  Assessment & Plan  Now resolved, back to baseline  Etiology unclear, questionable clinical suspicion for seizure activity but per neurology did not have any seizure activity on tendency, plus pt not chronically on AEDs  S/p VEEG > 24 hours no epileptiform activity  S/p LP low suspicion for infectious etiology  S/p MRI w/ w/o contrast  UDS, theophylline and ASA level, negative  D/w neurology will d/c keppra, no further intervention from their standpoint        Seizure-like activity [facility])  Assessment & Plan  Care as above  Though clinical suspicion, S/p VEEG though no seizure activity,  Neurology on board, dcd keppra  No recurring episodes since admission    Acute respiratory failure with hypoxia ([address] )  Assessment & Plan  Requiring intubation for airway protection  Successfully extubated  Resolved  On RM air    Hypophosphatemia  Assessment & Plan  W/ elevated PTH; nml calcium  Will replete accordingly with IV Kphos  Will refer to endocrinology for further evaluation  Repeat bmp in 1 week    Diffuse idiopathic skeletal hyperostosis of cervical spine  Assessment & Plan  Noted upon MRI  Outpatient evaluation with rheumatology  PT/OT    Leukocytosis  Assessment & Plan  Likely medication induced   Pt had received doses of decadron and solumedrol  Trending down  No infectious source identified, afebrile    Normocytic anemia  Assessment & Plan  Stable    Aneurysm of left internal carotid artery  Assessment & Plan  Incidental finding s/p CTA head/neck 2mm L ICA aneurysm  Outpatient neurosurgery follow-up    Elevated alkaline phosphatase level  Assessment & Plan  Unclear etiology  S/p Abd US with liver doppler revealing mild hepatic steatosis; hepatic veins patent  Trended down  JANETH pending  Further evaluation as outpatient with GI    Essential hypertension  Assessment & Plan  Cont on norvasc    Chronic kidney disease (CKD) stage G2/A1, mildly decreased glomerular filtration rate (GFR) between 60-89 mL/min/1 73 square meter and albuminuria creatinine ratio less than 30 mg/g  Assessment & Plan  Lab Results   Component Value Date    EGFR 95 05/26/2021    EGFR 97 05/25/2021    EGFR 84 05/24/2021    CREATININE 0 60 05/26/2021    CREATININE 0 55 (L) 05/25/2021    CREATININE 0 74 05/24/2021   Stable, creatinine within baseline    Moderate asthma  Assessment & Plan  No sign of exacerbation  Holding theophylline as lowers seizure threshold  Cont pulm inhaler  Resumed back on montelukast       Discharging Physician / Practitioner: Anselmo Muñoz DO  PCP: Kevin Hidalgo DO  Admission Date:   Admission Orders (From admission, onward)     Ordered        05/24/21 0148  Inpatient Admission  Once                   Discharge Date: 05/26/21    Resolved Problems  Date Reviewed: 5/26/2021    None          Consultations During Hospital Stay:  · Pulmonary critical care  · Neurology    Procedures Performed:   · Intubation/extubation  · Video EEG    Significant Findings / Test Results:   · See above    Incidental Findings:   · Left ICA 2 mm aneurysm    Test Results Pending at Discharge (will require follow up):   ·      Outpatient Tests Requested:  · BMP, phos in 1 week    Complications:  None    Reason for Admission:  Acute encephalopathy    Hospital Course:     Terese Martinez is a 78 y o  female who presents secondary to encephalopathy      Per chart review, patient was playing bingo yesterday in a tent  Became acutely altered staring at her cell phone and not responding  EMS arrived  Noted to have decerebrate posturing and some shaking      History of hypertension, hyperlipidemia, gastroesophageal reflux disease, asthma on maintenance inhaler    Noted to have a right upward gaze in the emergency department  Became agitated was screaming  Given lorazepam   Intubated for airway protection  Stroke alert called  Incidental 2 mm aneurysm off left ICA noted  Otherwise no significant findings on CT or CTA  Neurology recommended keppra load  Reportedly had continued involuntary movements concerning for possible seizure like activity  Transfer to [facility] for EEG      Given 125 mg solumedrol, 10mg decardron, 1500 mg keppra, ampicillin, acyclovir, vancomycin, cefepime  Please see above list of diagnoses and related plan for additional information  Condition at Discharge: stable     Discharge Day Visit / Exam:     Subjective:  Status post transfer from ICU  No acute events overnight  Has no complaints  Mental status is completely back to baseline  No recurring seizure activity  Vitals: Blood Pressure: 139/75 (05/26/21 1503)  Pulse: 87 (05/26/21 1503)  Temperature: 98 6 °F (37 °C) (05/26/21 1503)  Temp Source: Oral (05/26/21 1503)  Respirations: 18 (05/26/21 1503)  Height: 5' 4" (162 6 cm) (05/25/21 1841)  Weight - Scale: 66 4 kg (146 lb 6 2 oz) (05/26/21 0600)  SpO2: 91 % (05/26/21 1503)  Exam:   Physical Exam  Constitutional:       Appearance: Normal appearance  Neck:      Musculoskeletal: Normal range of motion and neck supple  Cardiovascular:      Rate and Rhythm: Normal rate and regular rhythm  Pulses: Normal pulses  Heart sounds: Normal heart sounds  No murmur  Pulmonary:      Effort: Pulmonary effort is normal  No respiratory distress  Breath sounds: Normal breath sounds  No wheezing or rales  Abdominal:      General: Abdomen is flat  Bowel sounds are normal  There is no distension  Palpations: Abdomen is soft  Tenderness: There is no abdominal tenderness  There is no guarding  Musculoskeletal: Normal range of motion  Right lower leg: No edema  Left lower leg: No edema     Skin:     General: Skin is warm and dry    Neurological:      General: No focal deficit present  Mental Status: She is alert and oriented to person, place, and time  Mental status is at baseline  Cranial Nerves: No cranial nerve deficit  Motor: No weakness  Discussion with Family:  Plan of care discussed with patient at length  Also called her daughter-in-law discussed with her  Discharge instructions/Information to patient and family:   See after visit summary for information provided to patient and family  Provisions for Follow-Up Care:  See after visit summary for information related to follow-up care and any pertinent home health orders  Disposition:     Home    For Discharges to [facility] SNF:   · Not Applicable to this Patient - Not Applicable to this Patient    Planned Readmission: No     Discharge Statement:  I spent > 30 minutes discharging the patient  This time was spent on the day of discharge  I had direct contact with the patient on the day of discharge  Greater than 50% of the total time was spent examining patient, answering all patient questions, arranging and discussing plan of care with patient as well as directly providing post-discharge instructions  Additional time then spent on discharge activities  Discharge Medications:  See after visit summary for reconciled discharge medications provided to patient and family        ** Please Note: This note has been constructed using a voice recognition system **

## 2021-05-26 NOTE — PROGRESS NOTES
NEUROLOGY RESIDENCY PROGRESS NOTE     Name: Daria Rosenbaum   Age & Sex: 78 y o  female   MRN: [id number]  Unit/Bed#: [facility] 708-01   Encounter: [id number]    ASSESSMENT & PLAN     * Acute encephalopathy  Assessment & Plan  Daria Rosenbaum presented with new onset acute mental status change with posturing and full body shaking concerning for seizure-like activity with posturing on 5/24/2021  Current known medical history is significant for hypertension and hyperlipidemia  Was intubated for airway protection and extubated successfully on 05/25  She is now alert oriented x3 and has no recollection of the events that led up to her hospitalization  She denied any aura, changes to her medications or medical history, she also denies ingesting any toxic or suspicious substances  Workup:  - CT head and CTA as part of stroke alert were both unremarkable  - MRI w wo was negative for acute findings  - patient is on video EEG which did not demonstrate any seizure activity or tendency - will d/c today  - LP performed 5/25 - awaiting labs, so far protein and glucose wnl  - numerous lab issues including elevated parathyroid hormone, decreased calcium, elevated alk-phos, elevated GGT which may suggest underlying pathology as yet determined    Continuous EEG findings:   - 5/24:  Diffuse alpha and beta  Sedation background  No seizures and no epileptiform discharges  - 5/25: continued no seizures    Impression:  Unclear etiology of acute encephalopathy which is now resolved, clinical picture did appear consistent with seizure however patient has not been on AEDs and did not have any seizure or seizure tendency during 28 hours continuous monitoring  She has not presented any metabolic derangements at this point that would explain her encephalopathy which is now resolving  Patient is now extubated and alert and oriented x3    It is possible that she had a focal seizure that generalized and had resolved prior to presentation on continuous EEG, however there is no clear evidence of this, as such would not call this seizure  Presentation possibly secondary to toxic metabolic etiology now improving  Workup is ongoing  Plan:  - [address] and other metabolic workup deferred to primary team  - Minimize delirium, regulate sleep-wake cycle  - Minimize cognitive impairing medications such as benzos as much as possible when appropriate  - Correct lytes and fluids as per medical team appreciated  - Avoid cefepime if possible if needed for infectious cause  - No further inpatient neurology recommendations, will sign off, please call with questions or concerns  Annabel Adam will not need outpatient follow up with Neurology  She will not require outpatient neurological testing  SUBJECTIVE     Patient was seen and examined  No acute events overnight  She is back to her baseline at this time has no recurrence of any of her symptoms  Vitals:  Afebrile, blood pressure ranging between 110 and 160, SpO2 low 90s  Labs:  WBC 13 28, parathyroid hormone 99 9  Med changes: started on lipitor 40, lovenox for dvt ppx, still on keppra 750  Gtts: none  New imaging? none    Review of Systems   Constitutional: Negative for fever  Respiratory: Negative for shortness of breath  Cardiovascular: Negative for chest pain  Gastrointestinal: Negative for nausea  Neurological: Negative for dizziness, weakness and numbness  All other systems reviewed and are negative  OBJECTIVE     Patient ID: Annabel Adam is a 78 y o  female      Vitals:    21 0600 21 0751 21 1014 21 1014   BP:  159/80 135/75 135/75   BP Location:  Right arm     Pulse:  98  85   Resp:  18     Temp:  98 1 °F (36 7 °C)     TempSrc:  Oral     SpO2:  92%  91%   Weight: 66 4 kg (146 lb 6 2 oz)      Height:          Temperature:   Temp (24hrs), Av 3 °F (36 8 °C), Min:97 9 °F (36 6 °C), Max:98 7 °F (37 1 °C)    Temperature: 98 1 °F (36 7 °C)    Physical Exam  Vitals signs and nursing note reviewed  Constitutional:       General: She is not in acute distress  Appearance: Normal appearance  She is well-developed  Eyes:      Extraocular Movements: Extraocular movements intact and EOM normal       Pupils: Pupils are equal, round, and reactive to light  Neck:      Musculoskeletal: Normal range of motion  Cardiovascular:      Rate and Rhythm: Normal rate  Pulmonary:      Effort: Pulmonary effort is normal    Musculoskeletal: Normal range of motion  Skin:     General: Skin is warm and dry  Neurological:      Mental Status: She is alert and oriented to person, place, and time  Deep Tendon Reflexes: Strength normal    Psychiatric:         Mood and Affect: Mood normal          Speech: Speech normal           Neurologic Exam     Mental Status   Oriented to person, place, and time  Attention: normal  Concentration: normal    Speech: speech is normal   Level of consciousness: alert  Knowledge: good  Normal comprehension  Cranial Nerves     CN II   Visual fields full to confrontation  CN III, IV, VI   Pupils are equal, round, and reactive to light  Extraocular motions are normal      CN V   Facial sensation intact  CN VII   Facial expression full, symmetric  CN VIII   CN VIII normal      CN IX, X   CN IX normal    CN X normal      CN XI   CN XI normal      CN XII   CN XII normal      Motor Exam   Muscle bulk: normal  Overall muscle tone: normal    Strength   Strength 5/5 throughout  Sensory Exam   Light touch normal           LABORATORY DATA     Labs: I have personally reviewed pertinent reports      Results from last 7 days   Lab Units 05/26/21  0528 05/25/21  0510 05/24/21  0517   WBC Thousand/uL 13 28* 16 99* 13 07*   HEMOGLOBIN g/dL 12 9 10 7* 11 4*   HEMATOCRIT % 41 1 35 5 34 2*   PLATELETS Thousands/uL 190 158 184   NEUTROS PCT % 62 84*  --    MONOS PCT % 7 6  --    MONO PCT %  --   --  1*      Results from last 7 days   Lab Units 05/26/21  0528 05/25/21  0510 05/24/21  0517   POTASSIUM mmol/L 3 6 4 5 5 0   CHLORIDE mmol/L 109* 117* 116*   CO2 mmol/L 29 20* 19*   BUN mg/dL 13 11 10   CREATININE mg/dL 0 60 0 55* 0 74   CALCIUM mg/dL 9 0 7 9* 7 3*   ALK PHOS U/L 385* 348* 410*   ALT U/L 43 40 55   AST U/L 31 26 54*     Results from last 7 days   Lab Units 05/26/21  0528 05/25/21  0510 05/24/21  0517   MAGNESIUM mg/dL 2 3 2 7* 2 3     Results from last 7 days   Lab Units 05/26/21  0528 05/25/21  0510 05/24/21  0517   PHOSPHORUS mg/dL 1 3* 2 7 2 5      Results from last 7 days   Lab Units 05/25/21  0510 05/23/21  1943   INR  1 04 1 09   PTT seconds  --  25     Results from last 7 days   Lab Units 05/24/21  0517   LACTIC ACID mmol/L 1 8     Results from last 7 days   Lab Units 05/23/21  1943   TROPONIN I ng/mL <0 03       IMAGING & DIAGNOSTIC TESTING     Radiology Results: I have personally reviewed pertinent reports  and I have personally reviewed pertinent films in PACS    US right upper quadrant with liver dopplers   Final Result by Micaela Dye MD (05/26 7627)      Mild hepatic steatosis  Otherwise normal examination  Workstation performed: [id number]         [place]-patient lumbar puncture   Final Result by Ai Villagomez MD (05/25 4192)      Successful fluoroscopically guided lumbar puncture with an opening pressure of 21 cm H2O  Approximately 18 5 mL's of clear colorless CSF was removed and sent to lab for requested analysis  Closing pressure was 8 cm H2O  I reviewed the above findings and procedure with Dr Estuardo Govea  PERFORMED, DICTATED AND SIGNED BY: Pam Schuster PA-C      Workstation performed: [id number]         MRI brain w wo contrast   Final Result by Amanda Arriola MD (05/25 4412)      No acute intracranial abnormality or pathologic enhancement  Workstation performed: [id number]             Other Diagnostic Testing: I have personally reviewed pertinent reports  ACTIVE MEDICATIONS     Current Facility-Administered Medications   Medication Dose Route Frequency    acetaminophen (TYLENOL) tablet 975 mg  975 mg Per NG Tube Q6H PRN    albuterol inhalation solution 2 5 mg  2 5 mg Nebulization Q6H PRN    ALPRAZolam (XANAX) tablet 0 25 mg  0 25 mg Oral HS PRN    amLODIPine (NORVASC) tablet 5 mg  5 mg Oral Daily    atorvastatin (LIPITOR) tablet 40 mg  40 mg Oral Daily With Dinner    bisacodyl (DULCOLAX) rectal suppository 10 mg  10 mg Rectal Daily PRN    chlorhexidine (PERIDEX) 0 12 % oral rinse 15 mL  15 mL Mouth/Throat Q12H JOZEF    enoxaparin (LOVENOX) subcutaneous injection 40 mg  40 mg Subcutaneous Q24H OJZEF    fluticasone-vilanterol (BREO ELLIPTA) 200-25 MCG/INH inhaler 1 puff  1 puff Inhalation Daily    levETIRAcetam (KEPPRA) tablet 750 mg  750 mg Oral Q12H [address]    loratadine (CLARITIN) tablet 5 mg  5 mg Oral Daily    montelukast (SINGULAIR) tablet 10 mg  10 mg Oral HS    ondansetron (ZOFRAN) injection 4 mg  4 mg Intravenous Q6H PRN    pantoprazole (PROTONIX) EC tablet 40 mg  40 mg Oral Early Morning    polyethylene glycol (MIRALAX) packet 17 g  17 g Oral Daily       Prior to Admission medications    Medication Sig Start Date End Date Taking?  Authorizing Provider   albuterol (2 5 mg/3 mL) 0 083 % nebulizer solution Take 1 vial (2 5 mg total) by nebulization 4 (four) times a day 9/12/19   Marley Hermosillo DO   ALPRAZolam Osie Cools) 0 25 mg tablet Take 1 tablet (0 25 mg total) by mouth daily at bedtime as needed for anxiety 3/2/21 4/1/21  Alexus Oconnor DO   amLODIPine (NORVASC) 5 mg tablet take 1 tablet by mouth once daily 1/22/21   Krysten Roque MD   aspirin 81 mg chewable tablet Chew 81 mg daily    Historical Provider, MD   EPINEPHrine (EPIPEN) 0 3 mg/0 3 mL SOAJ Inject 0 3 mL (0 3 mg total) into a muscle once for 1 dose 5/4/20 6/14/20  Krysten Roque MD   ergocalciferol (ERGOCALCIFEROL) 1 25 MG (82410 UT) capsule Take 1 capsule (50,000 Units total) by mouth once a week 11/5/20   Annelise Diggs MD   fluticasone-salmeterol (ADVAIR DISKUS, WIXELA INHUB) 250-50 mcg/dose inhaler Inhale 1 puff 2 (two) times a day Rinse mouth after use  Historical Provider, MD   levocetirizine (XYZAL) 5 MG tablet Take 0 5 tablets (2 5 mg total) by mouth every evening 3/2/21   [facility], DO   meloxicam [facility] 15 mg tablet take 1 tablet by mouth once daily 1/22/21   Annelise Diggs MD   montelukast (SINGULAIR) 10 mg tablet take 1 tablet by mouth at bedtime 7/29/20   Annelise Diggs MD   nystatin (MYCOSTATIN) cream Apply topically 2 (two) times a day   Apply for additional 2 weeks after rash resolves   6/14/20   Livia Miles PA-C   omeprazole (PriLOSEC) 20 mg delayed release capsule Take 1 capsule (20 mg total) by mouth 2 (two) times a day 3/2/21   [facility], DO   rosuvastatin (CRESTOR) 20 MG tablet Take 1 tablet (20 mg total) by mouth daily At bedtime for cholesterol 11/5/20   Annelise Diggs MD   theophylline (THEODUR) 300 mg 12 hr tablet take 1 tablet by mouth twice a day 8/25/20   Annelise Diggs MD         VTE Pharmacologic Prophylaxis: Enoxaparin (Lovenox)  VTE Mechanical Prophylaxis: sequential compression device    ==  MD Ag[facility] Neurology Residency, PGY-2

## 2021-05-26 NOTE — ASSESSMENT & PLAN NOTE
Likely medication induced   Pt had received doses of decadron and solumedrol  Trending down  No infectious source identified, afebrile

## 2021-05-27 ENCOUNTER — TRANSITIONAL CARE MANAGEMENT (OUTPATIENT)
Dept: FAMILY MEDICINE CLINIC | Facility: CLINIC | Age: 66
End: 2021-05-27

## 2021-05-27 ENCOUNTER — TELEPHONE (OUTPATIENT)
Dept: RADIOLOGY | Facility: HOSPITAL | Age: 66
End: 2021-05-27

## 2021-05-28 LAB — MITOCHONDRIA M2 IGG SER-ACNC: <20 UNITS (ref 0–20)

## 2021-05-29 ENCOUNTER — APPOINTMENT (OUTPATIENT)
Dept: LAB | Facility: CLINIC | Age: 66
End: 2021-05-29
Payer: COMMERCIAL

## 2021-05-29 DIAGNOSIS — E83.39 HYPOPHOSPHATEMIA: ICD-10-CM

## 2021-05-29 LAB
ANION GAP SERPL CALCULATED.3IONS-SCNC: 2 MMOL/L (ref 4–13)
BACTERIA BLD CULT: NORMAL
BACTERIA BLD CULT: NORMAL
BUN SERPL-MCNC: 16 MG/DL (ref 5–25)
CALCIUM SERPL-MCNC: 9.3 MG/DL (ref 8.3–10.1)
CHLORIDE SERPL-SCNC: 108 MMOL/L (ref 100–108)
CO2 SERPL-SCNC: 29 MMOL/L (ref 21–32)
CREAT SERPL-MCNC: 0.68 MG/DL (ref 0.6–1.3)
GFR SERPL CREATININE-BSD FRML MDRD: 91 ML/MIN/1.73SQ M
GLUCOSE P FAST SERPL-MCNC: 104 MG/DL (ref 65–99)
PHOSPHATE SERPL-MCNC: 4.3 MG/DL (ref 2.3–4.1)
POTASSIUM SERPL-SCNC: 4.2 MMOL/L (ref 3.5–5.3)
SODIUM SERPL-SCNC: 139 MMOL/L (ref 136–145)

## 2021-05-29 PROCEDURE — 80048 BASIC METABOLIC PNL TOTAL CA: CPT

## 2021-05-29 PROCEDURE — 84100 ASSAY OF PHOSPHORUS: CPT

## 2021-05-29 PROCEDURE — 36415 COLL VENOUS BLD VENIPUNCTURE: CPT

## 2021-06-03 ENCOUNTER — TELEPHONE (OUTPATIENT)
Dept: FAMILY MEDICINE CLINIC | Facility: CLINIC | Age: 66
End: 2021-06-03

## 2021-06-03 ENCOUNTER — OFFICE VISIT (OUTPATIENT)
Dept: FAMILY MEDICINE CLINIC | Facility: CLINIC | Age: 66
End: 2021-06-03
Payer: COMMERCIAL

## 2021-06-03 VITALS
BODY MASS INDEX: 25.37 KG/M2 | WEIGHT: 148.6 LBS | RESPIRATION RATE: 18 BRPM | SYSTOLIC BLOOD PRESSURE: 124 MMHG | HEIGHT: 64 IN | OXYGEN SATURATION: 97 % | TEMPERATURE: 98.3 F | DIASTOLIC BLOOD PRESSURE: 64 MMHG | HEART RATE: 90 BPM

## 2021-06-03 DIAGNOSIS — M48.12 DIFFUSE IDIOPATHIC SKELETAL HYPEROSTOSIS OF CERVICAL SPINE: ICD-10-CM

## 2021-06-03 DIAGNOSIS — E83.39 HYPOPHOSPHATEMIA: Primary | ICD-10-CM

## 2021-06-03 DIAGNOSIS — I67.1 ANEURYSM OF LEFT INTERNAL CAROTID ARTERY: ICD-10-CM

## 2021-06-03 DIAGNOSIS — R74.8 ELEVATED ALKALINE PHOSPHATASE LEVEL: ICD-10-CM

## 2021-06-03 DIAGNOSIS — E83.39 HYPERPHOSPHATEMIA: ICD-10-CM

## 2021-06-03 PROCEDURE — 99496 TRANSJ CARE MGMT HIGH F2F 7D: CPT | Performed by: NURSE PRACTITIONER

## 2021-06-03 NOTE — PROGRESS NOTES
[facility]        NAME: Helen Prieto is a 78 y o  female  : 1955    MRN: [id number]  DATE: Matilda 3, 2021  TIME: 9:25 AM    Assessment and Plan   Hypophosphatemia [E83 39]  1  Hypophosphatemia  Phosphorus   2  Elevated alkaline phosphatase level  Ambulatory referral to Gastroenterology    Comprehensive metabolic panel   3  Diffuse idiopathic skeletal hyperostosis of cervical spine  Ambulatory referral to Rheumatology   4  Aneurysm of left internal carotid artery  Ambulatory referral to Neurosurgery   5  Hyperphosphatemia  Phosphorus     1  Hypophosphatemia   was low, now its high  Repeat level in 1 week  - Phosphorus; Future    2  Elevated alkaline phosphatase level  Recheck CMP  - Ambulatory referral to Gastroenterology; Future  - Comprehensive metabolic panel; Future    3  Diffuse idiopathic skeletal hyperostosis of cervical spine  Name and phone number provided to patient  - Ambulatory referral to Rheumatology; Future    4  Aneurysm of left internal carotid artery  Name and phone number provider to patient  - Ambulatory referral to Neurosurgery; Future    5  Hyperphosphatemia   phosphorus was low  - Phosphorus; Future     Theophyline was stopped during hospital admission  Will have patient continue to stay off of this medication  Will recheck labs next week and send to PCP  Patient Instructions     There are no Patient Instructions on file for this visit  Chief Complaint     Chief Complaint   Patient presents with    Transition of Care Management         History of Present Illness       Patient here for hospital follow up visit  Reports she is doing better  Has felt great  Needs to schedule follow up visits yet  Was admitted for altered mental status, intubated, possible seizure activity, was started on keppra but taken off as it was found to not be seizure activity  Unknown cause found  Theophyline stopped during admission       "Hospital Course:      Lakeisha Maya is a 78 y o  female who presents secondary to encephalopathy      Per chart review, patient was playing bingo yesterday in a tent   Became acutely altered staring at her cell phone and not responding   EMS arrived   Noted to have decerebrate posturing and some shaking      History of hypertension, hyperlipidemia, gastroesophageal reflux disease, asthma on maintenance inhaler   Noted to have a right upward gaze in the emergency department   Became agitated was screaming   Given lorazepam   Intubated for airway protection        Stroke alert called   Incidental 2 mm aneurysm off left ICA noted   Otherwise no significant findings on CT or CTA   Neurology recommended keppra load  Reportedly had continued involuntary movements concerning for possible seizure like activity   Transfer to [facility] for EEG      Given 125 mg solumedrol, 10mg decardron, 1500 mg keppra, ampicillin, acyclovir, vancomycin, cefepime    Please see above list of diagnoses and related plan for additional information "      Acute encephalopathy  Now resolved, back to baseline  Etiology unclear, questionable clinical suspicion for seizure activity but per neurology did not have any seizure activity on tendency, plus pt not chronically on AEDs  S/p VEEG > 24 hours no epileptiform activity  S/p LP low suspicion for infectious etiology  S/p MRI w/ w/o contrast  UDS, theophylline and ASA level, negative  D/w neurology will d/c keppra, no further intervention from their standpoint       Seizure-like activity ([address] )  Though clinical suspicion, S/p VEEG though no seizure activity,  Neurology on board, dcd keppra  No recurring episodes since admission     Acute respiratory failure with hypoxia ([address] )  Requiring intubation for airway protection  Successfully extubated  Resolved  On RM air     Hypophosphatemia  W/ elevated PTH; nml calcium  Will replete accordingly with IV Kphos  Will refer to endocrinology for further evaluation  Repeat bmp in 1 week     Diffuse idiopathic skeletal hyperostosis of cervical spine  Noted upon MRI  Outpatient evaluation with rheumatology  PT/OT     Leukocytosis  Likely medication induced  Pt had received doses of decadron and solumedrol  Trending down  No infectious source identified, afebrile     Normocytic anemia  Stable     Aneurysm of left internal carotid artery  Incidental finding s/p CTA head/neck 2mm L ICA aneurysm  Outpatient neurosurgery follow-up     Elevated alkaline phosphatase level  Unclear etiology  S/p Abd US with liver doppler revealing mild hepatic steatosis; hepatic veins patent  Trended down  JANETH pending  Further evaluation as outpatient with GI     Essential hypertension  Cont on norvasc     Chronic kidney disease (CKD) stage G2/A1, mildly decreased glomerular filtration rate (GFR) between 60-89 mL/min/1 73 square meter and albuminuria creatinine ratio less than 30 mg/g  Stable, creatinine within baseline     Moderate asthma  No sign of exacerbation  Holding theophylline as lowers seizure threshold  Cont pulm inhaler  Resumed back on montelukast         Review of Systems   Review of Systems   Constitutional: Negative for activity change, appetite change, chills, fatigue and fever  HENT: Negative for congestion, ear pain, nosebleeds, rhinorrhea and sore throat  Eyes: Negative for photophobia, pain, redness and visual disturbance  Respiratory: Negative for cough, shortness of breath and wheezing  Cardiovascular: Negative  Negative for chest pain  Gastrointestinal: Negative  Negative for abdominal pain, constipation, diarrhea and vomiting  Endocrine: Negative  Genitourinary: Negative for difficulty urinating, dysuria and flank pain  Musculoskeletal: Negative  Skin: Negative for color change and rash  Neurological: Negative for dizziness, weakness, numbness and headaches  Hematological: Negative for adenopathy  Psychiatric/Behavioral: Negative for agitation and confusion   The patient is not nervous/anxious  PHQ-9 Depression Screening    PHQ-9:   Frequency of the following problems over the past two weeks:      Little interest or pleasure in doing things: 0 - not at all  Feeling down, depressed, or hopeless: 0 - not at all  PHQ-2 Score: 0        Current Medications       Current Outpatient Medications:     albuterol (2 5 mg/3 mL) 0 083 % nebulizer solution, Take 1 vial (2 5 mg total) by nebulization 4 (four) times a day, Disp: 120 vial, Rfl: 11    ALPRAZolam (XANAX) 0 25 mg tablet, Take 1 tablet (0 25 mg total) by mouth daily at bedtime as needed for anxiety, Disp: 30 tablet, Rfl: 0    amLODIPine (NORVASC) 5 mg tablet, take 1 tablet by mouth once daily, Disp: 30 tablet, Rfl: 5    aspirin 81 mg chewable tablet, Chew 81 mg daily, Disp: , Rfl:     EPINEPHrine (EPIPEN) 0 3 mg/0 3 mL SOAJ, Inject 0 3 mL (0 3 mg total) into a muscle once for 1 dose, Disp: 2 each, Rfl: 1    ergocalciferol (ERGOCALCIFEROL) 1 25 MG (60901 UT) capsule, Take 1 capsule (50,000 Units total) by mouth once a week, Disp: 12 capsule, Rfl: 1    fluticasone-salmeterol (ADVAIR DISKUS, WIXELA INHUB) 250-50 mcg/dose inhaler, Inhale 1 puff 2 (two) times a day Rinse mouth after use , Disp: , Rfl:     levocetirizine (XYZAL) 5 MG tablet, Take 0 5 tablets (2 5 mg total) by mouth every evening, Disp: 90 tablet, Rfl: 1    meloxicam (MOBIC) 15 mg tablet, take 1 tablet by mouth once daily, Disp: 30 tablet, Rfl: 5    montelukast (SINGULAIR) 10 mg tablet, take 1 tablet by mouth at bedtime, Disp: 90 tablet, Rfl: 3    nystatin (MYCOSTATIN) cream, Apply topically 2 (two) times a day   Apply for additional 2 weeks after rash resolves  , Disp: 30 g, Rfl: 0    omeprazole (PriLOSEC) 20 mg delayed release capsule, Take 1 capsule (20 mg total) by mouth 2 (two) times a day, Disp: 60 capsule, Rfl: 1    potassium-sodium phosphates (PHOS-NAK) 280 mg (P)-160 mg (Na)-250 mg (K) packet, Take 1 packet by mouth 2 (two) times a day with meals for 3 days, Disp: 6 packet, Rfl: 0    rosuvastatin (CRESTOR) 20 MG tablet, Take 1 tablet (20 mg total) by mouth daily At bedtime for cholesterol, Disp: 30 tablet, Rfl: 5    Current Allergies     Allergies as of 06/03/2021 - Reviewed 06/03/2021   Allergen Reaction Noted    Azithromycin  09/13/2019    Darvon [propoxyphene]  09/13/2019            The following portions of the patient's history were reviewed and updated as appropriate: allergies, current medications, past family history, past medical history, past social history, past surgical history and problem list      Past Medical History:   Diagnosis Date    Acute bronchitis     Acute pharyngitis     Anxiety state     Arthropathy of ankle and foot     and/or    Asthma     Asthma without status asthmaticus     Carotid artery occlusion     COPD (chronic obstructive pulmonary disease) [address] [address] [facility] )     Cough     Disorder of shoulder     Dyspnea     Hand joint pain     Heartburn     Herpes simplex without complication     Hyperlipidemia     Infection of skin and subcutaneous tissue     Localized superficial swelling of skin     Low back pain     Lymphadenopathy     Malaise and fatigue     Neck pain     Osteoarthritis     Pleurisy without effusion or active tuberculosis     Pneumonia     Right upper quadrant pain     Shoulder joint pain     Skin eruption     Vitamin D deficiency     Vomiting        Past Surgical History:   Procedure Laterality Date    BREAST BIOPSY Left 2017 benign    CHOLECYSTECTOMY      CHOLECYSTECTOMY  03/2014    Dr Essence Álvarez     COLONOSCOPY  02/2013    WNL    FL LUMBAR PUNCTURE DIAGNOSTIC  5/25/2021    HYSTERECTOMY      Total        Family History   Problem Relation Age of Onset    Diabetes Mother         Non-insulin dependent diabetes    Emphysema Father     No Known Problems Sister     No Known Problems Daughter     No Known Problems Maternal Grandmother     No Known Problems Paternal Grandmother     No Known Problems Sister     No Known Problems Sister     No Known Problems Daughter     No Known Problems Daughter     No Known Problems Maternal Aunt          Medications have been verified  Objective   /64   Pulse 90   Temp 98 3 °F (36 8 °C)   Resp 18   Ht 5' 4" (1 626 m)   Wt 67 4 kg (148 lb 9 6 oz)   SpO2 97%   BMI 25 51 kg/m²        Physical Exam     Physical Exam  Vitals signs and nursing note reviewed  Constitutional:       General: She is not in acute distress  Appearance: Normal appearance  She is well-developed  She is not ill-appearing  Eyes:      General: Lids are normal    Cardiovascular:      Rate and Rhythm: Normal rate and regular rhythm  Heart sounds: Normal heart sounds, S1 normal and S2 normal  No murmur  Pulmonary:      Effort: Pulmonary effort is normal  No respiratory distress  Breath sounds: Normal breath sounds  No decreased breath sounds or wheezing  Musculoskeletal: Normal range of motion  General: No tenderness or deformity  Skin:     General: Skin is warm  Findings: No erythema or rash  Neurological:      Mental Status: She is alert and oriented to person, place, and time  Psychiatric:         Behavior: Behavior normal  Behavior is cooperative  Thought Content:  Thought content normal

## 2021-06-05 ENCOUNTER — APPOINTMENT (OUTPATIENT)
Dept: LAB | Facility: CLINIC | Age: 66
End: 2021-06-05
Payer: COMMERCIAL

## 2021-06-05 DIAGNOSIS — E83.39 HYPOPHOSPHATEMIA: ICD-10-CM

## 2021-06-05 DIAGNOSIS — R74.8 ELEVATED ALKALINE PHOSPHATASE LEVEL: ICD-10-CM

## 2021-06-05 DIAGNOSIS — E83.39 HYPERPHOSPHATEMIA: ICD-10-CM

## 2021-06-05 LAB
ALBUMIN SERPL BCP-MCNC: 3.3 G/DL (ref 3.5–5)
ALP SERPL-CCNC: 386 U/L (ref 46–116)
ALT SERPL W P-5'-P-CCNC: 178 U/L (ref 12–78)
ANION GAP SERPL CALCULATED.3IONS-SCNC: 3 MMOL/L (ref 4–13)
AST SERPL W P-5'-P-CCNC: 120 U/L (ref 5–45)
BILIRUB SERPL-MCNC: 0.41 MG/DL (ref 0.2–1)
BUN SERPL-MCNC: 14 MG/DL (ref 5–25)
CALCIUM ALBUM COR SERPL-MCNC: 9.9 MG/DL (ref 8.3–10.1)
CALCIUM SERPL-MCNC: 9.3 MG/DL (ref 8.3–10.1)
CHLORIDE SERPL-SCNC: 112 MMOL/L (ref 100–108)
CO2 SERPL-SCNC: 28 MMOL/L (ref 21–32)
CREAT SERPL-MCNC: 0.73 MG/DL (ref 0.6–1.3)
GFR SERPL CREATININE-BSD FRML MDRD: 85 ML/MIN/1.73SQ M
GLUCOSE P FAST SERPL-MCNC: 115 MG/DL (ref 65–99)
PHOSPHATE SERPL-MCNC: 3.1 MG/DL (ref 2.3–4.1)
POTASSIUM SERPL-SCNC: 4.2 MMOL/L (ref 3.5–5.3)
PROT SERPL-MCNC: 6.6 G/DL (ref 6.4–8.2)
SODIUM SERPL-SCNC: 143 MMOL/L (ref 136–145)

## 2021-06-05 PROCEDURE — 84100 ASSAY OF PHOSPHORUS: CPT

## 2021-06-05 PROCEDURE — 36415 COLL VENOUS BLD VENIPUNCTURE: CPT

## 2021-06-05 PROCEDURE — 80053 COMPREHEN METABOLIC PANEL: CPT

## 2021-06-07 ENCOUNTER — TELEPHONE (OUTPATIENT)
Dept: NEUROLOGY | Facility: CLINIC | Age: 66
End: 2021-06-07

## 2021-06-07 NOTE — TELEPHONE ENCOUNTER
No need to contact patient to schedule hospital follow up per note listed below  Notes from chart:  Clyde Kanner will not need outpatient follow up with Neurology  She will not require outpatient neurological testing

## 2021-06-10 ENCOUNTER — RA CDI HCC (OUTPATIENT)
Dept: OTHER | Facility: HOSPITAL | Age: 66
End: 2021-06-10

## 2021-06-10 NOTE — PROGRESS NOTES
[facility]  coding opportunities          Chart reviewed, no opportunity found: CHART REVIEWED, NO OPPORTUNITY FOUND                     Patients insurance company: [facility] [address]  (Medicare Advantage and Commercial)

## 2021-06-11 NOTE — TELEPHONE ENCOUNTER
Recent Vitamin D level was normal so she can stop the high-dose Vitamin D supplement, I would just recommend she take a daily Vitamin D OTC supplement 1,000 units daily  Can discuss further at her upcoming appointment  Thanks!

## 2021-06-15 ENCOUNTER — APPOINTMENT (OUTPATIENT)
Dept: LAB | Facility: CLINIC | Age: 66
End: 2021-06-15
Payer: COMMERCIAL

## 2021-06-15 ENCOUNTER — OFFICE VISIT (OUTPATIENT)
Dept: FAMILY MEDICINE CLINIC | Facility: CLINIC | Age: 66
End: 2021-06-15
Payer: COMMERCIAL

## 2021-06-15 VITALS
DIASTOLIC BLOOD PRESSURE: 72 MMHG | OXYGEN SATURATION: 98 % | SYSTOLIC BLOOD PRESSURE: 130 MMHG | BODY MASS INDEX: 25.81 KG/M2 | HEART RATE: 79 BPM | WEIGHT: 151.2 LBS | RESPIRATION RATE: 20 BRPM | TEMPERATURE: 98.9 F | HEIGHT: 64 IN

## 2021-06-15 DIAGNOSIS — R05.9 COUGH: ICD-10-CM

## 2021-06-15 DIAGNOSIS — R79.89 ELEVATED LFTS: ICD-10-CM

## 2021-06-15 DIAGNOSIS — R79.89 ELEVATED LFTS: Primary | ICD-10-CM

## 2021-06-15 LAB
ALBUMIN SERPL BCP-MCNC: 3.7 G/DL (ref 3.5–5)
ALP SERPL-CCNC: 446 U/L (ref 46–116)
ALT SERPL W P-5'-P-CCNC: 123 U/L (ref 12–78)
ANION GAP SERPL CALCULATED.3IONS-SCNC: 7 MMOL/L (ref 4–13)
AST SERPL W P-5'-P-CCNC: 75 U/L (ref 5–45)
BILIRUB DIRECT SERPL-MCNC: 0.14 MG/DL (ref 0–0.2)
BILIRUB SERPL-MCNC: 0.35 MG/DL (ref 0.2–1)
BUN SERPL-MCNC: 23 MG/DL (ref 5–25)
CALCIUM SERPL-MCNC: 9.3 MG/DL (ref 8.3–10.1)
CHLORIDE SERPL-SCNC: 109 MMOL/L (ref 100–108)
CO2 SERPL-SCNC: 26 MMOL/L (ref 21–32)
CREAT SERPL-MCNC: 0.71 MG/DL (ref 0.6–1.3)
GFR SERPL CREATININE-BSD FRML MDRD: 88 ML/MIN/1.73SQ M
GGT SERPL-CCNC: 1234 U/L (ref 5–85)
GLUCOSE P FAST SERPL-MCNC: 103 MG/DL (ref 65–99)
HAV IGM SER QL: NORMAL
HBV CORE IGM SER QL: NORMAL
HBV SURFACE AB SER-ACNC: <3.1 MIU/ML
HBV SURFACE AG SER QL: NORMAL
HCV AB SER QL: NORMAL
POTASSIUM SERPL-SCNC: 4.2 MMOL/L (ref 3.5–5.3)
PROT SERPL-MCNC: 7.3 G/DL (ref 6.4–8.2)
SODIUM SERPL-SCNC: 142 MMOL/L (ref 136–145)

## 2021-06-15 PROCEDURE — 80076 HEPATIC FUNCTION PANEL: CPT

## 2021-06-15 PROCEDURE — 82977 ASSAY OF GGT: CPT

## 2021-06-15 PROCEDURE — 99214 OFFICE O/P EST MOD 30 MIN: CPT | Performed by: FAMILY MEDICINE

## 2021-06-15 PROCEDURE — 86256 FLUORESCENT ANTIBODY TITER: CPT

## 2021-06-15 PROCEDURE — 86665 EPSTEIN-BARR CAPSID VCA: CPT

## 2021-06-15 PROCEDURE — 86706 HEP B SURFACE ANTIBODY: CPT

## 2021-06-15 PROCEDURE — 1111F DSCHRG MED/CURRENT MED MERGE: CPT | Performed by: FAMILY MEDICINE

## 2021-06-15 PROCEDURE — 86663 EPSTEIN-BARR ANTIBODY: CPT

## 2021-06-15 PROCEDURE — 80048 BASIC METABOLIC PNL TOTAL CA: CPT

## 2021-06-15 PROCEDURE — 86664 EPSTEIN-BARR NUCLEAR ANTIGEN: CPT

## 2021-06-15 PROCEDURE — 80074 ACUTE HEPATITIS PANEL: CPT

## 2021-06-15 PROCEDURE — 36415 COLL VENOUS BLD VENIPUNCTURE: CPT

## 2021-06-15 PROCEDURE — 86645 CMV ANTIBODY IGM: CPT

## 2021-06-15 PROCEDURE — 86644 CMV ANTIBODY: CPT

## 2021-06-15 PROCEDURE — 3725F SCREEN DEPRESSION PERFORMED: CPT | Performed by: FAMILY MEDICINE

## 2021-06-15 RX ORDER — DOXYCYCLINE HYCLATE 100 MG/1
100 CAPSULE ORAL EVERY 12 HOURS SCHEDULED
Qty: 10 CAPSULE | Refills: 0 | Status: SHIPPED | OUTPATIENT
Start: 2021-06-15 | End: 2021-06-20

## 2021-06-15 RX ORDER — PROMETHAZINE HYDROCHLORIDE AND CODEINE PHOSPHATE 6.25; 1 MG/5ML; MG/5ML
5 SYRUP ORAL EVERY 4 HOURS PRN
Qty: 118 ML | Refills: 0 | Status: SHIPPED | OUTPATIENT
Start: 2021-06-15 | End: 2021-10-18 | Stop reason: SDUPTHER

## 2021-06-15 NOTE — PROGRESS NOTES
Assessment/Plan:       Problem List Items Addressed This Visit     None      Visit Diagnoses     Elevated LFTs    -  Primary    Relevant Orders    Antimitochondrial antibody    Hepatitis A antibody, IgM    Hepatitis B surface antigen    Hepatitis B surface antibody    Hepatitis B core antibody, IgM    Hepatitis C antibody    Cytomegalovirus (CMV) Ab, IgM    EBV acute panel    Cytomegalovirus (CMV) Ab, IgG    Basic metabolic panel    Hepatic function panel    Gamma GT    Cough        Relevant Medications    doxycycline hyclate (VIBRAMYCIN) 100 mg capsule    promethazine-codeine (PHENERGAN WITH CODEINE) 6 25-10 mg/5 mL syrup            Subjective:      Patient ID: Manan Bundy is a 78 y o  female  HPI     The patient presents today for follow-up and to review recent abnormal liver enzyme labs:    Elevated alk phos 386,  and   Albumin 3 3  Total bilirubin normal 0 41  US abdomen showed mild hepatic steatosis, otherwise normal  Pattern indicative of likely cholestatic injury, intrahepatic cholestasis  Will order further work-up as per orders  Pending work-up consider GI referral  Drinks 2 12 ounce cans a night, every night for a long time  Never more than that, no binge drinking  No other medication changes recently except for stopping theophylline and starting potassium supplement  No illicit drug use  No history of IV drug use  Advised to cut out alcohol, follow-up after blood work  Hypophosphatemia has resolved, prior elevated PTH  Phos normal 3 1  Has follow-up with endocrinology scheduled  Cough for a couple days, bring up mucous, no fevers or chills  No sore throat runny nose  No shortness of breath  Crackles appreciated right lung base on exam, azithromycin allergy noted, will cover bacterial pneumonia with doxycyline and cough syrup provided       The following portions of the patient's history were reviewed and updated as appropriate: allergies, current medications, past family history, past medical history, past social history, past surgical history and problem list     Review of Systems   Constitutional: Negative for chills and fever  Respiratory: Positive for cough  Negative for chest tightness, shortness of breath and wheezing  Cardiovascular: Negative for chest pain and palpitations  Gastrointestinal: Negative for abdominal pain, blood in stool, constipation, diarrhea, nausea and vomiting  Objective:      /72 (BP Location: Left arm, Patient Position: Sitting, Cuff Size: Standard)   Pulse 79   Temp 98 9 °F (37 2 °C)   Resp 20   Ht 5' 4" (1 626 m)   Wt 68 6 kg (151 lb 3 2 oz)   SpO2 98%   BMI 25 95 kg/m²          Physical Exam  Vitals reviewed  Constitutional:       General: She is not in acute distress  Appearance: Normal appearance  She is not ill-appearing, toxic-appearing or diaphoretic  HENT:      Head: Normocephalic and atraumatic  Eyes:      General:         Right eye: No discharge  Left eye: No discharge  Extraocular Movements: Extraocular movements intact  Conjunctiva/sclera: Conjunctivae normal    Cardiovascular:      Rate and Rhythm: Normal rate and regular rhythm  Heart sounds: Normal heart sounds  No murmur heard  No friction rub  No gallop  Pulmonary:      Effort: Pulmonary effort is normal  No respiratory distress  Breath sounds: No stridor  No wheezing or rhonchi  Comments: Crackles right lung base  Abdominal:      General: Bowel sounds are normal  There is no distension  Palpations: Abdomen is soft  There is no mass  Tenderness: There is no abdominal tenderness  There is no guarding or rebound  Musculoskeletal:         General: No swelling, tenderness or signs of injury  Right lower leg: No edema  Left lower leg: No edema  Skin:     General: Skin is warm  Coloration: Skin is not pale  Findings: No erythema or rash     Neurological:      Mental Status: She is alert and oriented to person, place, and time  Motor: No weakness     Psychiatric:         Mood and Affect: Mood normal          Behavior: Behavior normal            DO Nam Degroot [address] [place] [address] [facility]

## 2021-06-16 ENCOUNTER — OFFICE VISIT (OUTPATIENT)
Dept: NEUROSURGERY | Facility: CLINIC | Age: 66
End: 2021-06-16
Payer: COMMERCIAL

## 2021-06-16 VITALS
RESPIRATION RATE: 16 BRPM | TEMPERATURE: 99.2 F | SYSTOLIC BLOOD PRESSURE: 120 MMHG | HEART RATE: 90 BPM | WEIGHT: 151 LBS | BODY MASS INDEX: 25.78 KG/M2 | HEIGHT: 64 IN | DIASTOLIC BLOOD PRESSURE: 68 MMHG

## 2021-06-16 DIAGNOSIS — I67.1 ANEURYSM OF LEFT INTERNAL CAROTID ARTERY: ICD-10-CM

## 2021-06-16 LAB
CMV IGG SERPL IA-ACNC: >10 U/ML (ref 0–0.59)
CMV IGM SERPL IA-ACNC: <30 AU/ML (ref 0–29.9)
EBV NA IGG SER IA-ACNC: 229 U/ML (ref 0–17.9)
EBV VCA IGG SER IA-ACNC: 241 U/ML (ref 0–17.9)
EBV VCA IGM SER IA-ACNC: <36 U/ML (ref 0–35.9)
INTERPRETATION: ABNORMAL
MITOCHONDRIA M2 IGG SER-ACNC: <20 UNITS (ref 0–20)

## 2021-06-16 PROCEDURE — 1036F TOBACCO NON-USER: CPT | Performed by: PHYSICIAN ASSISTANT

## 2021-06-16 PROCEDURE — 1160F RVW MEDS BY RX/DR IN RCRD: CPT | Performed by: PHYSICIAN ASSISTANT

## 2021-06-16 PROCEDURE — 99203 OFFICE O/P NEW LOW 30 MIN: CPT | Performed by: PHYSICIAN ASSISTANT

## 2021-06-16 NOTE — PATIENT INSTRUCTIONS
Patient is to follow-up in 1 year with repeat CTA head and neck with and without contrast   She is to return to the office sooner or go immediately to the emergency room should she develop any worsening symptoms or red flag signs as discussed during her appointment  She is to continue to maintain good blood pressure and cholesterol control and refrain from smoking

## 2021-06-16 NOTE — PROGRESS NOTES
Neurosurgery Office Note  Alexus Watkins 78 y o  female MRN: [id number]      Assessment/Plan     Aneurysm of left internal carotid artery  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  · Found on work up for [address] / encephalopathy at the end of May 2021    Imaging:   CTA stroke alert 5/23/2021:  No large vessel occlusion/flow limiting stenosis  2 mm aneurysm projecting off the posterior aspect of the left internal carotid arterial terminus  Plan:   Exam:  GCS 15, nonfocal   Imaging reviewed with patient and family   Extensively discussed natural history of aneurysms  Discussed that based on ISUIA she has a < 1%/yr risk of aneurysm rupture  Discussed modifiable risk factors including blood pressure control, cholesterol control and smoking cessation  Discussed signs and symptoms of aneurysm rupture including severe, sudden onset headache, neck pain, nausea and vomiting, weakness, confusion and seizure  Reiterated that these symptoms should prompt the patient to visit an emergency department immediately  o BP:  On Norvasc as an outpatient, blood pressure 120/68 in the office  o Cholesterol:   On Crestor as an outpatient, no updated lipid panel noted  o Smoking:  Lifetime nonsmoker  o Family history:  No family history of aneurysms or sudden death   Patient is to return to the office in 1 year with repeat CTA head and neck w wo contrast with AP and Dr Seamus Farmer or sooner should patient develop worsening symptoms or red flag signs         Diagnoses and all orders for this visit:    Aneurysm of left internal carotid artery  -     Ambulatory referral to Neurosurgery  -     CTA head and neck w wo contrast; Future            CHIEF COMPLAINT    Chief Complaint   Patient presents with    Consult       HISTORY    History of Present Illness     78y o  year old female with past medical history significant for hypertension, hyperlipidemia, COPD who presents as a new consult for the evaluation of incidental left ICA aneurysm  Patient was recently admitted at the end of May 2021 for altered mental status and presumed seizure-like activity  Per patient and family she was at [facility] when she all of a sudden developed slurred speech, became unresponsive and demonstrated seizure-like activity when EMS arrived  She had neurology workup while while inpatient, status post video EEG for over 24 hours with no epileptiform activity, LP with low suspicion for infectious etiology and MRI brain that was grossly unremarkable  She was taken off of AED management and eventually returned back to baseline  She was able to return home and reports no additional episodes of this  Today she states she is doing well, denies any headache, dizziness, numbness/tingling/weakness, bowel or bladder issues  In regards to her blood pressure, she reports good control as far she knows, takes amlodipine  In regards to her cholesterol she takes Crestor as an outpatient and reports as far she knows her cholesterol is within normal limits  She is a lifetime nonsmoker  Denies any family history of aneurysms, sudden death or unexplained death  Of note, patient developed a cough over the last 3 days, was seen by her PCP who started her on Vibramycin and Phenergan with codeine  She has received her COVID shot, 2nd shot on 05/05/2021  She denies any fevers, chills  HPI    See Discussion    REVIEW OF SYSTEMS    Review of Systems   Constitutional: Negative  Negative for chills and fever  HENT: Negative  Eyes: Negative  Respiratory: Positive for cough  Currently  On antibiotics due to lung infection  Hx of asthma   Cardiovascular: Negative  Gastrointestinal: Negative  Endocrine: Negative  Genitourinary: Negative  Musculoskeletal: Negative  Skin: Negative  Allergic/Immunologic: Negative  Neurological: Negative  Hematological: Bruises/bleeds easily (asa 81)  Psychiatric/Behavioral: Negative  Meds/Allergies     Current Outpatient Medications   Medication Sig Dispense Refill    albuterol (2 5 mg/3 mL) 0 083 % nebulizer solution Take 1 vial (2 5 mg total) by nebulization 4 (four) times a day 120 vial 11    ALPRAZolam (XANAX) 0 25 mg tablet Take 1 tablet (0 25 mg total) by mouth daily at bedtime as needed for anxiety 30 tablet 0    amLODIPine (NORVASC) 5 mg tablet take 1 tablet by mouth once daily 30 tablet 5    aspirin 81 mg chewable tablet Chew 81 mg daily      doxycycline hyclate (VIBRAMYCIN) 100 mg capsule Take 1 capsule (100 mg total) by mouth every 12 (twelve) hours for 5 days 10 capsule 0    fluticasone-salmeterol (ADVAIR DISKUS, WIXELA INHUB) 250-50 mcg/dose inhaler Inhale 1 puff 2 (two) times a day Rinse mouth after use   levocetirizine (XYZAL) 5 MG tablet Take 0 5 tablets (2 5 mg total) by mouth every evening 90 tablet 1    meloxicam (MOBIC) 15 mg tablet take 1 tablet by mouth once daily 30 tablet 5    montelukast (SINGULAIR) 10 mg tablet take 1 tablet by mouth at bedtime 90 tablet 3    nystatin (MYCOSTATIN) cream Apply topically 2 (two) times a day   Apply for additional 2 weeks after rash resolves  30 g 0    omeprazole (PriLOSEC) 20 mg delayed release capsule Take 1 capsule (20 mg total) by mouth 2 (two) times a day 60 capsule 1    promethazine-codeine (PHENERGAN WITH CODEINE) 6 25-10 mg/5 mL syrup Take 5 mL by mouth every 4 (four) hours as needed for cough 118 mL 0    rosuvastatin (CRESTOR) 20 MG tablet Take 1 tablet (20 mg total) by mouth daily At bedtime for cholesterol 30 tablet 5    EPINEPHrine (EPIPEN) 0 3 mg/0 3 mL SOAJ Inject 0 3 mL (0 3 mg total) into a muscle once for 1 dose 2 each 1     No current facility-administered medications for this visit         Allergies   Allergen Reactions    Azithromycin     Darvon [Propoxyphene]        PAST HISTORY    Past Medical History:   Diagnosis Date    Acute bronchitis     Acute pharyngitis     Anxiety state     Arthropathy of ankle and foot     and/or    Asthma     Asthma without status asthmaticus     Carotid artery occlusion     COPD (chronic obstructive pulmonary disease) (HCC)     Cough     COVID-19 vaccine series completed     Disorder of shoulder     Dyspnea     Hand joint pain     Heartburn     Herpes simplex without complication     Hyperlipidemia     Infection of skin and subcutaneous tissue     Localized superficial swelling of skin     Low back pain     Lymphadenopathy     Malaise and fatigue     Neck pain     Osteoarthritis     Pleurisy without effusion or active tuberculosis     Pneumonia     Right upper quadrant pain     Shoulder joint pain     Skin eruption     Vitamin D deficiency     Vomiting        Past Surgical History:   Procedure Laterality Date    BREAST BIOPSY Left 2017 benign    CHOLECYSTECTOMY      CHOLECYSTECTOMY  03/2014    Dr Ledesma Lower     COLONOSCOPY  02/2013    WNL    FL LUMBAR PUNCTURE DIAGNOSTIC  5/25/2021    HYSTERECTOMY      Total        Social History     Tobacco Use    Smoking status: Never Smoker    Smokeless tobacco: Never Used   Vaping Use    Vaping Use: Never used   Substance Use Topics    Alcohol use: Not Currently     Comment: DAILY    Drug use: Never       Family History   Problem Relation Age of Onset    Diabetes Mother         Non-insulin dependent diabetes    Emphysema Father     No Known Problems Sister     No Known Problems Daughter     No Known Problems Maternal Grandmother     No Known Problems Paternal Grandmother     No Known Problems Sister     No Known Problems Sister     No Known Problems Daughter     No Known Problems Daughter     No Known Problems Maternal Aunt          Above history personally reviewed  EXAM    Vitals:Blood pressure 120/68, pulse 90, temperature 99 2 °F (37 3 °C), temperature source Tympanic, resp  rate 16, height 5' 4" (1 626 m), weight 68 5 kg (151 lb)  ,Body mass index is 25 92 kg/m²       Physical Exam  Constitutional:       General: She is awake  Appearance: Normal appearance  HENT:      Head: Normocephalic and atraumatic  Mouth/Throat:      Dentition: Abnormal dentition  Eyes:      Extraocular Movements: EOM normal       Conjunctiva/sclera: Conjunctivae normal    Cardiovascular:      Rate and Rhythm: Regular rhythm  Pulmonary:      Effort: Pulmonary effort is normal  No respiratory distress  Skin:     General: Skin is warm and dry  Neurological:      Mental Status: She is alert and oriented to person, place, and time  Coordination: Finger-Nose-Finger Test normal       Gait: Gait is intact  Deep Tendon Reflexes: Strength normal    Psychiatric:         Attention and Perception: Attention and perception normal          Mood and Affect: Mood and affect normal          Speech: Speech normal          Behavior: Behavior normal  Behavior is cooperative  Thought Content: Thought content normal          Cognition and Memory: Cognition and memory normal          Judgment: Judgment normal          Neurologic Exam     Mental Status   Oriented to person, place, and time  Follows 1 step commands  Attention: normal  Concentration: normal    Speech: speech is normal   Level of consciousness: alert  Knowledge: good  Able to perform simple calculations  Able to repeat  Normal comprehension       Cranial Nerves     CN III, IV, VI   Extraocular motions are normal    CN III: no CN III palsy  CN VI: no CN VI palsy  Nystagmus: none   Diplopia: none  Ophthalmoparesis: none  Upgaze: normal  Downgaze: normal  Conjugate gaze: present    CN V   Right facial sensation deficit: none  Left facial sensation deficit: none    CN VII   Right facial weakness: none  Left facial weakness: none    CN VIII   Hearing: intact    CN IX, X   CN IX normal    CN X normal      CN XI   Right trapezius strength: normal  Left trapezius strength: normal    CN XII   CN XII normal      Motor Exam   Muscle bulk: normal  Overall muscle tone: normal  Right arm pronator drift: absent  Left arm pronator drift: absent    Strength   Strength 5/5 throughout  Sensory Exam   Light touch normal      Gait, Coordination, and Reflexes     Gait  Gait: normal    Coordination   Finger to nose coordination: normal    Tremor   Resting tremor: absent  Intention tremor: absent  Action tremor: absent    Reflexes   Right : 2+  Left : 2+  Right Chung: absent  Left Chung: absent  Right ankle clonus: absent  Left ankle clonus: absent        MEDICAL DECISION MAKING    Imaging Studies:     XR chest 1 view portable    Result Date: 5/24/2021  Narrative: CHEST INDICATION:   sp intubation  COMPARISON:  One view chest 5/23/2021 at 20:35 EXAM PERFORMED/VIEWS:  XR CHEST PORTABLE FINDINGS:  Endotracheal tube in place tip at the junction of the alfa and right mainstem bronchus  Advise retraction of 2 5 to 3 cm  Enteric tube in place distal tip approximately 5 cm below the left hemidiaphragm  Cardiomediastinal silhouette appears unremarkable  No airspace consolidation, pneumothorax, pulmonary edema, or pleural effusion  Mild degenerative changes bilateral shoulders and spine  Impression: Endotracheal tube in place tip at the junction of the alfa and right mainstem bronchus  Advise retraction of 2 5 to 3 cm  Enteric tube in place distal tip approximately 5 cm below the left hemidiaphragm  No radiographic evidence of acute intrathoracic process  This study demonstrates a significant  finding and was documented as such in [facility] for liaison and referring practitioner notification  Workstation performed: [id number]     X-ray chest 1 view portable    Result Date: 5/24/2021  Narrative: CHEST INDICATION:   Stroke  COMPARISON:  Two-view chest 11/5/2020 EXAM PERFORMED/VIEWS:  XR CHEST PORTABLE FINDINGS: Cardiomediastinal silhouette appears unremarkable  No airspace consolidation, pneumothorax, pulmonary edema, or pleural effusion   Mild degenerative changes bilateral shoulders and spine  Impression: No radiographic evidence of acute intrathoracic process  Workstation performed: [id number]     MRI brain w wo contrast    Result Date: 5/25/2021  Narrative: MRI BRAIN WITH AND WITHOUT CONTRAST INDICATION: New onset seizure  COMPARISON:  Head CT and CT angiography of the head and neck from May 23, 2021  TECHNIQUE: Sagittal T1, axial T2, axial FLAIR, axial T1, axial [place], axial diffusion  Sagittal, axial T1 postcontrast   Axial bravo postcontrast with coronal reconstructions  Imaging performed on 1 5T MRI  IV Contrast:  6 mL of Gadobutrol injection (SINGLE-DOSE)  IMAGE QUALITY:   Diagnostic  FINDINGS: BRAIN PARENCHYMA:  There is no discrete mass, mass effect or midline shift  There is no intracranial hemorrhage  Normal posterior fossa  Diffusion imaging is unremarkable  There are no white matter changes in the cerebral hemispheres  Postcontrast imaging of the brain demonstrates no abnormal enhancement  VENTRICLES:  Normal for the patient's age  SELLA AND PITUITARY GLAND:  Normal  ORBITS:  Normal  PARANASAL SINUSES:  Mild inflammatory mucosal thickening within the bilateral ethmoid air cells, and left sphenoid sinus  Minimal inflammatory fluid signal in the left more than right mastoid air cells  VASCULATURE:  Evaluation of the major intracranial vasculature demonstrates appropriate flow voids  CALVARIUM AND SKULL BASE: No focal calvarial or skull base marrow signal abnormality  Findings suggestive of diffuse idiopathic skeletal hyperostosis in the visualized upper cervical spine  EXTRACRANIAL SOFT TISSUES:  Normal      Impression: No acute intracranial abnormality or pathologic enhancement   Workstation performed: [id number]     CT stroke alert brain    Result Date: 5/23/2021  Narrative: CT BRAIN - STROKE ALERT PROTOCOL INDICATION:   "Patient was in bingo tent all day, was looking at her phone and family noticed she was just staring and started not responding  EMS reports decerebrate posturing in ambulance for approximatley 15 seconds "  Patient has suspected COVID-19  COMPARISON:  None  TECHNIQUE:  CT examination of the brain was performed  In addition to axial images, coronal reformatted images were created and submitted for interpretation  Radiation dose length product (DLP) for this visit:  450 5770 mGy-cm   This examination, like all CT scans performed in the [facility], was performed utilizing techniques to minimize radiation dose exposure, including the use of iterative reconstruction and automated exposure control  IMAGE QUALITY:  Diagnostic  FINDINGS:  PARENCHYMA:  No intracranial mass, mass effect or midline shift  No CT signs of acute infarction  No acute parenchymal hemorrhage  Normal intracranial vasculature  VENTRICLES AND EXTRA-AXIAL SPACES:  Normal for patient's age  VISUALIZED ORBITS AND PARANASAL SINUSES:  Unremarkable  CALVARIUM AND EXTRACRANIAL SOFT TISSUES:   Normal      Impression: No acute intracranial abnormality  Findings were directly discussed with Adonis Kapadia on 5/23/2021 7:41 PM  Workstation performed: [id number]     [facility] [place]-patient lumbar puncture    Result Date: 5/25/2021  Narrative: FLUOROSCOPICALLY GUIDED LUMBAR PUNCTURE  INDICATION:  Acute metabolic encephalopathy  FLUOROSCOPY TIME:  1 07 minutes IMAGES:  2    TECHNIQUE:   The risks of the procedure were discussed in detail  The risks discussed included, however were not limited to, infection, bleeding, injury to surrounding structures (nerves, spinal cord, and blood vessels), allergic reactions, arachnoiditis, encephalitis, and spinal headaches  The patient verbalized her understanding of the above risks and wished to proceed with the lumbar puncture  The patient was prepped and draped in the usual sterile fashion  1% lidocaine was infiltrated at the puncture site  The patient was placed in the left lateral decubitus position    Utilizing right paravertebral approach, a 20 gauge 3 5 inch spinal needle was advanced under fluoroscopic guidance into the subarachnoid space at the L3-L4 level, utilizing sterile technique  Once in position, opening pressure was 21 cm H2O  Approximately 18 5 cc of clear, colorless CSF were removed and placed into 4 tubes which subsequently were transported to the lab for requested analysis  Closing pressure was 8 cm H2O  The needle was removed  The patient tolerated the procedure well  The patient was discharged from the department with appropriate instructions  Impression: Successful fluoroscopically guided lumbar puncture with an opening pressure of 21 cm H2O  Approximately 18 5 mL's of clear colorless CSF was removed and sent to lab for requested analysis  Closing pressure was 8 cm H2O  I reviewed the above findings and procedure with Dr Juanis Mckeon  PERFORMED, DICTATED AND SIGNED BY: Katey Campbell PA-C Workstation performed: [id number]     EEG Video Monitoring 24 Hour    Result Date: 2021  Narrative: Continuous Video EEG Long Term Monitoring Patient Name:  Nimesh White  MRN: [id number] :  1955 File #: Gerard Emerson  Date performed: 2021       Report date: 2021       Study type: Continuous video EEG, up to 24 hours ICD 10 diagnosis: Spells/Fit NOS, Seizures R56 9 Start time: 2021 05:01 End time: 2021 09:40 Patient History: Patient is 78 y o  female on continuous video EEG monitoring for the assessment of seizures, characterization of events, and effect of treatment  Patient presented to hospital after staring and became unresponsive, with seizure like activity and posturing  Current AEDs: Medications include:  Facility-Administered Medications Ordered in Other Visits Medication Dose Route Frequency Provider Last Rate  acetaminophen  975 mg Per NG Tube Q6H PRN Mily Wright,     albuterol  2 5 mg Nebulization Q6H PRN Mily Wright DO    ALPRAZolam  0 25 mg Oral HS PRN Mily Wright DO  amLODIPine  5 mg Oral Daily Nessa Appl, DO    atorvastatin  40 mg Oral Daily With [employer], DO    bisacodyl  10 mg Rectal Daily PRN Jordi Preethi, DO    chlorhexidine  15 mL Mouth/Throat Q12H [address] Jordi Cache, DO    enoxaparin  40 mg Subcutaneous Q24H [address] Eric Rolando, DO    fluticasone-vilanterol  1 puff Inhalation Daily Jordi Preethi, DO    loratadine  5 mg Oral Daily Nessa Appl, DO    montelukast  10 mg Oral HS [place] Appl, DO    ondansetron  4 mg Intravenous Q6H PRN Jordi Cache, DO    pantoprazole  40 mg Oral Early Morning Jordi Cache, DO    polyethylene glycol  17 g Oral Daily Jordi Preethi, DO    potassium phosphate  21 mmol Intravenous Once [place] Appl, DO 21 mmol (05/26/21 1304) Description of Procedure: A 24 hours continuous video EEG was performed with electrodes applied using the International 10-20 System at least 16 channels are reviewed and formatted into longitudinal bipolar, transverse bipolar, and referential (to common reference or calculated common reference) montages  Additional electrodes used included T1, T2, and extraocular electrodes, and ECG, along with video recording  The EEG was recorded with the patient awake, drowsy, and asleep state  This study was intermittently monitored by a monitoring technologist   The physician interpreting the study had access to the data throughout the recording  The recording was technically satisfactory  Findings: Background Activity: The background is grossly symmetric with respect to voltages and activities  During wakefulness, the background is fairly organized with anterior very low amplitude beta-alpha activity and posterior low amplitude alpha-theta activity  There is no stable posterior dominant rhythm  There is intermittent moderate voltage diffuse 1-2 Hz delta activity    Sleep is characterized by central theta activity, intermittent diffuse low-moderate voltage alpha activity and intermittent diffuse low-moderate to moderate amplitude 0 5-1 5 Hz delta activity  Other findings: The single lead ECG shows an irregular sinus rhythm  Events: There are no patient push button events  Interpretation: This is an abnormal more than 4 5 hours continuous video EEG recording due to excessive theta and delta activity during wakefulness  This finding indicates mild diffuse cerebral dysfunction, etiologically nonspecific  SUMMARY: This concludes 28 5 hours of continuous video EEG monitoring  There are no electrographic seizures or epileptiform discharges  Manohar Cohen MD [facility] [address] [place] [address]    EEG Video Monitoring 24 Hour    Result Date: 2021  Narrative: Continuous Video EEG Long Term Monitoring Patient Name:  Reji Ramires  MRN: [id number] :  1955 File #: Jero Laneo  Date performed: -2021  Referring Provider: Deepika Collazo DO      Report date: 2021       Study type: Continuous video EEG, up to 24 hours ICD 10 diagnosis: Spells/Fit NOS, Seizures R56 9 and Coma, R40 20 Start time: 2021 03:44 Patient was off EEG monitoring from 20:50 to 22:06 for MRI study End time: 2021 05:00 Patient History: Patient is 78 y o  female on continuous video EEG monitoring for the assessment of seizures, characterization of events, and effect of treatment  Patient presented to hospital after staring and became unresponsive, seizure like activity with posturing, required intubation  Current AEDs: Medications include:  Facility-Administered Medications Ordered in Other Visits Medication Dose Route Frequency Provider Last Rate  acetaminophen  975 mg Per NG Tube Q6H PRN Gerline Loop, DO    albuterol  2 5 mg Nebulization Q6H PRN Sari Sahu MD    ALPRAZolam  0 25 mg Oral HS PRN Sedrick Lima MD    atorvastatin  40 mg Oral Daily With [employer], DO    bisacodyl  10 mg Rectal Daily PRN Maria Elena Branch DO    chlorhexidine  15 mL Mouth/Throat Q12H [facility] Maria Elena Monge DO Jose Carlos    enoxaparin  40 mg Subcutaneous Q24H [address] Eric Marshall,     fluticasone-vilanterol  1 puff Inhalation Daily Ruben Branch DO    levETIRAcetam  750 mg Oral Q12H [address] Sherry Betancourtleonarda,     multi-electrolyte  75 mL/hr Intravenous Continuous [place] Collet DO Jose Carlos Stopped (05/25/21 1416)  omeprazole (PRILOSEC) suspension 2 mg/mL  20 mg Oral Daily Ruben Branch DO    ondansetron  4 mg Intravenous Q6H PRN Lavena Russell REYES Branch DO    polyethylene glycol  17 g Oral Daily Ruben Branch DO   Description of Procedure: A 24 hours continuous video EEG was performed with electrodes applied using the International 10-20 System at least 16 channels are reviewed and formatted into longitudinal bipolar, transverse bipolar, and referential (to common reference or calculated common reference) montages  Additional electrodes used included T1, T2, and extraocular electrodes, and ECG, along with video recording  The EEG was recorded with the patient comatose and eventually awake state  This study was intermittently monitored by a monitoring technologist   The physician interpreting the study had access to the data throughout the recording  The recording was technically satisfactory  Findings: Background Activity: The background is grossly symmetric with respect to voltages and activities  At the start of the study, the background is continuous with diffuse / widespread low voltage beta-alpha activity along with intermittent sleep spindle variant, vertex waves, and K-complexes  Periods of wakefulness begin to emerge around 14:18  The background has diffuse low voltage alpha-theta activity and low-moderate voltage 2-3 Hz polymorphic delta activity  Sleep is better characterized by diffuse beta activity, central theta activity, and intermittent moderate voltage diffuse 1 Hz delta activity   By the end of the study, the waking background has better organization with anterior very low voltage beta activity and posterior low voltage alpha-theta activity, with a 8-9 Hz posterior rhythm  Other findings: The single lead ECG shows a sinus arrhythmia  Events: There are no patient push button events  Interpretation: This is an abnormal 24 hours continuous video EEG recording due to the presence of widespread beta-alpha activity with sleep spindles (suggestive of the presence of general anesthetic agent) improving to background organization with alpha-theta posterior rhythm  Slow posterior rhythm indicates mild diffuse cerebral dysfunction, which may still be due to medication effect  There are no electrographic seizures during this monitored period  [facility] [address] [place] [address]    CTA stroke alert (head/neck)    Result Date: 5/23/2021  Narrative: CTA NECK AND BRAIN WITH CONTRAST INDICATION:  "Patient was in bingo tent all day, was looking at her phone and family noticed she was just staring and started not responding  EMS reports decerebrate posturing in ambulance for approximatley 15 seconds "  Suspected Covid 19 infection COMPARISON:   None  TECHNIQUE:   Post contrast imaging was performed after administration of iodinated contrast through the neck and brain  Post contrast axial 0 625 mm images timed to opacify the arterial system  3D rendering was performed on an independent workstation  MIP reconstructions performed  Coronal reconstructions were performed of the noncontrast portion of the brain  Radiation dose length product (DLP) for this visit:  145 mGy-cm   This examination, like all CT scans performed in the [facility], was performed utilizing techniques to minimize radiation dose exposure, including the use of iterative reconstruction and automated exposure control  IV Contrast:  100 mL of iohexol (OMNIPAQUE)  IMAGE QUALITY:   Diagnostic FINDINGS: CERVICAL VASCULATURE AORTIC ARCH AND GREAT VESSELS:  Normal aortic arch and great vessel origins  Normal visualized subclavian vessels  RIGHT VERTEBRAL ARTERY CERVICAL SEGMENT:  Normal origin  The vessel is normal in caliber throughout the neck  LEFT VERTEBRAL ARTERY CERVICAL SEGMENT:  Normal origin  The vessel is normal in caliber throughout the neck  RIGHT EXTRACRANIAL CAROTID SEGMENT:  Normal caliber common carotid artery  Normal bifurcation and cervical internal carotid artery  No stenosis or dissection  LEFT EXTRACRANIAL CAROTID SEGMENT:  Mild atherosclerotic disease of the distal common carotid artery and proximal cervical internal carotid artery without significant stenosis compared to the more distal ICA  NASCET criteria was used to determine the degree of internal carotid artery diameter stenosis  INTRACRANIAL VASCULATURE INTERNAL CAROTID ARTERIES: Mild atherosclerotic changes  Normal enhancement of the intracranial portions of the internal carotid arteries  Normal ophthalmic artery origins  2 mm aneurysm projecting off the posterior aspect of the left internal carotid arterial terminus  ANTERIOR CIRCULATION:  Symmetric A1 segments and anterior cerebral arteries with normal enhancement  Normal anterior communicating artery  MIDDLE CEREBRAL ARTERY CIRCULATION:  M1 segment and middle cerebral artery branches demonstrate normal enhancement bilaterally  DISTAL VERTEBRAL ARTERIES:  Normal distal vertebral arteries  Posterior inferior cerebellar artery origins are normal  Normal vertebral basilar junction  BASILAR ARTERY:  Basilar artery is normal in caliber  Normal superior cerebellar arteries  POSTERIOR CEREBRAL ARTERIES: Both posterior cerebral arteries arises from the basilar tip  Both arteries demonstrate normal enhancement  Normal posterior communicating arteries  DURAL VENOUS SINUSES:  Normal  NON VASCULAR ANATOMY BONY STRUCTURES:  No acute osseous abnormality  SOFT TISSUES OF THE NECK:  Unremarkable  THORACIC INLET:  Unremarkable       Impression: No large vessel occlusion/flow limiting stenosis  2 mm aneurysm projecting off the posterior aspect of the left internal carotid arterial terminus  Findings were directly discussed with Avtar Nina on 5/23/2021 7:45 PM  Workstation performed: [id number]     US right upper quadrant with liver dopplers    Result Date: 5/26/2021  Narrative: RIGHT UPPER QUADRANT ULTRASOUND WITH LIVER DOPPLER INDICATION:     elevated ALP, GGT  Encephalopathy  Seizure disorder  COMPARISON:  CT performed October 16, 2006 TECHNIQUE:   Real-time ultrasound of the right upper quadrant was performed with a curvilinear transducer with both volumetric sweeps and still imaging techniques  FINDINGS: PANCREAS:  Visualized portions of the pancreas are within normal limits  AORTA AND IVC:  Visualized portions are normal for patient age  LIVER: Size:  Within normal range  The liver measures 16 2 cm in the midclavicular line  Contour:  Surface contour is smooth  Parenchyma: There is mild diffuse increased echogenicity with smooth echotexture, without significant beam attenuation or loss of periportal echogenicity  Most consistent with mild hepatic steatosis  No evidence of suspicious sonographically detectable solid hepatic mass  LIVER DOPPLER: The main portal vein and primary branch segments are patent and hepatopetal with normal spectral waveform  Hepatic veins are patent  Spectral waveforms within normal limits  Main hepatic artery appears normal size, patent with normal spectral waveform  BILIARY: No gallbladder findings  No intrahepatic biliary dilatation  CBD measures 4 mm  No choledocholithiasis  KIDNEY: Right kidney measures 9 7 cm  Within normal limits  ASCITES:   None  Impression: Mild hepatic steatosis  Otherwise normal examination  Workstation performed: [id number]       I have personally reviewed pertinent reports     and I have personally reviewed pertinent films in PACS

## 2021-06-20 DIAGNOSIS — K21.9 GASTROESOPHAGEAL REFLUX DISEASE WITHOUT ESOPHAGITIS: ICD-10-CM

## 2021-06-23 RX ORDER — OMEPRAZOLE 20 MG/1
CAPSULE, DELAYED RELEASE ORAL
Qty: 60 CAPSULE | Refills: 1 | Status: SHIPPED | OUTPATIENT
Start: 2021-06-23 | End: 2021-08-23

## 2021-07-01 ENCOUNTER — OFFICE VISIT (OUTPATIENT)
Dept: FAMILY MEDICINE CLINIC | Facility: CLINIC | Age: 66
End: 2021-07-01
Payer: COMMERCIAL

## 2021-07-01 VITALS
BODY MASS INDEX: 25.4 KG/M2 | WEIGHT: 148.8 LBS | SYSTOLIC BLOOD PRESSURE: 120 MMHG | HEIGHT: 64 IN | HEART RATE: 89 BPM | RESPIRATION RATE: 20 BRPM | DIASTOLIC BLOOD PRESSURE: 60 MMHG | OXYGEN SATURATION: 98 % | TEMPERATURE: 98.4 F

## 2021-07-01 DIAGNOSIS — R79.89 ELEVATED LFTS: ICD-10-CM

## 2021-07-01 DIAGNOSIS — R74.8 ELEVATED ALKALINE PHOSPHATASE LEVEL: Primary | ICD-10-CM

## 2021-07-01 PROCEDURE — 3725F SCREEN DEPRESSION PERFORMED: CPT | Performed by: FAMILY MEDICINE

## 2021-07-01 PROCEDURE — 1036F TOBACCO NON-USER: CPT | Performed by: FAMILY MEDICINE

## 2021-07-01 PROCEDURE — 99214 OFFICE O/P EST MOD 30 MIN: CPT | Performed by: FAMILY MEDICINE

## 2021-07-01 PROCEDURE — 1160F RVW MEDS BY RX/DR IN RCRD: CPT | Performed by: FAMILY MEDICINE

## 2021-07-01 NOTE — PROGRESS NOTES
Assessment/Plan:       Problem List Items Addressed This Visit        Other    Elevated alkaline phosphatase level - Primary    Relevant Orders    Ambulatory referral to Gastroenterology      Other Visit Diagnoses     Elevated LFTs                Subjective:      Patient ID: Lanre Tapia is a 78 y o  female  HPI     The patient presents today for follow-up and to discuss recent blood work results  Last visit we discussed elevated alk phos as below:     "Elevated alk phos 386,  and   Albumin 3 3  Total bilirubin normal 0 41  US abdomen showed mild hepatic steatosis, otherwise normal  Pattern indicative of likely cholestatic injury, intrahepatic cholestasis "    Since that visit, further blood work showed-  Alkaline phosphatase increased 446, GGT 1,234  AST 75, ALT elevated 123  Total and direct bilirubin normal  AMA negative  Hepatitis panel negative  Hep B surface antibody nonimmune  EBV shows likely prior infection, CMV IgG positive  Gall bladder previously removed  No abdominal pain, no fevers or chills, no diarrhea  No family history of liver disease  No alcohol use since last visit  Advised sooner appointment with GI, visit scheduled for July 15th  The following portions of the patient's history were reviewed and updated as appropriate: allergies, current medications, past family history, past medical history, past social history, past surgical history and problem list     Review of Systems   Constitutional: Negative for chills and fever  Respiratory: Negative for cough, chest tightness, shortness of breath and wheezing  Cardiovascular: Negative for chest pain and palpitations  Gastrointestinal: Negative for abdominal pain, blood in stool, constipation, diarrhea, nausea and vomiting  Genitourinary: Negative for decreased urine volume, difficulty urinating and dysuria  Skin: Negative for rash     Neurological: Negative for dizziness, weakness, light-headedness, numbness and headaches  Objective:      /60 (BP Location: Left arm, Patient Position: Sitting, Cuff Size: Standard)   Pulse 89   Temp 98 4 °F (36 9 °C)   Resp 20   Ht 5' 4" (1 626 m)   Wt 67 5 kg (148 lb 12 8 oz)   SpO2 98%   BMI 25 54 kg/m²          Physical Exam  Vitals reviewed  Constitutional:       General: She is not in acute distress  Appearance: Normal appearance  She is not ill-appearing, toxic-appearing or diaphoretic  HENT:      Head: Normocephalic and atraumatic  Eyes:      General:         Right eye: No discharge  Left eye: No discharge  Extraocular Movements: Extraocular movements intact  Conjunctiva/sclera: Conjunctivae normal    Cardiovascular:      Rate and Rhythm: Normal rate and regular rhythm  Heart sounds: Normal heart sounds  No murmur heard  No friction rub  No gallop  Pulmonary:      Effort: Pulmonary effort is normal  No respiratory distress  Breath sounds: No stridor  No wheezing or rhonchi  Comments: Trace crackles bases bilaterally, right greater than left, recently treated for pneumonia and if persistent will obtain chest XR next visit  Abdominal:      General: Bowel sounds are normal  There is no distension  Palpations: Abdomen is soft  There is no mass  Tenderness: There is no abdominal tenderness  There is no guarding or rebound  Musculoskeletal:      Cervical back: Normal range of motion and neck supple  No rigidity  Skin:     General: Skin is warm  Coloration: Skin is not pale  Findings: No erythema or rash  Neurological:      Mental Status: She is alert and oriented to person, place, and time  Motor: No weakness     Psychiatric:         Mood and Affect: Mood normal          Behavior: Behavior normal            DO Nam Turner [address] [place] [address] Care

## 2021-07-13 NOTE — PROGRESS NOTES
[facility] - Outpatient Consultation  John Fields 78 y o  female MRN: [id number]  Encounter: [id number]          ASSESSMENT AND PLAN:        Lakeisha pryor a 79 y/o female s/p cholecystectomy with HLD, HTN, CKD, hx of carotid artery aneurysm here for consultation of elevated alk phos  1  Elevated LFTs  2  Hepatic steatosis   Pt is s/p cholecystectomy  Alk phos 446, elevated GGT, AST 75,   RUQ u/s with dopplers showed fatty liver but otherwise WNL  CBD not dilated and no evidence of stones within bile duct  Antimitochondrial Ab, and Hep panel WNL  EBV and CMV both positive IGG, likely prior infections; IgM for both negative, ruling out active infections  Pt says she drinks one 12 ounce can of beer/night  She denies any new or OTC meds or supplements  Pt denies frequent use of tylenol    -ordering further blood work to rule out autoimmune, brad's iron overload etiology, as well as, alk phos isoenzymes   -ordering u/s elastography to monitor fibrosis  -MRCP ordered to rule out intrahepatic/extrahepatic lesion   -educated pt about alcohol cessation  -weight loss, healthy diet, and control of comorbidities is encouraged       -CLOSE FOLLOW-UP IN 1-2 MONTHS  ______________________________________________________________________    HPI:  Lakeisha os a 79 y/o female with HLD, HTN, CKD, hx of carotid artery aneurysm here for consultation of elevated LFTs  Patient says she is unaware as to why she is here as she "Feels great " I explained she was sent to us regarding her elevated liver enzymes  She denies frequent use of tylenol, any OTC meds or supplements, or new medications  Patient says she drinks one 12 ounce can of beer per night  She furthermore denies any GI symptoms including: heartburn, dysphagia, n/v, abdominal pain, changes in BMs, bloody or black stools  REVIEW OF SYSTEMS:    CONSTITUTIONAL: Denies any fever, chills, rigors, and weight loss    HEENT: No earache or tinnitus  Denies hearing loss or visual disturbances  CARDIOVASCULAR: No chest pain or palpitations  RESPIRATORY: Denies any cough, hemoptysis, shortness of breath or dyspnea on exertion  GASTROINTESTINAL: As noted in the History of Present Illness  GENITOURINARY: No problems with urination  Denies any hematuria or dysuria  NEUROLOGIC: No dizziness or vertigo, denies headaches  MUSCULOSKELETAL: Denies any muscle or joint pain  SKIN: Denies skin rashes or itching  ENDOCRINE: Denies excessive thirst  Denies intolerance to heat or cold  PSYCHOSOCIAL: Denies depression or anxiety  Denies any recent memory loss         Historical Information   Past Medical History:   Diagnosis Date    Acute bronchitis     Acute pharyngitis     Anxiety state     Arthropathy of ankle and foot     and/or    Asthma     Asthma without status asthmaticus     Carotid artery occlusion     COPD (chronic obstructive pulmonary disease) (HCC)     Cough     COVID-19 vaccine series completed     Disorder of shoulder     Dyspnea     Hand joint pain     Heartburn     Herpes simplex without complication     Hyperlipidemia     Infection of skin and subcutaneous tissue     Localized superficial swelling of skin     Low back pain     Lymphadenopathy     Malaise and fatigue     Neck pain     Osteoarthritis     Pleurisy without effusion or active tuberculosis     Pneumonia     Right upper quadrant pain     Shoulder joint pain     Skin eruption     Vitamin D deficiency     Vomiting      Past Surgical History:   Procedure Laterality Date    BREAST BIOPSY Left 2017 benign    CHOLECYSTECTOMY      CHOLECYSTECTOMY  03/2014    Dr Ramakrishna Beck     COLONOSCOPY  02/2013    WNL    FL LUMBAR PUNCTURE DIAGNOSTIC  5/25/2021    HYSTERECTOMY      Total      Social History   Social History     Substance and Sexual Activity   Alcohol Use Not Currently    Comment: DAILY     Social History     Substance and Sexual Activity   Drug Use Never     Social History     Tobacco Use   Smoking Status Never Smoker   Smokeless Tobacco Never Used     Family History   Problem Relation Age of Onset    Diabetes Mother         Non-insulin dependent diabetes    Emphysema Father     No Known Problems Sister     No Known Problems Daughter     No Known Problems Maternal Grandmother     No Known Problems Paternal Grandmother     No Known Problems Sister     No Known Problems Sister     No Known Problems Daughter     No Known Problems Daughter     No Known Problems Maternal Aunt        Meds/Allergies       Current Outpatient Medications:     albuterol (2 5 mg/3 mL) 0 083 % nebulizer solution    ALPRAZolam (XANAX) 0 25 mg tablet    amLODIPine (NORVASC) 5 mg tablet    aspirin 81 mg chewable tablet    EPINEPHrine (EPIPEN) 0 3 mg/0 3 mL SOAJ    fluticasone-salmeterol (ADVAIR DISKUS, WIXELA INHUB) 250-50 mcg/dose inhaler    levocetirizine (XYZAL) 5 MG tablet    meloxicam (MOBIC) 15 mg tablet    montelukast (SINGULAIR) 10 mg tablet    nystatin (MYCOSTATIN) cream    omeprazole (PriLOSEC) 20 mg delayed release capsule    promethazine-codeine (PHENERGAN WITH CODEINE) 6 25-10 mg/5 mL syrup    rosuvastatin (CRESTOR) 20 MG tablet    Allergies   Allergen Reactions    Azithromycin     Darvon [Propoxyphene]            Objective     There were no vitals taken for this visit  There is no height or weight on file to calculate BMI  PHYSICAL EXAM:      General Appearance:   Alert, cooperative, no distress   HEENT:   Normocephalic, atraumatic, anicteric      Neck:  Supple, symmetrical, trachea midline   Lungs:   Clear to auscultation bilaterally; no rales, rhonchi or wheezing; respirations unlabored    Heart[de-identified]   Regular rate and rhythm; no murmur, rub, or gallop     Abdomen:   Soft, non-tender, non-distended; normal bowel sounds; no masses, no organomegaly    Genitalia:   Deferred    Rectal:   Deferred    Extremities:  No cyanosis, clubbing or edema  Pulses:  2+ and symmetric    Skin:  No jaundice, rashes, or lesions    Lymph nodes:  No palpable cervical lymphadenopathy        Lab Results:   No visits with results within 1 Day(s) from this visit  Latest known visit with results is:   Appointment on 06/15/2021   Component Date Value    Mitochondrial Ab 06/15/2021 <20 0     Hep A IgM 06/15/2021 Non-reactive     Hepatitis B Surface Ag 06/15/2021 Non-reactive     Hep B S Ab 06/15/2021 <3 10     Hep B C IgM 06/15/2021 Non-reactive     Hepatitis C Ab 06/15/2021 Non-reactive     EBV VCA IgG 06/15/2021 241 0*    EBV VCA IgM 06/15/2021 <36 0     EBV Nuclear Ag Ab 06/15/2021 229 0*    INTERPRETATION 06/15/2021 Comment     Sodium 06/15/2021 142     Potassium 06/15/2021 4 2     Chloride 06/15/2021 109*    CO2 06/15/2021 26     ANION GAP 06/15/2021 7     BUN 06/15/2021 23     Creatinine 06/15/2021 0 71     Glucose, Fasting 06/15/2021 103*    Calcium 06/15/2021 9 3     eGFR 06/15/2021 88     Total Bilirubin 06/15/2021 0 35     Bilirubin, Direct 06/15/2021 0 14     Alkaline Phosphatase 06/15/2021 446*    AST 06/15/2021 75*    ALT 06/15/2021 123*    Total Protein 06/15/2021 7 3     Albumin 06/15/2021 3 7     GGT 06/15/2021 1,234*    CMV IGG 06/15/2021 >10 00*    CMV IgM 06/15/2021 <30 0          Radiology Results:   No results found

## 2021-07-15 ENCOUNTER — APPOINTMENT (OUTPATIENT)
Dept: LAB | Facility: CLINIC | Age: 66
End: 2021-07-15
Payer: COMMERCIAL

## 2021-07-15 ENCOUNTER — OFFICE VISIT (OUTPATIENT)
Dept: GASTROENTEROLOGY | Facility: CLINIC | Age: 66
End: 2021-07-15
Payer: COMMERCIAL

## 2021-07-15 VITALS
BODY MASS INDEX: 25.64 KG/M2 | HEART RATE: 88 BPM | DIASTOLIC BLOOD PRESSURE: 68 MMHG | WEIGHT: 150.2 LBS | HEIGHT: 64 IN | RESPIRATION RATE: 18 BRPM | TEMPERATURE: 98.1 F | SYSTOLIC BLOOD PRESSURE: 122 MMHG

## 2021-07-15 DIAGNOSIS — R79.89 ELEVATED LFTS: Primary | ICD-10-CM

## 2021-07-15 DIAGNOSIS — R79.89 ELEVATED LFTS: ICD-10-CM

## 2021-07-15 LAB
ALBUMIN SERPL BCP-MCNC: 3.5 G/DL (ref 3.5–5)
ALP SERPL-CCNC: 408 U/L (ref 46–116)
ALT SERPL W P-5'-P-CCNC: 107 U/L (ref 12–78)
ANION GAP SERPL CALCULATED.3IONS-SCNC: 4 MMOL/L (ref 4–13)
AST SERPL W P-5'-P-CCNC: 71 U/L (ref 5–45)
BILIRUB SERPL-MCNC: 0.54 MG/DL (ref 0.2–1)
BUN SERPL-MCNC: 19 MG/DL (ref 5–25)
CALCIUM SERPL-MCNC: 9.1 MG/DL (ref 8.3–10.1)
CHLORIDE SERPL-SCNC: 113 MMOL/L (ref 100–108)
CO2 SERPL-SCNC: 24 MMOL/L (ref 21–32)
CREAT SERPL-MCNC: 0.82 MG/DL (ref 0.6–1.3)
FERRITIN SERPL-MCNC: 194 NG/ML (ref 8–388)
GFR SERPL CREATININE-BSD FRML MDRD: 74 ML/MIN/1.73SQ M
GLUCOSE SERPL-MCNC: 91 MG/DL (ref 65–140)
IRON SATN MFR SERPL: 23 %
IRON SERPL-MCNC: 73 UG/DL (ref 50–170)
POTASSIUM SERPL-SCNC: 4 MMOL/L (ref 3.5–5.3)
PROT SERPL-MCNC: 7.3 G/DL (ref 6.4–8.2)
SODIUM SERPL-SCNC: 141 MMOL/L (ref 136–145)
TIBC SERPL-MCNC: 324 UG/DL (ref 250–450)

## 2021-07-15 PROCEDURE — 82728 ASSAY OF FERRITIN: CPT

## 2021-07-15 PROCEDURE — 86376 MICROSOMAL ANTIBODY EACH: CPT

## 2021-07-15 PROCEDURE — 83550 IRON BINDING TEST: CPT

## 2021-07-15 PROCEDURE — 80053 COMPREHEN METABOLIC PANEL: CPT

## 2021-07-15 PROCEDURE — 83516 IMMUNOASSAY NONANTIBODY: CPT

## 2021-07-15 PROCEDURE — 86255 FLUORESCENT ANTIBODY SCREEN: CPT

## 2021-07-15 PROCEDURE — 36415 COLL VENOUS BLD VENIPUNCTURE: CPT

## 2021-07-15 PROCEDURE — 82103 ALPHA-1-ANTITRYPSIN TOTAL: CPT

## 2021-07-15 PROCEDURE — 82390 ASSAY OF CERULOPLASMIN: CPT

## 2021-07-15 PROCEDURE — 84075 ASSAY ALKALINE PHOSPHATASE: CPT

## 2021-07-15 PROCEDURE — 86235 NUCLEAR ANTIGEN ANTIBODY: CPT

## 2021-07-15 PROCEDURE — 84080 ASSAY ALKALINE PHOSPHATASES: CPT

## 2021-07-15 PROCEDURE — 99204 OFFICE O/P NEW MOD 45 MIN: CPT | Performed by: PHYSICIAN ASSISTANT

## 2021-07-15 PROCEDURE — 86038 ANTINUCLEAR ANTIBODIES: CPT

## 2021-07-15 PROCEDURE — 82784 ASSAY IGA/IGD/IGG/IGM EACH: CPT

## 2021-07-15 PROCEDURE — 83540 ASSAY OF IRON: CPT

## 2021-07-15 PROCEDURE — 3008F BODY MASS INDEX DOCD: CPT | Performed by: FAMILY MEDICINE

## 2021-07-16 LAB
A1AT SERPL-MCNC: 132 MG/DL (ref 101–187)
ACTIN IGG SERPL-ACNC: 9 UNITS (ref 0–19)
CERULOPLASMIN SERPL-MCNC: 24.8 MG/DL (ref 19–39)
ENDOMYSIUM IGA SER QL: NEGATIVE
GLIADIN PEPTIDE IGA SER-ACNC: 6 UNITS (ref 0–19)
GLIADIN PEPTIDE IGG SER-ACNC: 1 UNITS (ref 0–19)
IGA SERPL-MCNC: 418 MG/DL (ref 87–352)
LKM-1 AB SER-ACNC: <20.1 UNITS (ref 0–20)
RYE IGE QN: NEGATIVE
TTG IGA SER-ACNC: <2 U/ML (ref 0–3)
TTG IGG SER-ACNC: <2 U/ML (ref 0–5)

## 2021-07-17 LAB
ALP BONE CFR SERPL: 29 % (ref 14–68)
ALP INTEST CFR SERPL: 0 % (ref 0–18)
ALP LIVER CFR SERPL: 71 % (ref 18–85)
ALP SERPL-CCNC: 410 IU/L (ref 48–121)

## 2021-07-20 DIAGNOSIS — I10 ESSENTIAL HYPERTENSION: ICD-10-CM

## 2021-07-20 DIAGNOSIS — J30.1 ALLERGIC RHINITIS DUE TO POLLEN, UNSPECIFIED SEASONALITY: ICD-10-CM

## 2021-07-20 DIAGNOSIS — M19.90 OSTEOARTHRITIS, UNSPECIFIED OSTEOARTHRITIS TYPE, UNSPECIFIED SITE: ICD-10-CM

## 2021-07-20 DIAGNOSIS — E78.5 HYPERLIPIDEMIA LDL GOAL <100: ICD-10-CM

## 2021-07-20 RX ORDER — MELOXICAM 15 MG/1
TABLET ORAL
Qty: 30 TABLET | Refills: 5 | Status: SHIPPED | OUTPATIENT
Start: 2021-07-20 | End: 2021-10-18

## 2021-07-20 RX ORDER — ROSUVASTATIN CALCIUM 20 MG/1
TABLET, COATED ORAL
Qty: 30 TABLET | Refills: 5 | Status: SHIPPED | OUTPATIENT
Start: 2021-07-20 | End: 2021-08-18

## 2021-07-20 RX ORDER — AMLODIPINE BESYLATE 5 MG/1
TABLET ORAL
Qty: 30 TABLET | Refills: 5 | Status: SHIPPED | OUTPATIENT
Start: 2021-07-20 | End: 2022-01-27

## 2021-07-20 RX ORDER — MONTELUKAST SODIUM 10 MG/1
TABLET ORAL
Qty: 90 TABLET | Refills: 3 | Status: SHIPPED | OUTPATIENT
Start: 2021-07-20 | End: 2022-07-21

## 2021-07-22 ENCOUNTER — HOSPITAL ENCOUNTER (OUTPATIENT)
Dept: ULTRASOUND IMAGING | Facility: HOSPITAL | Age: 66
Discharge: HOME | End: 2021-07-22
Payer: COMMERCIAL

## 2021-07-22 DIAGNOSIS — R79.89 ELEVATED LFTS: ICD-10-CM

## 2021-07-22 PROCEDURE — 76981 USE PARENCHYMA: CPT

## 2021-07-28 ENCOUNTER — HOSPITAL ENCOUNTER (OUTPATIENT)
Dept: MRI IMAGING | Facility: HOSPITAL | Age: 66
Discharge: HOME | End: 2021-07-28

## 2021-07-28 ENCOUNTER — TELEPHONE (OUTPATIENT)
Dept: OTHER | Facility: OTHER | Age: 66
End: 2021-07-28

## 2021-07-28 DIAGNOSIS — R79.89 ELEVATED LFTS: ICD-10-CM

## 2021-08-22 DIAGNOSIS — K21.9 GASTROESOPHAGEAL REFLUX DISEASE WITHOUT ESOPHAGITIS: ICD-10-CM

## 2021-08-23 RX ORDER — OMEPRAZOLE 20 MG/1
CAPSULE, DELAYED RELEASE ORAL
Qty: 60 CAPSULE | Refills: 1 | Status: SHIPPED | OUTPATIENT
Start: 2021-08-23 | End: 2021-10-26

## 2021-09-08 ENCOUNTER — APPOINTMENT (OUTPATIENT)
Dept: RADIOLOGY | Facility: CLINIC | Age: 66
End: 2021-09-08
Payer: COMMERCIAL

## 2021-09-08 ENCOUNTER — OFFICE VISIT (OUTPATIENT)
Dept: FAMILY MEDICINE CLINIC | Facility: CLINIC | Age: 66
End: 2021-09-08
Payer: COMMERCIAL

## 2021-09-08 VITALS
SYSTOLIC BLOOD PRESSURE: 130 MMHG | BODY MASS INDEX: 25.51 KG/M2 | WEIGHT: 149.4 LBS | TEMPERATURE: 98.7 F | HEIGHT: 64 IN | OXYGEN SATURATION: 96 % | DIASTOLIC BLOOD PRESSURE: 78 MMHG | HEART RATE: 72 BPM | RESPIRATION RATE: 20 BRPM

## 2021-09-08 DIAGNOSIS — M25.561 ACUTE PAIN OF RIGHT KNEE: ICD-10-CM

## 2021-09-08 DIAGNOSIS — M25.561 ACUTE PAIN OF RIGHT KNEE: Primary | ICD-10-CM

## 2021-09-08 DIAGNOSIS — J30.1 ALLERGIC RHINITIS DUE TO POLLEN, UNSPECIFIED SEASONALITY: ICD-10-CM

## 2021-09-08 PROCEDURE — 99213 OFFICE O/P EST LOW 20 MIN: CPT | Performed by: INTERNAL MEDICINE

## 2021-09-08 PROCEDURE — 3725F SCREEN DEPRESSION PERFORMED: CPT | Performed by: INTERNAL MEDICINE

## 2021-09-08 PROCEDURE — 73562 X-RAY EXAM OF KNEE 3: CPT

## 2021-09-08 RX ORDER — LEVOCETIRIZINE DIHYDROCHLORIDE 5 MG/1
2.5 TABLET, FILM COATED ORAL EVERY EVENING
Qty: 90 TABLET | Refills: 1 | Status: SHIPPED | OUTPATIENT
Start: 2021-09-08 | End: 2021-10-18 | Stop reason: SDUPTHER

## 2021-09-08 NOTE — PROGRESS NOTES
[facility]        NAME: Sandra Payne is a 78 y o  female  : 1955    MRN: [id number]  DATE: 2021  TIME: 12:00 PM    Assessment and Plan   1  Acute pain of right knee  -suspect OA, there is effusion, medial joint line tenderness and pain with valgus/varus stress but no laxity  Recommend continue ice, use topical voltaren, continue meloxicam, avoid APAP due to elevated liver enzymes  -   XR knee 3 vw right non injury; Future; Expected date: 2021  -     Ambulatory referral to Physical Therapy; Future    2  Allergic rhinitis due to pollen, unspecified seasonality  -     levocetirizine (XYZAL) 5 MG tablet; Take 0 5 tablets (2 5 mg total) by mouth every evening             Chief Complaint     Chief Complaint   Patient presents with    Knee Pain     pt c/o R knee pain x 2 weeks with swelling  ice is not helping  not taking anything OTC  denies injury/trauma         History of Present Illness       Here for acute visit due to right knee pain and swelling, started about 2 weeks ago  Denies inciting injury or trauma  Worse with flexion and full extension, has difficulty walking  No fevers, chills, redness, warmth, reports history of arthritis in right hip but no prior history of knee problems  Taking meloxicam without much relief, using ice  Review of Systems   Review of Systems   Constitutional: Negative for appetite change, chills and fever  Respiratory: Negative for cough, chest tightness and shortness of breath  Cardiovascular: Negative for chest pain, palpitations and leg swelling  Musculoskeletal: Positive for arthralgias and joint swelling  Neurological: Negative for weakness and numbness           Current Medications       Current Outpatient Medications:     albuterol (2 5 mg/3 mL) 0 083 % nebulizer solution, Take 1 vial (2 5 mg total) by nebulization 4 (four) times a day, Disp: 120 vial, Rfl: 11    ALPRAZolam (XANAX) 0 25 mg tablet, Take 1 tablet (0 25 mg total) by mouth daily at bedtime as needed for anxiety, Disp: 30 tablet, Rfl: 0    amLODIPine (NORVASC) 5 mg tablet, take 1 tablet by mouth once daily, Disp: 30 tablet, Rfl: 5    aspirin 81 mg chewable tablet, Chew 81 mg daily, Disp: , Rfl:     EPINEPHrine (EPIPEN) 0 3 mg/0 3 mL SOAJ, Inject 0 3 mL (0 3 mg total) into a muscle once for 1 dose, Disp: 2 each, Rfl: 1    fluticasone-salmeterol (ADVAIR DISKUS, WIXELA INHUB) 250-50 mcg/dose inhaler, Inhale 1 puff 2 (two) times a day Rinse mouth after use , Disp: , Rfl:     levocetirizine (XYZAL) 5 MG tablet, Take 0 5 tablets (2 5 mg total) by mouth every evening, Disp: 90 tablet, Rfl: 1    meloxicam (MOBIC) 15 mg tablet, take 1 tablet by mouth once daily, Disp: 30 tablet, Rfl: 5    montelukast (SINGULAIR) 10 mg tablet, take 1 tablet by mouth at bedtime, Disp: 90 tablet, Rfl: 3    nystatin (MYCOSTATIN) cream, Apply topically 2 (two) times a day   Apply for additional 2 weeks after rash resolves  (Patient taking differently: Apply topically as needed    Apply for additional 2 weeks after rash resolves ), Disp: 30 g, Rfl: 0    omeprazole (PriLOSEC) 20 mg delayed release capsule, take 1 capsule by mouth twice a day, Disp: 60 capsule, Rfl: 1    rosuvastatin (CRESTOR) 20 MG tablet, TAKE 1 TABLET BY MOUTH DAILY AT BEDTIME FOR CHOLESTEROL, Disp: 90 tablet, Rfl: 3    ALPRAZolam (XANAX) 0 25 mg tablet, Take 1 tablet by mouth 1 hour prior to MRI, if needed can take second tablet by mouth half hour prior to MRI (Patient not taking: Reported on 9/8/2021), Disp: 2 tablet, Rfl: 0    promethazine-codeine (PHENERGAN WITH CODEINE) 6 25-10 mg/5 mL syrup, Take 5 mL by mouth every 4 (four) hours as needed for cough (Patient not taking: Reported on 9/8/2021), Disp: 118 mL, Rfl: 0    Current Allergies     Allergies as of 09/08/2021 - Reviewed 09/08/2021   Allergen Reaction Noted    Azithromycin  09/13/2019    Darvon [propoxyphene]  09/13/2019            The following portions of the patient's history were reviewed and updated as appropriate: allergies, current medications, past family history, past medical history, past social history, past surgical history and problem list      Past Medical History:   Diagnosis Date    Acute bronchitis     Acute pharyngitis     Anxiety state     Arthropathy of ankle and foot     and/or    Asthma     Asthma without status asthmaticus     Carotid artery occlusion     COPD (chronic obstructive pulmonary disease) ([facility] [address] )     Cough     COVID-19 vaccine series completed     Disorder of shoulder     Dyspnea     Hand joint pain     Heartburn     Herpes simplex without complication     Hyperlipidemia     Infection of skin and subcutaneous tissue     Localized superficial swelling of skin     Low back pain     Lymphadenopathy     Malaise and fatigue     Neck pain     Osteoarthritis     Pleurisy without effusion or active tuberculosis     Pneumonia     Right upper quadrant pain     Shoulder joint pain     Skin eruption     Vitamin D deficiency     Vomiting        Past Surgical History:   Procedure Laterality Date    BREAST BIOPSY Left 2017 benign    CHOLECYSTECTOMY      CHOLECYSTECTOMY  03/2014    Dr Bella Davis     COLONOSCOPY  02/2013    WNL    FL LUMBAR PUNCTURE DIAGNOSTIC  5/25/2021    HYSTERECTOMY      Total        Family History   Problem Relation Age of Onset    Diabetes Mother         Non-insulin dependent diabetes    Emphysema Father     No Known Problems Sister     No Known Problems Daughter     No Known Problems Maternal Grandmother     No Known Problems Paternal Grandmother     No Known Problems Sister     No Known Problems Sister     No Known Problems Daughter     No Known Problems Daughter     No Known Problems Maternal Aunt          Medications have been verified          Objective   /78   Pulse 72   Temp 98 7 °F (37 1 °C)   Resp 20   Ht 5' 4" (1 626 m)   Wt 67 8 kg (149 lb 6 4 oz) SpO2 96%   BMI 25 64 kg/m²        Physical Exam     Physical Exam  Vitals reviewed  Constitutional:       General: She is not in acute distress  Appearance: She is normal weight  Musculoskeletal:      Right knee: Swelling and crepitus present  No erythema, ecchymosis or bony tenderness  Normal range of motion  Tenderness present over the medial joint line  Normal patellar mobility  Instability Tests: Medial Magno test negative and lateral Magno test negative  Left knee: Crepitus present  Neurological:      General: No focal deficit present  Mental Status: She is alert  Psychiatric:         Mood and Affect: Mood and affect normal          Speech: Speech normal          Behavior: Behavior normal  Behavior is cooperative               Results:  Lab Results   Component Value Date    SODIUM 141 07/15/2021    K 4 0 07/15/2021     (H) 07/15/2021    CO2 24 07/15/2021    BUN 19 07/15/2021    CREATININE 0 82 07/15/2021    GLUC 91 07/15/2021    CALCIUM 9 1 07/15/2021       Lab Results   Component Value Date    HGBA1C 5 5 03/05/2021       Lab Results   Component Value Date    WBC 13 28 (H) 05/26/2021    HGB 12 9 05/26/2021    HCT 41 1 05/26/2021    MCV 92 05/26/2021     05/26/2021

## 2021-09-08 NOTE — PATIENT INSTRUCTIONS
Please continue ice as needed for pain, can also use voltaren gel as needed  Get sleeve knee brace and xray  Consider physical therapy, if no improvement we can do steroid injection

## 2021-09-17 ENCOUNTER — OFFICE VISIT (OUTPATIENT)
Dept: URGENT CARE | Facility: CLINIC | Age: 66
End: 2021-09-17
Payer: COMMERCIAL

## 2021-09-17 VITALS
HEART RATE: 81 BPM | BODY MASS INDEX: 25.44 KG/M2 | RESPIRATION RATE: 18 BRPM | HEIGHT: 64 IN | SYSTOLIC BLOOD PRESSURE: 142 MMHG | TEMPERATURE: 98.4 F | OXYGEN SATURATION: 98 % | DIASTOLIC BLOOD PRESSURE: 72 MMHG | WEIGHT: 149 LBS

## 2021-09-17 DIAGNOSIS — L03.90 CELLULITIS, UNSPECIFIED CELLULITIS SITE: ICD-10-CM

## 2021-09-17 DIAGNOSIS — T63.444A BEE STING REACTION, UNDETERMINED INTENT, INITIAL ENCOUNTER: Primary | ICD-10-CM

## 2021-09-17 PROCEDURE — S9083 URGENT CARE CENTER GLOBAL: HCPCS | Performed by: PHYSICIAN ASSISTANT

## 2021-09-17 PROCEDURE — 99213 OFFICE O/P EST LOW 20 MIN: CPT | Performed by: PHYSICIAN ASSISTANT

## 2021-09-17 RX ORDER — PREDNISONE 10 MG/1
TABLET ORAL
Qty: 26 TABLET | Refills: 0 | Status: SHIPPED | OUTPATIENT
Start: 2021-09-17 | End: 2021-10-18

## 2021-09-17 RX ORDER — CEPHALEXIN 500 MG/1
500 CAPSULE ORAL EVERY 8 HOURS SCHEDULED
Qty: 21 CAPSULE | Refills: 0 | Status: SHIPPED | OUTPATIENT
Start: 2021-09-17 | End: 2021-09-24

## 2021-09-17 NOTE — PROGRESS NOTES
[facility]    NAME: Luis Daniel is a 78 y o  female  : 1955    MRN: [id number]  DATE: 2021  TIME: 3:59 PM    Assessment and Plan   Bee sting reaction, undetermined intent, initial encounter [T63 444A]  1  Bee sting reaction, undetermined intent, initial encounter  cephalexin (KEFLEX) 500 mg capsule    predniSONE 10 mg tablet   2  Cellulitis, unspecified cellulitis site         Patient Instructions     Patient Instructions   Antibiotic and prednisone as directed  Chief Complaint     Chief Complaint   Patient presents with    Bee Sting     right leg x 2 days       History of Present Illness     65-year-old female with complaint of bee sting to her right lower leg  Area is reddened and has increased warmth over it  Review of Systems   Review of Systems   Constitutional: Negative for appetite change, chills and fever  HENT: Negative for congestion, ear discharge, ear pain, facial swelling, postnasal drip, sinus pressure, sneezing and sore throat  Respiratory: Negative for cough, shortness of breath and wheezing  Cardiovascular: Positive for leg swelling  Negative for palpitations  Skin: Positive for color change and wound  Neurological: Negative for headaches         Current Medications     Current Outpatient Medications:     albuterol (2 5 mg/3 mL) 0 083 % nebulizer solution, Take 1 vial (2 5 mg total) by nebulization 4 (four) times a day, Disp: 120 vial, Rfl: 11    amLODIPine (NORVASC) 5 mg tablet, take 1 tablet by mouth once daily, Disp: 30 tablet, Rfl: 5    aspirin 81 mg chewable tablet, Chew 81 mg daily, Disp: , Rfl:     fluticasone-salmeterol (ADVAIR DISKUS, WIXELA INHUB) 250-50 mcg/dose inhaler, Inhale 1 puff 2 (two) times a day Rinse mouth after use , Disp: , Rfl:     levocetirizine (XYZAL) 5 MG tablet, Take 0 5 tablets (2 5 mg total) by mouth every evening, Disp: 90 tablet, Rfl: 1    meloxicam (MOBIC) 15 mg tablet, take 1 tablet by mouth once daily, Disp: 30 tablet, Rfl: 5    montelukast (SINGULAIR) 10 mg tablet, take 1 tablet by mouth at bedtime, Disp: 90 tablet, Rfl: 3    omeprazole (PriLOSEC) 20 mg delayed release capsule, take 1 capsule by mouth twice a day, Disp: 60 capsule, Rfl: 1    rosuvastatin (CRESTOR) 20 MG tablet, TAKE 1 TABLET BY MOUTH DAILY AT BEDTIME FOR CHOLESTEROL, Disp: 90 tablet, Rfl: 3    ALPRAZolam (XANAX) 0 25 mg tablet, Take 1 tablet (0 25 mg total) by mouth daily at bedtime as needed for anxiety, Disp: 30 tablet, Rfl: 0    ALPRAZolam (XANAX) 0 25 mg tablet, Take 1 tablet by mouth 1 hour prior to MRI, if needed can take second tablet by mouth half hour prior to MRI (Patient not taking: Reported on 9/8/2021), Disp: 2 tablet, Rfl: 0    cephalexin (KEFLEX) 500 mg capsule, Take 1 capsule (500 mg total) by mouth every 8 (eight) hours for 7 days, Disp: 21 capsule, Rfl: 0    EPINEPHrine (EPIPEN) 0 3 mg/0 3 mL SOAJ, Inject 0 3 mL (0 3 mg total) into a muscle once for 1 dose, Disp: 2 each, Rfl: 1    nystatin (MYCOSTATIN) cream, Apply topically 2 (two) times a day   Apply for additional 2 weeks after rash resolves   (Patient not taking: Reported on 9/17/2021), Disp: 30 g, Rfl: 0    predniSONE 10 mg tablet, Take 3 tabs BID X 2 days, 2 tabs BID X 2 days, 1 tab BID X 2 days, 1 tab daily X 2 days, Disp: 26 tablet, Rfl: 0    promethazine-codeine (PHENERGAN WITH CODEINE) 6 25-10 mg/5 mL syrup, Take 5 mL by mouth every 4 (four) hours as needed for cough (Patient not taking: Reported on 9/8/2021), Disp: 118 mL, Rfl: 0    Current Allergies     Allergies as of 09/17/2021 - Reviewed 09/17/2021   Allergen Reaction Noted    Azithromycin  09/13/2019    Darvon [propoxyphene]  09/13/2019          The following portions of the patient's history were reviewed and updated as appropriate: allergies, current medications, past family history, past medical history, past social history, past surgical history and problem list    Past Medical History: Diagnosis Date    Acute bronchitis     Acute pharyngitis     Anxiety state     Arthropathy of ankle and foot     and/or    Asthma     Asthma without status asthmaticus     Carotid artery occlusion     COPD (chronic obstructive pulmonary disease) (HCC)     Cough     COVID-19 vaccine series completed     Disorder of shoulder     Dyspnea     Hand joint pain     Heartburn     Herpes simplex without complication     Hyperlipidemia     Infection of skin and subcutaneous tissue     Localized superficial swelling of skin     Low back pain     Lymphadenopathy     Malaise and fatigue     Neck pain     Osteoarthritis     Pleurisy without effusion or active tuberculosis     Pneumonia     Right upper quadrant pain     Shoulder joint pain     Skin eruption     Vitamin D deficiency     Vomiting      Past Surgical History:   Procedure Laterality Date    BREAST BIOPSY Left 2017 benign    CHOLECYSTECTOMY      CHOLECYSTECTOMY  03/2014    Dr Yeboah Carolyn     COLONOSCOPY  02/2013    WNL    FL LUMBAR PUNCTURE DIAGNOSTIC  5/25/2021    HYSTERECTOMY      Total      Family History   Problem Relation Age of Onset    Diabetes Mother         Non-insulin dependent diabetes    Emphysema Father     No Known Problems Sister     No Known Problems Daughter     No Known Problems Maternal Grandmother     No Known Problems Paternal Grandmother     No Known Problems Sister     No Known Problems Sister     No Known Problems Daughter     No Known Problems Daughter     No Known Problems Maternal Aunt      Social History     Socioeconomic History    Marital status:       Spouse name: Not on file    Number of children: Not on file    Years of education: Not on file    Highest education level: Not on file   Occupational History    Occupation: Homemaker   Tobacco Use    Smoking status: Never Smoker    Smokeless tobacco: Never Used   Vaping Use    Vaping Use: Never used   Substance and Sexual Activity    Alcohol use: Not Currently     Comment: DAILY    Drug use: Never    Sexual activity: Not on file   Other Topics Concern    Not on file   Social History Narrative    Not on file     Social Determinants of Health     Financial Resource Strain:     Difficulty of Paying Living Expenses:    Food Insecurity:     Worried About Running Out of Food in the Last Year:     [address] in the Last Year:    Transportation Needs:     Lack of Transportation (Medical):  Lack of Transportation (Non-Medical):    Physical Activity:     Days of Exercise per Week:     Minutes of Exercise per Session:    Stress:     Feeling of Stress :    Social Connections:     Frequency of Communication with Friends and Family:     Frequency of Social Gatherings with Friends and Family:     Attends Jainism Services:     Active Member of Clubs or Organizations:     Attends Club or Organization Meetings:     Marital Status:    Intimate Partner Violence:     Fear of Current or Ex-Partner:     Emotionally Abused:     Physically Abused:     Sexually Abused:      Medications have been verified  Objective   /72   Pulse 81   Temp 98 4 °F (36 9 °C)   Resp 18   Ht 5' 4" (1 626 m)   Wt 67 6 kg (149 lb)   SpO2 98%   BMI 25 58 kg/m²      Physical Exam   Physical Exam  Vitals and nursing note reviewed  Constitutional:       General: She is not in acute distress  Appearance: She is well-developed  HENT:      Head: Normocephalic and atraumatic  Right Ear: Tympanic membrane normal       Left Ear: Tympanic membrane normal       Nose: Nose normal  No mucosal edema or rhinorrhea  Right Sinus: No maxillary sinus tenderness or frontal sinus tenderness  Left Sinus: No maxillary sinus tenderness or frontal sinus tenderness  Mouth/Throat:      Pharynx: No oropharyngeal exudate or posterior oropharyngeal erythema     Eyes:      Conjunctiva/sclera: Conjunctivae normal    Cardiovascular:      Rate and Rhythm: Normal rate and regular rhythm  Heart sounds: Normal heart sounds  No murmur heard  Pulmonary:      Effort: Pulmonary effort is normal       Breath sounds: Normal breath sounds  Skin:     Findings: Erythema (erythema, swelling and increased warmth right lower leg) present

## 2021-09-20 NOTE — PROGRESS NOTES
PT Evaluation  and PT Discharge    Today's date: 2021  Patient name: Salomón Askew  : 1955  MRN: [id number]  Referring provider: Archana Sams MD  Dx:   Encounter Diagnosis     ICD-10-CM    1  Acute pain of right knee  M25 561                   Assessment  Assessment details: Salomón Askew is a 78 y o  female with a history of anxiety, asthma,carotid artery stenosis, GERD, hyperlipidemia, LBP, lymphadenopathy, OA, Hx neck pain,  that presents for a low complexity physical therapy initial evaluation  The patient did not have any complaints of pain or functional limitation during today's evaluation for acute R knee pain  During the examination the patient demonstrated mildly decreased LE strength, normal ROM, normal gait, and no pain  The patient was instructed in a few exercises to address some mild muscle weakness impairments  The patient understands and accepts the home exercise information  D/C FORMAL PT AT THIS TIME  Impairments: impaired balance, impaired physical strength and lacks appropriate home exercise program  Barriers to therapy: MED HX  Understanding of Dx/Px/POC: fair   Prognosis: fair  Prognosis details: MED HX    Goals  STG: Achieve by the end of today's session    1  Patient to achieve independence with home exercise plan  MET  2  All patient questions answered prior to discharge from PT to a HEP  MET      Plan  Plan details: D/C PT TO AN INDEPENDENT HEP  Plan of Care beginning date: 2021  Plan of Care expiration date: 2021  Treatment plan discussed with: PTA and patient        Subjective Evaluation    History of Present Illness  Mechanism of injury: Salomón Askew is a 78 y o  female that presents to outpatient physical therapy with complaints of right knee swelling  The patient reports no decrease in function - she is able to walk 3 miles/day and perform all of her functional tasks / work tasks    The patient would like to be evaluated to determine the need for formal physical therapy  Currently the patient does not have a complaint of impaired function or pain at the right knee  R KNEE XRAY: FINDINGS:     There is no acute fracture or dislocation      There is no joint effusion      There is mild to moderate medial and patellofemoral compartment joint space narrowing  There are small marginal osteophytes at the medial, lateral and patellofemoral compartments  There is calcification at the insertion of the quadriceps tendon      No lytic or blastic osseous lesion      Soft tissues are unremarkable      IMPRESSION:     No acute osseous abnormality      Degenerative changes as described    Pain  No pain reported  Current pain ratin  At best pain ratin  At worst pain ratin    Social Support  Steps to enter house: yes  Stairs in house: yes   Lives in: multiple-level home  Lives with: adult children and young children    Employment status: working ([employer] work)  Hand dominance: right    Treatments  Current treatment: physical therapy  Patient Goals  Patient goal: DC PT - no needs identified after evaluation        Objective     Palpation     Additional Palpation Details  NO TTP    Tenderness     Additional Tenderness Details  NO TTP    Neurological Testing     Sensation     Knee   Left Knee   Intact: light touch    Right Knee   Intact: light touch     Strength/Myotome Testing     Left Hip   Planes of Motion   Flexion: 4  Extension: 4-  Abduction: 4-  Adduction: 4    Right Hip   Planes of Motion   Flexion: 4  Extension: 4-  Abduction: 4-  Adduction: 4    Left Knee   Flexion: 4-  Extension: 4-  Quadriceps contraction: fair    Right Knee   Flexion: 4-  Extension: 4-  Quadriceps contraction: fair    Tests     Additional Tests Details  30 SSTS: 17 stands no hands ( +) Valgus at B/L knees    Gait: (+) valgus R>L knee - no LOB, good heel toe rollover, equal step lengths                 Re-Evaluation:10/19  PRECAUTIONS: NONE  Knee Specialty Daily Treatment Diary     Manual Therapy 9/21       MFR/STM/ IASTM        Hamstring stretch        Prone Quad stretch        Patellar mobs        Knee stretch on edge of mat            Ther Ex 9/21       Nu Step S=        Biodex Bike S=        Heel slides flex/abd        PROM knee        EXT BOARD        Prone knee flex        Bridges x10       SLR all planes        SAQ        LAQ        Hamstring stretch        Calf stretch        Standing hamstring curls                Mini Squat                TKE        Total gym squats (deep)        Neuro Re-ed        SLB 1x:05       QS x10 :05       Wall slides with feet stagger        Gluteal set x10:05       Fitter board        Eccentric Leg press        Clam Shells x10       QS+ SLR        [place]        GAIT Training        sidestepping        Heel-toe amb        Mirror walking        Ther Act        Amb up/down steps        Chair squats        Crate carry        Step ups            Modalities        CP  KNEE                                The patient was given a new home exercise plan with written handout, pictures, and verbal instruction  The patient accepts and understands the new home activities   Access Code [email or web address]

## 2021-09-21 ENCOUNTER — EVALUATION (OUTPATIENT)
Dept: PHYSICAL THERAPY | Facility: CLINIC | Age: 66
End: 2021-09-21
Payer: COMMERCIAL

## 2021-09-21 DIAGNOSIS — M25.561 ACUTE PAIN OF RIGHT KNEE: Primary | ICD-10-CM

## 2021-09-21 PROCEDURE — 97161 PT EVAL LOW COMPLEX 20 MIN: CPT | Performed by: PHYSICAL THERAPIST

## 2021-09-21 PROCEDURE — 97112 NEUROMUSCULAR REEDUCATION: CPT | Performed by: PHYSICAL THERAPIST

## 2021-09-23 ENCOUNTER — APPOINTMENT (OUTPATIENT)
Dept: LAB | Facility: CLINIC | Age: 66
End: 2021-09-23
Payer: COMMERCIAL

## 2021-09-23 ENCOUNTER — OFFICE VISIT (OUTPATIENT)
Dept: GASTROENTEROLOGY | Facility: CLINIC | Age: 66
End: 2021-09-23
Payer: COMMERCIAL

## 2021-09-23 VITALS
TEMPERATURE: 98.1 F | BODY MASS INDEX: 26.02 KG/M2 | HEIGHT: 64 IN | WEIGHT: 152.4 LBS | HEART RATE: 78 BPM | DIASTOLIC BLOOD PRESSURE: 78 MMHG | RESPIRATION RATE: 16 BRPM | SYSTOLIC BLOOD PRESSURE: 154 MMHG

## 2021-09-23 DIAGNOSIS — R79.89 ELEVATED LFTS: ICD-10-CM

## 2021-09-23 DIAGNOSIS — R79.89 ELEVATED LFTS: Primary | ICD-10-CM

## 2021-09-23 DIAGNOSIS — R74.8 ELEVATED ALKALINE PHOSPHATASE LEVEL: ICD-10-CM

## 2021-09-23 DIAGNOSIS — F40.240 CLAUSTROPHOBIA: ICD-10-CM

## 2021-09-23 LAB
ALBUMIN SERPL BCP-MCNC: 3.5 G/DL (ref 3.5–5)
ALP SERPL-CCNC: 184 U/L (ref 46–116)
ALT SERPL W P-5'-P-CCNC: 53 U/L (ref 12–78)
AST SERPL W P-5'-P-CCNC: 28 U/L (ref 5–45)
BILIRUB DIRECT SERPL-MCNC: 0.15 MG/DL (ref 0–0.2)
BILIRUB SERPL-MCNC: 0.43 MG/DL (ref 0.2–1)
PROT SERPL-MCNC: 7.2 G/DL (ref 6.4–8.2)

## 2021-09-23 PROCEDURE — 3008F BODY MASS INDEX DOCD: CPT | Performed by: INTERNAL MEDICINE

## 2021-09-23 PROCEDURE — 1160F RVW MEDS BY RX/DR IN RCRD: CPT | Performed by: INTERNAL MEDICINE

## 2021-09-23 PROCEDURE — 82787 IGG 1 2 3 OR 4 EACH: CPT

## 2021-09-23 PROCEDURE — 1036F TOBACCO NON-USER: CPT | Performed by: INTERNAL MEDICINE

## 2021-09-23 PROCEDURE — 82784 ASSAY IGA/IGD/IGG/IGM EACH: CPT

## 2021-09-23 PROCEDURE — 36415 COLL VENOUS BLD VENIPUNCTURE: CPT

## 2021-09-23 PROCEDURE — 99214 OFFICE O/P EST MOD 30 MIN: CPT | Performed by: INTERNAL MEDICINE

## 2021-09-23 PROCEDURE — 80076 HEPATIC FUNCTION PANEL: CPT

## 2021-09-23 RX ORDER — LORAZEPAM 0.5 MG/1
TABLET ORAL
Qty: 2 TABLET | Refills: 0 | Status: SHIPPED | OUTPATIENT
Start: 2021-09-23 | End: 2021-10-18

## 2021-09-23 NOTE — PATIENT INSTRUCTIONS
(1) Get additional blood tests  (2) Schedule MRI  (3) Stop taking Mobic and Crestor  ------  (4) We may have to do a liver biopsy  (5) Decrease alcohol

## 2021-09-23 NOTE — PROGRESS NOTES
[facility] - Outpatient Follow-up Note  Lino Gambino 78 y o  female MRN: [id number]  Encounter: [id number]          ASSESSMENT AND PLAN:    Lino Gambino is a 78 y o  female with elevated AP  GGT and AP isoenzymes confirm hepatic origin  In addition, she had ALT and AST elevation more recently in the setting of an acute episode requiring intubation and hospital admission  I suspect that these more recent transaminase were potentially due to transient ischemia and suspect that they will likely resolve  Her AP elevation at this time remains unexplained  Potential unexplored causes include AMA-negative PBC, IgG4 disease, PSC  Plan:  - Re-check LFTs today  - Check immunoglobulin levels  - Hold rosuvastatin for now (also advised her to stop meloxicam as indication is unclear)  - Proceed with MRI/MRCP  I have prescribed two 0 5 mg Ativan pills to take prior to the test   I have also instructed her to have someone with her who can drive her home, and not to take Xanax that day  - Recommended alcohol cessation, which she did not think she can do at this time; she did agree to cut down consumption to 1 beer daily  - Consider liver biopsy in the near future based on above tests  - Return in 3 months      1  Elevated LFTs    2  Elevated alkaline phosphatase level        Orders Placed This Encounter   Procedures    IgG 1, 2, 3, and 4     ______________________________________________________________________    SUBJECTIVE:    Lino Gambino is a 78 y o  female who presents for follow up of elevated LFTs  Patient was last seen in July for elevated AP since March  However, she also developed ALT and AST elevation in May  She states today that on 5/24, she developed acute MS changes and was taken to the ER  She was worked up for stroke and seizures, and was empirically treated for meningitis  She was also briefly intubated for airway protection    She ultimately improved and was discharged with a diagnosis of encephalitis  She attributes this event to receiving the 2nd dose of the Moderna vaccine on 4/5/2021 (19 days earlier)  The patient's ALT and AST were first noted to be elevated on 6/5 and seemed to be slowly down-trending until 7/15; they have not been checked since  On the other hand, AP has been elevated since March and peaked at 446 in mid-June  She has undergone a rather extensive workup so far, which has ruled out AIH, PBC, Perez's, hemochromatosis, a1AT deficeincy, celiac disease, and acute and chronic viral infections  US electrography with F0-F1 and S2 scores  She has abdominal MRI ordered which has not been completed due to her anxiety with being inside the scanner  She feels well currently  Continues to drink 2 beers each night  No acetaminophen use  Recently started prednisone and Keflex for bee sting  Takes Mobic but not sure what for  Also on Crestor  She had negative Cologuard last year  REVIEW OF SYSTEMS IS OTHERWISE NEGATIVE        Historical Information   Past Medical History:   Diagnosis Date    Acute bronchitis     Acute pharyngitis     Anxiety state     Arthropathy of ankle and foot     and/or    Asthma     Asthma without status asthmaticus     Carotid artery occlusion     [facility]ch[facility] obstructive pulmonary disease) ([facility])     Cough     COVID-19 vaccine series completed     Disorder of shoulder     Dyspnea     Hand joint pain     Heartburn     Herpes simplex without complication     Hyperlipidemia     Infection of skin and subcutaneous tissue     Localized superficial swelling of skin     Low back pain     Lymphadenopathy     Malaise and fatigue     Neck pain     Osteoarthritis     Pleurisy without effusion or active tuberculosis     Pneumonia     Right upper quadrant pain     Shoulder joint pain     Skin eruption     Vitamin D deficiency     Vomiting      Past Surgical History:   Procedure Laterality Date    BREAST BIOPSY Left 2017 benign    CHOLECYSTECTOMY      CHOLECYSTECTOMY  03/2014    Dr Cristina Kraus     COLONOSCOPY  02/2013    WNL    FL LUMBAR PUNCTURE DIAGNOSTIC  5/25/2021    HYSTERECTOMY      Total      Social History   Social History     Substance and Sexual Activity   Alcohol Use Not Currently    Comment: DAILY     Social History     Substance and Sexual Activity   Drug Use Never     Social History     Tobacco Use   Smoking Status Never Smoker   Smokeless Tobacco Never Used     Family History   Problem Relation Age of Onset    Diabetes Mother         Non-insulin dependent diabetes    Emphysema Father     No Known Problems Sister     No Known Problems Daughter     No Known Problems Maternal Grandmother     No Known Problems Paternal Grandmother     No Known Problems Sister     No Known Problems Sister     No Known Problems Daughter     No Known Problems Daughter     No Known Problems Maternal Aunt        Meds/Allergies       Current Outpatient Medications:     albuterol (2 5 mg/3 mL) 0 083 % nebulizer solution    ALPRAZolam (XANAX) 0 25 mg tablet    amLODIPine (NORVASC) 5 mg tablet    aspirin 81 mg chewable tablet    cephalexin (KEFLEX) 500 mg capsule    EPINEPHrine (EPIPEN) 0 3 mg/0 3 mL SOAJ    fluticasone-salmeterol (ADVAIR DISKUS, WIXELA INHUB) 250-50 mcg/dose inhaler    levocetirizine (XYZAL) 5 MG tablet    meloxicam (MOBIC) 15 mg tablet    montelukast (SINGULAIR) 10 mg tablet    nystatin (MYCOSTATIN) cream    omeprazole (PriLOSEC) 20 mg delayed release capsule    predniSONE 10 mg tablet    promethazine-codeine (PHENERGAN WITH CODEINE) 6 25-10 mg/5 mL syrup    rosuvastatin (CRESTOR) 20 MG tablet    ALPRAZolam (XANAX) 0 25 mg tablet    Allergies   Allergen Reactions    Azithromycin     Darvon [Propoxyphene]            Objective     Blood pressure 154/78, pulse 78, temperature 98 1 °F (36 7 °C), temperature source Tympanic, resp   rate 16, height 5' 4" (1 626 m), weight 69 1 kg (152 lb 6 4 oz)  Body mass index is 26 16 kg/m²  PHYSICAL EXAM:      General Appearance:   Alert, cooperative, no distress   HEENT:   Normocephalic, atraumatic, anicteric  Neck:  Supple, symmetrical, trachea midline   Lungs:   Clear to auscultation bilaterally; no rales, rhonchi or wheezing; respirations unlabored    Heart[de-identified]   Regular rate and rhythm; no murmur, rub, or gallop  Abdomen:   Soft, non-tender, non-distended; normal bowel sounds; no masses, no organomegaly    Genitalia:   Deferred    Rectal:   Deferred    Extremities:  No cyanosis, clubbing or edema    Pulses:  2+ and symmetric    Skin:  No jaundice, rashes, or lesions    Lymph nodes:  No palpable cervical lymphadenopathy        Lab Results:   No visits with results within 1 Day(s) from this visit     Latest known visit with results is:   Appointment on 07/15/2021   Component Date Value    Alk Phos Isoenzymes 07/15/2021 410*    Alk Phos Liver Fract 07/15/2021 71     Alk Phos Bone Fract 07/15/2021 29     Alk Phos Intestine Fract 07/15/2021 0     Ceruloplasmin 07/15/2021 24 8     A-1 Antitrypsin 07/15/2021 132     JANETH 07/15/2021 Negative     Smooth Muscle Ab 07/15/2021 9     Liver-Kidney Microsomal * 07/15/2021 <20 1     IgA 07/15/2021 418*    Gliadin IgA 07/15/2021 6     Gliadin IgG 07/15/2021 1     Tissue Transglut Ab IGG 07/15/2021 <2     TISSUE TRANSGLUTAMINASE * 07/15/2021 <2     Endomysial IgA 07/15/2021 Negative     Sodium 07/15/2021 141     Potassium 07/15/2021 4 0     Chloride 07/15/2021 113*    CO2 07/15/2021 24     ANION GAP 07/15/2021 4     BUN 07/15/2021 19     Creatinine 07/15/2021 0 82     Glucose 07/15/2021 91     Calcium 07/15/2021 9 1     AST 07/15/2021 71*    ALT 07/15/2021 107*    Alkaline Phosphatase 07/15/2021 408*    Total Protein 07/15/2021 7 3     Albumin 07/15/2021 3 5     Total Bilirubin 07/15/2021 0 54     eGFR 07/15/2021 74     Iron Saturation 07/15/2021 23     TIBC 07/15/2021 324     Iron 07/15/2021 73     Ferritin 07/15/2021 194        Lab Results   Component Value Date    WBC 13 28 (H) 05/26/2021    HGB 12 9 05/26/2021    HCT 41 1 05/26/2021    MCV 92 05/26/2021     05/26/2021       Lab Results   Component Value Date    SODIUM 141 07/15/2021    K 4 0 07/15/2021     (H) 07/15/2021    CO2 24 07/15/2021    AGAP 4 07/15/2021    BUN 19 07/15/2021    CREATININE 0 82 07/15/2021    GLUC 91 07/15/2021    GLUF 103 (H) 06/15/2021    CALCIUM 9 1 07/15/2021    AST 71 (H) 07/15/2021     (H) 07/15/2021    ALKPHOS 408 (H) 07/15/2021    TP 7 3 07/15/2021    TBILI 0 54 07/15/2021    EGFR 74 07/15/2021       No results found for: CRP    No results found for: [id number], TSH    Lab Results   Component Value Date    IRON 73 07/15/2021    TIBC 324 07/15/2021    FERRITIN 194 07/15/2021       Radiology Results:   XR knee 3 vw right non injury    Result Date: 9/12/2021  Narrative: RIGHT KNEE INDICATION:   M25 561: Pain in right knee  COMPARISON:  Right knee x-ray dated October 10, 2019  VIEWS:  XR KNEE 3 VW RIGHT NON INJURY FINDINGS: There is no acute fracture or dislocation  There is no joint effusion  There is mild to moderate medial and patellofemoral compartment joint space narrowing  There are small marginal osteophytes at the medial, lateral and patellofemoral compartments  There is calcification at the insertion of the quadriceps tendon  No lytic or blastic osseous lesion  Soft tissues are unremarkable  Impression: No acute osseous abnormality  Degenerative changes as described   Workstation performed: [id number]

## 2021-09-25 LAB
IGG SERPL-MCNC: 776 MG/DL (ref 586–1602)
IGG1 SER-MCNC: 389 MG/DL (ref 248–810)
IGG2 SER-MCNC: 255 MG/DL (ref 130–555)
IGG3 SER-MCNC: 43 MG/DL (ref 15–102)
IGG4 SER-MCNC: 53 MG/DL (ref 2–96)

## 2021-09-28 DIAGNOSIS — R05.9 COUGH: ICD-10-CM

## 2021-09-28 RX ORDER — PROMETHAZINE HYDROCHLORIDE AND CODEINE PHOSPHATE 6.25; 1 MG/5ML; MG/5ML
5 SYRUP ORAL EVERY 4 HOURS PRN
Qty: 118 ML | Refills: 0 | Status: CANCELLED | OUTPATIENT
Start: 2021-09-28

## 2021-09-30 ENCOUNTER — OFFICE VISIT (OUTPATIENT)
Dept: ENDOCRINOLOGY | Facility: CLINIC | Age: 66
End: 2021-09-30
Payer: COMMERCIAL

## 2021-09-30 VITALS
WEIGHT: 152 LBS | TEMPERATURE: 97.8 F | BODY MASS INDEX: 26.09 KG/M2 | HEART RATE: 74 BPM | DIASTOLIC BLOOD PRESSURE: 74 MMHG | SYSTOLIC BLOOD PRESSURE: 132 MMHG

## 2021-09-30 DIAGNOSIS — E55.9 VITAMIN D DEFICIENCY: ICD-10-CM

## 2021-09-30 DIAGNOSIS — E83.39 HYPOPHOSPHATEMIA: ICD-10-CM

## 2021-09-30 DIAGNOSIS — Z13.21 ENCOUNTER FOR VITAMIN DEFICIENCY SCREENING: Primary | ICD-10-CM

## 2021-09-30 PROCEDURE — 99204 OFFICE O/P NEW MOD 45 MIN: CPT | Performed by: INTERNAL MEDICINE

## 2021-10-07 ENCOUNTER — CONSULT (OUTPATIENT)
Dept: RHEUMATOLOGY | Facility: CLINIC | Age: 66
End: 2021-10-07
Payer: COMMERCIAL

## 2021-10-07 VITALS
HEIGHT: 64 IN | DIASTOLIC BLOOD PRESSURE: 70 MMHG | BODY MASS INDEX: 25.95 KG/M2 | WEIGHT: 152 LBS | SYSTOLIC BLOOD PRESSURE: 110 MMHG

## 2021-10-07 DIAGNOSIS — M48.12 DIFFUSE IDIOPATHIC SKELETAL HYPEROSTOSIS OF CERVICAL SPINE: Primary | ICD-10-CM

## 2021-10-07 DIAGNOSIS — M25.561 RIGHT KNEE PAIN, UNSPECIFIED CHRONICITY: ICD-10-CM

## 2021-10-07 PROCEDURE — 99204 OFFICE O/P NEW MOD 45 MIN: CPT | Performed by: INTERNAL MEDICINE

## 2021-10-08 ENCOUNTER — HOSPITAL ENCOUNTER (OUTPATIENT)
Dept: MRI IMAGING | Facility: HOSPITAL | Age: 66
Discharge: HOME | End: 2021-10-08
Payer: COMMERCIAL

## 2021-10-08 PROCEDURE — G1004 CDSM NDSC: HCPCS

## 2021-10-08 PROCEDURE — 74183 MRI ABD W/O CNTR FLWD CNTR: CPT

## 2021-10-08 PROCEDURE — A9585 GADOBUTROL INJECTION: HCPCS | Performed by: PHYSICIAN ASSISTANT

## 2021-10-08 RX ADMIN — GADOBUTROL 7 ML: 604.72 INJECTION INTRAVENOUS at 09:54

## 2021-10-12 ENCOUNTER — RA CDI HCC (OUTPATIENT)
Dept: OTHER | Facility: HOSPITAL | Age: 66
End: 2021-10-12

## 2021-10-18 ENCOUNTER — CLINICAL SUPPORT (OUTPATIENT)
Dept: URGENT CARE | Facility: CLINIC | Age: 66
End: 2021-10-18
Payer: COMMERCIAL

## 2021-10-18 ENCOUNTER — APPOINTMENT (OUTPATIENT)
Dept: RADIOLOGY | Facility: CLINIC | Age: 66
End: 2021-10-18
Payer: COMMERCIAL

## 2021-10-18 ENCOUNTER — APPOINTMENT (OUTPATIENT)
Dept: LAB | Facility: CLINIC | Age: 66
End: 2021-10-18
Payer: COMMERCIAL

## 2021-10-18 ENCOUNTER — OFFICE VISIT (OUTPATIENT)
Dept: FAMILY MEDICINE CLINIC | Facility: CLINIC | Age: 66
End: 2021-10-18
Payer: COMMERCIAL

## 2021-10-18 VITALS
SYSTOLIC BLOOD PRESSURE: 130 MMHG | HEIGHT: 64 IN | HEART RATE: 72 BPM | BODY MASS INDEX: 25.81 KG/M2 | OXYGEN SATURATION: 97 % | DIASTOLIC BLOOD PRESSURE: 68 MMHG | TEMPERATURE: 98.6 F | WEIGHT: 151.2 LBS

## 2021-10-18 DIAGNOSIS — Z13.21 ENCOUNTER FOR VITAMIN DEFICIENCY SCREENING: ICD-10-CM

## 2021-10-18 DIAGNOSIS — J45.40 MODERATE PERSISTENT ASTHMA WITHOUT COMPLICATION: Primary | ICD-10-CM

## 2021-10-18 DIAGNOSIS — R06.00 DOE (DYSPNEA ON EXERTION): ICD-10-CM

## 2021-10-18 DIAGNOSIS — R74.8 ELEVATED ALKALINE PHOSPHATASE LEVEL: ICD-10-CM

## 2021-10-18 DIAGNOSIS — E83.39 HYPOPHOSPHATEMIA: ICD-10-CM

## 2021-10-18 DIAGNOSIS — F41.9 ANXIETY: ICD-10-CM

## 2021-10-18 DIAGNOSIS — J45.40 MODERATE PERSISTENT ASTHMA WITHOUT COMPLICATION: ICD-10-CM

## 2021-10-18 DIAGNOSIS — Z01.818 PRE-PROCEDURAL EXAMINATION: ICD-10-CM

## 2021-10-18 DIAGNOSIS — I10 ESSENTIAL HYPERTENSION: ICD-10-CM

## 2021-10-18 DIAGNOSIS — R05.3 CHRONIC COUGH: ICD-10-CM

## 2021-10-18 DIAGNOSIS — J30.1 ALLERGIC RHINITIS DUE TO POLLEN, UNSPECIFIED SEASONALITY: ICD-10-CM

## 2021-10-18 DIAGNOSIS — R05.9 COUGH: ICD-10-CM

## 2021-10-18 DIAGNOSIS — E55.9 VITAMIN D DEFICIENCY: ICD-10-CM

## 2021-10-18 DIAGNOSIS — R94.31 ABNORMAL EKG: Primary | ICD-10-CM

## 2021-10-18 DIAGNOSIS — Z12.31 ENCOUNTER FOR SCREENING MAMMOGRAM FOR MALIGNANT NEOPLASM OF BREAST: Primary | ICD-10-CM

## 2021-10-18 LAB
25(OH)D3 SERPL-MCNC: 31.2 NG/ML (ref 30–100)
ALBUMIN SERPL BCP-MCNC: 3.6 G/DL (ref 3.5–5)
ALP SERPL-CCNC: 167 U/L (ref 46–116)
ALT SERPL W P-5'-P-CCNC: 32 U/L (ref 12–78)
ANION GAP SERPL CALCULATED.3IONS-SCNC: 0 MMOL/L (ref 4–13)
AST SERPL W P-5'-P-CCNC: 22 U/L (ref 5–45)
BILIRUB SERPL-MCNC: 0.45 MG/DL (ref 0.2–1)
BUN SERPL-MCNC: 17 MG/DL (ref 5–25)
CALCIUM SERPL-MCNC: 9.6 MG/DL (ref 8.3–10.1)
CHLORIDE SERPL-SCNC: 111 MMOL/L (ref 100–108)
CO2 SERPL-SCNC: 29 MMOL/L (ref 21–32)
CREAT SERPL-MCNC: 0.83 MG/DL (ref 0.6–1.3)
GFR SERPL CREATININE-BSD FRML MDRD: 73 ML/MIN/1.73SQ M
GLUCOSE P FAST SERPL-MCNC: 115 MG/DL (ref 65–99)
MAGNESIUM SERPL-MCNC: 2.4 MG/DL (ref 1.6–2.6)
PHOSPHATE SERPL-MCNC: 3.1 MG/DL (ref 2.3–4.1)
PHOSPHATE UR-MCNC: 14.6 MG/DL
POTASSIUM SERPL-SCNC: 4.4 MMOL/L (ref 3.5–5.3)
PROT SERPL-MCNC: 7.3 G/DL (ref 6.4–8.2)
SODIUM SERPL-SCNC: 140 MMOL/L (ref 136–145)
T4 FREE SERPL-MCNC: 0.84 NG/DL (ref 0.76–1.46)
TSH SERPL DL<=0.05 MIU/L-ACNC: 1.48 UIU/ML (ref 0.36–3.74)
VIT B12 SERPL-MCNC: 540 PG/ML (ref 100–900)

## 2021-10-18 PROCEDURE — 3008F BODY MASS INDEX DOCD: CPT | Performed by: FAMILY MEDICINE

## 2021-10-18 PROCEDURE — 84105 ASSAY OF URINE PHOSPHORUS: CPT

## 2021-10-18 PROCEDURE — 84439 ASSAY OF FREE THYROXINE: CPT

## 2021-10-18 PROCEDURE — 83735 ASSAY OF MAGNESIUM: CPT

## 2021-10-18 PROCEDURE — 3725F SCREEN DEPRESSION PERFORMED: CPT | Performed by: FAMILY MEDICINE

## 2021-10-18 PROCEDURE — 1036F TOBACCO NON-USER: CPT | Performed by: FAMILY MEDICINE

## 2021-10-18 PROCEDURE — 84443 ASSAY THYROID STIM HORMONE: CPT

## 2021-10-18 PROCEDURE — 84100 ASSAY OF PHOSPHORUS: CPT

## 2021-10-18 PROCEDURE — 3075F SYST BP GE 130 - 139MM HG: CPT | Performed by: FAMILY MEDICINE

## 2021-10-18 PROCEDURE — 82607 VITAMIN B-12: CPT

## 2021-10-18 PROCEDURE — 71046 X-RAY EXAM CHEST 2 VIEWS: CPT

## 2021-10-18 PROCEDURE — 1160F RVW MEDS BY RX/DR IN RCRD: CPT | Performed by: FAMILY MEDICINE

## 2021-10-18 PROCEDURE — 82306 VITAMIN D 25 HYDROXY: CPT

## 2021-10-18 PROCEDURE — 3078F DIAST BP <80 MM HG: CPT | Performed by: FAMILY MEDICINE

## 2021-10-18 PROCEDURE — 99214 OFFICE O/P EST MOD 30 MIN: CPT | Performed by: FAMILY MEDICINE

## 2021-10-18 PROCEDURE — 36415 COLL VENOUS BLD VENIPUNCTURE: CPT

## 2021-10-18 PROCEDURE — 93005 ELECTROCARDIOGRAM TRACING: CPT

## 2021-10-18 PROCEDURE — 80053 COMPREHEN METABOLIC PANEL: CPT

## 2021-10-18 RX ORDER — ALPRAZOLAM 0.25 MG/1
0.25 TABLET ORAL
Qty: 30 TABLET | Refills: 5 | Status: SHIPPED | OUTPATIENT
Start: 2021-10-18 | End: 2022-07-09 | Stop reason: SDUPTHER

## 2021-10-18 RX ORDER — LEVOCETIRIZINE DIHYDROCHLORIDE 5 MG/1
2.5 TABLET, FILM COATED ORAL EVERY EVENING
Qty: 90 TABLET | Refills: 1 | Status: SHIPPED | OUTPATIENT
Start: 2021-10-18

## 2021-10-18 RX ORDER — PROMETHAZINE HYDROCHLORIDE AND CODEINE PHOSPHATE 6.25; 1 MG/5ML; MG/5ML
5 SYRUP ORAL EVERY 4 HOURS PRN
Qty: 118 ML | Refills: 0 | Status: SHIPPED | OUTPATIENT
Start: 2021-10-18 | End: 2022-03-26

## 2021-10-20 ENCOUNTER — TELEPHONE (OUTPATIENT)
Dept: FAMILY MEDICINE CLINIC | Facility: CLINIC | Age: 66
End: 2021-10-20

## 2021-10-20 DIAGNOSIS — R06.00 DOE (DYSPNEA ON EXERTION): Primary | ICD-10-CM

## 2021-10-20 DIAGNOSIS — R94.31 ABNORMAL EKG: ICD-10-CM

## 2021-10-20 LAB
ATRIAL RATE: 61 BPM
P AXIS: 24 DEGREES
PR INTERVAL: 190 MS
QRS AXIS: -5 DEGREES
QRSD INTERVAL: 84 MS
QT INTERVAL: 434 MS
QTC INTERVAL: 436 MS
T WAVE AXIS: -10 DEGREES
VENTRICULAR RATE: 61 BPM

## 2021-10-20 PROCEDURE — 93010 ELECTROCARDIOGRAM REPORT: CPT | Performed by: INTERNAL MEDICINE

## 2021-10-22 ENCOUNTER — TELEPHONE (OUTPATIENT)
Dept: ENDOCRINOLOGY | Facility: CLINIC | Age: 66
End: 2021-10-22

## 2021-10-25 DIAGNOSIS — K21.9 GASTROESOPHAGEAL REFLUX DISEASE WITHOUT ESOPHAGITIS: ICD-10-CM

## 2021-10-26 RX ORDER — OMEPRAZOLE 20 MG/1
CAPSULE, DELAYED RELEASE ORAL
Qty: 60 CAPSULE | Refills: 1 | Status: SHIPPED | OUTPATIENT
Start: 2021-10-26 | End: 2022-01-10

## 2021-10-28 ENCOUNTER — HOSPITAL ENCOUNTER (OUTPATIENT)
Dept: MAMMOGRAPHY | Facility: HOSPITAL | Age: 66
Discharge: HOME | End: 2021-10-28
Payer: COMMERCIAL

## 2021-10-28 VITALS — WEIGHT: 151 LBS | HEIGHT: 64 IN | BODY MASS INDEX: 25.78 KG/M2

## 2021-10-28 DIAGNOSIS — Z12.31 ENCOUNTER FOR SCREENING MAMMOGRAM FOR MALIGNANT NEOPLASM OF BREAST: ICD-10-CM

## 2021-10-28 PROCEDURE — 77067 SCR MAMMO BI INCL CAD: CPT

## 2021-10-28 PROCEDURE — 77063 BREAST TOMOSYNTHESIS BI: CPT

## 2021-11-30 ENCOUNTER — OFFICE VISIT (OUTPATIENT)
Dept: FAMILY MEDICINE CLINIC | Facility: CLINIC | Age: 66
End: 2021-11-30
Payer: COMMERCIAL

## 2021-11-30 VITALS
BODY MASS INDEX: 24.75 KG/M2 | OXYGEN SATURATION: 98 % | DIASTOLIC BLOOD PRESSURE: 72 MMHG | WEIGHT: 145 LBS | SYSTOLIC BLOOD PRESSURE: 130 MMHG | HEIGHT: 64 IN | RESPIRATION RATE: 18 BRPM | TEMPERATURE: 98.9 F | HEART RATE: 66 BPM

## 2021-11-30 DIAGNOSIS — R06.00 DOE (DYSPNEA ON EXERTION): ICD-10-CM

## 2021-11-30 DIAGNOSIS — J45.40 MODERATE PERSISTENT ASTHMA WITHOUT COMPLICATION: Primary | ICD-10-CM

## 2021-11-30 DIAGNOSIS — R94.31 ABNORMAL EKG: ICD-10-CM

## 2021-11-30 PROCEDURE — 99214 OFFICE O/P EST MOD 30 MIN: CPT | Performed by: FAMILY MEDICINE

## 2021-11-30 PROCEDURE — 3008F BODY MASS INDEX DOCD: CPT | Performed by: FAMILY MEDICINE

## 2021-11-30 PROCEDURE — 1160F RVW MEDS BY RX/DR IN RCRD: CPT | Performed by: FAMILY MEDICINE

## 2021-11-30 PROCEDURE — 3078F DIAST BP <80 MM HG: CPT | Performed by: FAMILY MEDICINE

## 2021-11-30 PROCEDURE — 3075F SYST BP GE 130 - 139MM HG: CPT | Performed by: FAMILY MEDICINE

## 2021-11-30 PROCEDURE — 1036F TOBACCO NON-USER: CPT | Performed by: FAMILY MEDICINE

## 2021-11-30 PROCEDURE — 3725F SCREEN DEPRESSION PERFORMED: CPT | Performed by: FAMILY MEDICINE

## 2021-12-15 ENCOUNTER — OFFICE VISIT (OUTPATIENT)
Dept: GASTROENTEROLOGY | Facility: CLINIC | Age: 66
End: 2021-12-15
Payer: COMMERCIAL

## 2021-12-15 VITALS
BODY MASS INDEX: 24.45 KG/M2 | SYSTOLIC BLOOD PRESSURE: 114 MMHG | WEIGHT: 143.2 LBS | HEART RATE: 70 BPM | DIASTOLIC BLOOD PRESSURE: 66 MMHG | TEMPERATURE: 97.6 F | HEIGHT: 64 IN | RESPIRATION RATE: 16 BRPM

## 2021-12-15 DIAGNOSIS — K76.0 HEPATIC STEATOSIS: ICD-10-CM

## 2021-12-15 DIAGNOSIS — R74.8 ELEVATED ALKALINE PHOSPHATASE LEVEL: ICD-10-CM

## 2021-12-15 DIAGNOSIS — K21.9 GASTROESOPHAGEAL REFLUX DISEASE, UNSPECIFIED WHETHER ESOPHAGITIS PRESENT: ICD-10-CM

## 2021-12-15 DIAGNOSIS — R74.8 ELEVATED LIVER ENZYMES: Primary | ICD-10-CM

## 2021-12-15 PROCEDURE — 1036F TOBACCO NON-USER: CPT | Performed by: FAMILY MEDICINE

## 2021-12-15 PROCEDURE — 3078F DIAST BP <80 MM HG: CPT | Performed by: FAMILY MEDICINE

## 2021-12-15 PROCEDURE — 1160F RVW MEDS BY RX/DR IN RCRD: CPT | Performed by: FAMILY MEDICINE

## 2021-12-15 PROCEDURE — 99214 OFFICE O/P EST MOD 30 MIN: CPT | Performed by: FAMILY MEDICINE

## 2021-12-15 PROCEDURE — 3074F SYST BP LT 130 MM HG: CPT | Performed by: FAMILY MEDICINE

## 2021-12-15 PROCEDURE — 3008F BODY MASS INDEX DOCD: CPT | Performed by: FAMILY MEDICINE

## 2022-01-08 DIAGNOSIS — K21.9 GASTROESOPHAGEAL REFLUX DISEASE WITHOUT ESOPHAGITIS: ICD-10-CM

## 2022-01-10 RX ORDER — OMEPRAZOLE 20 MG/1
CAPSULE, DELAYED RELEASE ORAL
Qty: 60 CAPSULE | Refills: 1 | Status: SHIPPED | OUTPATIENT
Start: 2022-01-10 | End: 2022-03-21

## 2022-01-10 NOTE — TELEPHONE ENCOUNTER
Patient requesting refill(s) of:  omeprazole (PriLOSEC) 20 mg  Last filled:10/26/21  Last appt:11/30/21  Next appt:-  Pharmacy:[facility]      Patient needs apt,

## 2022-01-27 DIAGNOSIS — I10 ESSENTIAL HYPERTENSION: ICD-10-CM

## 2022-01-27 RX ORDER — AMLODIPINE BESYLATE 5 MG/1
TABLET ORAL
Qty: 30 TABLET | Refills: 5 | Status: SHIPPED | OUTPATIENT
Start: 2022-01-27 | End: 2022-08-02

## 2022-03-15 ENCOUNTER — OFFICE VISIT (OUTPATIENT)
Dept: GASTROENTEROLOGY | Facility: CLINIC | Age: 67
End: 2022-03-15
Payer: COMMERCIAL

## 2022-03-15 VITALS
HEART RATE: 65 BPM | DIASTOLIC BLOOD PRESSURE: 78 MMHG | RESPIRATION RATE: 18 BRPM | BODY MASS INDEX: 24.55 KG/M2 | SYSTOLIC BLOOD PRESSURE: 132 MMHG | OXYGEN SATURATION: 95 % | WEIGHT: 143 LBS

## 2022-03-15 DIAGNOSIS — R74.8 ELEVATED ALKALINE PHOSPHATASE LEVEL: ICD-10-CM

## 2022-03-15 DIAGNOSIS — Z12.11 SCREENING FOR COLORECTAL CANCER: ICD-10-CM

## 2022-03-15 DIAGNOSIS — Z12.12 SCREENING FOR COLORECTAL CANCER: ICD-10-CM

## 2022-03-15 DIAGNOSIS — R74.8 ELEVATED LIVER ENZYMES: Primary | ICD-10-CM

## 2022-03-15 DIAGNOSIS — K21.9 GASTROESOPHAGEAL REFLUX DISEASE, UNSPECIFIED WHETHER ESOPHAGITIS PRESENT: ICD-10-CM

## 2022-03-15 DIAGNOSIS — K76.0 HEPATIC STEATOSIS: ICD-10-CM

## 2022-03-15 PROCEDURE — 99214 OFFICE O/P EST MOD 30 MIN: CPT | Performed by: FAMILY MEDICINE

## 2022-03-15 NOTE — PROGRESS NOTES
[facility] - Outpatient Follow-up Note  Jeana Cano 78 y o  female MRN: [id number]  Encounter: [id number]          ASSESSMENT AND PLAN:      1  Elevated liver enzymes  2  Elevated alkaline phosphatase level  Patient with singularly elevated alk-phos since 03/05/21, peaking at 446 on 6/15/21 with elevated AST and ALT between 06/05/21 and 07/15/21 - GGT confirms hepatic origin  Extensive and complete hepatic workup, including labs and imaging, has been largely unremarkable  After d/cing Crestor and Mobic, she has had slow but steady down-trending liver enzymes  Most recent CMP on 10/18/21 with normal AST/ALT and near-normal alk-phos (167)  GIven negative hepatic workup, would suspect this to be a medication induced liver toxicity - Do not believe a liver biopsy is necessary a this time, unless repeat hepatic function reflects up-trending liver enzymes  Advised patient to have previously ordered hepatic function panel drawn in the next 1-2 weeks to ensure normalization of alk-phos  May consider restarting low-dose statin, given patient's other comorbidities, with close monitoring of liver enzymes  Recommend to continue having hepatic function checked at least every 6 months    - Hepatic function panel; Future    3  Hepatic steatosis  Hepatic steatosis noted on RUQ U/S (5/25/21) and MRI/MRCP (10/8/21)  US elastography (7/22/2021) with F0-F1 and S2  Advised strict control of contributing  comorbidities and weight loss through low-fat diet and cardiovascular exercise as tolerated  Also encouraged alcohol cessation  Recommend to continue having hepatic function checked at least every 6 months  4  Gastroesophageal reflux disease, unspecified whether esophagitis present  Patient with GERD well controlled on omeprazole 20 mg once daily  Denies breakthrough reflux - Continue taking PPI as prescribed   Advised patient to adhere to a strict GERD diet by avoiding traditional triggers such as spicy, saucy, citrus, greasy/fatty/fried, carbonated, caffeinated, or alcoholic foods and beverages  Also advised to try and not eat within 3 hours of lying down/bedtime  5  Screening for colorectal cancer  Cologuard 1/14/2021 negative  Repeat x3 years  Follow-up in one year or sooner if necessary  ______________________________________________________________________    SUBJECTIVE: Patient is a 78 y o  female with PMH significant for anxiety, asthma, carotid artery occlusion, COPD, HLD, and Vitamin D deficiency who presents today for follow-up regarding elevated liver enzymes  Patient was initially seen on 07/15/2021 for elevated liver enzymes and hospital follow-up regarding AMS, encephalopathy, and seizure like activity with unclear etiology on 05/24/2021  She has had singularly elevated alk-phos since 03/05/2021, peaking at 446 on 6/15 with elevated AST and ALT between 06/05/2021 and 07/15/2021  Alk phos has been steadily down-trending since      Patient has had extensive hepatic workup including celiac panel, chronic hepatitis panel, Hep A IgM, LKM antibody, ASMA, AMA, JANETH, A1AT, iron panel w/ ferritin, ceruloplasmin, IgG4 all unremarkable  Alk phos isoenzymes was elevated, but fractions were normal  Patient's GGT was markedly elevated, suggesting hepatic origin  CMV and EBV IgG were positive, with negative IgMs, suggesting previous infection  Imaging included an RUQ US on 05/25/2021 with mild hepatic steatosis, U/S elastography on 07/22/2021 with F0-F1 and S2, and MRI/MRCP with hepatic steatosis and no suspicious hepatic lesions or biliary findings  She had stopped taking Crestor/Mobic since 9/2021  Most recent CMP with down trending alk phos, almost entirely normalized       Cologuard 1/14/2021 negative  Denies family hx of colon or other GI-related cancers  Denies personal history of colonic polyps  History of GERD, well controlled with omeprazole 20 mg twice daily   States she is feeling well with no other GI-related complaints  REVIEW OF SYSTEMS IS OTHERWISE NEGATIVE        Historical Information   Past Medical History:   Diagnosis Date    Acute bronchitis     Acute pharyngitis     Anxiety state     Arthropathy of ankle and foot     and/or    Asthma     Asthma without status asthmaticus     Carotid artery occlusion     [facility] disease[facility] ([facility]     Cough     COVID-19 vaccine series completed     Disorder of shoulder     Dyspnea     Hand joint pain     Heartburn     Herpes simplex without complication     Hyperlipidemia     Infection of skin and subcutaneous tissue     Localized superficial swelling of skin     Low back pain     Lymphadenopathy     Malaise and fatigue     Neck pain     Osteoarthritis     Pleurisy without effusion or active tuberculosis     Pneumonia     Right upper quadrant pain     Shoulder joint pain     Skin eruption     Vitamin D deficiency     Vomiting      Past Surgical History:   Procedure Laterality Date    BREAST BIOPSY Left 2017 benign    CHOLECYSTECTOMY      CHOLECYSTECTOMY  03/2014    Dr Estephanie Barone     COLONOSCOPY  02/2013    WNL    FL LUMBAR PUNCTURE DIAGNOSTIC  5/25/2021    HYSTERECTOMY      Total      Social History   Social History     Substance and Sexual Activity   Alcohol Use Yes    Comment: DAILY     Social History     Substance and Sexual Activity   Drug Use Never     Social History     Tobacco Use   Smoking Status Never Smoker   Smokeless Tobacco Never Used     Family History   Problem Relation Age of Onset    Diabetes Mother         Non-insulin dependent diabetes    Emphysema Father     No Known Problems Sister     No Known Problems Daughter     No Known Problems Maternal Grandmother     No Known Problems Paternal Grandmother     No Known Problems Sister     No Known Problems Sister     No Known Problems Daughter     No Known Problems Daughter     No Known Problems Maternal Aunt Meds/Allergies       Current Outpatient Medications:     albuterol (2 5 mg/3 mL) 0 083 % nebulizer solution    ALPRAZolam (XANAX) 0 25 mg tablet    amLODIPine (NORVASC) 5 mg tablet    aspirin 81 mg chewable tablet    fluticasone-salmeterol (Advair Diskus) 500-50 mcg/dose inhaler    levocetirizine (XYZAL) 5 MG tablet    montelukast (SINGULAIR) 10 mg tablet    nystatin (MYCOSTATIN) cream    omeprazole (PriLOSEC) 20 mg delayed release capsule    promethazine-codeine (PHENERGAN WITH CODEINE) 6 25-10 mg/5 mL syrup    EPINEPHrine (EPIPEN) 0 3 mg/0 3 mL SOAJ    Allergies   Allergen Reactions    Azithromycin     Darvon [Propoxyphene]            Objective     Blood pressure 132/78, pulse 65, resp  rate 18, weight 64 9 kg (143 lb), SpO2 95 %  Body mass index is 24 55 kg/m²  PHYSICAL EXAM:      General Appearance:   Alert, cooperative, no distress   HEENT:   Normocephalic, atraumatic, anicteric  Neck:  Supple, symmetrical, trachea midline   Lungs:   Clear to auscultation bilaterally; no rales, rhonchi or wheezing; respirations unlabored    Heart[de-identified]   Regular rate and rhythm; no murmur, rub, or gallop  Abdomen:   Soft, non-tender, non-distended; normal bowel sounds; no masses, no organomegaly    Genitalia:   Deferred    Rectal:   Deferred    Extremities:  No cyanosis, clubbing or edema    Pulses:  2+ and symmetric    Skin:  No jaundice, rashes, or lesions    Lymph nodes:  No palpable cervical lymphadenopathy        Lab Results:   No visits with results within 1 Day(s) from this visit     Latest known visit with results is:   Clinical Support on 10/18/2021   Component Date Value    Ventricular Rate 10/18/2021 61     Atrial Rate 10/18/2021 61     AK Interval 10/18/2021 190     QRSD Interval 10/18/2021 84     QT Interval 10/18/2021 434     QTC Interval 10/18/2021 436     P Axis 10/18/2021 24     QRS Axis 10/18/2021 -5     T Wave Axis 10/18/2021 -10          Radiology Results:   No results found

## 2022-03-19 DIAGNOSIS — K21.9 GASTROESOPHAGEAL REFLUX DISEASE WITHOUT ESOPHAGITIS: ICD-10-CM

## 2022-03-21 RX ORDER — OMEPRAZOLE 20 MG/1
CAPSULE, DELAYED RELEASE ORAL
Qty: 60 CAPSULE | Refills: 1 | Status: SHIPPED | OUTPATIENT
Start: 2022-03-21 | End: 2022-05-22

## 2022-03-21 NOTE — TELEPHONE ENCOUNTER
Patient requesting refill(s) of: omeprazole 20 mg BID    Last filled: 1/10/2022 #60 x 1  Last appt:11/30/2021  Next appt:3/26/2022  Pharmacy: [facility]

## 2022-03-26 ENCOUNTER — OFFICE VISIT (OUTPATIENT)
Dept: FAMILY MEDICINE CLINIC | Facility: CLINIC | Age: 67
End: 2022-03-26
Payer: COMMERCIAL

## 2022-03-26 ENCOUNTER — APPOINTMENT (OUTPATIENT)
Dept: LAB | Facility: CLINIC | Age: 67
End: 2022-03-26
Payer: COMMERCIAL

## 2022-03-26 VITALS
TEMPERATURE: 97.3 F | SYSTOLIC BLOOD PRESSURE: 128 MMHG | DIASTOLIC BLOOD PRESSURE: 60 MMHG | HEIGHT: 64 IN | BODY MASS INDEX: 24.28 KG/M2 | OXYGEN SATURATION: 96 % | HEART RATE: 68 BPM | WEIGHT: 142.2 LBS | RESPIRATION RATE: 20 BRPM

## 2022-03-26 DIAGNOSIS — J45.40 MODERATE PERSISTENT ASTHMA WITHOUT COMPLICATION: ICD-10-CM

## 2022-03-26 DIAGNOSIS — K76.0 HEPATIC STEATOSIS: ICD-10-CM

## 2022-03-26 DIAGNOSIS — Z00.00 MEDICARE ANNUAL WELLNESS VISIT, INITIAL: Primary | ICD-10-CM

## 2022-03-26 DIAGNOSIS — E55.9 VITAMIN D DEFICIENCY: ICD-10-CM

## 2022-03-26 DIAGNOSIS — J98.01 BRONCHOSPASM: ICD-10-CM

## 2022-03-26 DIAGNOSIS — R74.8 ELEVATED ALKALINE PHOSPHATASE LEVEL: ICD-10-CM

## 2022-03-26 DIAGNOSIS — F41.9 ANXIETY: ICD-10-CM

## 2022-03-26 DIAGNOSIS — I10 ESSENTIAL HYPERTENSION: ICD-10-CM

## 2022-03-26 DIAGNOSIS — J30.89 NON-SEASONAL ALLERGIC RHINITIS, UNSPECIFIED TRIGGER: ICD-10-CM

## 2022-03-26 DIAGNOSIS — R74.8 ELEVATED LIVER ENZYMES: ICD-10-CM

## 2022-03-26 DIAGNOSIS — K21.9 GASTROESOPHAGEAL REFLUX DISEASE, UNSPECIFIED WHETHER ESOPHAGITIS PRESENT: ICD-10-CM

## 2022-03-26 LAB
25(OH)D3 SERPL-MCNC: 26.7 NG/ML (ref 30–100)
ALBUMIN SERPL BCP-MCNC: 3.9 G/DL (ref 3.5–5)
ALP SERPL-CCNC: 168 U/L (ref 46–116)
ALT SERPL W P-5'-P-CCNC: 35 U/L (ref 12–78)
ANION GAP SERPL CALCULATED.3IONS-SCNC: 4 MMOL/L (ref 4–13)
AST SERPL W P-5'-P-CCNC: 24 U/L (ref 5–45)
BASOPHILS # BLD AUTO: 0.06 THOUSANDS/ΜL (ref 0–0.1)
BASOPHILS NFR BLD AUTO: 1 % (ref 0–1)
BILIRUB DIRECT SERPL-MCNC: 0.11 MG/DL (ref 0–0.2)
BILIRUB SERPL-MCNC: 0.62 MG/DL (ref 0.2–1)
BUN SERPL-MCNC: 19 MG/DL (ref 5–25)
CALCIUM SERPL-MCNC: 9.8 MG/DL (ref 8.3–10.1)
CHLORIDE SERPL-SCNC: 109 MMOL/L (ref 100–108)
CHOLEST SERPL-MCNC: 233 MG/DL
CO2 SERPL-SCNC: 27 MMOL/L (ref 21–32)
CREAT SERPL-MCNC: 0.92 MG/DL (ref 0.6–1.3)
EOSINOPHIL # BLD AUTO: 0.75 THOUSAND/ΜL (ref 0–0.61)
EOSINOPHIL NFR BLD AUTO: 8 % (ref 0–6)
ERYTHROCYTE [DISTWIDTH] IN BLOOD BY AUTOMATED COUNT: 13.2 % (ref 11.6–15.1)
GFR SERPL CREATININE-BSD FRML MDRD: 64 ML/MIN/1.73SQ M
GLUCOSE P FAST SERPL-MCNC: 101 MG/DL (ref 65–99)
HCT VFR BLD AUTO: 44.2 % (ref 34.8–46.1)
HDLC SERPL-MCNC: 55 MG/DL
HGB BLD-MCNC: 14.4 G/DL (ref 11.5–15.4)
IMM GRANULOCYTES # BLD AUTO: 0.04 THOUSAND/UL (ref 0–0.2)
IMM GRANULOCYTES NFR BLD AUTO: 0 % (ref 0–2)
LDLC SERPL CALC-MCNC: 163 MG/DL (ref 0–100)
LYMPHOCYTES # BLD AUTO: 2.81 THOUSANDS/ΜL (ref 0.6–4.47)
LYMPHOCYTES NFR BLD AUTO: 30 % (ref 14–44)
MCH RBC QN AUTO: 29.1 PG (ref 26.8–34.3)
MCHC RBC AUTO-ENTMCNC: 32.6 G/DL (ref 31.4–37.4)
MCV RBC AUTO: 89 FL (ref 82–98)
MONOCYTES # BLD AUTO: 0.72 THOUSAND/ΜL (ref 0.17–1.22)
MONOCYTES NFR BLD AUTO: 8 % (ref 4–12)
NEUTROPHILS # BLD AUTO: 4.92 THOUSANDS/ΜL (ref 1.85–7.62)
NEUTS SEG NFR BLD AUTO: 53 % (ref 43–75)
NRBC BLD AUTO-RTO: 0 /100 WBCS
PLATELET # BLD AUTO: 240 THOUSANDS/UL (ref 149–390)
PMV BLD AUTO: 11.9 FL (ref 8.9–12.7)
POTASSIUM SERPL-SCNC: 4.2 MMOL/L (ref 3.5–5.3)
PROT SERPL-MCNC: 7.7 G/DL (ref 6.4–8.2)
RBC # BLD AUTO: 4.95 MILLION/UL (ref 3.81–5.12)
SODIUM SERPL-SCNC: 140 MMOL/L (ref 136–145)
TRIGL SERPL-MCNC: 77 MG/DL
WBC # BLD AUTO: 9.3 THOUSAND/UL (ref 4.31–10.16)

## 2022-03-26 PROCEDURE — 1036F TOBACCO NON-USER: CPT | Performed by: FAMILY MEDICINE

## 2022-03-26 PROCEDURE — 1170F FXNL STATUS ASSESSED: CPT | Performed by: FAMILY MEDICINE

## 2022-03-26 PROCEDURE — 99214 OFFICE O/P EST MOD 30 MIN: CPT | Performed by: FAMILY MEDICINE

## 2022-03-26 PROCEDURE — 1160F RVW MEDS BY RX/DR IN RCRD: CPT | Performed by: FAMILY MEDICINE

## 2022-03-26 PROCEDURE — 3725F SCREEN DEPRESSION PERFORMED: CPT | Performed by: FAMILY MEDICINE

## 2022-03-26 PROCEDURE — 80053 COMPREHEN METABOLIC PANEL: CPT

## 2022-03-26 PROCEDURE — 82306 VITAMIN D 25 HYDROXY: CPT

## 2022-03-26 PROCEDURE — 1125F AMNT PAIN NOTED PAIN PRSNT: CPT | Performed by: FAMILY MEDICINE

## 2022-03-26 PROCEDURE — 85025 COMPLETE CBC W/AUTO DIFF WBC: CPT

## 2022-03-26 PROCEDURE — 82248 BILIRUBIN DIRECT: CPT

## 2022-03-26 PROCEDURE — G0438 PPPS, INITIAL VISIT: HCPCS | Performed by: FAMILY MEDICINE

## 2022-03-26 PROCEDURE — 36415 COLL VENOUS BLD VENIPUNCTURE: CPT

## 2022-03-26 PROCEDURE — 1090F PRES/ABSN URINE INCON ASSESS: CPT | Performed by: FAMILY MEDICINE

## 2022-03-26 PROCEDURE — 3288F FALL RISK ASSESSMENT DOCD: CPT | Performed by: FAMILY MEDICINE

## 2022-03-26 PROCEDURE — 3074F SYST BP LT 130 MM HG: CPT | Performed by: FAMILY MEDICINE

## 2022-03-26 PROCEDURE — 80061 LIPID PANEL: CPT

## 2022-03-26 PROCEDURE — 3008F BODY MASS INDEX DOCD: CPT | Performed by: FAMILY MEDICINE

## 2022-03-26 PROCEDURE — 3078F DIAST BP <80 MM HG: CPT | Performed by: FAMILY MEDICINE

## 2022-03-26 RX ORDER — ALBUTEROL SULFATE 2.5 MG/3ML
2.5 SOLUTION RESPIRATORY (INHALATION) 4 TIMES DAILY
Qty: 180 ML | Refills: 1 | Status: SHIPPED | OUTPATIENT
Start: 2022-03-26

## 2022-03-26 NOTE — PROGRESS NOTES
Assessment/Plan:       Problem List Items Addressed This Visit     None            Subjective:      Patient ID: Shannan Lundberg is a 78 y o  female  HPI     Asthma- Currently taking Advair 1 puff BID and albuterol PRN  Albuterol twice per day  Hypertension- Currently taking amlodipine 5 mg daily  Anxiety- Currently taking Xanax 0 25 mg daily PRN  Working well, anxiety well-controlled  Some days don't take  Allergies- Currently taking Singulair 10 mg daily, Xyzal 2 5 mg daily  GERD- Prilosec 20 mg daily  The following portions of the patient's history were reviewed and updated as appropriate: allergies, current medications, past family history, past medical history, past social history, past surgical history, and problem list     Review of Systems      Objective:      /60 (BP Location: Left arm, Patient Position: Sitting, Cuff Size: Standard)   Pulse 68   Temp (!) 97 3 °F (36 3 °C)   Resp 20   Ht 5' 4" (1 626 m)   Wt 64 5 kg (142 lb 3 2 oz)   SpO2 96%   BMI 24 41 kg/m²          Physical Exam        Bertram Mcardle, DO  [facility]      Assessment and Plan:     Problem List Items Addressed This Visit     None      Visit Diagnoses     Bronchospasm               Preventive health issues were discussed with patient, and age appropriate screening tests were ordered as noted in patient's After Visit Summary  Personalized health advice and appropriate referrals for health education or preventive services given if needed, as noted in patient's After Visit Summary       History of Present Illness:     Patient presents for Medicare Annual Wellness visit    Patient Care Team:  Bertram Mcardle, DO as PCP - General (Family Medicine)  Khari Jiménez PA-C as Physician Assistant (Gastroenterology)  Aaron Pruitt MD (Gastroenterology)     Problem List:     Patient Active Problem List   Diagnosis    Hyperlipidemia LDL goal <100    Moderate asthma    Chronic kidney disease (CKD) stage G2/A1, mildly decreased glomerular filtration rate (GFR) between 60-89 mL/min/1 73 square meter and albuminuria creatinine ratio less than 30 mg/g    Essential hypertension    Non-seasonal allergic rhinitis    Right hip pain    Elevated alkaline phosphatase level    Osteopenia    Aneurysm of left internal carotid artery    Normocytic anemia    Leukocytosis    Diffuse idiopathic skeletal hyperostosis of cervical spine    Hypophosphatemia    Abnormal EKG    Elevated liver enzymes    Hepatic steatosis    Gastroesophageal reflux disease      Past Medical and Surgical History:     Past Medical History:   Diagnosis Date    Acute bronchitis     Acute pharyngitis     Anxiety state     Arthropathy of ankle and foot     and/or    Asthma     Asthma without status asthmaticus     Carotid artery occlusion     COPD [facility]chronic obstructive pulmonary disease) ([facility])     Cough     COVID-19 vaccine series completed     Disorder of shoulder     Dyspnea     Hand joint pain     Heartburn     Herpes simplex without complication     Hyperlipidemia     Infection of skin and subcutaneous tissue     Localized superficial swelling of skin     Low back pain     Lymphadenopathy     Malaise and fatigue     Neck pain     Osteoarthritis     Pleurisy without effusion or active tuberculosis     Pneumonia     Right upper quadrant pain     Shoulder joint pain     Skin eruption     Vitamin D deficiency     Vomiting      Past Surgical History:   Procedure Laterality Date    BREAST BIOPSY Left 2017 benign    CHOLECYSTECTOMY      CHOLECYSTECTOMY  03/2014    Dr Eric Mayfield     COLONOSCOPY  02/2013    WNL    FL LUMBAR PUNCTURE DIAGNOSTIC  5/25/2021    HYSTERECTOMY      Total       Family History:     Family History   Problem Relation Age of Onset    Diabetes Mother         Non-insulin dependent diabetes    Emphysema Father     No Known Problems Sister     No Known Problems Daughter     No Known Problems Maternal Grandmother     No Known Problems Paternal Grandmother     No Known Problems Sister     No Known Problems Sister     No Known Problems Daughter     No Known Problems Daughter     No Known Problems Maternal Aunt       Social History:     Social History     Socioeconomic History    Marital status:      Spouse name: None    Number of children: None    Years of education: None    Highest education level: None   Occupational History    Occupation: Homemaker   Tobacco Use    Smoking status: Never Smoker    Smokeless tobacco: Never Used   Vaping Use    Vaping Use: Never used   Substance and Sexual Activity    Alcohol use: Yes     Comment: DAILY    Drug use: Never    Sexual activity: None   Other Topics Concern    None   Social History Narrative    None     Social Determinants of Health     Financial Resource Strain: Not on file   Food Insecurity: Not on file   Transportation Needs: Not on file   Physical Activity: Not on file   Stress: Not on file   Social Connections: Not on file   Intimate Partner Violence: Not on file   Housing Stability: Not on file      Medications and Allergies:     Current Outpatient Medications   Medication Sig Dispense Refill    albuterol (2 5 mg/3 mL) 0 083 % nebulizer solution Take 1 vial (2 5 mg total) by nebulization 4 (four) times a day 120 vial 11    ALPRAZolam (XANAX) 0 25 mg tablet Take 1 tablet (0 25 mg total) by mouth daily at bedtime as needed for anxiety 30 tablet 5    amLODIPine (NORVASC) 5 mg tablet take 1 tablet by mouth once daily 30 tablet 5    aspirin 81 mg chewable tablet Chew 81 mg daily      fluticasone-salmeterol (Advair Diskus) 500-50 mcg/dose inhaler Inhale 1 puff 2 (two) times a day Rinse mouth after use   180 blister 3    levocetirizine (XYZAL) 5 MG tablet Take 0 5 tablets (2 5 mg total) by mouth every evening 90 tablet 1    montelukast (SINGULAIR) 10 mg tablet take 1 tablet by mouth at bedtime 90 tablet 3    nystatin (MYCOSTATIN) cream Apply topically 2 (two) times a day   Apply for additional 2 weeks after rash resolves  30 g 0    omeprazole (PriLOSEC) 20 mg delayed release capsule take 1 capsule by mouth twice a day 60 capsule 1    EPINEPHrine (EPIPEN) 0 3 mg/0 3 mL SOAJ Inject 0 3 mL (0 3 mg total) into a muscle once for 1 dose 2 each 1     No current facility-administered medications for this visit       Allergies   Allergen Reactions    Azithromycin     Darvon [Propoxyphene]       Immunizations:     Immunization History   Administered Date(s) Administered    COVID-19 MODERNA VACC 0 5 ML IM 04/02/2021, 05/05/2021    INFLUENZA 10/22/2015, 09/24/2018    Tdap 08/13/2015      Health Maintenance:         Topic Date Due    Breast Cancer Screening: Mammogram  10/28/2022    Colorectal Cancer Screening  01/14/2024    Hepatitis C Screening  Completed         Topic Date Due    Pneumococcal Vaccine: 65+ Years (1 of 2 - PPSV23) Never done    COVID-19 Vaccine (3 - Booster for Moderna series) 10/05/2021      Medicare Health Risk Assessment:     /60 (BP Location: Left arm, Patient Position: Sitting, Cuff Size: Standard)   Pulse 68   Temp (!) 97 3 °F (36 3 °C)   Resp 20   Ht 5' 4" (1 626 m)   Wt 64 5 kg (142 lb 3 2 oz)   SpO2 96%   BMI 24 41 kg/m²      Annual Wellness Visit    Raman Hernandez DO

## 2022-03-26 NOTE — PATIENT INSTRUCTIONS
Medicare Preventive Visit Patient Instructions  Thank you for completing your Welcome to Medicare Visit or Medicare Annual Wellness Visit today  Your next wellness visit will be due in one year (3/27/2023)  The screening/preventive services that you may require over the next 5-10 years are detailed below  Some tests may not apply to you based off risk factors and/or age  Screening tests ordered at today's visit but not completed yet may show as past due  Also, please note that scanned in results may not display below  Preventive Screenings:  Service Recommendations Previous Testing/Comments   Colorectal Cancer Screening  * Colonoscopy    * Fecal Occult Blood Test (FOBT)/Fecal Immunochemical Test (FIT)  * Fecal DNA/Cologuard Test  * Flexible Sigmoidoscopy Age: 54-65 years old   Colonoscopy: every 10 years (may be performed more frequently if at higher risk)  OR  FOBT/FIT: every 1 year  OR  Cologuard: every 3 years  OR  Sigmoidoscopy: every 5 years  Screening may be recommended earlier than age 48 if at higher risk for colorectal cancer  Also, an individualized decision between you and your healthcare provider will decide whether screening between the ages of 74-80 would be appropriate  Colonoscopy: Not on file  FOBT/FIT: Not on file  Cologuard: 01/14/2021  Sigmoidoscopy: Not on file          Breast Cancer Screening Age: 36 years old  Frequency: every 1-2 years  Not required if history of left and right mastectomy Mammogram: 10/28/2021        Cervical Cancer Screening Between the ages of 21-29, pap smear recommended once every 3 years  Between the ages of 33-67, can perform pap smear with HPV co-testing every 5 years     Recommendations may differ for women with a history of total hysterectomy, cervical cancer, or abnormal pap smears in past  Pap Smear: Not on file        Hepatitis C Screening Once for adults born between [facility]  More frequently in patients at high risk for Hepatitis C Hep C Antibody: 06/15/2021        Diabetes Screening 1-2 times per year if you're at risk for diabetes or have pre-diabetes Fasting glucose: 115 mg/dL   A1C: 5 5 %        Cholesterol Screening Once every 5 years if you don't have a lipid disorder  May order more often based on risk factors  Lipid panel: 05/25/2021          Other Preventive Screenings Covered by Medicare:  1  Abdominal Aortic Aneurysm (AAA) Screening: covered once if your at risk  You're considered to be at risk if you have a family history of AAA  2  Lung Cancer Screening: covers low dose CT scan once per year if you meet all of the following conditions: (1) Age 50-69; (2) No signs or symptoms of lung cancer; (3) Current smoker or have quit smoking within the last 15 years; (4) You have a tobacco smoking history of at least 30 pack years (packs per day multiplied by number of years you smoked); (5) You get a written order from a healthcare provider  3  Glaucoma Screening: covered annually if you're considered high risk: (1) You have diabetes OR (2) Family history of glaucoma OR (3)  aged 48 and older OR (3)  American aged 72 and older  3  Osteoporosis Screening: covered every 2 years if you meet one of the following conditions: (1) You're estrogen deficient and at risk for osteoporosis based off medical history and other findings; (2) Have a vertebral abnormality; (3) On glucocorticoid therapy for more than 3 months; (4) Have primary hyperparathyroidism; (5) On osteoporosis medications and need to assess response to drug therapy  · Last bone density test (DXA Scan): 03/15/2021   5  HIV Screening: covered annually if you're between the age of 15-65  Also covered annually if you are younger than 13 and older than 72 with risk factors for HIV infection  For pregnant patients, it is covered up to 3 times per pregnancy      Immunizations:  Immunization Recommendations   Influenza Vaccine Annual influenza vaccination during flu season is recommended for all persons aged >= 6 months who do not have contraindications   Pneumococcal Vaccine (Prevnar and Pneumovax)  * Prevnar = PCV13  * Pneumovax = PPSV23   Adults 25-60 years old: 1-3 doses may be recommended based on certain risk factors  Adults 72 years old: Prevnar (PCV13) vaccine recommended followed by Pneumovax (PPSV23) vaccine  If already received PPSV23 since turning 65, then PCV13 recommended at least one year after PPSV23 dose  Hepatitis B Vaccine 3 dose series if at intermediate or high risk (ex: diabetes, end stage renal disease, liver disease)   Tetanus (Td) Vaccine - COST NOT COVERED BY MEDICARE PART B Following completion of primary series, a booster dose should be given every 10 years to maintain immunity against tetanus  Td may also be given as tetanus wound prophylaxis  Tdap Vaccine - COST NOT COVERED BY MEDICARE PART B Recommended at least once for all adults  For pregnant patients, recommended with each pregnancy  Shingles Vaccine (Shingrix) - COST NOT COVERED BY MEDICARE PART B  2 shot series recommended in those aged 48 and above     Health Maintenance Due:      Topic Date Due    Breast Cancer Screening: Mammogram  10/28/2022    Colorectal Cancer Screening  01/14/2024    Hepatitis C Screening  Completed     Immunizations Due:      Topic Date Due    Pneumococcal Vaccine: 65+ Years (1 of 2 - PPSV23) Never done    COVID-19 Vaccine (3 - Booster for Charna Paget series) 10/05/2021     Advance Directives   What are advance directives? Advance directives are legal documents that state your wishes and plans for medical care  These plans are made ahead of time in case you lose your ability to make decisions for yourself  Advance directives can apply to any medical decision, such as the treatments you want, and if you want to donate organs  What are the types of advance directives? There are many types of advance directives, and each state has rules about how to use them   You may choose a combination of any of the following:  · Living will: This is a written record of the treatment you want  You can also choose which treatments you do not want, which to limit, and which to stop at a certain time  This includes surgery, medicine, IV fluid, and tube feedings  · Durable power of  for healthcare [place] [facility]): This is a written record that states who you want to make healthcare choices for you when you are unable to make them for yourself  This person, called a proxy, is usually a family member or a friend  You may choose more than 1 proxy  · Do not resuscitate (DNR) order:  A DNR order is used in case your heart stops beating or you stop breathing  It is a request not to have certain forms of treatment, such as CPR  A DNR order may be included in other types of advance directives  · Medical directive: This covers the care that you want if you are in a coma, near death, or unable to make decisions for yourself  You can list the treatments you want for each condition  Treatment may include pain medicine, surgery, blood transfusions, dialysis, IV or tube feedings, and a ventilator (breathing machine)  · Values history: This document has questions about your views, beliefs, and how you feel and think about life  This information can help others choose the care that you would choose  Why are advance directives important? An advance directive helps you control your care  Although spoken wishes may be used, it is better to have your wishes written down  Spoken wishes can be misunderstood, or not followed  Treatments may be given even if you do not want them  An advance directive may make it easier for your family to make difficult choices about your care  © Copyright [employer] 2018 Information is for End User's use only and may not be sold, redistributed or otherwise used for commercial purposes   All illustrations and images included in CareNotes® are the copyrighted property of A  D A M , Inc  or [facility] Preventive Visit Patient Instructions  Thank you for completing your Welcome to Medicare Visit or Medicare Annual Wellness Visit today  Your next wellness visit will be due in one year (3/27/2023)  The screening/preventive services that you may require over the next 5-10 years are detailed below  Some tests may not apply to you based off risk factors and/or age  Screening tests ordered at today's visit but not completed yet may show as past due  Also, please note that scanned in results may not display below  Preventive Screenings:  Service Recommendations Previous Testing/Comments   Colorectal Cancer Screening  * Colonoscopy    * Fecal Occult Blood Test (FOBT)/Fecal Immunochemical Test (FIT)  * Fecal DNA/Cologuard Test  * Flexible Sigmoidoscopy Age: 54-65 years old   Colonoscopy: every 10 years (may be performed more frequently if at higher risk)  OR  FOBT/FIT: every 1 year  OR  Cologuard: every 3 years  OR  Sigmoidoscopy: every 5 years  Screening may be recommended earlier than age 48 if at higher risk for colorectal cancer  Also, an individualized decision between you and your healthcare provider will decide whether screening between the ages of 74-80 would be appropriate  Colonoscopy: Not on file  FOBT/FIT: Not on file  Cologuard: 01/14/2021  Sigmoidoscopy: Not on file          Breast Cancer Screening Age: 36 years old  Frequency: every 1-2 years  Not required if history of left and right mastectomy Mammogram: 10/28/2021        Cervical Cancer Screening Between the ages of 21-29, pap smear recommended once every 3 years  Between the ages of 33-67, can perform pap smear with HPV co-testing every 5 years     Recommendations may differ for women with a history of total hysterectomy, cervical cancer, or abnormal pap smears in past  Pap Smear: Not on file        Hepatitis C Screening Once for adults born between 1945 and 1965  More frequently in patients at high risk for Hepatitis C Hep C Antibody: 06/15/2021        Diabetes Screening 1-2 times per year if you're at risk for diabetes or have pre-diabetes Fasting glucose: 115 mg/dL   A1C: 5 5 %        Cholesterol Screening Once every 5 years if you don't have a lipid disorder  May order more often based on risk factors  Lipid panel: 05/25/2021          Other Preventive Screenings Covered by Medicare:  6  Abdominal Aortic Aneurysm (AAA) Screening: covered once if your at risk  You're considered to be at risk if you have a family history of AAA  7  Lung Cancer Screening: covers low dose CT scan once per year if you meet all of the following conditions: (1) Age 50-69; (2) No signs or symptoms of lung cancer; (3) Current smoker or have quit smoking within the last 15 years; (4) You have a tobacco smoking history of at least 30 pack years (packs per day multiplied by number of years you smoked); (5) You get a written order from a healthcare provider  8  Glaucoma Screening: covered annually if you're considered high risk: (1) You have diabetes OR (2) Family history of glaucoma OR (3)  aged 48 and older OR (3)  American aged 72 and older  5  Osteoporosis Screening: covered every 2 years if you meet one of the following conditions: (1) You're estrogen deficient and at risk for osteoporosis based off medical history and other findings; (2) Have a vertebral abnormality; (3) On glucocorticoid therapy for more than 3 months; (4) Have primary hyperparathyroidism; (5) On osteoporosis medications and need to assess response to drug therapy  · Last bone density test (DXA Scan): 03/15/2021  10  HIV Screening: covered annually if you're between the age of 12-76  Also covered annually if you are younger than 13 and older than 72 with risk factors for HIV infection  For pregnant patients, it is covered up to 3 times per pregnancy      Immunizations:  Immunization Recommendations   Influenza Vaccine Annual influenza vaccination during flu season is recommended for all persons aged >= 6 months who do not have contraindications   Pneumococcal Vaccine (Prevnar and Pneumovax)  * Prevnar = PCV13  * Pneumovax = PPSV23   Adults 25-60 years old: 1-3 doses may be recommended based on certain risk factors  Adults 72 years old: Prevnar (PCV13) vaccine recommended followed by Pneumovax (PPSV23) vaccine  If already received PPSV23 since turning 65, then PCV13 recommended at least one year after PPSV23 dose  Hepatitis B Vaccine 3 dose series if at intermediate or high risk (ex: diabetes, end stage renal disease, liver disease)   Tetanus (Td) Vaccine - COST NOT COVERED BY MEDICARE PART B Following completion of primary series, a booster dose should be given every 10 years to maintain immunity against tetanus  Td may also be given as tetanus wound prophylaxis  Tdap Vaccine - COST NOT COVERED BY MEDICARE PART B Recommended at least once for all adults  For pregnant patients, recommended with each pregnancy  Shingles Vaccine (Shingrix) - COST NOT COVERED BY MEDICARE PART B  2 shot series recommended in those aged 48 and above     Health Maintenance Due:      Topic Date Due    Breast Cancer Screening: Mammogram  10/28/2022    Colorectal Cancer Screening  01/14/2024    Hepatitis C Screening  Completed     Immunizations Due:      Topic Date Due    Pneumococcal Vaccine: 65+ Years (1 of 2 - PPSV23) Never done    COVID-19 Vaccine (3 - Booster for Claire Kenning series) 10/05/2021     Advance Directives   What are advance directives? Advance directives are legal documents that state your wishes and plans for medical care  These plans are made ahead of time in case you lose your ability to make decisions for yourself  Advance directives can apply to any medical decision, such as the treatments you want, and if you want to donate organs  What are the types of advance directives?   There are many types of advance directives, and each state has rules about how to use them  You may choose a combination of any of the following:  · Living will: This is a written record of the treatment you want  You can also choose which treatments you do not want, which to limit, and which to stop at a certain time  This includes surgery, medicine, IV fluid, and tube feedings  · Durable power of  for healthcare [place] [facility]): This is a written record that states who you want to make healthcare choices for you when you are unable to make them for yourself  This person, called a proxy, is usually a family member or a friend  You may choose more than 1 proxy  · Do not resuscitate (DNR) order:  A DNR order is used in case your heart stops beating or you stop breathing  It is a request not to have certain forms of treatment, such as CPR  A DNR order may be included in other types of advance directives  · Medical directive: This covers the care that you want if you are in a coma, near death, or unable to make decisions for yourself  You can list the treatments you want for each condition  Treatment may include pain medicine, surgery, blood transfusions, dialysis, IV or tube feedings, and a ventilator (breathing machine)  · Values history: This document has questions about your views, beliefs, and how you feel and think about life  This information can help others choose the care that you would choose  Why are advance directives important? An advance directive helps you control your care  Although spoken wishes may be used, it is better to have your wishes written down  Spoken wishes can be misunderstood, or not followed  Treatments may be given even if you do not want them  An advance directive may make it easier for your family to make difficult choices about your care  © Copyright [employer] 2018 Information is for End User's use only and may not be sold, redistributed or otherwise used for commercial purposes   All illustrations and images included in CareNotes® are the copyrighted property of A D A M , Inc  or [address] [place], [place] [address]mel bandar

## 2022-03-26 NOTE — PROGRESS NOTES
Assessment and Plan:     Problem List Items Addressed This Visit        Respiratory    Moderate asthma     - Stable, albuterol use twice daily PRN and Advair scheduled  - Asthma and chronic cough, reports scarring in lungs from working in [employer] for many years, chest XR normal, faint scattered crackles on exam today   - Recommended CT chest, will combine with CTA in June due to phobia of enclosed spaces          Relevant Medications    albuterol (2 5 mg/3 mL) 0 083 % nebulizer solution    Other Relevant Orders    CT chest wo contrast       Cardiovascular and Mediastinum    Essential hypertension    Relevant Orders    CBC and differential (Completed)    Comprehensive metabolic panel (Completed)    Lipid Panel with Direct LDL reflex (Completed)      Other Visit Diagnoses     Medicare annual wellness visit, initial    -  Primary    Bronchospasm        Relevant Medications    albuterol (2 5 mg/3 mL) 0 083 % nebulizer solution    Anxiety        Vitamin D deficiency        Relevant Orders    Vitamin D 25 hydroxy (Completed)          Depression Screening and Follow-up Plan: Patient was screened for depression during today's encounter  They screened negative with a PHQ-2 score of 0  Preventive health issues were discussed with patient, and age appropriate screening tests were ordered as noted in patient's After Visit Summary  Personalized health advice and appropriate referrals for health education or preventive services given if needed, as noted in patient's After Visit Summary       History of Present Illness:     Patient presents for Medicare Annual Wellness visit    Patient Care Team:  Marlo Castro DO as PCP - General (Family Medicine)  Krista Montano PA-C as Physician Assistant (Gastroenterology)  Tonia Guzman MD (Gastroenterology)     Problem List:     Patient Active Problem List   Diagnosis    Hyperlipidemia LDL goal <100    Moderate asthma    Chronic kidney disease (CKD) stage G2/A1, mildly decreased glomerular filtration rate (GFR) between 60-89 mL/min/1 73 square meter and albuminuria creatinine ratio less than 30 mg/g    Essential hypertension    Non-seasonal allergic rhinitis    Right hip pain    Elevated alkaline phosphatase level    Osteopenia    Aneurysm of left internal carotid artery    Normocytic anemia    Leukocytosis    Diffuse idiopathic skeletal hyperostosis of cervical spine    Hypophosphatemia    Abnormal EKG    Elevated liver enzymes    Hepatic steatosis    Gastroesophageal reflux disease      Past Medical and Surgical History:     Past Medical History:   Diagnosis Date    Acute bronchitis     Acute pharyngitis     Anxiety state     Arthropathy of ankle and foot     and/or    Asthma     Asthma without status asthmaticus     Carotid artery occlusion     COPD (chronic obstructive pulmonary disease) ([facility])     Cough     COVID-19 vaccine series completed     Disorder of shoulder     Dyspnea     Hand joint pain     Heartburn     Herpes simplex without complication     Hyperlipidemia     Infection of skin and subcutaneous tissue     Localized superficial swelling of skin     Low back pain     Lymphadenopathy     Malaise and fatigue     Neck pain     Osteoarthritis     Pleurisy without effusion or active tuberculosis     Pneumonia     Right upper quadrant pain     Shoulder joint pain     Skin eruption     Vitamin D deficiency     Vomiting      Past Surgical History:   Procedure Laterality Date    BREAST BIOPSY Left 2017 benign    CHOLECYSTECTOMY      CHOLECYSTECTOMY  03/2014    Dr Kiko Davis     COLONOSCOPY  02/2013    WNL    FL LUMBAR PUNCTURE DIAGNOSTIC  5/25/2021    HYSTERECTOMY      Total       Family History:     Family History   Problem Relation Age of Onset    Diabetes Mother         Non-insulin dependent diabetes    Emphysema Father     No Known Problems Sister     No Known Problems Daughter     No Known Problems Maternal Grandmother     No Known Problems Paternal Grandmother     No Known Problems Sister     No Known Problems Sister     No Known Problems Daughter     No Known Problems Daughter     No Known Problems Maternal Aunt       Social History:     Social History     Socioeconomic History    Marital status:      Spouse name: None    Number of children: None    Years of education: None    Highest education level: None   Occupational History    Occupation: Homemaker   Tobacco Use    Smoking status: Never Smoker    Smokeless tobacco: Never Used   Vaping Use    Vaping Use: Never used   Substance and Sexual Activity    Alcohol use: Yes     Comment: DAILY    Drug use: Never    Sexual activity: None   Other Topics Concern    None   Social History Narrative    None     Social Determinants of Health     Financial Resource Strain: Not on file   Food Insecurity: Not on file   Transportation Needs: Not on file   Physical Activity: Not on file   Stress: Not on file   Social Connections: Not on file   Intimate Partner Violence: Not on file   Housing Stability: Not on file      Medications and Allergies:     Current Outpatient Medications   Medication Sig Dispense Refill    albuterol (2 5 mg/3 mL) 0 083 % nebulizer solution Take 3 mL (2 5 mg total) by nebulization 4 (four) times a day 180 mL 1    ALPRAZolam (XANAX) 0 25 mg tablet Take 1 tablet (0 25 mg total) by mouth daily at bedtime as needed for anxiety 30 tablet 5    amLODIPine (NORVASC) 5 mg tablet take 1 tablet by mouth once daily 30 tablet 5    aspirin 81 mg chewable tablet Chew 81 mg daily      fluticasone-salmeterol (Advair Diskus) 500-50 mcg/dose inhaler Inhale 1 puff 2 (two) times a day Rinse mouth after use   180 blister 3    levocetirizine (XYZAL) 5 MG tablet Take 0 5 tablets (2 5 mg total) by mouth every evening 90 tablet 1    montelukast (SINGULAIR) 10 mg tablet take 1 tablet by mouth at bedtime 90 tablet 3    nystatin (MYCOSTATIN) cream Apply topically 2 (two) times a day   Apply for additional 2 weeks after rash resolves  30 g 0    omeprazole (PriLOSEC) 20 mg delayed release capsule take 1 capsule by mouth twice a day 60 capsule 1    EPINEPHrine (EPIPEN) 0 3 mg/0 3 mL SOAJ Inject 0 3 mL (0 3 mg total) into a muscle once for 1 dose 2 each 1     No current facility-administered medications for this visit  Allergies   Allergen Reactions    Azithromycin     Darvon [Propoxyphene]       Immunizations:     Immunization History   Administered Date(s) Administered    COVID-19 MODERNA VACC 0 5 ML IM 04/02/2021, 05/05/2021    INFLUENZA 10/22/2015, 09/24/2018    Tdap 08/13/2015      Health Maintenance:         Topic Date Due    Breast Cancer Screening: Mammogram  10/28/2022    Colorectal Cancer Screening  01/14/2024    Hepatitis C Screening  Completed         Topic Date Due    Pneumococcal Vaccine: 65+ Years (1 of 2 - PPSV23) Never done    COVID-19 Vaccine (3 - Booster for Moderna series) 10/05/2021      Medicare Health Risk Assessment:     /60 (BP Location: Left arm, Patient Position: Sitting, Cuff Size: Standard)   Pulse 68   Temp (!) 97 3 °F (36 3 °C)   Resp 20   Ht 5' 4" (1 626 m)   Wt 64 5 kg (142 lb 3 2 oz)   SpO2 96%   BMI 24 41 kg/m²      Lakeisha SIERRA is here for her Initial Wellness visit  Health Risk Assessment:   Patient rates overall health as very good  Patient feels that their physical health rating is much better  Patient is very satisfied with their life  Eyesight was rated as same  Hearing was rated as same  Patient feels that their emotional and mental health rating is much better  Patients states they are never, rarely angry  Patient states they are never, rarely unusually tired/fatigued  Pain experienced in the last 7 days has been none  Patient states that she has experienced no weight loss or gain in last 6 months  Depression Screening:   PHQ-2 Score: 0  PHQ-9 Score: 0      Fall Risk Screening:    In the past year, patient has experienced: no history of falling in past year      Urinary Incontinence Screening:   Patient has not leaked urine accidently in the last six months  Home Safety:  Patient does not have trouble with stairs inside or outside of their home  Patient has working smoke alarms and has working carbon monoxide detector  Home safety hazards include: none  Nutrition:   Current diet is Regular  Medications:   Patient is currently taking over-the-counter supplements  OTC medications include: see medication list  Patient is able to manage medications  Activities of Daily Living (ADLs)/Instrumental Activities of Daily Living (IADLs):   Walk and transfer into and out of bed and chair?: Yes  Dress and groom yourself?: Yes    Bathe or shower yourself?: Yes    Feed yourself?  Yes  Do your laundry/housekeeping?: Yes  Manage your money, pay your bills and track your expenses?: Yes  Make your own meals?: Yes    Do your own shopping?: Yes    Previous Hospitalizations:   Any hospitalizations or ED visits within the last 12 months?: No      Advance Care Planning:   Living will: No    Durable POA for healthcare: No    Advanced directive: No      Comments: Daughter Nat decision maker     Cognitive Screening:   Provider or family/friend/caregiver concerned regarding cognition?: No    PREVENTIVE SCREENINGS      Cardiovascular Screening:    General: Screening Not Indicated and History Lipid Disorder      Diabetes Screening:     General: Screening Current      Colorectal Cancer Screening:     General: Screening Current      Breast Cancer Screening:     General: Screening Current      Cervical Cancer Screening:    General: Screening Not Indicated      Osteoporosis Screening:    General: Screening Current      Abdominal Aortic Aneurysm (AAA) Screening:        General: Screening Not Indicated      Lung Cancer Screening:     General: Screening Not Indicated      Hepatitis C Screening:    General: Screening Current    Screening, Brief Intervention, and Referral to Treatment (SBIRT)    Screening  Typical number of drinks in a day: 2  Typical number of drinks in a week: 10  Interpretation: Low risk drinking behavior  Single Item Drug Screening:  How often have you used an illegal drug (including marijuana) or a prescription medication for non-medical reasons in the past year? never    Single Item Drug Screen Score: 0  Interpretation: Negative screen for possible drug use disorder    Brief Intervention  Alcohol & drug use screenings were reviewed  No concerns regarding substance use disorder identified         Isela Moise, DO

## 2022-03-27 PROBLEM — F41.9 ANXIETY: Status: ACTIVE | Noted: 2022-03-27

## 2022-03-28 NOTE — ASSESSMENT & PLAN NOTE
- Stable, albuterol use twice daily PRN and Advair scheduled  - Asthma and chronic cough, reports scarring in lungs from working in [employer] [facility] for many years, chest XR normal, faint scattered crackles on exam today   - Recommended CT chest, will combine with CTA in June due to phobia of enclosed spaces

## 2022-03-28 NOTE — PROGRESS NOTES
Assessment/Plan:       Problem List Items Addressed This Visit        Digestive    Gastroesophageal reflux disease     - Continue current regimen             Respiratory    Moderate asthma     - Stable, albuterol use twice daily PRN and Advair scheduled  - Asthma and chronic cough, reports scarring in lungs from working in [employer] for many years, chest XR normal, faint scattered crackles on exam today   - Recommended CT chest, will combine with CTA in June due to phobia of enclosed spaces          Relevant Medications    albuterol (2 5 mg/3 mL) 0 083 % nebulizer solution    Other Relevant Orders    CT chest wo contrast    Non-seasonal allergic rhinitis     - Continue current regimen             Cardiovascular and Mediastinum    Essential hypertension     - Controlled   - Continue current regimen          Relevant Orders    CBC and differential (Completed)    Comprehensive metabolic panel (Completed)    Lipid Panel with Direct LDL reflex (Completed)       Other    Anxiety     - Continue current regimen            Other Visit Diagnoses     Medicare annual wellness visit, initial    -  Primary    Bronchospasm        Relevant Medications    albuterol (2 5 mg/3 mL) 0 083 % nebulizer solution    Vitamin D deficiency        Relevant Orders    Vitamin D 25 hydroxy (Completed)            Subjective:      Patient ID: Elizabeth Fuentes is a 78 y o  female  HPI     Asthma- Currently taking Advair 1 puff BID and albuterol PRN  Albuterol twice per day  States breathing has been good  Hypertension- Currently taking amlodipine 5 mg daily  Denies any chest pains  Anxiety- Currently taking Xanax 0 25 mg daily PRN  Working well, anxiety well-controlled  Some days doesn't take  Allergies- Currently taking Singulair 10 mg daily, Xyzal 2 5 mg daily  Well-controlled  GERD- Prilosec 20 mg daily  Well-controlled       The following portions of the patient's history were reviewed and updated as appropriate: allergies, current medications, past family history, past medical history, past social history, past surgical history, and problem list     Review of Systems   All other systems reviewed and are negative  Objective:      /60 (BP Location: Left arm, Patient Position: Sitting, Cuff Size: Standard)   Pulse 68   Temp (!) 97 3 °F (36 3 °C)   Resp 20   Ht 5' 4" (1 626 m)   Wt 64 5 kg (142 lb 3 2 oz)   SpO2 96%   BMI 24 41 kg/m²          Physical Exam  Vitals reviewed  Constitutional:       General: She is not in acute distress  Appearance: Normal appearance  She is not ill-appearing, toxic-appearing or diaphoretic  HENT:      Head: Normocephalic and atraumatic  Eyes:      General:         Right eye: No discharge  Left eye: No discharge  Extraocular Movements: Extraocular movements intact  Conjunctiva/sclera: Conjunctivae normal    Cardiovascular:      Rate and Rhythm: Normal rate and regular rhythm  Heart sounds: Normal heart sounds  No murmur heard  No friction rub  No gallop  Pulmonary:      Effort: Pulmonary effort is normal  No respiratory distress  Breath sounds: No stridor  No wheezing or rhonchi  Comments: Faint scattered lung crackles bilaterally   Musculoskeletal:         General: No swelling, tenderness or signs of injury  Right lower leg: No edema  Left lower leg: No edema  Skin:     General: Skin is warm  Coloration: Skin is not pale  Findings: No erythema or rash  Neurological:      Mental Status: She is alert and oriented to person, place, and time  Motor: No weakness     Psychiatric:         Mood and Affect: Mood normal          Behavior: Behavior normal              [facility] Floor, [address] [facility]

## 2022-04-01 DIAGNOSIS — R74.8 ELEVATED ALKALINE PHOSPHATASE LEVEL: Primary | ICD-10-CM

## 2022-05-22 DIAGNOSIS — K21.9 GASTROESOPHAGEAL REFLUX DISEASE WITHOUT ESOPHAGITIS: ICD-10-CM

## 2022-05-22 RX ORDER — OMEPRAZOLE 20 MG/1
CAPSULE, DELAYED RELEASE ORAL
Qty: 60 CAPSULE | Refills: 1 | Status: SHIPPED | OUTPATIENT
Start: 2022-05-22

## 2022-06-02 ENCOUNTER — APPOINTMENT (OUTPATIENT)
Dept: LAB | Facility: CLINIC | Age: 67
End: 2022-06-02
Payer: COMMERCIAL

## 2022-06-02 DIAGNOSIS — Z01.818 PRE-PROCEDURAL EXAMINATION: ICD-10-CM

## 2022-06-02 LAB
BUN SERPL-MCNC: 20 MG/DL (ref 5–25)
CREAT SERPL-MCNC: 0.9 MG/DL (ref 0.6–1.3)
GFR SERPL CREATININE-BSD FRML MDRD: 65 ML/MIN/1.73SQ M

## 2022-06-02 PROCEDURE — 84520 ASSAY OF UREA NITROGEN: CPT

## 2022-06-02 PROCEDURE — 36415 COLL VENOUS BLD VENIPUNCTURE: CPT

## 2022-06-02 PROCEDURE — 82565 ASSAY OF CREATININE: CPT

## 2022-06-10 ENCOUNTER — HOSPITAL ENCOUNTER (OUTPATIENT)
Dept: CT IMAGING | Facility: HOSPITAL | Age: 67
Discharge: HOME | End: 2022-06-10
Payer: COMMERCIAL

## 2022-06-10 DIAGNOSIS — I67.1 ANEURYSM OF LEFT INTERNAL CAROTID ARTERY: ICD-10-CM

## 2022-06-10 DIAGNOSIS — J45.40 MODERATE PERSISTENT ASTHMA WITHOUT COMPLICATION: ICD-10-CM

## 2022-06-10 PROCEDURE — 71250 CT THORAX DX C-: CPT

## 2022-06-10 PROCEDURE — G1004 CDSM NDSC: HCPCS

## 2022-06-10 PROCEDURE — 70496 CT ANGIOGRAPHY HEAD: CPT

## 2022-06-10 PROCEDURE — 70498 CT ANGIOGRAPHY NECK: CPT

## 2022-06-10 RX ADMIN — IOHEXOL 65 ML: 350 INJECTION, SOLUTION INTRAVENOUS at 13:09

## 2022-06-26 ENCOUNTER — HOSPITAL ENCOUNTER (EMERGENCY)
Facility: HOSPITAL | Age: 67
Discharge: HOME | End: 2022-06-26
Attending: EMERGENCY MEDICINE
Payer: COMMERCIAL

## 2022-06-26 VITALS
BODY MASS INDEX: 24.24 KG/M2 | DIASTOLIC BLOOD PRESSURE: 69 MMHG | OXYGEN SATURATION: 97 % | TEMPERATURE: 97.2 F | SYSTOLIC BLOOD PRESSURE: 143 MMHG | RESPIRATION RATE: 16 BRPM | WEIGHT: 142 LBS | HEART RATE: 73 BPM | HEIGHT: 64 IN

## 2022-06-26 DIAGNOSIS — R04.0 LEFT-SIDED EPISTAXIS: Primary | ICD-10-CM

## 2022-06-26 PROCEDURE — 99284 EMERGENCY DEPT VISIT MOD MDM: CPT | Performed by: EMERGENCY MEDICINE

## 2022-06-26 PROCEDURE — 30903 CONTROL OF NOSEBLEED: CPT | Performed by: EMERGENCY MEDICINE

## 2022-06-26 PROCEDURE — 99283 EMERGENCY DEPT VISIT LOW MDM: CPT

## 2022-06-26 RX ORDER — TRANEXAMIC ACID 100 MG/ML
1000 INJECTION, SOLUTION INTRAVENOUS ONCE
Status: COMPLETED | OUTPATIENT
Start: 2022-06-26 | End: 2022-06-26

## 2022-06-26 RX ADMIN — TRANEXAMIC ACID 1000 MG: 100 INJECTION INTRAVENOUS at 12:38

## 2022-06-26 RX ADMIN — SILVER NITRATE APPLICATORS 1 APPLICATOR: 25; 75 STICK TOPICAL at 12:39

## 2022-06-26 NOTE — ED PROVIDER NOTES
History  Chief Complaint   Patient presents with   [id number] [facility]     Patient is a 70-year-old female who presents for evaluation of epistaxis this  Patient says that the bleeding started around 10:00 a m  Nikia Graham She says it is coming from the left nostril  She says that has not stopped since then but it has seemed to slow down  She denies any lightheadedness, dizziness, nausea, vomiting, chest pain, shortness of breath  Her vitals are within normal limits  On exam, there is no active bleeding at this time  There is no obvious trauma to the nose there is no septal hematoma  The patient is on baby aspirin  Prior to Admission Medications   Prescriptions Last Dose Informant Patient Reported? Taking? ALPRAZolam (XANAX) 0 25 mg tablet   No No   Sig: Take 1 tablet (0 25 mg total) by mouth daily at bedtime as needed for anxiety   EPINEPHrine (EPIPEN) 0 3 mg/0 3 mL SOAJ  Self No No   Sig: Inject 0 3 mL (0 3 mg total) into a muscle once for 1 dose   albuterol (2 5 mg/3 mL) 0 083 % nebulizer solution   No No   Sig: Take 3 mL (2 5 mg total) by nebulization 4 (four) times a day   amLODIPine (NORVASC) 5 mg tablet   No No   Sig: take 1 tablet by mouth once daily   aspirin 81 mg chewable tablet  Self Yes No   Sig: Chew 81 mg daily   fluticasone-salmeterol (Advair Diskus) 500-50 mcg/dose inhaler   No No   Sig: Inhale 1 puff 2 (two) times a day Rinse mouth after use  levocetirizine (XYZAL) 5 MG tablet   No No   Sig: Take 0 5 tablets (2 5 mg total) by mouth every evening   montelukast (SINGULAIR) 10 mg tablet   No No   Sig: take 1 tablet by mouth at bedtime   nystatin (MYCOSTATIN) cream  Self No No   Sig: Apply topically 2 (two) times a day   Apply for additional 2 weeks after rash resolves     omeprazole (PriLOSEC) 20 mg delayed release capsule   No No   Sig: take 1 capsule by mouth twice a day      Facility-Administered Medications: None       Past Medical History:   Diagnosis Date    Acute bronchitis     Acute Hypertension is well controlled. .  Continue current treatment regimen.  Dietary sodium restriction.  Weight loss.  Regular aerobic exercise.  Blood pressure will be reassessed at the next regular appointment.   pharyngitis     Anxiety state     Arthropathy of ankle and foot     and/or    Asthma     Asthma without status asthmaticus     Carotid artery occlusion     COPD (chronic obstructive pulmonary disease) (HCC)     Cough     COVID-19 vaccine series completed     Disorder of shoulder     Dyspnea     Hand joint pain     Heartburn     Herpes simplex without complication     Hyperlipidemia     Infection of skin and subcutaneous tissue     Localized superficial swelling of skin     Low back pain     Lymphadenopathy     Malaise and fatigue     Neck pain     Osteoarthritis     Pleurisy without effusion or active tuberculosis     Pneumonia     Right upper quadrant pain     Shoulder joint pain     Skin eruption     Vitamin D deficiency     Vomiting        Past Surgical History:   Procedure Laterality Date    BREAST BIOPSY Left 2017 benign    CHOLECYSTECTOMY      CHOLECYSTECTOMY  03/2014    Dr Essence Álvarez     COLONOSCOPY  02/2013    WNL    FL LUMBAR PUNCTURE DIAGNOSTIC  5/25/2021    HYSTERECTOMY      Total        Family History   Problem Relation Age of Onset    Diabetes Mother         Non-insulin dependent diabetes    Emphysema Father     No Known Problems Sister     No Known Problems Daughter     No Known Problems Maternal Grandmother     No Known Problems Paternal Grandmother     No Known Problems Sister     No Known Problems Sister     No Known Problems Daughter     No Known Problems Daughter     No Known Problems Maternal Aunt      I have reviewed and agree with the history as documented  E-Cigarette/Vaping    E-Cigarette Use Never User      E-Cigarette/Vaping Substances     Social History     Tobacco Use    Smoking status: Never Smoker    Smokeless tobacco: Never Used   Vaping Use    Vaping Use: Never used   Substance Use Topics    Alcohol use: Yes     Comment: DAILY    Drug use: Never       Review of Systems   Constitutional: Negative for fever and unexpected weight change  HENT: Positive for nosebleeds  Negative for congestion, ear pain, sore throat and trouble swallowing  Eyes: Negative for pain and redness  Respiratory: Negative for cough, chest tightness and shortness of breath  Cardiovascular: Negative for chest pain and leg swelling  Gastrointestinal: Negative for abdominal distention, abdominal pain, diarrhea and vomiting  Endocrine: Negative for polyuria  Genitourinary: Negative for dysuria, hematuria, pelvic pain and vaginal bleeding  Musculoskeletal: Negative for back pain and myalgias  Skin: Negative for rash  Neurological: Negative for dizziness, syncope, weakness, light-headedness and headaches  Physical Exam  Physical Exam  Vitals and nursing note reviewed  Constitutional:       General: She is not in acute distress  Appearance: She is well-developed  HENT:      Head: Normocephalic and atraumatic  Right Ear: External ear normal       Left Ear: External ear normal       Nose: No nasal deformity or signs of injury  Right Nostril: No foreign body, epistaxis or septal hematoma  Left Nostril: Epistaxis present  No foreign body or septal hematoma  Mouth/Throat:      Mouth: Mucous membranes are moist       Pharynx: No oropharyngeal exudate or posterior oropharyngeal erythema  Eyes:      Extraocular Movements: Extraocular movements intact  Conjunctiva/sclera: Conjunctivae normal       Pupils: Pupils are equal, round, and reactive to light  Cardiovascular:      Rate and Rhythm: Normal rate and regular rhythm  Heart sounds: Normal heart sounds  No murmur heard  No friction rub  No gallop  Pulmonary:      Effort: Pulmonary effort is normal  No respiratory distress  Breath sounds: Normal breath sounds  No wheezing or rales  Abdominal:      General: There is no distension  Palpations: Abdomen is soft  Tenderness: There is no abdominal tenderness  There is no guarding     Musculoskeletal: General: No swelling, tenderness or deformity  Normal range of motion  Cervical back: Normal range of motion and neck supple  Lymphadenopathy:      Cervical: No cervical adenopathy  Skin:     General: Skin is warm and dry  Neurological:      General: No focal deficit present  Mental Status: She is alert and oriented to person, place, and time  Mental status is at baseline  Cranial Nerves: No cranial nerve deficit  Sensory: No sensory deficit  Motor: No weakness or abnormal muscle tone  Coordination: Coordination normal          Vital Signs  ED Triage Vitals [06/26/22 1208]   Temperature Pulse Respirations Blood Pressure SpO2   (!) 97 2 °F (36 2 °C) 73 16 143/69 97 %      Temp Source Heart Rate Source Patient Position - Orthostatic VS BP Location FiO2 (%)   Tympanic Monitor Sitting Right arm --      Pain Score       No Pain           Vitals:    06/26/22 1208   BP: 143/69   Pulse: 73   Patient Position - Orthostatic VS: Sitting         Visual Acuity      ED Medications  Medications   tranexamic Acid 1000 MG/10ML injection 1,000 mg (1,000 mg Nasal Given by Other 6/26/22 1238)   silver nitrate-potassium nitrate (ARZOL SILVER NITRATE) 96-46 % applicator 1 applicator (1 applicator Topical Given by Other 6/26/22 1239)       Diagnostic Studies  Results Reviewed     None                 No orders to display              Procedures  Epistaxis management    Date/Time: 6/26/2022 2:00 PM  Performed by: Mick Rosas DO  Authorized by: Mick Rosas DO   Universal Protocol:  Consent given by: patient  Patient identity confirmed: verbally with patient      Patient location:  ED  Anesthesia (see MAR for exact dosages):      Anesthesia method:  None  Procedure details:     Treatment site:  L anterior    Approach:  External    Spec Headlamp used: No      Treatment complexity:  Limited    Treatment episode: initial    Post-procedure details:     Assessment:  Bleeding stopped    Patient tolerance of procedure: Tolerated well, no immediate complications             ED Course  ED Course as of 06/26/22 1405   Sun Jun 26, 2022   1322 Packing was placed in ED  bleeding is controlled  Will discharge with ENT follow-up                                             [facility]  Number of Diagnoses or Management Options  Left-sided epistaxis  Diagnosis management comments: 74 yo F presenting for nose bleed  Started at 10am   On daily ASA  No lightheadedness, dizziness, sob  Vitals Normal  Applied TXA and 7 nitrate  Bleeding reoccurred  Packing was placed with controlled bleeding  Patient given ENT follow-up  Told to return to the ED if she is not able to be seen within 3 days      Disposition  Final diagnoses:   Left-sided epistaxis     Time reflects when diagnosis was documented in both MDM as applicable and the Disposition within this note     Time User Action Codes Description Comment    6/26/2022  1:22 PM Helen Singer Add [R04 0] Left-sided epistaxis       ED Disposition     ED Disposition   Discharge    Condition   Stable    Date/Time   Sun Jun 26, 2022  1:22 PM    Comment   Lakeisha Trejo discharge to home/self care                 Follow-up Information     Follow up With Specialties Details Why Contact Info Rickey Anton 58, DO Otolaryngology Schedule an appointment as soon as possible for a visit in 3 days For follow up of nose bleed [facility] [address] Emergency Department Emergency Medicine Go to  If symptoms worsen, if you are unable to follow up within 3 days [address]  [address] [place] [phone]  [facility] Emergency Department, [address] [place] [address] Araceli [place], [address]          Patient's Medications   Discharge Prescriptions    No medications on file           PDMP Review       Value Time User    PDMP Reviewed  Yes 10/18/2021  9:21 AM Candy Pisano Keegan Paulino          ED Provider  Electronically Signed by           Shaw Cole,   06/26/22 6742

## 2022-07-09 ENCOUNTER — OFFICE VISIT (OUTPATIENT)
Dept: FAMILY MEDICINE CLINIC | Facility: CLINIC | Age: 67
End: 2022-07-09
Payer: COMMERCIAL

## 2022-07-09 VITALS
HEART RATE: 60 BPM | SYSTOLIC BLOOD PRESSURE: 138 MMHG | WEIGHT: 133.8 LBS | DIASTOLIC BLOOD PRESSURE: 74 MMHG | RESPIRATION RATE: 18 BRPM | TEMPERATURE: 97.1 F | OXYGEN SATURATION: 98 % | HEIGHT: 64 IN | BODY MASS INDEX: 22.84 KG/M2

## 2022-07-09 DIAGNOSIS — J98.4 SMALL AIRWAYS DISEASE: ICD-10-CM

## 2022-07-09 DIAGNOSIS — F41.9 ANXIETY: ICD-10-CM

## 2022-07-09 DIAGNOSIS — E55.9 VITAMIN D DEFICIENCY: ICD-10-CM

## 2022-07-09 DIAGNOSIS — J45.40 MODERATE PERSISTENT ASTHMA WITHOUT COMPLICATION: Primary | ICD-10-CM

## 2022-07-09 DIAGNOSIS — R59.0 HILAR ADENOPATHY: ICD-10-CM

## 2022-07-09 DIAGNOSIS — I10 ESSENTIAL HYPERTENSION: ICD-10-CM

## 2022-07-09 PROCEDURE — 99214 OFFICE O/P EST MOD 30 MIN: CPT | Performed by: FAMILY MEDICINE

## 2022-07-09 PROCEDURE — 1160F RVW MEDS BY RX/DR IN RCRD: CPT | Performed by: FAMILY MEDICINE

## 2022-07-09 RX ORDER — ALPRAZOLAM 0.25 MG/1
0.25 TABLET ORAL
Qty: 30 TABLET | Refills: 1 | Status: SHIPPED | OUTPATIENT
Start: 2022-07-09

## 2022-07-09 NOTE — ASSESSMENT & PLAN NOTE
- CT findings reviewed with patient, discussed meeting with pulmonology and she declines at this time given she feels so well  - Suspicion of infection/TB very low given asymptomatic

## 2022-07-09 NOTE — PROGRESS NOTES
Assessment/Plan:       Problem List Items Addressed This Visit        Respiratory    Moderate asthma - Primary     - Well-controlled Advair and albuterol PRN, rare use            Small airways disease     - CT findings reviewed with patient, discussed meeting with pulmonology and she declines at this time given she feels so well  - Suspicion of infection/TB very low given asymptomatic            Hilar adenopathy     - Unclear etiology and not seen on CT chest, also small cavitary lesion, will consider TB testing and repeat CT chest to follow   - No voice change or any complaints, defer ENT referral at this time               Cardiovascular and Mediastinum    Essential hypertension     - Well-controlled continue current regimen            Relevant Orders    CBC and differential    Comprehensive metabolic panel    Lipid Panel with Direct LDL reflex    TSH, 3rd generation with Free T4 reflex       Other    Anxiety    Relevant Medications    ALPRAZolam (XANAX) 0 25 mg tablet      Other Visit Diagnoses     Vitamin D deficiency        Relevant Orders    Vitamin D 25 hydroxy            Blood work prior to next appointment  Subjective:      Patient ID: Fiona Montenegro is a 75 y o  female  HPI     Moderate asthma-     CT chest showed tree in bud pulmonary opacities, some focal thickening of the right minor fissure with an associated small subpleural bleb  Mild biapical pleural parenchymal fibronodular changes are present  Suggestive of small airway disease  1 1 cm right hilar lymph node seen on CT head neck same day, as well as small cavitary lesion RUL and incidentally saw evidence of left vocal cord paralysis  She notes no shortness of breath, cough, fevers or chills  She actually is feeling great, feeling much better recently and walking 3 miles every other day  She has history of working in [employer] for many years, reports met with pulmonology 5-6 years ago, told she has scarring in lungs from this  She reports no trouble speaking or voice changes  Otherwise no complaints, anxiety well-controlled with very rare Xanax use  The following portions of the patient's history were reviewed and updated as appropriate: allergies, current medications, past family history, past medical history, past social history, past surgical history, and problem list     Review of Systems   All other systems reviewed and are negative  Objective:      /74   Pulse 60   Temp (!) 97 1 °F (36 2 °C)   Resp 18   Ht 5' 4" (1 626 m)   Wt 60 7 kg (133 lb 12 8 oz)   SpO2 98%   BMI 22 97 kg/m²          Physical Exam  Vitals reviewed  Constitutional:       General: She is not in acute distress  Appearance: Normal appearance  She is not ill-appearing, toxic-appearing or diaphoretic  HENT:      Head: Normocephalic and atraumatic  Eyes:      General:         Right eye: No discharge  Left eye: No discharge  Extraocular Movements: Extraocular movements intact  Conjunctiva/sclera: Conjunctivae normal    Cardiovascular:      Rate and Rhythm: Normal rate and regular rhythm  Heart sounds: Murmur heard  Systolic murmur is present with a grade of 1/6  No friction rub  No gallop  Pulmonary:      Effort: Pulmonary effort is normal  No respiratory distress  Breath sounds: No stridor  No wheezing or rhonchi  Comments: Diminished breath sounds bases, bibasilar crackles   Musculoskeletal:         General: No swelling, tenderness or signs of injury  Right lower leg: No edema  Left lower leg: No edema  Skin:     General: Skin is warm  Coloration: Skin is not pale  Findings: No erythema or rash  Neurological:      Mental Status: She is alert and oriented to person, place, and time  Motor: No weakness     Psychiatric:         Mood and Affect: Mood normal          Behavior: Behavior normal              Criselda Porter DO  [facility]

## 2022-07-09 NOTE — ASSESSMENT & PLAN NOTE
- Unclear etiology and not seen on CT chest, also small cavitary lesion, will consider TB testing and repeat CT chest to follow   - No voice change or any complaints, defer ENT referral at this time

## 2022-07-21 DIAGNOSIS — J30.1 ALLERGIC RHINITIS DUE TO POLLEN, UNSPECIFIED SEASONALITY: ICD-10-CM

## 2022-07-21 RX ORDER — MONTELUKAST SODIUM 10 MG/1
TABLET ORAL
Qty: 90 TABLET | Refills: 3 | Status: SHIPPED | OUTPATIENT
Start: 2022-07-21

## 2022-07-22 ENCOUNTER — TELEPHONE (OUTPATIENT)
Dept: FAMILY MEDICINE CLINIC | Facility: CLINIC | Age: 67
End: 2022-07-22

## 2022-07-22 NOTE — TELEPHONE ENCOUNTER
Please let the patient know I was further reviewing her CT chest result and what could cause those changes we are seeing in her lungs- I know she reported she is feeling well, but sometimes the changes we see in her lungs can be a sign of an autoimmune condition, as well as a type of chronic bacterial infections in the lungs   I know she was hesitant before, but would she be open to looking into further testing/meeting with a pulmonologist?

## 2022-07-26 NOTE — TELEPHONE ENCOUNTER
Spoke with patient and she said no she is not seeing a pulmonologist  She worked in [employer] for years and it is from that

## 2022-07-28 ENCOUNTER — OFFICE VISIT (OUTPATIENT)
Dept: URGENT CARE | Facility: CLINIC | Age: 67
End: 2022-07-28
Payer: COMMERCIAL

## 2022-07-28 VITALS
OXYGEN SATURATION: 97 % | DIASTOLIC BLOOD PRESSURE: 60 MMHG | TEMPERATURE: 98.2 F | HEART RATE: 68 BPM | HEIGHT: 64 IN | RESPIRATION RATE: 18 BRPM | BODY MASS INDEX: 22.71 KG/M2 | WEIGHT: 133 LBS | SYSTOLIC BLOOD PRESSURE: 150 MMHG

## 2022-07-28 DIAGNOSIS — N39.0 URINARY TRACT INFECTION WITHOUT HEMATURIA, SITE UNSPECIFIED: ICD-10-CM

## 2022-07-28 DIAGNOSIS — R10.9 FLANK PAIN: Primary | ICD-10-CM

## 2022-07-28 LAB
SL AMB  POCT GLUCOSE, UA: ABNORMAL
SL AMB LEUKOCYTE ESTERASE,UA: ABNORMAL
SL AMB POCT BILIRUBIN,UA: ABNORMAL
SL AMB POCT BLOOD,UA: ABNORMAL
SL AMB POCT CLARITY,UA: CLEAR
SL AMB POCT COLOR,UA: YELLOW
SL AMB POCT KETONES,UA: ABNORMAL
SL AMB POCT NITRITE,UA: ABNORMAL
SL AMB POCT PH,UA: 6
SL AMB POCT SPECIFIC GRAVITY,UA: 1
SL AMB POCT URINE PROTEIN: ABNORMAL
SL AMB POCT UROBILINOGEN: 0.2

## 2022-07-28 PROCEDURE — 87086 URINE CULTURE/COLONY COUNT: CPT | Performed by: PHYSICIAN ASSISTANT

## 2022-07-28 PROCEDURE — 99213 OFFICE O/P EST LOW 20 MIN: CPT | Performed by: PHYSICIAN ASSISTANT

## 2022-07-28 PROCEDURE — S9083 URGENT CARE CENTER GLOBAL: HCPCS | Performed by: PHYSICIAN ASSISTANT

## 2022-07-28 PROCEDURE — 81002 URINALYSIS NONAUTO W/O SCOPE: CPT | Performed by: PHYSICIAN ASSISTANT

## 2022-07-28 RX ORDER — NITROFURANTOIN 25; 75 MG/1; MG/1
100 CAPSULE ORAL 2 TIMES DAILY
Qty: 10 CAPSULE | Refills: 0 | Status: SHIPPED | OUTPATIENT
Start: 2022-07-28 | End: 2022-08-02

## 2022-07-28 NOTE — TELEPHONE ENCOUNTER
Patient called with complaints of Flank Pain  Was at urgent care today for this  Said they did not do anything for her there  MR spoke to Kirill and note was reviewed  Reviewed urgent care note and saw POCT urine dip was done and there was small leukocytes  No medication was sent to the pharmacy  [place] checked patient's recent labs and kidney function  Suggested Macrobid be sent to the pharmacy       Macrobid sent back to available provider for approval

## 2022-07-28 NOTE — PROGRESS NOTES
[address] [facility]      NAME: Nemesio Zamudio is a 75 y o  female  : 1955    MRN: [id number]  DATE: 2022  TIME: 2:39 PM    Assessment and Plan   Flank pain [R10 9]  1  Flank pain  POCT urine dip    Urine culture       Patient Instructions   Urine dip not overly impressive for UTI, will send to lab to confirm  NO blood  Suspect possible kidney stone  Advised ER if pain worsens  Otherwise follow up with PCP in 1-2 days  Also educated to watch for a rash as sometimes pain starts before the rash develops in shingles  Patient agreeable to plan  To present to the ER if symptoms worsen  Chief Complaint     Chief Complaint   Patient presents with    Flank Pain     Right side  Started yesterday  No trauma to the area         History of Present Illness   Lakeisha Trejo presents to the clinic c/o    Flank Pain  This is a new problem  The current episode started yesterday  The problem occurs intermittently  The problem has been waxing and waning since onset  Pain location: right  Quality: sharp  The pain is at a severity of 8/10  The pain is moderate  The pain is the same all the time  The symptoms are aggravated by position  Pertinent negatives include no abdominal pain, bladder incontinence, bowel incontinence, chest pain, dysuria, fever, headaches, numbness or tingling  She has tried NSAIDs for the symptoms  The treatment provided no relief  Review of Systems   Review of Systems   Constitutional: Negative for chills, diaphoresis, fatigue and fever  HENT: Negative for congestion, ear discharge, ear pain and facial swelling  Eyes: Negative for photophobia, pain, discharge, redness, itching and visual disturbance  Respiratory: Negative for apnea, cough, chest tightness, shortness of breath and wheezing  Cardiovascular: Negative for chest pain and palpitations  Gastrointestinal: Negative for abdominal pain and bowel incontinence  Genitourinary: Positive for flank pain   Negative for bladder incontinence, difficulty urinating, dysuria, frequency and urgency  Skin: Negative for color change, rash and wound  Neurological: Negative for dizziness, tingling, numbness and headaches  Hematological: Negative for adenopathy  Current Medications     Long-Term Medications   Medication Sig Dispense Refill    ALPRAZolam (XANAX) 0 25 mg tablet Take 1 tablet (0 25 mg total) by mouth daily at bedtime as needed for anxiety 30 tablet 1    amLODIPine (NORVASC) 5 mg tablet take 1 tablet by mouth once daily 30 tablet 5    aspirin 81 mg chewable tablet Chew 81 mg daily      fluticasone-salmeterol (Advair Diskus) 500-50 mcg/dose inhaler Inhale 1 puff 2 (two) times a day Rinse mouth after use  180 blister 3    levocetirizine (XYZAL) 5 MG tablet Take 0 5 tablets (2 5 mg total) by mouth every evening 90 tablet 1    montelukast (SINGULAIR) 10 mg tablet take 1 tablet by mouth at bedtime 90 tablet 3    nystatin (MYCOSTATIN) cream Apply topically 2 (two) times a day   Apply for additional 2 weeks after rash resolves   30 g 0    omeprazole (PriLOSEC) 20 mg delayed release capsule take 1 capsule by mouth twice a day 60 capsule 1    EPINEPHrine (EPIPEN) 0 3 mg/0 3 mL SOAJ Inject 0 3 mL (0 3 mg total) into a muscle once for 1 dose 2 each 1       Current Allergies     Allergies as of 07/28/2022 - Reviewed 07/28/2022   Allergen Reaction Noted    Azithromycin  09/13/2019    Darvon [propoxyphene]  09/13/2019            The following portions of the patient's history were reviewed and updated as appropriate: allergies, current medications, past family history, past medical history, past social history, past surgical history and problem list   Past Medical History:   Diagnosis Date    Acute bronchitis     Acute pharyngitis     Anxiety state     Arthropathy of ankle and foot     and/or    Asthma     Asthma without status asthmaticus     Carotid artery occlusion     COPD (chronic obstructive pulmonary disease) ([facility] )     Cough     COVID-19 vaccine series completed     Disorder of shoulder     Dyspnea     Hand joint pain     Heartburn     Herpes simplex without complication     Hyperlipidemia     Infection of skin and subcutaneous tissue     Localized superficial swelling of skin     Low back pain     Lymphadenopathy     Malaise and fatigue     Neck pain     Osteoarthritis     Pleurisy without effusion or active tuberculosis     Pneumonia     Right upper quadrant pain     Shoulder joint pain     Skin eruption     Vitamin D deficiency     Vomiting      Past Surgical History:   Procedure Laterality Date    BREAST BIOPSY Left 2017 benign    CHOLECYSTECTOMY      CHOLECYSTECTOMY  03/2014    Dr Tabatha Mackey COLONOSCOPY  02/2013    WNL    FL LUMBAR PUNCTURE DIAGNOSTIC  05/25/2021    HYSTERECTOMY      Total     NASAL POLYP EXCISION Left 2009    with lysis of intranasal adhesions from packing     Social History     Socioeconomic History    Marital status:       Spouse name: Not on file    Number of children: Not on file    Years of education: Not on file    Highest education level: Not on file   Occupational History    Occupation: Homemaker   Tobacco Use    Smoking status: Never Smoker    Smokeless tobacco: Never Used   Vaping Use    Vaping Use: Never used   Substance and Sexual Activity    Alcohol use: Yes     Comment: DAILY    Drug use: Never    Sexual activity: Not on file   Other Topics Concern    Not on file   Social History Narrative    Not on file     Social Determinants of Health     Financial Resource Strain: Not on file   Food Insecurity: Not on file   Transportation Needs: Not on file   Physical Activity: Not on file   Stress: Not on file   Social Connections: Not on file   Intimate Partner Violence: Not on file   Housing Stability: Not on file       Objective   /60   Pulse 68   Temp 98 2 °F (36 8 °C)   Resp 18   Ht 5' 4" (1 626 m)   Wt 60 3 kg (133 lb) SpO2 97%   BMI 22 83 kg/m²      Physical Exam     Physical Exam  Vitals and nursing note reviewed  Constitutional:       General: She is not in acute distress  Appearance: She is well-developed  She is not diaphoretic  HENT:      Head: Normocephalic and atraumatic  Right Ear: External ear normal       Left Ear: External ear normal       Nose: Nose normal    Eyes:      General: No scleral icterus  Right eye: No discharge  Left eye: No discharge  Conjunctiva/sclera: Conjunctivae normal    Cardiovascular:      Rate and Rhythm: Normal rate and regular rhythm  Heart sounds: Normal heart sounds  No murmur heard  No friction rub  No gallop  Pulmonary:      Effort: Pulmonary effort is normal  No respiratory distress  Breath sounds: Normal breath sounds  No decreased breath sounds, wheezing, rhonchi or rales  Abdominal:      General: Bowel sounds are normal  There is no distension  Palpations: Abdomen is soft  Tenderness: There is no abdominal tenderness  There is no right CVA tenderness, left CVA tenderness, guarding or rebound  Skin:     General: Skin is warm and dry  Coloration: Skin is not pale  Findings: No erythema or rash  Neurological:      Mental Status: She is alert and oriented to person, place, and time  Psychiatric:         Behavior: Behavior normal          Thought Content:  Thought content normal          Judgment: Judgment normal          Syed Bill PA-C

## 2022-07-29 LAB — BACTERIA UR CULT: NORMAL

## 2022-08-01 DIAGNOSIS — I10 ESSENTIAL HYPERTENSION: ICD-10-CM

## 2022-08-02 ENCOUNTER — OFFICE VISIT (OUTPATIENT)
Dept: FAMILY MEDICINE CLINIC | Facility: CLINIC | Age: 67
End: 2022-08-02
Payer: COMMERCIAL

## 2022-08-02 ENCOUNTER — APPOINTMENT (OUTPATIENT)
Dept: LAB | Facility: CLINIC | Age: 67
End: 2022-08-02
Payer: COMMERCIAL

## 2022-08-02 ENCOUNTER — HOSPITAL ENCOUNTER (OUTPATIENT)
Dept: CT IMAGING | Facility: HOSPITAL | Age: 67
Discharge: HOME | End: 2022-08-02
Attending: INTERNAL MEDICINE
Payer: COMMERCIAL

## 2022-08-02 VITALS
HEART RATE: 59 BPM | SYSTOLIC BLOOD PRESSURE: 116 MMHG | BODY MASS INDEX: 22.88 KG/M2 | RESPIRATION RATE: 18 BRPM | OXYGEN SATURATION: 99 % | WEIGHT: 134 LBS | DIASTOLIC BLOOD PRESSURE: 62 MMHG | HEIGHT: 64 IN | TEMPERATURE: 97.9 F

## 2022-08-02 DIAGNOSIS — N20.0 NEPHROLITHIASIS: ICD-10-CM

## 2022-08-02 DIAGNOSIS — N18.2 CHRONIC KIDNEY DISEASE (CKD) STAGE G2/A1, MILDLY DECREASED GLOMERULAR FILTRATION RATE (GFR) BETWEEN 60-89 ML/MIN/1.73 SQUARE METER AND ALBUMINURIA CREATININE RATIO LESS THAN 30 MG/G: ICD-10-CM

## 2022-08-02 DIAGNOSIS — R10.9 FLANK PAIN: Primary | ICD-10-CM

## 2022-08-02 DIAGNOSIS — R10.9 FLANK PAIN: ICD-10-CM

## 2022-08-02 LAB
ALBUMIN SERPL BCP-MCNC: 4 G/DL (ref 3.5–5)
ALP SERPL-CCNC: 138 U/L (ref 46–116)
ALT SERPL W P-5'-P-CCNC: 30 U/L (ref 12–78)
ANION GAP SERPL CALCULATED.3IONS-SCNC: 6 MMOL/L (ref 4–13)
AST SERPL W P-5'-P-CCNC: 26 U/L (ref 5–45)
BASOPHILS # BLD AUTO: 0.08 THOUSANDS/ΜL (ref 0–0.1)
BASOPHILS NFR BLD AUTO: 1 % (ref 0–1)
BILIRUB SERPL-MCNC: 0.33 MG/DL (ref 0.2–1)
BUN SERPL-MCNC: 15 MG/DL (ref 5–25)
CALCIUM SERPL-MCNC: 9.5 MG/DL (ref 8.3–10.1)
CHLORIDE SERPL-SCNC: 111 MMOL/L (ref 96–108)
CO2 SERPL-SCNC: 24 MMOL/L (ref 21–32)
CREAT SERPL-MCNC: 0.87 MG/DL (ref 0.6–1.3)
EOSINOPHIL # BLD AUTO: 0.5 THOUSAND/ΜL (ref 0–0.61)
EOSINOPHIL NFR BLD AUTO: 5 % (ref 0–6)
ERYTHROCYTE [DISTWIDTH] IN BLOOD BY AUTOMATED COUNT: 12.9 % (ref 11.6–15.1)
GFR SERPL CREATININE-BSD FRML MDRD: 68 ML/MIN/1.73SQ M
GLUCOSE P FAST SERPL-MCNC: 96 MG/DL (ref 65–99)
HCT VFR BLD AUTO: 41.9 % (ref 34.8–46.1)
HGB BLD-MCNC: 13 G/DL (ref 11.5–15.4)
IMM GRANULOCYTES # BLD AUTO: 0.03 THOUSAND/UL (ref 0–0.2)
IMM GRANULOCYTES NFR BLD AUTO: 0 % (ref 0–2)
LYMPHOCYTES # BLD AUTO: 2.9 THOUSANDS/ΜL (ref 0.6–4.47)
LYMPHOCYTES NFR BLD AUTO: 31 % (ref 14–44)
MCH RBC QN AUTO: 29.3 PG (ref 26.8–34.3)
MCHC RBC AUTO-ENTMCNC: 31 G/DL (ref 31.4–37.4)
MCV RBC AUTO: 95 FL (ref 82–98)
MONOCYTES # BLD AUTO: 0.73 THOUSAND/ΜL (ref 0.17–1.22)
MONOCYTES NFR BLD AUTO: 8 % (ref 4–12)
NEUTROPHILS # BLD AUTO: 5.26 THOUSANDS/ΜL (ref 1.85–7.62)
NEUTS SEG NFR BLD AUTO: 55 % (ref 43–75)
NRBC BLD AUTO-RTO: 0 /100 WBCS
PLATELET # BLD AUTO: 247 THOUSANDS/UL (ref 149–390)
PMV BLD AUTO: 12.2 FL (ref 8.9–12.7)
POTASSIUM SERPL-SCNC: 3.9 MMOL/L (ref 3.5–5.3)
PROT SERPL-MCNC: 7.7 G/DL (ref 6.4–8.4)
RBC # BLD AUTO: 4.43 MILLION/UL (ref 3.81–5.12)
SODIUM SERPL-SCNC: 141 MMOL/L (ref 135–147)
WBC # BLD AUTO: 9.5 THOUSAND/UL (ref 4.31–10.16)

## 2022-08-02 PROCEDURE — 85025 COMPLETE CBC W/AUTO DIFF WBC: CPT

## 2022-08-02 PROCEDURE — 1160F RVW MEDS BY RX/DR IN RCRD: CPT | Performed by: INTERNAL MEDICINE

## 2022-08-02 PROCEDURE — 3074F SYST BP LT 130 MM HG: CPT | Performed by: INTERNAL MEDICINE

## 2022-08-02 PROCEDURE — 74176 CT ABD & PELVIS W/O CONTRAST: CPT

## 2022-08-02 PROCEDURE — 99214 OFFICE O/P EST MOD 30 MIN: CPT | Performed by: INTERNAL MEDICINE

## 2022-08-02 PROCEDURE — 3078F DIAST BP <80 MM HG: CPT | Performed by: INTERNAL MEDICINE

## 2022-08-02 PROCEDURE — 36415 COLL VENOUS BLD VENIPUNCTURE: CPT

## 2022-08-02 PROCEDURE — 80053 COMPREHEN METABOLIC PANEL: CPT

## 2022-08-02 PROCEDURE — 3725F SCREEN DEPRESSION PERFORMED: CPT | Performed by: INTERNAL MEDICINE

## 2022-08-02 RX ORDER — TAMSULOSIN HYDROCHLORIDE 0.4 MG/1
0.4 CAPSULE ORAL
Qty: 30 CAPSULE | Refills: 0 | Status: SHIPPED | OUTPATIENT
Start: 2022-08-02 | End: 2022-08-26 | Stop reason: SDUPTHER

## 2022-08-02 RX ORDER — TRAMADOL HYDROCHLORIDE 50 MG/1
50 TABLET ORAL EVERY 6 HOURS PRN
Qty: 10 TABLET | Refills: 0 | Status: SHIPPED | OUTPATIENT
Start: 2022-08-02 | End: 2022-08-07

## 2022-08-02 RX ORDER — AMLODIPINE BESYLATE 5 MG/1
TABLET ORAL
Qty: 30 TABLET | Refills: 5 | Status: SHIPPED | OUTPATIENT
Start: 2022-08-02

## 2022-08-02 NOTE — PATIENT INSTRUCTIONS
Please get blood work done today  Stat CT to check for kidney stone  Start flomax to help stone pass, tramadol as needed for pain

## 2022-08-02 NOTE — PROGRESS NOTES
[facility]        NAME: Elizabeth Fuentes is a 75 y o  female  : 1955    MRN: [id number]  DATE: 2022  TIME: 2:56 PM    Assessment and Plan   1  Flank pain  -reviewed U/A and culture, she is having right flank and RLQ pain on exam, CT in  showed 3mm right lower pole collecting system calculus  Will get CT renal colic, check labs, start flomax, prn tramadol    -   CBC and differential; Future  -     Comprehensive metabolic panel; Future  -     tamsulosin (FLOMAX) 0 4 mg; Take 1 capsule (0 4 mg total) by mouth daily with dinner  -     CT renal stone study abdomen pelvis wo contrast; Future; Expected date: 2022  -     traMADol (Ultram) 50 mg tablet; Take 1 tablet (50 mg total) by mouth every 6 (six) hours as needed for moderate pain for up to 5 days    2  Nephrolithiasis  -     CBC and differential; Future  -     Comprehensive metabolic panel; Future  -     tamsulosin (FLOMAX) 0 4 mg; Take 1 capsule (0 4 mg total) by mouth daily with dinner  -     CT renal stone study abdomen pelvis wo contrast; Future; Expected date: 2022  -     traMADol (Ultram) 50 mg tablet; Take 1 tablet (50 mg total) by mouth every 6 (six) hours as needed for moderate pain for up to 5 days    3  Chronic kidney disease (CKD) stage G2/A1, mildly decreased glomerular filtration rate (GFR) between 60-89 mL/min/1 73 square meter and albuminuria creatinine ratio less than 30 mg/g  -     CBC and differential; Future  -     Comprehensive metabolic panel; Future  -     CT renal stone study abdomen pelvis wo contrast; Future; Expected date: 2022             Chief Complaint     Chief Complaint   Patient presents with    Flank Pain     Was at urgent care last week and was given antibiotics for UTI and it did not help  Walking helps alleviate pain, discomfort sitting and sleeping  Denies any urinary symptoms            History of Present Illness       64yo female here for acute visit for right flank pain  Started about a week ago, on 07/28  Diagnosed with possible UTI  Given macrobid but symptoms haven't improved  Pain is worse with sitting, lying down, better with movement  Currently 8/10  Tried motrin and tylenol without relief  No fevers/chills, nausea/vomiting  Review of Systems   Review of Systems   Constitutional: Negative for appetite change, chills, fatigue and fever  Respiratory: Negative for cough, chest tightness and shortness of breath  Cardiovascular: Negative for chest pain, palpitations and leg swelling  Gastrointestinal: Positive for abdominal pain and constipation  Negative for diarrhea, nausea and vomiting  Genitourinary: Positive for flank pain  Negative for difficulty urinating, dysuria and frequency  Neurological: Negative for dizziness and headaches  Current Medications       Current Outpatient Medications:     albuterol (2 5 mg/3 mL) 0 083 % nebulizer solution, Take 3 mL (2 5 mg total) by nebulization 4 (four) times a day, Disp: 180 mL, Rfl: 1    ALPRAZolam (XANAX) 0 25 mg tablet, Take 1 tablet (0 25 mg total) by mouth daily at bedtime as needed for anxiety, Disp: 30 tablet, Rfl: 1    amLODIPine (NORVASC) 5 mg tablet, take 1 tablet by mouth once daily, Disp: 30 tablet, Rfl: 5    aspirin 81 mg chewable tablet, Chew 81 mg daily, Disp: , Rfl:     EPINEPHrine (EPIPEN) 0 3 mg/0 3 mL SOAJ, Inject 0 3 mL (0 3 mg total) into a muscle once for 1 dose, Disp: 2 each, Rfl: 1    fluticasone-salmeterol (Advair Diskus) 500-50 mcg/dose inhaler, Inhale 1 puff 2 (two) times a day Rinse mouth after use , Disp: 180 blister, Rfl: 3    levocetirizine (XYZAL) 5 MG tablet, Take 0 5 tablets (2 5 mg total) by mouth every evening, Disp: 90 tablet, Rfl: 1    montelukast (SINGULAIR) 10 mg tablet, take 1 tablet by mouth at bedtime, Disp: 90 tablet, Rfl: 3    nystatin (MYCOSTATIN) cream, Apply topically 2 (two) times a day   Apply for additional 2 weeks after rash resolves  , Disp: 30 g, Rfl: 0    omeprazole (PriLOSEC) 20 mg delayed release capsule, take 1 capsule by mouth twice a day, Disp: 60 capsule, Rfl: 1    tamsulosin (FLOMAX) 0 4 mg, Take 1 capsule (0 4 mg total) by mouth daily with dinner, Disp: 30 capsule, Rfl: 0    traMADol (Ultram) 50 mg tablet, Take 1 tablet (50 mg total) by mouth every 6 (six) hours as needed for moderate pain for up to 5 days, Disp: 10 tablet, Rfl: 0    Current Allergies     Allergies as of 08/02/2022 - Reviewed 08/02/2022   Allergen Reaction Noted    Azithromycin  09/13/2019    Darvon [propoxyphene]  09/13/2019            The following portions of the patient's history were reviewed and updated as appropriate: allergies, current medications, past family history, past medical history, past social history, past surgical history and problem list      Past Medical History:   Diagnosis Date    Acute bronchitis     Acute pharyngitis     Anxiety state     Arthropathy of ankle and foot     and/or    Asthma     Asthma without status asthmaticus     Carotid artery occlusion     COPD (chronic obstructive pulmonary disease) ([facility] [address] )     Cough     COVID-19 vaccine series completed     Disorder of shoulder     Dyspnea     Hand joint pain     Heartburn     Herpes simplex without complication     Hyperlipidemia     Infection of skin and subcutaneous tissue     Localized superficial swelling of skin     Low back pain     Lymphadenopathy     Malaise and fatigue     Neck pain     Osteoarthritis     Pleurisy without effusion or active tuberculosis     Pneumonia     Right upper quadrant pain     Shoulder joint pain     Skin eruption     Vitamin D deficiency     Vomiting        Past Surgical History:   Procedure Laterality Date    BREAST BIOPSY Left 2017 benign    CHOLECYSTECTOMY      CHOLECYSTECTOMY  03/2014    Dr oHward Gagnon     COLONOSCOPY  02/2013    WNL    FL LUMBAR PUNCTURE DIAGNOSTIC  05/25/2021    HYSTERECTOMY      Total     NASAL POLYP EXCISION Left 2009    with lysis of intranasal adhesions from packing       Family History   Problem Relation Age of Onset    Diabetes Mother         Non-insulin dependent diabetes    Emphysema Father     No Known Problems Sister     No Known Problems Daughter     No Known Problems Maternal Grandmother     No Known Problems Paternal Grandmother     No Known Problems Sister     No Known Problems Sister     No Known Problems Daughter     No Known Problems Daughter     No Known Problems Maternal Aunt          Medications have been verified  Objective   /62   Pulse 59   Temp 97 9 °F (36 6 °C)   Resp 18   Ht 5' 4" (1 626 m)   Wt 60 8 kg (134 lb)   SpO2 99%   BMI 23 00 kg/m²        Physical Exam     Physical Exam  Vitals reviewed  Constitutional:       General: She is not in acute distress  Appearance: She is normal weight  Cardiovascular:      Rate and Rhythm: Normal rate and regular rhythm  Heart sounds: No murmur heard  No friction rub  No gallop  Pulmonary:      Effort: Pulmonary effort is normal  No respiratory distress  Breath sounds: Normal breath sounds  No wheezing or rhonchi  Abdominal:      General: Abdomen is flat  Bowel sounds are normal       Palpations: Abdomen is soft  Tenderness: There is abdominal tenderness in the right lower quadrant  There is right CVA tenderness  There is no guarding or rebound  Hernia: No hernia is present  Neurological:      General: No focal deficit present  Mental Status: She is alert  Psychiatric:         Mood and Affect: Mood and affect normal          Speech: Speech normal          Behavior: Behavior normal  Behavior is cooperative               Results:  Lab Results   Component Value Date    SODIUM 140 03/26/2022    K 4 2 03/26/2022     (H) 03/26/2022    CO2 27 03/26/2022    BUN 20 06/02/2022    CREATININE 0 90 06/02/2022    GLUC 91 07/15/2021    CALCIUM 9 8 03/26/2022       Lab Results   Component Value Date    HGBA1C 5 5 03/05/2021       Lab Results   Component Value Date    WBC 9 30 03/26/2022    HGB 14 4 03/26/2022    HCT 44 2 03/26/2022    MCV 89 03/26/2022     03/26/2022

## 2022-08-25 DIAGNOSIS — K21.9 GASTROESOPHAGEAL REFLUX DISEASE WITHOUT ESOPHAGITIS: ICD-10-CM

## 2022-08-25 RX ORDER — OMEPRAZOLE 20 MG/1
CAPSULE, DELAYED RELEASE ORAL
Qty: 60 CAPSULE | Refills: 1 | Status: SHIPPED | OUTPATIENT
Start: 2022-08-25

## 2022-08-26 DIAGNOSIS — N20.0 NEPHROLITHIASIS: ICD-10-CM

## 2022-08-26 DIAGNOSIS — R10.9 FLANK PAIN: ICD-10-CM

## 2022-08-26 RX ORDER — TAMSULOSIN HYDROCHLORIDE 0.4 MG/1
0.4 CAPSULE ORAL
Qty: 30 CAPSULE | Refills: 0 | Status: SHIPPED | OUTPATIENT
Start: 2022-08-26 | End: 2022-10-03 | Stop reason: SDUPTHER

## 2022-08-26 NOTE — TELEPHONE ENCOUNTER
Patient requesting refill(s) of: tamsulosin     Last filled: 08/02/2022  Last appt:08/02/2022  Next appt:11/9/2022  Pharmacy:[facility] [id number] [facility]

## 2022-09-01 ENCOUNTER — TELEPHONE (OUTPATIENT)
Dept: FAMILY MEDICINE CLINIC | Facility: CLINIC | Age: 67
End: 2022-09-01

## 2022-09-01 ENCOUNTER — OFFICE VISIT (OUTPATIENT)
Dept: NEUROSURGERY | Facility: CLINIC | Age: 67
End: 2022-09-01
Payer: COMMERCIAL

## 2022-09-01 VITALS
HEART RATE: 90 BPM | RESPIRATION RATE: 16 BRPM | SYSTOLIC BLOOD PRESSURE: 119 MMHG | TEMPERATURE: 98.3 F | DIASTOLIC BLOOD PRESSURE: 96 MMHG | BODY MASS INDEX: 20.83 KG/M2 | WEIGHT: 122 LBS | HEIGHT: 64 IN

## 2022-09-01 DIAGNOSIS — I67.1 ANEURYSM OF LEFT INTERNAL CAROTID ARTERY: Primary | ICD-10-CM

## 2022-09-01 PROCEDURE — 99212 OFFICE O/P EST SF 10 MIN: CPT | Performed by: NURSE PRACTITIONER

## 2022-09-01 PROCEDURE — 3080F DIAST BP >= 90 MM HG: CPT | Performed by: NURSE PRACTITIONER

## 2022-09-01 PROCEDURE — 1160F RVW MEDS BY RX/DR IN RCRD: CPT | Performed by: NURSE PRACTITIONER

## 2022-09-01 PROCEDURE — 3074F SYST BP LT 130 MM HG: CPT | Performed by: NURSE PRACTITIONER

## 2022-09-01 NOTE — PROGRESS NOTES
Assessment/Plan:    Aneurysm of left internal carotid artery  As addressed in HPI  No complaints  Non-Focal examination    Imaging  CTA NECK AND BRAIN WITH AND WITHOUT CONTRAST 6/10/2022 INTERNAL CAROTID ARTERIES:  Normal enhancement of the intracranial portions of the internal carotid arteries with mild calcified atherosclerotic disease bilaterally  Normal ophthalmic artery origins  Normal ICA terminus  No aneurysm identified in left   ICA carotid terminus as previously stated dated 5/23/2021  PLAN  · No continued NSX service/visits indicated , imagining does not demonstrate intracranial aneurysm  · RTO prn   · Call office w/ questions or concerns  There are no diagnoses linked to this encounter  Subjective: 1 yr f/u aneurysm    Patient ID: Man Rosario is a 75 y o  female     HPI   1 year f/u appointment incidental aneurysm 5/23/2021  During ED w/u for AMS and encephalopathy  CTA head and neck stroke 5/23/21 INTERNAL CAROTID ARTERIES: Mild atherosclerotic changes  Normal enhancement of the intracranial portions of the internal carotid arteries  Normal ophthalmic artery origins  2 mm aneurysm projecting off the posterior aspect of the left internal carotid   arterial terminus  NSX consult office visit 6/16/2021   Educated on aneurysm as per protocol HTN -yes on amlodipine, HLD -yes was treating with Crestor  No longer taking discontinued, never smoked, no family history of or sudden death  RTO in 1 year as snpx w/ Dr Star Mccain and AP     Presents today for 1 year f/u appointment     I have spent 15 minutes with the patient today in which greater than 50% of this time was spent in assessment, examination, impressions, reviewing imagining and recommendations for care  All questions were answered to his/her satisfaction, and contact information provided in the event additional questions arise   Patient acknowledged an understanding and agreement with plan              REVIEW OF SYSTEMS  Review of Systems   Constitutional: Negative  Negative for chills and fever  HENT: Negative  Negative for hearing loss, tinnitus, trouble swallowing and voice change  Eyes: Negative  Negative for photophobia and visual disturbance  Respiratory: Negative  Negative for cough  Hx of asthma   Cardiovascular: Negative  Gastrointestinal: Negative  Negative for nausea and vomiting  Endocrine: Negative  Genitourinary: Negative  Negative for frequency and urgency  Musculoskeletal: Negative  Negative for gait problem, myalgias, neck pain and neck stiffness  No falls     Skin: Negative  Allergic/Immunologic: Negative  Neurological: Negative  Negative for dizziness, tremors, seizures, syncope, speech difficulty, weakness, light-headedness, numbness and headaches  Hematological: Bruises/bleeds easily (asa 81)  Psychiatric/Behavioral: Negative  Negative for confusion, decreased concentration and sleep disturbance  Meds/Allergies     Current Outpatient Medications   Medication Sig Dispense Refill    albuterol (2 5 mg/3 mL) 0 083 % nebulizer solution Take 3 mL (2 5 mg total) by nebulization 4 (four) times a day 180 mL 1    ALPRAZolam (XANAX) 0 25 mg tablet Take 1 tablet (0 25 mg total) by mouth daily at bedtime as needed for anxiety 30 tablet 1    amLODIPine (NORVASC) 5 mg tablet take 1 tablet by mouth once daily 30 tablet 5    aspirin 81 mg chewable tablet Chew 81 mg daily      fluticasone-salmeterol (Advair Diskus) 500-50 mcg/dose inhaler Inhale 1 puff 2 (two) times a day Rinse mouth after use  180 blister 3    levocetirizine (XYZAL) 5 MG tablet Take 0 5 tablets (2 5 mg total) by mouth every evening 90 tablet 1    montelukast (SINGULAIR) 10 mg tablet take 1 tablet by mouth at bedtime 90 tablet 3    nystatin (MYCOSTATIN) cream Apply topically 2 (two) times a day   Apply for additional 2 weeks after rash resolves   30 g 0    omeprazole (PriLOSEC) 20 mg delayed release capsule take 1 capsule by mouth twice a day 60 capsule 1    tamsulosin (FLOMAX) 0 4 mg Take 1 capsule (0 4 mg total) by mouth daily with dinner 30 capsule 0    EPINEPHrine (EPIPEN) 0 3 mg/0 3 mL SOAJ Inject 0 3 mL (0 3 mg total) into a muscle once for 1 dose (Patient not taking: Reported on 9/1/2022) 2 each 1     No current facility-administered medications for this visit         Allergies   Allergen Reactions    Azithromycin     Darvon [Propoxyphene]        PAST HISTORY    Past Medical History:   Diagnosis Date    Acute bronchitis     Acute pharyngitis     Anxiety state     Arthropathy of ankle and foot     and/or    Asthma     Asthma without status asthmaticus     Carotid artery occlusion     COPD [facility]chronic obstructive pulmonary disease) ([facility]     Cough     COVID-19 vaccine series completed     Disorder of shoulder     Dyspnea     Hand joint pain     Heartburn     Herpes simplex without complication     Hyperlipidemia     Infection of skin and subcutaneous tissue     Localized superficial swelling of skin     Low back pain     Lymphadenopathy     Malaise and fatigue     Neck pain     Osteoarthritis     Pleurisy without effusion or active tuberculosis     Pneumonia     Right upper quadrant pain     Shoulder joint pain     Skin eruption     Vitamin D deficiency     Vomiting        Past Surgical History:   Procedure Laterality Date    BREAST BIOPSY Left 2017 benign    CHOLECYSTECTOMY      CHOLECYSTECTOMY  03/2014    Dr [facility]     COLONOSCOPY  02/2013    WNL    FL LUMBAR PUNCTURE DIAGNOSTIC  05/25/2021    HYSTERECTOMY      Total     NASAL POLYP EXCISION Left 2009    with lysis of intranasal adhesions from packing       Social History     Tobacco Use    Smoking status: Never Smoker    Smokeless tobacco: Never Used   Vaping Use    Vaping Use: Never used   Substance Use Topics    Alcohol use: Yes     Comment: DAILY    Drug use: Never       Family History Problem Relation Age of Onset    Diabetes Mother         Non-insulin dependent diabetes    Emphysema Father     No Known Problems Sister     No Known Problems Daughter     No Known Problems Maternal Grandmother     No Known Problems Paternal Grandmother     No Known Problems Sister     No Known Problems Sister     No Known Problems Daughter     No Known Problems Daughter     No Known Problems Maternal Aunt        The following portions of the patient's history were reviewed and updated as appropriate: allergies, current medications, past family history, past medical history, past social history, past surgical history and problem list       EXAM    Vitals:Blood pressure 119/96, pulse 90, temperature 98 3 °F (36 8 °C), temperature source Probe, resp  rate 16, height 5' 4" (1 626 m), weight 55 3 kg (122 lb)  ,Body mass index is 20 94 kg/m²  Physical Exam  Vitals and nursing note reviewed  Exam conducted with a chaperone present (daughter)  Constitutional:       General: She is not in acute distress  Appearance: Normal appearance  She is normal weight  She is not ill-appearing, toxic-appearing or diaphoretic  HENT:      Head: Normocephalic and atraumatic  Mouth/Throat:      Comments: Decayed missing teeth   Eyes:      General: No scleral icterus  Right eye: No discharge  Left eye: No discharge  Cardiovascular:      Rate and Rhythm: Regular rhythm  Pulmonary:      Effort: Pulmonary effort is normal  No respiratory distress  Musculoskeletal:      Right lower leg: No edema  Left lower leg: No edema  Neurological:      Mental Status: She is alert and oriented to person, place, and time  Gait: Gait is intact  Psychiatric:         Mood and Affect: Mood normal          Behavior: Behavior normal          Neurologic Exam     Mental Status   Oriented to person, place, and time     Level of consciousness: alert    Motor Exam   Muscle bulk: decreased  Right arm tone: decreased  Left arm tone: decreased  Right leg tone: decreased  Left leg tone: decreased    Strength   Right deltoid: 4/5  Left deltoid: 4/5  Right biceps: 4/5  Left biceps: 4/5  Right triceps: 4/5  Left triceps: 4/5  Right wrist flexion: 4/5  Left wrist flexion: 4/5  Right wrist extension: 4/5  Left wrist extension: 4/5  Right interossei: 4/5  Left interossei: 4/5  Right iliopsoas: 4/5  Left iliopsoas: 4/5  Right quadriceps: 4/5  Left quadriceps: 4/5  Right anterior tibial: 4/5  Left anterior tibial: 4/5  Right gastroc: 4/5  Left gastroc: 4/5    Sensory Exam   Right arm light touch: normal  Left arm light touch: normal  Right leg light touch: normal  Left leg light touch: normal    Gait, Coordination, and Reflexes     Gait  Gait: normal      Imaging Studies      CTA NECK AND BRAIN WITH AND WITHOUT CONTRAST 6/10/2022      INDICATION: I67 1: Cerebral aneurysm, nonruptured     COMPARISON:   MRI brain with and without contrast May 24, 2021  CTA stroke alert head neck and CT stroke alert brain May 23, 2021  Same day CT chest without contrast   CT chest with contrast February 3, 2013      TECHNIQUE:  Routine CT imaging of the Brain without contrast   Post contrast imaging was performed after administration of iodinated contrast through the neck and brain  Post contrast axial 0 625 mm images timed to opacify the arterial system        3D rendering was performed on an independent workstation  MIP reconstructions performed  Coronal reconstructions were performed of the noncontrast portion of the brain        Radiation dose length product (DLP) for this visit:  1087 53 mGy-cm     This examination, like all CT scans performed in the [facility], was performed utilizing techniques to minimize radiation dose exposure, including the use of   iterative reconstruction and automated exposure control         IV Contrast:  65 mL of iohexol (OMNIPAQUE)     IMAGE QUALITY:   Diagnostic     FINDINGS:  NONCONTRAST BRAIN  PARENCHYMA:  No intracranial mass, mass effect or midline shift  No CT signs of acute infarction  No acute parenchymal hemorrhage  Small bilateral medial basal ganglia calcifications      VENTRICLES AND EXTRA-AXIAL SPACES:  Normal for the patient's age      VISUALIZED ORBITS AND PARANASAL SINUSES:  Orbits are normal   Minimal mucosal thickening in left frontal and bilateral maxillary sinuses  Small anterior nasal septal perforation, slightly increased in size from prior exam   Left nasal septal deviation   with leftward osseous nasal spur      CERVICAL VASCULATURE  AORTIC ARCH AND GREAT VESSELS: Minimal calcified atherosclerotic disease of aortic arch  Normal great vessel origins  Normal visualized subclavian vessels      RIGHT VERTEBRAL ARTERY CERVICAL SEGMENT:  Normal origin  The vessel is mildly hypoplastic in caliber throughout the neck      LEFT VERTEBRAL ARTERY CERVICAL SEGMENT:  Normal origin  The vessel is normal and dominant in caliber throughout the neck      RIGHT EXTRACRANIAL CAROTID SEGMENT:  Normal caliber common carotid artery  Normal bifurcation and cervical internal carotid artery  No stenosis or dissection      LEFT EXTRACRANIAL CAROTID SEGMENT:  Mild atherosclerotic disease of the distal common carotid artery and proximal cervical internal carotid artery without significant stenosis compared to the more distal ICA  Less than 50% stenosis  No dissection      NASCET criteria was used to determine the degree of internal carotid artery diameter stenosis        INTRACRANIAL VASCULATURE   INTERNAL CAROTID ARTERIES:  Normal enhancement of the intracranial portions of the internal carotid arteries with mild calcified atherosclerotic disease bilaterally  Normal ophthalmic artery origins  Normal ICA terminus    No aneurysm identified in left   ICA carotid terminus as previously stated dated 5/23/2021      ANTERIOR CIRCULATION:  Symmetric A1 segments and anterior cerebral arteries with normal enhancement  Normal anterior communicating artery      MIDDLE CEREBRAL ARTERY CIRCULATION:  M1 segment and middle cerebral artery branches demonstrate normal enhancement bilaterally      DISTAL VERTEBRAL ARTERIES:  Patent hypoplastic right and dominant left distal vertebral arteries  Posterior inferior cerebellar artery origins are normal  Normal vertebral basilar junction      BASILAR ARTERY:  Basilar artery is normal in caliber  Normal superior cerebellar arteries      POSTERIOR CEREBRAL ARTERIES: Both posterior cerebral arteries arises from the basilar tip  Both arteries demonstrate normal enhancement  Patent posterior communicating arteries  Small infundibular origin of left posterior communicating artery      VENOUS STRUCTURES:  Normal         NON VASCULAR ANATOMY  BONY STRUCTURES:  No acute osseous abnormality  Unchanged bulky anterior bridging osteophytes of cervical spine with anterior osseous fusion of C4-C6 vertebral bodies      SOFT TISSUES OF THE NECK:  Mild medialization of left true vocal cord with asymmetric dilatation of left piriform sinus       THORACIC INLET:  Multiple scattered micronodular opacities in visualized bilateral upper and right lower lobe superior segment  Small cavitary lesion in right upper lobe (4:267)  Subcentimeter calcified granulomas in right upper lobe  1 1 cm right   hilar lymph node (4:256)        IMPRESSION:     No acute intracranial abnormality      Negative CTA head and neck for large vessel occlusion, dissection, aneurysm, or high-grade stenosis  No aneurysm identified in left ICA carotid terminus as previously stated on prior examination dated 5/23/2021       Small infundibular origin of left posterior communicating artery      Slightly increased size of small anterior nasal septal perforation    Differential includes posttraumatic/postsurgical change, vasculitis, among other differentials      Multiple scattered micronodular opacities in visualized bilateral upper and right lower lobe superior segment with possible small cavitary lesion in right upper lobe  Differential includes infection/inflammation, vasculitis (particularly given small   cavitary lesion in right upper lobe), among other differentials  Please see same-day CT chest for further evaluation      Findings suggestive of left vocal cord paralysis  Recommend ENT consultation for direct visualization      Mildly enlarged right hilar lymph node, which may be reactive  Attention on follow-up      The study was marked in EPIC for significant notification                    I have personally reviewed pertinent reports     and I have personally reviewed pertinent films in PACS

## 2022-09-01 NOTE — TELEPHONE ENCOUNTER
We received a paper in regards to PPL notice for electric shut off  They have sent us a medical certification request, are you okay to sign this? Please advise

## 2022-09-01 NOTE — ASSESSMENT & PLAN NOTE
As addressed in HPI  No complaints  Non-Focal examination    Imaging  CTA NECK AND BRAIN WITH AND WITHOUT CONTRAST 6/10/2022 INTERNAL CAROTID ARTERIES:  Normal enhancement of the intracranial portions of the internal carotid arteries with mild calcified atherosclerotic disease bilaterally  Normal ophthalmic artery origins  Normal ICA terminus  No aneurysm identified in left   ICA carotid terminus as previously stated dated 5/23/2021  PLAN  · No continued NSX service/visits indicated , imagining does not demonstrate intracranial aneurysm  · RTO prn   · Call office w/ questions or concerns

## 2022-10-03 DIAGNOSIS — N20.0 NEPHROLITHIASIS: ICD-10-CM

## 2022-10-03 DIAGNOSIS — R10.9 FLANK PAIN: ICD-10-CM

## 2022-10-03 RX ORDER — TAMSULOSIN HYDROCHLORIDE 0.4 MG/1
0.4 CAPSULE ORAL
Qty: 30 CAPSULE | Refills: 0 | Status: SHIPPED | OUTPATIENT
Start: 2022-10-03

## 2022-10-03 NOTE — TELEPHONE ENCOUNTER
Patient requesting refill(s) of: tamsulosin 0 4 mg daily    Last filled: 8/26/2022 #30 x 0  Last appt: 8/2/2022  Next appt: 11/9/2022  Pharmacy: [facility]

## 2022-10-06 ENCOUNTER — TELEPHONE (OUTPATIENT)
Dept: FAMILY MEDICINE CLINIC | Facility: CLINIC | Age: 67
End: 2022-10-06

## 2022-10-06 DIAGNOSIS — Z12.31 ENCOUNTER FOR SCREENING MAMMOGRAM FOR MALIGNANT NEOPLASM OF BREAST: Primary | ICD-10-CM

## 2022-10-06 NOTE — TELEPHONE ENCOUNTER
Pt called requesting script for mammogram  Per chart review, she is due after 10/28/2022  Order placed  Patient would like our office to schedule  She prefers mornings  Called and scheduled patient at [facility] on 10/31/2022 at 9:20 am     Patient notified

## 2022-10-13 ENCOUNTER — OFFICE VISIT (OUTPATIENT)
Dept: FAMILY MEDICINE CLINIC | Facility: CLINIC | Age: 67
End: 2022-10-13
Payer: COMMERCIAL

## 2022-10-13 ENCOUNTER — APPOINTMENT (OUTPATIENT)
Dept: LAB | Facility: CLINIC | Age: 67
End: 2022-10-13
Payer: COMMERCIAL

## 2022-10-13 VITALS
SYSTOLIC BLOOD PRESSURE: 138 MMHG | HEART RATE: 74 BPM | DIASTOLIC BLOOD PRESSURE: 76 MMHG | WEIGHT: 130 LBS | BODY MASS INDEX: 22.2 KG/M2 | TEMPERATURE: 98 F | OXYGEN SATURATION: 97 % | HEIGHT: 64 IN

## 2022-10-13 DIAGNOSIS — J06.9 UPPER RESPIRATORY TRACT INFECTION, UNSPECIFIED TYPE: Primary | ICD-10-CM

## 2022-10-13 DIAGNOSIS — E55.9 VITAMIN D DEFICIENCY: ICD-10-CM

## 2022-10-13 DIAGNOSIS — I10 ESSENTIAL HYPERTENSION: ICD-10-CM

## 2022-10-13 DIAGNOSIS — J40 BRONCHITIS: ICD-10-CM

## 2022-10-13 LAB
25(OH)D3 SERPL-MCNC: 50.8 NG/ML (ref 30–100)
ALBUMIN SERPL BCP-MCNC: 4 G/DL (ref 3.5–5)
ALP SERPL-CCNC: 153 U/L (ref 46–116)
ALT SERPL W P-5'-P-CCNC: 27 U/L (ref 12–78)
ANION GAP SERPL CALCULATED.3IONS-SCNC: 4 MMOL/L (ref 4–13)
AST SERPL W P-5'-P-CCNC: 19 U/L (ref 5–45)
BASOPHILS # BLD AUTO: 0.07 THOUSANDS/ΜL (ref 0–0.1)
BASOPHILS NFR BLD AUTO: 1 % (ref 0–1)
BILIRUB SERPL-MCNC: 0.78 MG/DL (ref 0.2–1)
BUN SERPL-MCNC: 14 MG/DL (ref 5–25)
CALCIUM SERPL-MCNC: 9.6 MG/DL (ref 8.3–10.1)
CHLORIDE SERPL-SCNC: 107 MMOL/L (ref 96–108)
CHOLEST SERPL-MCNC: 215 MG/DL
CO2 SERPL-SCNC: 26 MMOL/L (ref 21–32)
CREAT SERPL-MCNC: 0.86 MG/DL (ref 0.6–1.3)
EOSINOPHIL # BLD AUTO: 0.53 THOUSAND/ΜL (ref 0–0.61)
EOSINOPHIL NFR BLD AUTO: 6 % (ref 0–6)
ERYTHROCYTE [DISTWIDTH] IN BLOOD BY AUTOMATED COUNT: 13 % (ref 11.6–15.1)
GFR SERPL CREATININE-BSD FRML MDRD: 69 ML/MIN/1.73SQ M
GLUCOSE P FAST SERPL-MCNC: 115 MG/DL (ref 65–99)
HCT VFR BLD AUTO: 45.2 % (ref 34.8–46.1)
HDLC SERPL-MCNC: 57 MG/DL
HGB BLD-MCNC: 14.4 G/DL (ref 11.5–15.4)
IMM GRANULOCYTES # BLD AUTO: 0.03 THOUSAND/UL (ref 0–0.2)
IMM GRANULOCYTES NFR BLD AUTO: 0 % (ref 0–2)
LDLC SERPL CALC-MCNC: 146 MG/DL (ref 0–100)
LYMPHOCYTES # BLD AUTO: 3.21 THOUSANDS/ΜL (ref 0.6–4.47)
LYMPHOCYTES NFR BLD AUTO: 35 % (ref 14–44)
MCH RBC QN AUTO: 29 PG (ref 26.8–34.3)
MCHC RBC AUTO-ENTMCNC: 31.9 G/DL (ref 31.4–37.4)
MCV RBC AUTO: 91 FL (ref 82–98)
MONOCYTES # BLD AUTO: 0.74 THOUSAND/ΜL (ref 0.17–1.22)
MONOCYTES NFR BLD AUTO: 8 % (ref 4–12)
NEUTROPHILS # BLD AUTO: 4.57 THOUSANDS/ΜL (ref 1.85–7.62)
NEUTS SEG NFR BLD AUTO: 50 % (ref 43–75)
NRBC BLD AUTO-RTO: 0 /100 WBCS
PLATELET # BLD AUTO: 252 THOUSANDS/UL (ref 149–390)
PMV BLD AUTO: 12.1 FL (ref 8.9–12.7)
POTASSIUM SERPL-SCNC: 4.2 MMOL/L (ref 3.5–5.3)
PROT SERPL-MCNC: 7.9 G/DL (ref 6.4–8.4)
RBC # BLD AUTO: 4.96 MILLION/UL (ref 3.81–5.12)
SODIUM SERPL-SCNC: 137 MMOL/L (ref 135–147)
TRIGL SERPL-MCNC: 62 MG/DL
TSH SERPL DL<=0.05 MIU/L-ACNC: 1.37 UIU/ML (ref 0.45–4.5)
WBC # BLD AUTO: 9.15 THOUSAND/UL (ref 4.31–10.16)

## 2022-10-13 PROCEDURE — 36415 COLL VENOUS BLD VENIPUNCTURE: CPT

## 2022-10-13 PROCEDURE — 80053 COMPREHEN METABOLIC PANEL: CPT

## 2022-10-13 PROCEDURE — 85025 COMPLETE CBC W/AUTO DIFF WBC: CPT

## 2022-10-13 PROCEDURE — 99213 OFFICE O/P EST LOW 20 MIN: CPT | Performed by: NURSE PRACTITIONER

## 2022-10-13 PROCEDURE — 80061 LIPID PANEL: CPT

## 2022-10-13 PROCEDURE — 84443 ASSAY THYROID STIM HORMONE: CPT

## 2022-10-13 PROCEDURE — 82306 VITAMIN D 25 HYDROXY: CPT

## 2022-10-13 RX ORDER — DEXTROMETHORPHAN HYDROBROMIDE AND PROMETHAZINE HYDROCHLORIDE 15; 6.25 MG/5ML; MG/5ML
5 SOLUTION ORAL 4 TIMES DAILY PRN
Qty: 240 ML | Refills: 0 | Status: SHIPPED | OUTPATIENT
Start: 2022-10-13

## 2022-10-13 RX ORDER — PROMETHAZINE HYDROCHLORIDE AND CODEINE PHOSPHATE 6.25; 1 MG/5ML; MG/5ML
5 SYRUP ORAL EVERY 4 HOURS PRN
Qty: 240 ML | Refills: 0 | Status: SHIPPED | OUTPATIENT
Start: 2022-10-13

## 2022-10-13 RX ORDER — PREDNISONE 20 MG/1
20 TABLET ORAL 2 TIMES DAILY WITH MEALS
Qty: 10 TABLET | Refills: 0 | Status: SHIPPED | OUTPATIENT
Start: 2022-10-13 | End: 2022-10-18

## 2022-10-13 NOTE — PROGRESS NOTES
[facility]        NAME: eJana Cano is a 75 y o  female  : 1955    MRN: [id number]  DATE: 2022  TIME: 12:14 PM    Assessment and Plan   Upper respiratory tract infection, unspecified type [J06 9]  1  Upper respiratory tract infection, unspecified type  predniSONE 20 mg tablet    promethazine-codeine (PHENERGAN WITH CODEINE) 6 25-10 mg/5 mL syrup   2  Bronchitis  predniSONE 20 mg tablet    promethazine-codeine (PHENERGAN WITH CODEINE) 6 25-10 mg/5 mL syrup   1  Upper respiratory tract infection, unspecified type  - patient requesting prednisone and cough medication as this is usually what helps her when she gets this  Discussed with patient to call the office if no improvement or worsening symptoms  - predniSONE 20 mg tablet; Take 1 tablet (20 mg total) by mouth 2 (two) times a day with meals for 5 days  Dispense: 10 tablet; Refill: 0  - promethazine-codeine (PHENERGAN WITH CODEINE) 6 25-10 mg/5 mL syrup; Take 5 mL by mouth every 4 (four) hours as needed for cough  Dispense: 240 mL; Refill: 0    2  Bronchitis  Mild wheeze in RLL  - predniSONE 20 mg tablet; Take 1 tablet (20 mg total) by mouth 2 (two) times a day with meals for 5 days  Dispense: 10 tablet; Refill: 0  - promethazine-codeine (PHENERGAN WITH CODEINE) 6 25-10 mg/5 mL syrup; Take 5 mL by mouth every 4 (four) hours as needed for cough  Dispense: 240 mL; Refill: 0        Patient Instructions     There are no Patient Instructions on file for this visit  Chief Complaint     Chief Complaint   Patient presents with   • Cough     Bringing up green mucus  Feels she might been getting pneumonia  History of Present Illness       Patient here for an acute visit with c/o sick symptoms  Just started on - coughing with green mucous, always knows that is the start of pneumonia for her, she had had pneumonia in the past  Denies congestion, rhinorrhea  Pt notices increase in wheezing   Pt has been using her Albuterol inhaler 2x/day for the last two days  No other treatments  Denies fever, chills, sweats, headaches, sore throat, ear pain  Review of Systems   Review of Systems   Constitutional: Negative for activity change, appetite change, fatigue and fever  HENT: Negative for congestion, ear discharge, ear pain, postnasal drip, rhinorrhea, sinus pressure, sinus pain, sore throat and voice change  Eyes: Negative for discharge and redness  Respiratory: Positive for cough and wheezing  Negative for chest tightness and shortness of breath  Cardiovascular: Negative for chest pain  Gastrointestinal: Negative for abdominal pain, diarrhea, nausea and vomiting  Skin: Negative for color change and rash  Neurological: Negative for dizziness and headaches  Hematological: Negative for adenopathy         PHQ-2/9 Depression Screening    Little interest or pleasure in doing things: 0 - not at all  Feeling down, depressed, or hopeless: 0 - not at all  PHQ-2 Score: 0  PHQ-2 Interpretation: Negative depression screen        Current Medications       Current Outpatient Medications:   •  albuterol (2 5 mg/3 mL) 0 083 % nebulizer solution, Take 3 mL (2 5 mg total) by nebulization 4 (four) times a day, Disp: 180 mL, Rfl: 1  •  ALPRAZolam (XANAX) 0 25 mg tablet, Take 1 tablet (0 25 mg total) by mouth daily at bedtime as needed for anxiety, Disp: 30 tablet, Rfl: 1  •  amLODIPine (NORVASC) 5 mg tablet, take 1 tablet by mouth once daily, Disp: 30 tablet, Rfl: 5  •  aspirin 81 mg chewable tablet, Chew 81 mg daily, Disp: , Rfl:   •  fluticasone-salmeterol (Advair Diskus) 500-50 mcg/dose inhaler, Inhale 1 puff 2 (two) times a day Rinse mouth after use , Disp: 180 blister, Rfl: 3  •  levocetirizine (XYZAL) 5 MG tablet, Take 0 5 tablets (2 5 mg total) by mouth every evening, Disp: 90 tablet, Rfl: 1  •  montelukast (SINGULAIR) 10 mg tablet, take 1 tablet by mouth at bedtime, Disp: 90 tablet, Rfl: 3  •  nystatin (MYCOSTATIN) cream, Apply topically 2 (two) times a day   Apply for additional 2 weeks after rash resolves  , Disp: 30 g, Rfl: 0  •  omeprazole (PriLOSEC) 20 mg delayed release capsule, take 1 capsule by mouth twice a day, Disp: 60 capsule, Rfl: 1  •  predniSONE 20 mg tablet, Take 1 tablet (20 mg total) by mouth 2 (two) times a day with meals for 5 days, Disp: 10 tablet, Rfl: 0  •  promethazine-codeine (PHENERGAN WITH CODEINE) 6 25-10 mg/5 mL syrup, Take 5 mL by mouth every 4 (four) hours as needed for cough, Disp: 240 mL, Rfl: 0  •  tamsulosin (FLOMAX) 0 4 mg, Take 1 capsule (0 4 mg total) by mouth daily with dinner, Disp: 30 capsule, Rfl: 0  •  EPINEPHrine (EPIPEN) 0 3 mg/0 3 mL SOAJ, Inject 0 3 mL (0 3 mg total) into a muscle once for 1 dose (Patient not taking: Reported on 9/1/2022), Disp: 2 each, Rfl: 1    Current Allergies     Allergies as of 10/13/2022 - Reviewed 10/13/2022   Allergen Reaction Noted   • Azithromycin  09/13/2019   • Darvon [propoxyphene]  09/13/2019            The following portions of the patient's history were reviewed and updated as appropriate: allergies, current medications, past family history, past medical history, past social history, past surgical history and problem list      Past Medical History:   Diagnosis Date   • Acute bronchitis    • Acute pharyngitis    • Anxiety state    • Arthropathy of ankle and foot     and/or   • Asthma    • Asthma without status asthmaticus    • Carotid artery occlusion    • COPD (chronic obstructive pulmonary disease) ([facility])    • Cough    • COVID-19 vaccine series completed    • Disorder of shoulder    • Dyspnea    • Hand joint pain    • Heartburn    • Herpes simplex without complication    • Hyperlipidemia    • Infection of skin and subcutaneous tissue    • Localized superficial swelling of skin    • Low back pain    • Lymphadenopathy    • Malaise and fatigue    • Neck pain    • Osteoarthritis    • Pleurisy without effusion or active tuberculosis    • Pneumonia    • Right upper quadrant pain    • Shoulder joint pain    • Skin eruption    • Vitamin D deficiency    • Vomiting        Past Surgical History:   Procedure Laterality Date   • BREAST BIOPSY Left 2017 benign   • CHOLECYSTECTOMY     • CHOLECYSTECTOMY  03/2014    Dr Srinivasa Gross    • COLONOSCOPY  02/2013    WNL   • FL LUMBAR PUNCTURE DIAGNOSTIC  05/25/2021   • HYSTERECTOMY      Total    • NASAL POLYP EXCISION Left 2009    with lysis of intranasal adhesions from packing       Family History   Problem Relation Age of Onset   • Diabetes Mother         Non-insulin dependent diabetes   • Emphysema Father    • No Known Problems Sister    • No Known Problems Daughter    • No Known Problems Maternal Grandmother    • No Known Problems Paternal Grandmother    • No Known Problems Sister    • No Known Problems Sister    • No Known Problems Daughter    • No Known Problems Daughter    • No Known Problems Maternal Aunt          Medications have been verified  Objective   /76   Pulse 74   Temp 98 °F (36 7 °C)   Ht 5' 4" (1 626 m)   Wt 59 kg (130 lb)   SpO2 97%   BMI 22 31 kg/m²        Physical Exam     Physical Exam  Vitals and nursing note reviewed  Constitutional:       General: She is not in acute distress  Appearance: She is well-developed  She is not ill-appearing or diaphoretic  HENT:      Head: Normocephalic and atraumatic  Right Ear: Tympanic membrane, ear canal and external ear normal       Left Ear: Tympanic membrane, ear canal and external ear normal       Nose: Nose normal  No laceration, mucosal edema or rhinorrhea  Mouth/Throat:      Mouth: Mucous membranes are moist       Pharynx: Uvula midline  No oropharyngeal exudate or posterior oropharyngeal erythema  Cardiovascular:      Rate and Rhythm: Normal rate and regular rhythm  Heart sounds: Normal heart sounds  No murmur heard    Pulmonary:      Effort: Pulmonary effort is normal  No bradypnea, accessory muscle usage or respiratory distress  Breath sounds: No stridor  No decreased breath sounds or wheezing  Skin:     General: Skin is warm  Capillary Refill: Capillary refill takes less than 2 seconds  Coloration: Skin is not pale  Findings: No erythema or rash  Neurological:      Mental Status: She is alert and oriented to person, place, and time  Psychiatric:         Behavior: Behavior normal  Behavior is cooperative  Thought Content:  Thought content normal          Judgment: Judgment normal

## 2022-10-13 NOTE — TELEPHONE ENCOUNTER
Jennie Vergara at [employer] called stating they do not carry phenergan with codeine   Asking for alternative medication

## 2022-11-04 DIAGNOSIS — K21.9 GASTROESOPHAGEAL REFLUX DISEASE WITHOUT ESOPHAGITIS: ICD-10-CM

## 2022-11-04 DIAGNOSIS — R10.9 FLANK PAIN: ICD-10-CM

## 2022-11-04 DIAGNOSIS — N20.0 NEPHROLITHIASIS: ICD-10-CM

## 2022-11-04 RX ORDER — TAMSULOSIN HYDROCHLORIDE 0.4 MG/1
CAPSULE ORAL
Qty: 30 CAPSULE | Refills: 0 | Status: SHIPPED | OUTPATIENT
Start: 2022-11-04 | End: 2022-11-09

## 2022-11-04 RX ORDER — OMEPRAZOLE 20 MG/1
CAPSULE, DELAYED RELEASE ORAL
Qty: 60 CAPSULE | Refills: 1 | Status: SHIPPED | OUTPATIENT
Start: 2022-11-04 | End: 2022-11-09 | Stop reason: SDUPTHER

## 2022-11-09 ENCOUNTER — OFFICE VISIT (OUTPATIENT)
Dept: PODIATRY | Facility: CLINIC | Age: 67
End: 2022-11-09

## 2022-11-09 ENCOUNTER — APPOINTMENT (OUTPATIENT)
Dept: RADIOLOGY | Facility: CLINIC | Age: 67
End: 2022-11-09

## 2022-11-09 ENCOUNTER — TELEPHONE (OUTPATIENT)
Dept: FAMILY MEDICINE CLINIC | Facility: CLINIC | Age: 67
End: 2022-11-09

## 2022-11-09 ENCOUNTER — OFFICE VISIT (OUTPATIENT)
Dept: FAMILY MEDICINE CLINIC | Facility: CLINIC | Age: 67
End: 2022-11-09

## 2022-11-09 VITALS
WEIGHT: 130 LBS | HEIGHT: 64 IN | RESPIRATION RATE: 18 BRPM | BODY MASS INDEX: 22.2 KG/M2 | TEMPERATURE: 97.7 F | OXYGEN SATURATION: 95 % | SYSTOLIC BLOOD PRESSURE: 132 MMHG | DIASTOLIC BLOOD PRESSURE: 78 MMHG | HEART RATE: 57 BPM

## 2022-11-09 VITALS
WEIGHT: 130 LBS | HEIGHT: 64 IN | SYSTOLIC BLOOD PRESSURE: 132 MMHG | DIASTOLIC BLOOD PRESSURE: 78 MMHG | OXYGEN SATURATION: 95 % | HEART RATE: 63 BPM | BODY MASS INDEX: 22.2 KG/M2

## 2022-11-09 DIAGNOSIS — R74.8 ELEVATED ALKALINE PHOSPHATASE LEVEL: ICD-10-CM

## 2022-11-09 DIAGNOSIS — E78.5 HYPERLIPIDEMIA, UNSPECIFIED HYPERLIPIDEMIA TYPE: ICD-10-CM

## 2022-11-09 DIAGNOSIS — S92.515A CLOSED NONDISPLACED FRACTURE OF PROXIMAL PHALANX OF LESSER TOE OF LEFT FOOT, INITIAL ENCOUNTER: ICD-10-CM

## 2022-11-09 DIAGNOSIS — I10 ESSENTIAL HYPERTENSION: ICD-10-CM

## 2022-11-09 DIAGNOSIS — E55.9 VITAMIN D DEFICIENCY: ICD-10-CM

## 2022-11-09 DIAGNOSIS — M79.672 LEFT FOOT PAIN: Primary | ICD-10-CM

## 2022-11-09 DIAGNOSIS — K21.9 GASTROESOPHAGEAL REFLUX DISEASE WITHOUT ESOPHAGITIS: ICD-10-CM

## 2022-11-09 DIAGNOSIS — J45.40 MODERATE PERSISTENT ASTHMA WITHOUT COMPLICATION: ICD-10-CM

## 2022-11-09 DIAGNOSIS — R73.01 ELEVATED FASTING GLUCOSE: ICD-10-CM

## 2022-11-09 DIAGNOSIS — M79.672 LEFT FOOT PAIN: ICD-10-CM

## 2022-11-09 DIAGNOSIS — M79.675 TOE PAIN, LEFT: Primary | ICD-10-CM

## 2022-11-09 DIAGNOSIS — J30.1 ALLERGIC RHINITIS DUE TO POLLEN, UNSPECIFIED SEASONALITY: ICD-10-CM

## 2022-11-09 RX ORDER — ALBUTEROL SULFATE 90 UG/1
2 AEROSOL, METERED RESPIRATORY (INHALATION) EVERY 6 HOURS PRN
Qty: 8.5 G | Refills: 1 | Status: SHIPPED | OUTPATIENT
Start: 2022-11-09

## 2022-11-09 RX ORDER — AMLODIPINE BESYLATE 5 MG/1
5 TABLET ORAL DAILY
Qty: 100 TABLET | Refills: 3 | Status: SHIPPED | OUTPATIENT
Start: 2022-11-09

## 2022-11-09 RX ORDER — ROSUVASTATIN CALCIUM 5 MG/1
5 TABLET, COATED ORAL DAILY
Qty: 30 TABLET | Refills: 0 | Status: SHIPPED | OUTPATIENT
Start: 2022-11-09 | End: 2022-11-09

## 2022-11-09 RX ORDER — LEVOCETIRIZINE DIHYDROCHLORIDE 5 MG/1
2.5 TABLET, FILM COATED ORAL EVERY EVENING
Qty: 100 TABLET | Refills: 1 | Status: SHIPPED | OUTPATIENT
Start: 2022-11-09

## 2022-11-09 RX ORDER — MONTELUKAST SODIUM 10 MG/1
10 TABLET ORAL
Qty: 100 TABLET | Refills: 3 | Status: SHIPPED | OUTPATIENT
Start: 2022-11-09

## 2022-11-09 RX ORDER — OMEPRAZOLE 20 MG/1
20 CAPSULE, DELAYED RELEASE ORAL 2 TIMES DAILY
Qty: 200 CAPSULE | Refills: 3 | Status: SHIPPED | OUTPATIENT
Start: 2022-11-09

## 2022-11-09 NOTE — TELEPHONE ENCOUNTER
Patient called the office in regards to visit today, said she was prescribed rosuvastatin  Stated she was previously prescribed this but was taken off of it by her kidney specialist      Please advise

## 2022-11-09 NOTE — TELEPHONE ENCOUNTER
Spoke to patient  She said she does not need to see her GI specialist again unless she has an issue come up

## 2022-11-09 NOTE — TELEPHONE ENCOUNTER
I apologize yes she was taken off of Crestor because it had elevated her liver enzymes  I've added this to her allergy list so that no one puts her on it in the future  When does she see her GI specialist again? I would like to get their clearance before starting any new cholesterol medications  For now would focus on her diet  Thanks!

## 2022-11-09 NOTE — PROGRESS NOTES
Assessment/Plan:       Problem List Items Addressed This Visit        Digestive    Gastroesophageal reflux disease    Relevant Medications    omeprazole (PriLOSEC) 20 mg delayed release capsule       Respiratory    Moderate asthma    Relevant Medications    montelukast (SINGULAIR) 10 mg tablet    albuterol (ProAir HFA) 90 mcg/act inhaler    Allergic rhinitis due to pollen    Relevant Medications    montelukast (SINGULAIR) 10 mg tablet    levocetirizine (XYZAL) 5 MG tablet       Cardiovascular and Mediastinum    Essential hypertension    Relevant Medications    amLODIPine (NORVASC) 5 mg tablet       Other    Hyperlipidemia     - Uncontrolled, ASCVD risk score 11%         Relevant Orders    Comprehensive metabolic panel    Lipid Panel with Direct LDL reflex    Elevated alkaline phosphatase level     - History of this, following with GI         Vitamin D deficiency     - Continue OTC supplement         Elevated fasting glucose     - Discussed diet, will check A1c next labs          Relevant Orders    HEMOGLOBIN A1C W/ EAG ESTIMATION    Left foot pain - Primary     - XR ordered rule out fracture          Relevant Orders    XR foot 3+ vw left (Completed)            Subjective:      Patient ID: Chrissie Hayes is a 75 y o  female  HPI     She is here today to review recent results, and for follow-up  Couple weeks ago she stubbed her left 2nd and 3rd toes on a concrete step, very painful, black and blue afterwards, still some swelling now and pain  Otherwise she's feeling great, better than she has in awhile  Recent blood work reviewed and normal, except for as outlined below:     Vitamin D- Taking 2,000 units daily  Vitamin D normal 50  Elevated fasting glucose 115  She is watching her diet, doesn't eat breakfast or lunch, eats dinner, last night steak and potatoes fried, night before spaghetti with meatballs  Kidney function stable  Alk phos elevated 153  Hyperlipidemia- Chol 215,    ASCVD 11%      Prior had kidney stone, pain has resolved, still taking Flomax  Advised okay to trial off  Asthma- She stopped the Advair and actually breathing better without it  No shortness of breath  No chest pain or tightness  Feeling well  Needs refills medications  The following portions of the patient's history were reviewed and updated as appropriate: allergies, current medications, past family history, past medical history, past social history, past surgical history, and problem list     Review of Systems   All other systems reviewed and are negative  Objective:      /78   Pulse 57   Temp 97 7 °F (36 5 °C) (Tympanic)   Resp 18   Ht 5' 4" (1 626 m)   Wt 59 kg (130 lb)   SpO2 95%   BMI 22 31 kg/m²          Physical Exam  Vitals reviewed  Constitutional:       General: She is not in acute distress  Appearance: Normal appearance  She is not ill-appearing, toxic-appearing or diaphoretic  HENT:      Head: Normocephalic and atraumatic  Eyes:      General:         Right eye: No discharge  Left eye: No discharge  Extraocular Movements: Extraocular movements intact  Conjunctiva/sclera: Conjunctivae normal    Cardiovascular:      Rate and Rhythm: Normal rate and regular rhythm  Heart sounds: Normal heart sounds  No murmur heard  No friction rub  No gallop  Pulmonary:      Effort: Pulmonary effort is normal  No respiratory distress  Breath sounds: Normal breath sounds  No stridor  No wheezing or rhonchi  Musculoskeletal:         General: No swelling, tenderness or signs of injury  Right lower leg: No edema  Left lower leg: No edema  Feet:    Skin:     General: Skin is warm  Coloration: Skin is not pale  Findings: No erythema or rash  Neurological:      Mental Status: She is alert and oriented to person, place, and time  Motor: No weakness     Psychiatric:         Mood and Affect: Mood normal          Behavior: Behavior normal              Mario Neighbor, DO [facility] [address] [place] [address]

## 2022-11-10 NOTE — TELEPHONE ENCOUNTER
Please let her know I am going to reach out to the GI team to see if they would be okay with cholesterol medication from their standpoint, I'll let her know once I hear back- thanks!

## 2022-11-10 NOTE — PROGRESS NOTES
Assessment/Plan:    No problem-specific Assessment & Plan notes found for this encounter  Diagnoses and all orders for this visit:    Toe pain, left    Closed nondisplaced fracture of proximal phalanx of lesser toe of left foot, initial encounter  -     Post Op Shoe      Plan:     -  Patient was counseled and educated on the condition and the diagnosis  The diagnosis, treatment options and prognosis were discussed in detail    - left foot x-rays reviewed and personally interpreted the x-ray finding with patient which is consistent with 3rd proximal phalanx base minimally displaced intra-articular fracture  - I recommended conservative management including surgical shoe with weight-bearing as tolerated, rest ice elevate francesca tape to the adjacent toe  Avoid barefoot or close fitting shoes  - return in 4-6 weeks, we will obtain new set of x-ray      Subjective:      Patient ID: Johanna Hampton is a 75 y o  female  68-year-old female presents for evaluation of left toe pain secondary to an injury she sustained about 3 weeks ago  Patient reports since 3 weeks she has been walking wearing her regular shoes  Reports mild discomfort on the 2nd and 3rd digit  Patient denies any other complaints at this time        The following portions of the patient's history were reviewed and updated as appropriate:   She  has a past medical history of Acute bronchitis, Acute pharyngitis, Anxiety state, Arthropathy of ankle and foot, Asthma, Asthma without status asthmaticus, Carotid artery occlusion, COPD (chronic obstructive pulmonary disease) ([address] ), Cough, COVID-19 vaccine series completed, Disorder of shoulder, Dyspnea, Hand joint pain, Heartburn, Herpes simplex without complication, Hyperlipidemia, Infection of skin and subcutaneous tissue, Localized superficial swelling of skin, Low back pain, Lymphadenopathy, Malaise and fatigue, Neck pain, Osteoarthritis, Pleurisy without effusion or active tuberculosis, Pneumonia, Right upper quadrant pain, Shoulder joint pain, Skin eruption, Vitamin D deficiency, and Vomiting  She   Patient Active Problem List    Diagnosis Date Noted   • Vitamin D deficiency 11/09/2022   • Elevated fasting glucose 11/09/2022   • Left foot pain 11/09/2022   • Allergic rhinitis due to pollen 11/09/2022   • Small airways disease 07/09/2022   • Hilar adenopathy 07/09/2022   • Anxiety 03/27/2022   • Elevated liver enzymes 12/15/2021   • Hepatic steatosis 12/15/2021   • Gastroesophageal reflux disease 12/15/2021   • Abnormal EKG 11/30/2021   • Hypophosphatemia 05/26/2021   • Aneurysm of left internal carotid artery 05/25/2021   • Normocytic anemia 05/25/2021   • Leukocytosis 05/25/2021   • Diffuse idiopathic skeletal hyperostosis of cervical spine 05/25/2021   • Right hip pain 04/01/2021   • Elevated alkaline phosphatase level 04/01/2021   • Osteopenia 04/01/2021   • Non-seasonal allergic rhinitis 03/02/2021   • Chronic kidney disease (CKD) stage G2/A1, mildly decreased glomerular filtration rate (GFR) between 60-89 mL/min/1 73 square meter and albuminuria creatinine ratio less than 30 mg/g 10/07/2019   • Essential hypertension 10/07/2019   • Hyperlipidemia 09/26/2019   • Moderate asthma 09/26/2019     She  has a past surgical history that includes Cholecystectomy; Colonoscopy (02/2013); Hysterectomy; Cholecystectomy (03/2014); Breast biopsy (Left, 2017 benign); [place] lumbar puncture diagnostic (05/25/2021); and Nasal polyp excision (Left, 2009)       Review of Systems   Constitutional: Negative for chills  Respiratory: Negative for shortness of breath  Gastrointestinal: Negative for vomiting  Musculoskeletal: Positive for arthralgias  Skin: Negative for color change  Neurological: Negative for dizziness  Psychiatric/Behavioral: Negative for agitation           Objective:      /78   Pulse 63   Ht 5' 4" (1 626 m)   Wt 59 kg (130 lb)   SpO2 95%   BMI 22 31 kg/m²          Physical Exam  Vitals reviewed  Cardiovascular:      Rate and Rhythm: Normal rate  Pulses: Normal pulses  Pulmonary:      Effort: Pulmonary effort is normal    Musculoskeletal:         General: Tenderness present  Left foot: Tenderness present  Comments: Mild pain with palpation noted on the 3rd toe at the level of proximal phalanx base  No discomfort with range of motion of the 2nd 3rd and 4th MTPJ  Light touch sensation intact  Mild edema to the 3rd toe  No ecchymosis or bruising noted  Skin:     General: Skin is warm  Neurological:      General: No focal deficit present  Mental Status: She is alert     Psychiatric:         Mood and Affect: Mood normal

## 2022-11-15 ENCOUNTER — TELEPHONE (OUTPATIENT)
Dept: FAMILY MEDICINE CLINIC | Facility: CLINIC | Age: 67
End: 2022-11-15

## 2022-11-15 DIAGNOSIS — E78.5 HYPERLIPIDEMIA, UNSPECIFIED HYPERLIPIDEMIA TYPE: Primary | ICD-10-CM

## 2022-11-15 RX ORDER — ROSUVASTATIN CALCIUM 5 MG/1
5 TABLET, COATED ORAL DAILY
Qty: 30 TABLET | Refills: 5 | Status: SHIPPED | OUTPATIENT
Start: 2022-11-15

## 2022-11-15 NOTE — TELEPHONE ENCOUNTER
Please let the patient know I've sent in Crestor for her, we'll start with 5 mg daily, and I've placed orders for liver function in 2 and 6 weeks  Thanks!

## 2022-11-15 NOTE — TELEPHONE ENCOUNTER
Notified patient  She said she is willing to try this  She would like it sent to [facility] on [address]  Thanks!

## 2022-11-15 NOTE — TELEPHONE ENCOUNTER
Please let Lakeisha know I talked to one of the GI docs, they reviewed her history and given statin medications are so beneficial, they recommended trial of a low dose statin and then check LFTs at 2 weeks and 6 weeks   If she tolerates that, we can slowly titrate up to goal  Let me know if she is okay with this- thanks!

## 2022-12-02 ENCOUNTER — OFFICE VISIT (OUTPATIENT)
Dept: PODIATRY | Facility: CLINIC | Age: 67
End: 2022-12-02

## 2022-12-02 ENCOUNTER — APPOINTMENT (OUTPATIENT)
Dept: RADIOLOGY | Facility: CLINIC | Age: 67
End: 2022-12-02

## 2022-12-02 VITALS
HEIGHT: 64 IN | SYSTOLIC BLOOD PRESSURE: 144 MMHG | WEIGHT: 130 LBS | DIASTOLIC BLOOD PRESSURE: 72 MMHG | BODY MASS INDEX: 22.2 KG/M2

## 2022-12-02 DIAGNOSIS — S92.515A CLOSED NONDISPLACED FRACTURE OF PROXIMAL PHALANX OF LESSER TOE OF LEFT FOOT, INITIAL ENCOUNTER: ICD-10-CM

## 2022-12-02 DIAGNOSIS — S92.515D CLOSED NONDISPLACED FRACTURE OF PROXIMAL PHALANX OF LESSER TOE OF LEFT FOOT WITH ROUTINE HEALING, SUBSEQUENT ENCOUNTER: Primary | ICD-10-CM

## 2022-12-02 NOTE — PROGRESS NOTES
Assessment/Plan:    No problem-specific Assessment & Plan notes found for this encounter  Diagnoses and all orders for this visit:    Closed nondisplaced fracture of proximal phalanx of lesser toe of left foot with routine healing, subsequent encounter  -     X-ray foot left 3+ views; Future      Plan:     -  Patient was counseled and educated on the condition and the diagnosis  The diagnosis, treatment options and prognosis were discussed in detail    - left foot x-rays obtained, I personally interpreted x-ray findings with patient which is consistent with 3rd proximal phalanx base healing fracture  - advised to remain in surgical shoe for additional 2 weeks and then may transition to sneakers  - return as needed  -address all questions and concerns  Subjective:      Patient ID: Juliette Mercado is a 75 y o  female  79-year-old female presents for ongoing follow-up of left lesser toe fracture that she sustained about 4 weeks ago  Patient reports she has no discomfort to the toe and able to wiggle her toe without any pain  She is compliant with wearing surgical shoe  No other complaints at this visit        The following portions of the patient's history were reviewed and updated as appropriate:   She  has a past medical history of Acute bronchitis, Acute pharyngitis, Anxiety state, Arthropathy of ankle and foot, Asthma, Asthma without status asthmaticus, Carotid artery occlusion, COPD (chronic obstructive pulmonary disease) ([address] ), Cough, COVID-19 vaccine series completed, Disorder of shoulder, Dyspnea, Hand joint pain, Heartburn, Herpes simplex without complication, Hyperlipidemia, Infection of skin and subcutaneous tissue, Localized superficial swelling of skin, Low back pain, Lymphadenopathy, Malaise and fatigue, Neck pain, Osteoarthritis, Pleurisy without effusion or active tuberculosis, Pneumonia, Right upper quadrant pain, Shoulder joint pain, Skin eruption, Vitamin D deficiency, and Vomiting  She   Patient Active Problem List    Diagnosis Date Noted   • Vitamin D deficiency 11/09/2022   • Elevated fasting glucose 11/09/2022   • Left foot pain 11/09/2022   • Allergic rhinitis due to pollen 11/09/2022   • Small airways disease 07/09/2022   • Hilar adenopathy 07/09/2022   • Anxiety 03/27/2022   • Elevated liver enzymes 12/15/2021   • Hepatic steatosis 12/15/2021   • Gastroesophageal reflux disease 12/15/2021   • Abnormal EKG 11/30/2021   • Hypophosphatemia 05/26/2021   • Aneurysm of left internal carotid artery 05/25/2021   • Normocytic anemia 05/25/2021   • Leukocytosis 05/25/2021   • Diffuse idiopathic skeletal hyperostosis of cervical spine 05/25/2021   • Right hip pain 04/01/2021   • Elevated alkaline phosphatase level 04/01/2021   • Osteopenia 04/01/2021   • Non-seasonal allergic rhinitis 03/02/2021   • Chronic kidney disease (CKD) stage G2/A1, mildly decreased glomerular filtration rate (GFR) between 60-89 mL/min/1 73 square meter and albuminuria creatinine ratio less than 30 mg/g 10/07/2019   • Essential hypertension 10/07/2019   • Hyperlipidemia 09/26/2019   • Moderate asthma 09/26/2019     She  has a past surgical history that includes Cholecystectomy; Colonoscopy (02/2013); Hysterectomy; Cholecystectomy (03/2014); Breast biopsy (Left, 2017 benign); [place] lumbar puncture diagnostic (05/25/2021); and Nasal polyp excision (Left, 2009)       Review of Systems   Constitutional: Negative for chills  Respiratory: Negative for shortness of breath  Gastrointestinal: Negative for vomiting  Musculoskeletal: Negative for arthralgias  Skin: Negative for color change  Neurological: Negative for dizziness  Psychiatric/Behavioral: Negative for agitation  Objective:      /72 (BP Location: Left arm, Patient Position: Sitting, Cuff Size: Standard)   Ht 5' 4" (1 626 m)   Wt 59 kg (130 lb)   BMI 22 31 kg/m²          Physical Exam  Vitals reviewed     Cardiovascular:      Rate and Rhythm: Normal rate  Pulses: Normal pulses  Pulmonary:      Effort: Pulmonary effort is normal    Musculoskeletal:         General: No tenderness  Feet:      Comments: No pain with palpation to the left 3rd 4th or 5th digits  Patient able to wiggle her toes without any discomfort  Light touch sensation intact  Capillary refill time within normal limits  Skin:     General: Skin is warm and dry  Neurological:      General: No focal deficit present  Mental Status: She is alert     Psychiatric:         Mood and Affect: Mood normal

## 2022-12-14 NOTE — ASSESSMENT & PLAN NOTE
----- Message from Maddie Peña CNP sent at 12/14/2022 11:40 AM CST -----  BMP looks good, blood sugar a little elevated, I don't thinks she was fasting before the lab was drawn.   Blood pressure has been stable since extubation; -130  · Holding home antihypertensives in unit, can restart before or at discharge

## 2022-12-23 ENCOUNTER — TELEPHONE (OUTPATIENT)
Dept: FAMILY MEDICINE CLINIC | Facility: CLINIC | Age: 67
End: 2022-12-23

## 2022-12-23 DIAGNOSIS — K21.9 GASTROESOPHAGEAL REFLUX DISEASE WITHOUT ESOPHAGITIS: ICD-10-CM

## 2022-12-23 DIAGNOSIS — E78.5 HYPERLIPIDEMIA, UNSPECIFIED HYPERLIPIDEMIA TYPE: ICD-10-CM

## 2022-12-23 NOTE — TELEPHONE ENCOUNTER
Patient called and wanted to inform you that she is not taking the Crestor 5mg as she can not tolerate it,

## 2022-12-28 NOTE — TELEPHONE ENCOUNTER
Recorded in her chart, we can discuss further when I see her in Feb our plan for her cholesterol, in the meantime would focus on diet changes

## 2023-01-29 ENCOUNTER — OFFICE VISIT (OUTPATIENT)
Dept: URGENT CARE | Facility: CLINIC | Age: 68
End: 2023-01-29

## 2023-01-29 VITALS
TEMPERATURE: 97.8 F | RESPIRATION RATE: 18 BRPM | WEIGHT: 130 LBS | OXYGEN SATURATION: 97 % | DIASTOLIC BLOOD PRESSURE: 72 MMHG | SYSTOLIC BLOOD PRESSURE: 155 MMHG | HEART RATE: 61 BPM | BODY MASS INDEX: 22.31 KG/M2

## 2023-01-29 DIAGNOSIS — R10.9 RIGHT FLANK PAIN: Primary | ICD-10-CM

## 2023-01-29 LAB
SL AMB  POCT GLUCOSE, UA: NORMAL
SL AMB LEUKOCYTE ESTERASE,UA: NORMAL
SL AMB POCT BILIRUBIN,UA: NORMAL
SL AMB POCT BLOOD,UA: NORMAL
SL AMB POCT CLARITY,UA: CLEAR
SL AMB POCT COLOR,UA: YELLOW
SL AMB POCT KETONES,UA: NORMAL
SL AMB POCT NITRITE,UA: NORMAL
SL AMB POCT PH,UA: 7.5
SL AMB POCT SPECIFIC GRAVITY,UA: 1
SL AMB POCT URINE PROTEIN: NORMAL
SL AMB POCT UROBILINOGEN: 0.2

## 2023-01-29 NOTE — PROGRESS NOTES
[facility]        NAME: Anthony Boucher is a 75 y o  female  : 1955    MRN: [id number]  DATE: 2023  TIME: 6:30 PM    Assessment and Plan   Right flank pain [R10 9]  1  Right flank pain  POCT urine dip    Urine culture          UA: Negative  Report to the ED for further evaluation and treatment  Patient Instructions       Proceed to the ED  Chief Complaint     Chief Complaint   Patient presents with   • Rib Pain     X 2 days, no injury         History of Present Illness       Patient is a 74 y/o/f presenting to [facility] with right flank pain  Patient reports hx of kidney stones a few months ago  Pt passed two stones at that time  Pt reports flank pain resolved at that time and returned two days ago  Patient reports slight cough and hx of Pneumonia  Patient concerned might be Pneumonia or Bronchitis  No fevers or shortness of breath  Flank Pain  This is a new problem  The current episode started in the past 7 days (2 days ago)  The problem occurs constantly  The problem is unchanged  The symptoms are aggravated by position  Pertinent negatives include no abdominal pain, chest pain, dysuria or fever  Review of Systems   Review of Systems   Constitutional: Negative for chills and fever  HENT: Negative for ear pain and sore throat  Eyes: Negative for pain and visual disturbance  Respiratory: Positive for cough  Negative for shortness of breath  Cardiovascular: Negative for chest pain and palpitations  Gastrointestinal: Negative for abdominal pain and vomiting  Genitourinary: Positive for flank pain  Negative for dysuria and hematuria  Musculoskeletal: Positive for back pain  Negative for arthralgias  Skin: Negative for color change and rash  Neurological: Negative for seizures and syncope  All other systems reviewed and are negative          Current Medications       Current Outpatient Medications:   •  albuterol (2 5 mg/3 mL) 0 083 % nebulizer solution, Take 3 mL (2 5 mg total) by nebulization 4 (four) times a day, Disp: 180 mL, Rfl: 1  •  albuterol (ProAir HFA) 90 mcg/act inhaler, Inhale 2 puffs every 6 (six) hours as needed for wheezing, Disp: 8 5 g, Rfl: 1  •  ALPRAZolam (XANAX) 0 25 mg tablet, Take 1 tablet (0 25 mg total) by mouth daily at bedtime as needed for anxiety, Disp: 30 tablet, Rfl: 1  •  amLODIPine (NORVASC) 5 mg tablet, Take 1 tablet (5 mg total) by mouth daily, Disp: 100 tablet, Rfl: 3  •  aspirin 81 mg chewable tablet, Chew 81 mg daily, Disp: , Rfl:   •  levocetirizine (XYZAL) 5 MG tablet, Take 0 5 tablets (2 5 mg total) by mouth every evening, Disp: 100 tablet, Rfl: 1  •  montelukast (SINGULAIR) 10 mg tablet, Take 1 tablet (10 mg total) by mouth daily at bedtime, Disp: 100 tablet, Rfl: 3  •  nystatin (MYCOSTATIN) cream, Apply topically 2 (two) times a day   Apply for additional 2 weeks after rash resolves  , Disp: 30 g, Rfl: 0  •  EPINEPHrine (EPIPEN) 0 3 mg/0 3 mL SOAJ, Inject 0 3 mL (0 3 mg total) into a muscle once for 1 dose, Disp: 2 each, Rfl: 1  •  fluticasone-salmeterol (Advair Diskus) 500-50 mcg/dose inhaler, Inhale 1 puff 2 (two) times a day Rinse mouth after use , Disp: 180 blister, Rfl: 3  •  omeprazole (PriLOSEC) 20 mg delayed release capsule, Take 1 capsule (20 mg total) by mouth 2 (two) times a day, Disp: 200 capsule, Rfl: 3  •  Promethazine-DM (PHENERGAN-DM) 6 25-15 mg/5 mL oral syrup, Take 5 mL by mouth 4 (four) times a day as needed for cough (Patient not taking: Reported on 1/29/2023), Disp: 240 mL, Rfl: 0    Current Allergies     Allergies as of 01/29/2023 - Reviewed 01/29/2023   Allergen Reaction Noted   • Azithromycin  09/13/2019   • Darvon [propoxyphene]  09/13/2019   • Crestor [rosuvastatin] Hives and GI Intolerance 11/09/2022            The following portions of the patient's history were reviewed and updated as appropriate: allergies, current medications, past family history, past medical history, past social history, past surgical history and problem list      Past Medical History:   Diagnosis Date   • Acute bronchitis    • Acute pharyngitis    • Anxiety state    • Arthropathy of ankle and foot     and/or   • Asthma    • Asthma without status asthmaticus    • Carotid artery occlusion    • COPD (chronic obstructive pulmonary disease) ([facility])    • Cough    • COVID-19 vaccine series completed    • Disorder of shoulder    • Dyspnea    • Hand joint pain    • Heartburn    • Herpes simplex without complication    • Hyperlipidemia    • Infection of skin and subcutaneous tissue    • Localized superficial swelling of skin    • Low back pain    • Lymphadenopathy    • Malaise and fatigue    • Neck pain    • Osteoarthritis    • Pleurisy without effusion or active tuberculosis    • Pneumonia    • Right upper quadrant pain    • Shoulder joint pain    • Skin eruption    • Vitamin D deficiency    • Vomiting        Past Surgical History:   Procedure Laterality Date   • BREAST BIOPSY Left 2017 benign   • CHOLECYSTECTOMY     • CHOLECYSTECTOMY  03/2014    Dr Juli Daniel    • COLONOSCOPY  02/2013    WNL   • FL LUMBAR PUNCTURE DIAGNOSTIC  05/25/2021   • HYSTERECTOMY      Total    • NASAL POLYP EXCISION Left 2009    with lysis of intranasal adhesions from packing       Family History   Problem Relation Age of Onset   • Diabetes Mother         Non-insulin dependent diabetes   • Emphysema Father    • No Known Problems Sister    • No Known Problems Daughter    • No Known Problems Maternal Grandmother    • No Known Problems Paternal Grandmother    • No Known Problems Sister    • No Known Problems Sister    • No Known Problems Daughter    • No Known Problems Daughter    • No Known Problems Maternal Aunt          Medications have been verified  Objective   /72   Pulse 61   Temp 97 8 °F (36 6 °C)   Resp 18   Wt 59 kg (130 lb)   SpO2 97%   BMI 22 31 kg/m²   No LMP recorded  Patient has had a hysterectomy         Physical Exam     Physical Exam  Constitutional:       Appearance: Normal appearance  HENT:      Head: Normocephalic and atraumatic  Nose: Nose normal       Mouth/Throat:      Mouth: Mucous membranes are moist    Eyes:      Extraocular Movements: Extraocular movements intact  Conjunctiva/sclera: Conjunctivae normal       Pupils: Pupils are equal, round, and reactive to light  Cardiovascular:      Rate and Rhythm: Normal rate and regular rhythm  Pulmonary:      Effort: Pulmonary effort is normal    Musculoskeletal:         General: Normal range of motion  Cervical back: Normal range of motion and neck supple  Skin:     General: Skin is warm and dry  Capillary Refill: Capillary refill takes less than 2 seconds  Neurological:      General: No focal deficit present  Mental Status: She is alert and oriented to person, place, and time     Psychiatric:         Mood and Affect: Mood normal          Behavior: Behavior normal

## 2023-02-07 ENCOUNTER — RA CDI HCC (OUTPATIENT)
Dept: OTHER | Facility: HOSPITAL | Age: 68
End: 2023-02-07

## 2023-02-07 NOTE — PROGRESS NOTES
[facility]  coding opportunities       Chart reviewed, no opportunity found:   [address]        Patients Insurance     Medicare Insurance: [employer]

## 2023-03-01 ENCOUNTER — APPOINTMENT (EMERGENCY)
Dept: CT IMAGING | Facility: HOSPITAL | Age: 68
End: 2023-03-01

## 2023-03-01 ENCOUNTER — HOSPITAL ENCOUNTER (EMERGENCY)
Facility: HOSPITAL | Age: 68
Discharge: HOME | End: 2023-03-02
Attending: EMERGENCY MEDICINE

## 2023-03-01 VITALS
BODY MASS INDEX: 22.2 KG/M2 | DIASTOLIC BLOOD PRESSURE: 75 MMHG | SYSTOLIC BLOOD PRESSURE: 147 MMHG | HEIGHT: 64 IN | OXYGEN SATURATION: 95 % | TEMPERATURE: 96.8 F | WEIGHT: 130 LBS | HEART RATE: 77 BPM | RESPIRATION RATE: 18 BRPM

## 2023-03-01 DIAGNOSIS — S09.90XA CLOSED HEAD INJURY, INITIAL ENCOUNTER: Primary | ICD-10-CM

## 2023-03-01 DIAGNOSIS — Z79.82 ASPIRIN LONG-TERM USE: ICD-10-CM

## 2023-03-01 DIAGNOSIS — R11.0 NAUSEA: ICD-10-CM

## 2023-03-02 RX ORDER — ONDANSETRON 2 MG/ML
4 INJECTION INTRAMUSCULAR; INTRAVENOUS ONCE
Status: COMPLETED | OUTPATIENT
Start: 2023-03-02 | End: 2023-03-02

## 2023-03-02 RX ORDER — ONDANSETRON 4 MG/1
4 TABLET, ORALLY DISINTEGRATING ORAL EVERY 6 HOURS PRN
Qty: 20 TABLET | Refills: 0 | Status: SHIPPED | OUTPATIENT
Start: 2023-03-02

## 2023-03-02 RX ADMIN — ONDANSETRON 4 MG: 2 INJECTION INTRAMUSCULAR; INTRAVENOUS at 01:03

## 2023-03-02 NOTE — DISCHARGE INSTRUCTIONS
Recommend Tylenol as needed for headaches  Zofran as needed for nausea and vomiting  Follow-up with primary care physician  Activity as tolerated    Return for any worsening symptoms including severe headache, blurry vision, weakness, or any other concerning symptoms

## 2023-03-02 NOTE — ED PROVIDER NOTES
History  Chief Complaint   Patient presents with   • Head Injury     Pt was hit with a snowball  5pm and began to have double vision and nausea a half hour ago  Pt alert and oriented     26-year-old female on daily aspirin presents to the emergency department for evaluation of nausea and double vision  Patient reports around 5 PM she was accidentally hit in the back of the head with a snowball  She denies losing consciousness at that time  Had some mild pain in the head that then resolved  An hour and a half prior to arrival, started with nausea and double vision, both have since resolved  Currently denies any headache, neck or back pain  No chest pain, shortness of breath, or abdominal pain  Denies any blood thinners  Denies any prior head injuries  EMS states patient was able to ambulate seen          Prior to Admission Medications   Prescriptions Last Dose Informant Patient Reported? Taking? ALPRAZolam (XANAX) 0 25 mg tablet   No No   Sig: Take 1 tablet (0 25 mg total) by mouth daily at bedtime as needed for anxiety   EPINEPHrine (EPIPEN) 0 3 mg/0 3 mL SOAJ   No No   Sig: Inject 0 3 mL (0 3 mg total) into a muscle once for 1 dose   Promethazine-DM (PHENERGAN-DM) 6 25-15 mg/5 mL oral syrup   No No   Sig: Take 5 mL by mouth 4 (four) times a day as needed for cough   Patient not taking: Reported on 1/29/2023   albuterol (2 5 mg/3 mL) 0 083 % nebulizer solution   No No   Sig: Take 3 mL (2 5 mg total) by nebulization 4 (four) times a day   albuterol (ProAir HFA) 90 mcg/act inhaler   No No   Sig: Inhale 2 puffs every 6 (six) hours as needed for wheezing   amLODIPine (NORVASC) 5 mg tablet   No No   Sig: Take 1 tablet (5 mg total) by mouth daily   aspirin 81 mg chewable tablet   Yes No   Sig: Chew 81 mg daily   fluticasone-salmeterol (Advair Diskus) 500-50 mcg/dose inhaler   No No   Sig: Inhale 1 puff 2 (two) times a day Rinse mouth after use     levocetirizine (XYZAL) 5 MG tablet   No No   Sig: Take 0 5 tablets (2 5 mg total) by mouth every evening   montelukast (SINGULAIR) 10 mg tablet   No No   Sig: Take 1 tablet (10 mg total) by mouth daily at bedtime   nystatin (MYCOSTATIN) cream   No No   Sig: Apply topically 2 (two) times a day   Apply for additional 2 weeks after rash resolves     omeprazole (PriLOSEC) 20 mg delayed release capsule   No No   Sig: Take 1 capsule (20 mg total) by mouth 2 (two) times a day      Facility-Administered Medications: None       Past Medical History:   Diagnosis Date   • Acute bronchitis    • Acute pharyngitis    • Anxiety state    • Arthropathy of ankle and foot     and/or   • Asthma    • Asthma without status asthmaticus    • Carotid artery occlusion    • COPD (chronic obstructive pulmonary disease) ([facility])    • Cough    • COVID-19 vaccine series completed    • Disorder of shoulder    • Dyspnea    • Hand joint pain    • Heartburn    • Herpes simplex without complication    • Hyperlipidemia    • Infection of skin and subcutaneous tissue    • Localized superficial swelling of skin    • Low back pain    • Lymphadenopathy    • Malaise and fatigue    • Neck pain    • Osteoarthritis    • Pleurisy without effusion or active tuberculosis    • Pneumonia    • Right upper quadrant pain    • Shoulder joint pain    • Skin eruption    • Vitamin D deficiency    • Vomiting        Past Surgical History:   Procedure Laterality Date   • BREAST BIOPSY Left 2017 benign   • CHOLECYSTECTOMY     • CHOLECYSTECTOMY  03/2014    Dr Tasha Camejo    • COLONOSCOPY  02/2013    WNL   • FL LUMBAR PUNCTURE DIAGNOSTIC  05/25/2021   • HYSTERECTOMY      Total    • NASAL POLYP EXCISION Left 2009    with lysis of intranasal adhesions from packing       Family History   Problem Relation Age of Onset   • Diabetes Mother         Non-insulin dependent diabetes   • Emphysema Father    • No Known Problems Sister    • No Known Problems Daughter    • No Known Problems Maternal Grandmother    • No Known Problems Paternal Grandmother    • No Known Problems Sister    • No Known Problems Sister    • No Known Problems Daughter    • No Known Problems Daughter    • No Known Problems Maternal Aunt      I have reviewed and agree with the history as documented  E-Cigarette/Vaping   • E-Cigarette Use Never User      E-Cigarette/Vaping Substances   • Nicotine No    • THC No    • CBD No    • Flavoring No      Social History     Tobacco Use   • Smoking status: Never   • Smokeless tobacco: Never   Vaping Use   • Vaping Use: Never used   Substance Use Topics   • Alcohol use: Yes     Alcohol/week: 21 0 standard drinks     Types: 21 Cans of beer per week     Comment: Daily   • Drug use: Never       Review of Systems   Constitutional: Negative for chills and fever  HENT: Negative for ear pain and sore throat  Eyes: Positive for visual disturbance  Negative for pain  Respiratory: Negative for cough and shortness of breath  Cardiovascular: Negative for chest pain and palpitations  Gastrointestinal: Positive for nausea  Negative for abdominal pain and vomiting  Genitourinary: Negative for dysuria and hematuria  Musculoskeletal: Negative for arthralgias and back pain  Skin: Negative for color change and rash  Neurological: Negative for seizures and syncope  All other systems reviewed and are negative  Physical Exam  Physical Exam  Vitals and nursing note reviewed  Constitutional:       General: She is not in acute distress  HENT:      Head: Normocephalic and atraumatic  Right Ear: External ear normal       Left Ear: External ear normal       Nose: Nose normal       Mouth/Throat:      Mouth: Mucous membranes are moist    Eyes:      Extraocular Movements: Extraocular movements intact  Conjunctiva/sclera: Conjunctivae normal       Pupils: Pupils are equal, round, and reactive to light  Cardiovascular:      Rate and Rhythm: Normal rate and regular rhythm  Pulses: Normal pulses  Heart sounds: Normal heart sounds   No murmur heard  Pulmonary:      Effort: Pulmonary effort is normal  No respiratory distress  Breath sounds: No stridor  Musculoskeletal:         General: No deformity  Normal range of motion  Cervical back: Normal range of motion and neck supple  No tenderness  Skin:     General: Skin is warm and dry  Capillary Refill: Capillary refill takes less than 2 seconds  Neurological:      General: No focal deficit present  Mental Status: She is alert and oriented to person, place, and time  Comments: Normal finger-to-nose   Psychiatric:         Mood and Affect: Mood normal          Behavior: Behavior normal          Vital Signs  ED Triage Vitals   Temperature Pulse Respirations Blood Pressure SpO2   03/01/23 2324 03/01/23 2320 03/01/23 2320 03/01/23 2320 03/01/23 2320   (!) 96 8 °F (36 °C) 77 18 147/75 95 %      Temp Source Heart Rate Source Patient Position - Orthostatic VS BP Location FiO2 (%)   03/01/23 2324 03/01/23 2320 03/01/23 2320 03/01/23 2320 --   Tympanic Monitor Lying Right arm       Pain Score       03/01/23 2320       5           Vitals:    03/01/23 2320   BP: 147/75   Pulse: 77   Patient Position - Orthostatic VS: Lying         Visual Acuity      ED Medications  Medications   ondansetron (ZOFRAN) injection 4 mg (4 mg Intravenous Given 3/2/23 0103)       Diagnostic Studies  Results Reviewed     None                 CT head without contrast   Final Result by Raine Bo MD (03/02 0040)      No acute intracranial abnormality  Workstation performed: [id number]                    Procedures  Procedures         ED Course                                             Medical Decision Making  50-year-old female presents to the emergency department for evaluation following head injury  On exam, she is resting comfortably in bed in no acute distress  No focal neurologic deficits present    Differential diagnosis includes but is not limited to migraine headache, intracranial hemorrhage, mild concussion  Patient takes daily aspirin, will get CT scan of the head to evaluate  CT scan negative for any acute findings  On repeat evaluation, symptoms are currently mild  Plan to discharge home with prescription for Zofran  Patient given strict return precautions  Aspirin long-term use: acute illness or injury  Closed head injury, initial encounter: acute illness or injury  Nausea: acute illness or injury  Amount and/or Complexity of Data Reviewed  Independent Historian: EMS  Radiology: ordered and independent interpretation performed  Risk  OTC drugs  Prescription drug management  Disposition  Final diagnoses:   Closed head injury, initial encounter   Aspirin long-term use   Nausea     Time reflects when diagnosis was documented in both MDM as applicable and the Disposition within this note     Time User Action Codes Description Comment    3/2/2023 12:42 AM Check, Josselin Ravel Add [S09 90XA] Closed head injury, initial encounter     3/2/2023 12:42 AM Check, Josselin Ravel Add [Z79 82] Aspirin long-term use     3/2/2023 12:43 AM Check, Josselin Ravel Add [R11 0] Nausea       ED Disposition     ED Disposition   Discharge    Condition   Stable    Date/Time   Thu Mar 2, 2023 12:43 AM    Comment   Lakeisha Trejo discharge to home/self care                 Follow-up Information     Follow up With Specialties Details Why Contact Info Additional [address] Emergency Department Emergency Medicine  If symptoms worsen Geremias Browning [address] [phone]  [facility] Emergency Department, [address] [place] [address] WITH [address] Family Medicine Schedule an appointment as soon as possible for a visit   Geremias Gage [address]  [address]  [phone]             Discharge Medication List as of 3/2/2023 12:44 AM      START taking these medications    Details ondansetron (Zofran ODT) 4 mg disintegrating tablet Take 1 tablet (4 mg total) by mouth every 6 (six) hours as needed for nausea or vomiting, Starting Thu 3/2/2023, Normal         CONTINUE these medications which have NOT CHANGED    Details   albuterol (2 5 mg/3 mL) 0 083 % nebulizer solution Take 3 mL (2 5 mg total) by nebulization 4 (four) times a day, Starting Sat 3/26/2022, Normal      albuterol (ProAir HFA) 90 mcg/act inhaler Inhale 2 puffs every 6 (six) hours as needed for wheezing, Starting Wed 11/9/2022, Normal      ALPRAZolam (XANAX) 0 25 mg tablet Take 1 tablet (0 25 mg total) by mouth daily at bedtime as needed for anxiety, Starting Sat 7/9/2022, Normal      amLODIPine (NORVASC) 5 mg tablet Take 1 tablet (5 mg total) by mouth daily, Starting Wed 11/9/2022, Normal      aspirin 81 mg chewable tablet Chew 81 mg daily, Historical Med      EPINEPHrine (EPIPEN) 0 3 mg/0 3 mL SOAJ Inject 0 3 mL (0 3 mg total) into a muscle once for 1 dose, Starting Mon 5/4/2020, Normal      fluticasone-salmeterol (Advair Diskus) 500-50 mcg/dose inhaler Inhale 1 puff 2 (two) times a day Rinse mouth after use , Starting Mon 10/18/2021, Until Wed 11/9/2022, Normal      levocetirizine (XYZAL) 5 MG tablet Take 0 5 tablets (2 5 mg total) by mouth every evening, Starting Wed 11/9/2022, Normal      montelukast (SINGULAIR) 10 mg tablet Take 1 tablet (10 mg total) by mouth daily at bedtime, Starting Wed 11/9/2022, Normal      nystatin (MYCOSTATIN) cream Apply topically 2 (two) times a day   Apply for additional 2 weeks after rash resolves  , Starting Sun 6/14/2020, Normal      omeprazole (PriLOSEC) 20 mg delayed release capsule Take 1 capsule (20 mg total) by mouth 2 (two) times a day, Starting Wed 11/9/2022, Normal      Promethazine-DM (PHENERGAN-DM) 6 25-15 mg/5 mL oral syrup Take 5 mL by mouth 4 (four) times a day as needed for cough, Starting u 10/13/2022, Normal             No discharge procedures on file      PDMP Review Value Time User    PDMP Reviewed  Yes 8/2/2022  2:11 PM Autumn Sanford MD          ED Provider  Electronically Signed by           Jayson Shannon MD  03/02/23 9648

## 2023-03-28 ENCOUNTER — APPOINTMENT (OUTPATIENT)
Dept: RADIOLOGY | Facility: CLINIC | Age: 68
End: 2023-03-28

## 2023-03-28 ENCOUNTER — OFFICE VISIT (OUTPATIENT)
Dept: URGENT CARE | Facility: CLINIC | Age: 68
End: 2023-03-28

## 2023-03-28 VITALS
BODY MASS INDEX: 22.31 KG/M2 | OXYGEN SATURATION: 93 % | SYSTOLIC BLOOD PRESSURE: 159 MMHG | WEIGHT: 130 LBS | TEMPERATURE: 97.9 F | HEART RATE: 90 BPM | RESPIRATION RATE: 26 BRPM | DIASTOLIC BLOOD PRESSURE: 73 MMHG

## 2023-03-28 DIAGNOSIS — J18.9 PNEUMONIA OF LEFT LOWER LOBE DUE TO INFECTIOUS ORGANISM: Primary | ICD-10-CM

## 2023-03-28 DIAGNOSIS — J45.41 MODERATE PERSISTENT ASTHMA WITH ACUTE EXACERBATION: ICD-10-CM

## 2023-03-28 DIAGNOSIS — R06.2 WHEEZING: ICD-10-CM

## 2023-03-28 RX ORDER — PREDNISONE 20 MG/1
60 TABLET ORAL DAILY
Qty: 15 TABLET | Refills: 0 | Status: SHIPPED | OUTPATIENT
Start: 2023-03-28 | End: 2023-04-02

## 2023-03-28 RX ORDER — LEVOFLOXACIN 750 MG/1
750 TABLET ORAL EVERY 24 HOURS
Qty: 5 TABLET | Refills: 0 | Status: SHIPPED | OUTPATIENT
Start: 2023-03-28 | End: 2023-04-02

## 2023-03-28 NOTE — PROGRESS NOTES
[facility]        NAME: German Eldridge is a 75 y o  female  : 1955    MRN: [id number]  DATE: 2023  TIME: 3:31 PM      Assessment and Plan     Pneumonia of left lower lobe due to infectious organism [J18 9]  1  Pneumonia of left lower lobe due to infectious organism  XR chest pa & lateral    levofloxacin (LEVAQUIN) 750 mg tablet      2  Moderate persistent asthma with acute exacerbation  predniSONE 20 mg tablet        Note:   Told patient about the increased risk of Achilles tendon rupture while taking prednisone and levofloxacin together, and told her to be aware of any pain in the Achilles tendon area and to seek medical attention immediately if this happens  Patient Instructions   There are no Patient Instructions on file for this visit  Follow up with PCP in 3-5 days  Go to ER if symptoms worsen  Chief Complaint     Chief Complaint   Patient presents with   • Asthma     X 2 weeks   • Wheezing         History of Present Illness     Patient presents with wheezing and asthma attack x 2 weeks  She states it is getting worse despite using her inhalers  Review of Systems     Review of Systems   Constitutional: Negative for chills, fatigue and fever  HENT: Negative for congestion, ear pain, postnasal drip, rhinorrhea, sinus pressure, sinus pain and sore throat  Eyes: Negative for pain and visual disturbance  Respiratory: Positive for cough, chest tightness, shortness of breath and wheezing  Cardiovascular: Negative for chest pain and palpitations  Gastrointestinal: Negative for abdominal pain, diarrhea, nausea and vomiting  Genitourinary: Negative for dysuria and hematuria  Musculoskeletal: Negative for arthralgias and back pain  Skin: Negative for color change and rash  Neurological: Negative for dizziness, seizures, syncope, numbness and headaches  All other systems reviewed and are negative          Current Medications       Current Outpatient Medications:   •  albuterol (2 5 mg/3 mL) 0 083 % nebulizer solution, Take 3 mL (2 5 mg total) by nebulization 4 (four) times a day, Disp: 180 mL, Rfl: 1  •  albuterol (ProAir HFA) 90 mcg/act inhaler, Inhale 2 puffs every 6 (six) hours as needed for wheezing, Disp: 8 5 g, Rfl: 1  •  ALPRAZolam (XANAX) 0 25 mg tablet, Take 1 tablet (0 25 mg total) by mouth daily at bedtime as needed for anxiety, Disp: 30 tablet, Rfl: 1  •  amLODIPine (NORVASC) 5 mg tablet, Take 1 tablet (5 mg total) by mouth daily, Disp: 100 tablet, Rfl: 3  •  aspirin 81 mg chewable tablet, Chew 81 mg daily, Disp: , Rfl:   •  levocetirizine (XYZAL) 5 MG tablet, Take 0 5 tablets (2 5 mg total) by mouth every evening, Disp: 100 tablet, Rfl: 1  •  levofloxacin (LEVAQUIN) 750 mg tablet, Take 1 tablet (750 mg total) by mouth every 24 hours for 5 days, Disp: 5 tablet, Rfl: 0  •  montelukast (SINGULAIR) 10 mg tablet, Take 1 tablet (10 mg total) by mouth daily at bedtime, Disp: 100 tablet, Rfl: 3  •  nystatin (MYCOSTATIN) cream, Apply topically 2 (two) times a day   Apply for additional 2 weeks after rash resolves  , Disp: 30 g, Rfl: 0  •  omeprazole (PriLOSEC) 20 mg delayed release capsule, Take 1 capsule (20 mg total) by mouth 2 (two) times a day, Disp: 200 capsule, Rfl: 3  •  ondansetron (Zofran ODT) 4 mg disintegrating tablet, Take 1 tablet (4 mg total) by mouth every 6 (six) hours as needed for nausea or vomiting, Disp: 20 tablet, Rfl: 0  •  predniSONE 20 mg tablet, Take 3 tablets (60 mg total) by mouth daily for 5 days, Disp: 15 tablet, Rfl: 0  •  EPINEPHrine (EPIPEN) 0 3 mg/0 3 mL SOAJ, Inject 0 3 mL (0 3 mg total) into a muscle once for 1 dose, Disp: 2 each, Rfl: 1  •  fluticasone-salmeterol (Advair Diskus) 500-50 mcg/dose inhaler, Inhale 1 puff 2 (two) times a day Rinse mouth after use , Disp: 180 blister, Rfl: 3  •  Promethazine-DM (PHENERGAN-DM) 6 25-15 mg/5 mL oral syrup, Take 5 mL by mouth 4 (four) times a day as needed for cough (Patient not taking: Reported on 1/29/2023), Disp: 240 mL, Rfl: 0    Current Allergies     Allergies as of 03/28/2023 - Reviewed 03/28/2023   Allergen Reaction Noted   • Azithromycin  09/13/2019   • Darvon [propoxyphene]  09/13/2019   • Crestor [rosuvastatin] Hives and GI Intolerance 11/09/2022              The following portions of the patient's history were reviewed and updated as appropriate: allergies, current medications, past family history, past medical history, past social history, past surgical history, and problem list      Past Medical History:   Diagnosis Date   • Acute bronchitis    • Acute pharyngitis    • Anxiety state    • Arthropathy of ankle and foot     and/or   • Asthma    • Asthma without status asthmaticus    • Carotid artery occlusion    • COPD (chronic obstructive pulmonary disease) ([facility])    • Cough    • COVID-19 vaccine series completed    • Disorder of shoulder    • Dyspnea    • Hand joint pain    • Heartburn    • Herpes simplex without complication    • Hyperlipidemia    • Infection of skin and subcutaneous tissue    • Localized superficial swelling of skin    • Low back pain    • Lymphadenopathy    • Malaise and fatigue    • Neck pain    • Osteoarthritis    • Pleurisy without effusion or active tuberculosis    • Pneumonia    • Right upper quadrant pain    • Shoulder joint pain    • Skin eruption    • Vitamin D deficiency    • Vomiting        Past Surgical History:   Procedure Laterality Date   • BREAST BIOPSY Left 2017 benign   • CHOLECYSTECTOMY     • CHOLECYSTECTOMY  03/2014    Dr Rama Jane    • COLONOSCOPY  02/2013    WNL   • FL LUMBAR PUNCTURE DIAGNOSTIC  05/25/2021   • HYSTERECTOMY      Total    • NASAL POLYP EXCISION Left 2009    with lysis of intranasal adhesions from packing       Family History   Problem Relation Age of Onset   • Diabetes Mother         Non-insulin dependent diabetes   • Emphysema Father    • No Known Problems Sister    • No Known Problems Daughter    • No Known Problems Maternal Grandmother    • No Known Problems Paternal Grandmother    • No Known Problems Sister    • No Known Problems Sister    • No Known Problems Daughter    • No Known Problems Daughter    • No Known Problems Maternal Aunt          Medications have been verified  Objective     /73   Pulse 90   Temp 97 9 °F (36 6 °C)   Resp (!) 26   Wt 59 kg (130 lb)   SpO2 93%   BMI 22 31 kg/m²   No LMP recorded  Patient has had a hysterectomy  Physical Exam     Physical Exam  Vitals and nursing note reviewed  Constitutional:       Appearance: Normal appearance  She is normal weight  HENT:      Head: Normocephalic and atraumatic  Nose: Nose normal       Mouth/Throat:      Mouth: Mucous membranes are moist       Pharynx: Oropharynx is clear  Cardiovascular:      Rate and Rhythm: Normal rate and regular rhythm  Pulses: Normal pulses  Heart sounds: Normal heart sounds  Pulmonary:      Effort: Respiratory distress present  Breath sounds: Wheezing present  Comments: Patient struggling to finish sentences--mild respiratory distress  Wheezing in all lung fields  Skin:     General: Skin is warm and dry  Neurological:      General: No focal deficit present  Mental Status: She is alert and oriented to person, place, and time     Psychiatric:         Mood and Affect: Mood normal          Behavior: Behavior normal

## 2023-05-01 ENCOUNTER — OFFICE VISIT (OUTPATIENT)
Dept: FAMILY MEDICINE CLINIC | Facility: CLINIC | Age: 68
End: 2023-05-01

## 2023-05-01 VITALS
SYSTOLIC BLOOD PRESSURE: 126 MMHG | HEART RATE: 81 BPM | BODY MASS INDEX: 25.1 KG/M2 | HEIGHT: 64 IN | WEIGHT: 147 LBS | TEMPERATURE: 98.7 F | OXYGEN SATURATION: 95 % | DIASTOLIC BLOOD PRESSURE: 76 MMHG

## 2023-05-01 DIAGNOSIS — R06.2 WHEEZING: ICD-10-CM

## 2023-05-01 DIAGNOSIS — R06.09 DOE (DYSPNEA ON EXERTION): ICD-10-CM

## 2023-05-01 DIAGNOSIS — J45.40 MODERATE PERSISTENT ASTHMA WITHOUT COMPLICATION: Primary | ICD-10-CM

## 2023-05-01 NOTE — PROGRESS NOTES
"Assessment/Plan:       Problem List Items Addressed This Visit        Respiratory    Moderate asthma - Primary    Relevant Orders    Complete PFT with post bronchodilator   Other Visit Diagnoses     TOSCANO (dyspnea on exertion)        Relevant Orders    Complete PFT with post bronchodilator    Wheezing        Relevant Orders    Complete PFT with post bronchodilator            Ambulatory oxygen monitoring maintained 91-97% with normal heart rate  Recommended updated PFT testing, will consider pulmonology referral as well  Declines additional chest imaging  Subjective:      Patient ID: Laxmi Rice is a 70 y o  female  HPI     Seen in the ED 4/14 for viral URI with cough, chest XR showed right lower base opacity, discharged with prednisone  Up and down steps wheezing and out of breath a little bit, sometimes with short distance  Advair 500-50 1 puff BID  Using albuterol neb twice daily, before every 4 hours, only using albuterol inhaler at night  Overall feeling better  Never smoked  Worked in [employer] [place], material has formaldehyde in it, long exposure to that  The following portions of the patient's history were reviewed and updated as appropriate: allergies, current medications, past family history, past medical history, past social history, past surgical history, and problem list     Review of Systems   All other systems reviewed and are negative  Objective:      /76   Pulse 81   Temp 98 7 °F (37 1 °C)   Ht 5' 4\" (1 626 m)   Wt 66 7 kg (147 lb)   SpO2 95%   BMI 25 23 kg/m²          Physical Exam  Vitals reviewed  Constitutional:       General: She is not in acute distress  Appearance: Normal appearance  She is not ill-appearing, toxic-appearing or diaphoretic  HENT:      Head: Normocephalic and atraumatic  Eyes:      General:         Right eye: No discharge  Left eye: No discharge  Extraocular Movements: Extraocular movements intact        " Conjunctiva/sclera: Conjunctivae normal    Cardiovascular:      Rate and Rhythm: Normal rate and regular rhythm  Heart sounds: Normal heart sounds  No murmur heard  No friction rub  No gallop  Pulmonary:      Effort: Pulmonary effort is normal  No respiratory distress  Breath sounds: No stridor  No wheezing or rhonchi  Comments: Diminished breath sounds at bases   Musculoskeletal:         General: No swelling, tenderness or signs of injury  Right lower leg: No edema  Left lower leg: No edema  Skin:     General: Skin is warm  Coloration: Skin is not pale  Findings: No erythema or rash  Neurological:      Mental Status: She is alert and oriented to person, place, and time  Motor: No weakness     Psychiatric:         Mood and Affect: Mood normal          Behavior: Behavior normal              DO Kelly Bearden [facility]

## 2023-05-10 ENCOUNTER — HOSPITAL ENCOUNTER (OUTPATIENT)
Dept: PULMONOLOGY | Facility: HOSPITAL | Age: 68
Discharge: HOME | End: 2023-05-10

## 2023-05-10 DIAGNOSIS — R06.2 WHEEZING: ICD-10-CM

## 2023-05-10 DIAGNOSIS — J45.40 MODERATE PERSISTENT ASTHMA WITHOUT COMPLICATION: ICD-10-CM

## 2023-05-10 DIAGNOSIS — R06.09 DOE (DYSPNEA ON EXERTION): ICD-10-CM

## 2023-05-10 RX ORDER — ALBUTEROL SULFATE 2.5 MG/3ML
2.5 SOLUTION RESPIRATORY (INHALATION) ONCE AS NEEDED
Status: COMPLETED | OUTPATIENT
Start: 2023-05-10 | End: 2023-05-10

## 2023-05-10 RX ADMIN — ALBUTEROL SULFATE 2.5 MG: 2.5 SOLUTION RESPIRATORY (INHALATION) at 13:09

## 2023-05-11 DIAGNOSIS — J45.40 MODERATE PERSISTENT ASTHMA WITHOUT COMPLICATION: ICD-10-CM

## 2023-05-11 DIAGNOSIS — J44.9 CHRONIC OBSTRUCTIVE PULMONARY DISEASE, UNSPECIFIED COPD TYPE (HCC): Primary | ICD-10-CM

## 2023-05-11 DIAGNOSIS — R06.09 DOE (DYSPNEA ON EXERTION): ICD-10-CM

## 2023-05-18 DIAGNOSIS — J44.9 CHRONIC OBSTRUCTIVE PULMONARY DISEASE, UNSPECIFIED COPD TYPE (HCC): Primary | ICD-10-CM

## 2023-05-18 RX ORDER — TIOTROPIUM BROMIDE 18 UG/1
18 CAPSULE ORAL; RESPIRATORY (INHALATION) DAILY
Qty: 30 CAPSULE | Refills: 5 | Status: SHIPPED | OUTPATIENT
Start: 2023-05-18

## 2023-08-15 ENCOUNTER — OFFICE VISIT (OUTPATIENT)
Dept: FAMILY MEDICINE CLINIC | Facility: CLINIC | Age: 68
End: 2023-08-15
Payer: COMMERCIAL

## 2023-08-15 VITALS
HEART RATE: 78 BPM | OXYGEN SATURATION: 97 % | HEIGHT: 64 IN | BODY MASS INDEX: 24.59 KG/M2 | WEIGHT: 144 LBS | RESPIRATION RATE: 20 BRPM | DIASTOLIC BLOOD PRESSURE: 72 MMHG | TEMPERATURE: 98 F | SYSTOLIC BLOOD PRESSURE: 128 MMHG

## 2023-08-15 DIAGNOSIS — J45.40 MODERATE PERSISTENT ASTHMA WITHOUT COMPLICATION: ICD-10-CM

## 2023-08-15 DIAGNOSIS — J44.1 COPD EXACERBATION (HCC): Primary | ICD-10-CM

## 2023-08-15 DIAGNOSIS — R05.1 ACUTE COUGH: ICD-10-CM

## 2023-08-15 PROCEDURE — 99214 OFFICE O/P EST MOD 30 MIN: CPT | Performed by: FAMILY MEDICINE

## 2023-08-15 RX ORDER — DOXYCYCLINE HYCLATE 100 MG/1
100 CAPSULE ORAL EVERY 12 HOURS SCHEDULED
Qty: 14 CAPSULE | Refills: 0 | Status: SHIPPED | OUTPATIENT
Start: 2023-08-15 | End: 2023-08-22

## 2023-08-15 RX ORDER — PREDNISONE 20 MG/1
40 TABLET ORAL DAILY
Qty: 10 TABLET | Refills: 0 | Status: SHIPPED | OUTPATIENT
Start: 2023-08-15 | End: 2023-08-20

## 2023-08-15 RX ORDER — ALBUTEROL SULFATE 90 UG/1
2 AEROSOL, METERED RESPIRATORY (INHALATION) EVERY 6 HOURS PRN
Qty: 8.5 G | Refills: 1 | Status: SHIPPED | OUTPATIENT
Start: 2023-08-15

## 2023-08-15 RX ORDER — GUAIFENESIN AND CODEINE PHOSPHATE 100; 10 MG/5ML; MG/5ML
5 SOLUTION ORAL 3 TIMES DAILY PRN
Qty: 118 ML | Refills: 0 | Status: SHIPPED | OUTPATIENT
Start: 2023-08-15

## 2023-08-15 NOTE — PROGRESS NOTES
Assessment/Plan:       Problem List Items Addressed This Visit        Respiratory    Moderate asthma    Relevant Medications    albuterol (ProAir HFA) 90 mcg/act inhaler    predniSONE 20 mg tablet    doxycycline hyclate (VIBRAMYCIN) 100 mg capsule    guaifenesin-codeine (GUAIFENESIN AC) 100-10 MG/5ML liquid   Other Visit Diagnoses     COPD exacerbation (HCC)    -  Primary    Relevant Medications    albuterol (ProAir HFA) 90 mcg/act inhaler    predniSONE 20 mg tablet    doxycycline hyclate (VIBRAMYCIN) 100 mg capsule    guaifenesin-codeine (GUAIFENESIN AC) 100-10 MG/5ML liquid    Acute cough        Relevant Medications    predniSONE 20 mg tablet    doxycycline hyclate (VIBRAMYCIN) 100 mg capsule    guaifenesin-codeine (GUAIFENESIN AC) 100-10 MG/5ML liquid            Recheck posterior throat next visit to ensure resolution of papule. Subjective:      Patient ID: Hang Clinton is a 70 y.o. female. HPI     Cough for 1 week, green mucous, no fevers. Breathing so so, out of albuterol inhaler, albuterol neb every 4 hours. More wheezing, tightness. The following portions of the patient's history were reviewed and updated as appropriate: allergies, current medications, past family history, past medical history, past social history, past surgical history, and problem list.    Review of Systems   All other systems reviewed and are negative. Objective:      /72   Pulse 78   Temp 98 °F (36.7 °C) (Tympanic)   Resp 20   Ht 5' 4" (1.626 m)   Wt 65.3 kg (144 lb)   SpO2 97%   BMI 24.72 kg/m²          Physical Exam  Vitals reviewed. Constitutional:       General: She is not in acute distress. Appearance: Normal appearance. She is not ill-appearing, toxic-appearing or diaphoretic. HENT:      Head: Normocephalic and atraumatic. Right Ear: Tympanic membrane, ear canal and external ear normal. There is no impacted cerumen.       Left Ear: Tympanic membrane, ear canal and external ear normal. There is no impacted cerumen. Nose: Congestion present. Mouth/Throat:      Mouth: Mucous membranes are moist.      Pharynx: No oropharyngeal exudate or posterior oropharyngeal erythema. Comments: Papule with central white spot posterior left pharynx   Eyes:      General:         Right eye: No discharge. Left eye: No discharge. Extraocular Movements: Extraocular movements intact. Conjunctiva/sclera: Conjunctivae normal.   Cardiovascular:      Rate and Rhythm: Normal rate and regular rhythm. Heart sounds: Normal heart sounds. No murmur heard. No friction rub. No gallop. Pulmonary:      Effort: Pulmonary effort is normal. No respiratory distress. Breath sounds: No stridor. Wheezing present. No rhonchi. Comments: Scattered crackles   Musculoskeletal:         General: No swelling, tenderness or signs of injury. Right lower leg: No edema. Left lower leg: No edema. Skin:     General: Skin is warm. Coloration: Skin is not pale. Findings: No erythema or rash. Neurological:      Mental Status: She is alert and oriented to person, place, and time. Motor: No weakness.    Psychiatric:         Mood and Affect: Mood normal.         Behavior: Behavior normal.             DO Alejandrina Rangel [facility]

## 2023-08-27 PROBLEM — J44.9 CHRONIC OBSTRUCTIVE PULMONARY DISEASE (HCC): Status: ACTIVE | Noted: 2023-08-27

## 2023-08-28 ENCOUNTER — TELEPHONE (OUTPATIENT)
Dept: FAMILY MEDICINE CLINIC | Facility: CLINIC | Age: 68
End: 2023-08-28

## 2023-08-28 ENCOUNTER — OFFICE VISIT (OUTPATIENT)
Dept: FAMILY MEDICINE CLINIC | Facility: CLINIC | Age: 68
End: 2023-08-28
Payer: COMMERCIAL

## 2023-08-28 VITALS
DIASTOLIC BLOOD PRESSURE: 68 MMHG | BODY MASS INDEX: 24.92 KG/M2 | TEMPERATURE: 97.5 F | WEIGHT: 146 LBS | SYSTOLIC BLOOD PRESSURE: 126 MMHG | OXYGEN SATURATION: 98 % | RESPIRATION RATE: 18 BRPM | HEART RATE: 57 BPM | HEIGHT: 64 IN

## 2023-08-28 DIAGNOSIS — J44.9 CHRONIC OBSTRUCTIVE PULMONARY DISEASE, UNSPECIFIED COPD TYPE (HCC): ICD-10-CM

## 2023-08-28 DIAGNOSIS — M85.80 OSTEOPENIA, UNSPECIFIED LOCATION: ICD-10-CM

## 2023-08-28 DIAGNOSIS — J30.89 NON-SEASONAL ALLERGIC RHINITIS, UNSPECIFIED TRIGGER: ICD-10-CM

## 2023-08-28 DIAGNOSIS — R73.09 ELEVATED GLUCOSE: ICD-10-CM

## 2023-08-28 DIAGNOSIS — Z12.31 SCREENING MAMMOGRAM, ENCOUNTER FOR: ICD-10-CM

## 2023-08-28 DIAGNOSIS — Z12.4 SCREENING FOR CERVICAL CANCER: ICD-10-CM

## 2023-08-28 DIAGNOSIS — E78.5 HYPERLIPIDEMIA, UNSPECIFIED HYPERLIPIDEMIA TYPE: ICD-10-CM

## 2023-08-28 DIAGNOSIS — J45.40 MODERATE PERSISTENT ASTHMA WITHOUT COMPLICATION: ICD-10-CM

## 2023-08-28 DIAGNOSIS — Z91.030 BEE STING ALLERGY: ICD-10-CM

## 2023-08-28 DIAGNOSIS — Z00.00 MEDICARE ANNUAL WELLNESS VISIT, SUBSEQUENT: Primary | ICD-10-CM

## 2023-08-28 DIAGNOSIS — I10 ESSENTIAL HYPERTENSION: ICD-10-CM

## 2023-08-28 PROBLEM — I67.1 ANEURYSM OF LEFT INTERNAL CAROTID ARTERY: Status: RESOLVED | Noted: 2021-05-25 | Resolved: 2023-08-28

## 2023-08-28 PROCEDURE — 99214 OFFICE O/P EST MOD 30 MIN: CPT | Performed by: FAMILY MEDICINE

## 2023-08-28 PROCEDURE — G0439 PPPS, SUBSEQ VISIT: HCPCS | Performed by: FAMILY MEDICINE

## 2023-08-28 RX ORDER — TIOTROPIUM BROMIDE 18 UG/1
18 CAPSULE ORAL; RESPIRATORY (INHALATION) DAILY
Qty: 30 CAPSULE | Refills: 5 | Status: SHIPPED | OUTPATIENT
Start: 2023-08-28

## 2023-08-28 RX ORDER — FLUTICASONE PROPIONATE AND SALMETEROL 500; 50 UG/1; UG/1
1 POWDER RESPIRATORY (INHALATION) 2 TIMES DAILY
Start: 2023-08-28 | End: 2023-08-28

## 2023-08-28 RX ORDER — TIOTROPIUM BROMIDE 18 UG/1
18 CAPSULE ORAL; RESPIRATORY (INHALATION) DAILY
Qty: 30 CAPSULE | Refills: 5
Start: 2023-08-28 | End: 2023-08-28

## 2023-08-28 RX ORDER — EPINEPHRINE 0.3 MG/.3ML
0.3 INJECTION SUBCUTANEOUS ONCE
Qty: 2 EACH | Refills: 1 | Status: SHIPPED | OUTPATIENT
Start: 2023-08-28 | End: 2023-08-28

## 2023-08-28 RX ORDER — FLUTICASONE PROPIONATE AND SALMETEROL 500; 50 UG/1; UG/1
1 POWDER RESPIRATORY (INHALATION) 2 TIMES DAILY
Qty: 60 BLISTER | Refills: 5 | Status: SHIPPED | OUTPATIENT
Start: 2023-08-28

## 2023-08-28 RX ORDER — PREDNISONE 20 MG/1
40 TABLET ORAL DAILY
Qty: 10 TABLET | Refills: 0 | Status: SHIPPED | OUTPATIENT
Start: 2023-08-28 | End: 2023-09-02

## 2023-08-28 NOTE — PROGRESS NOTES
Assessment and Plan:     Problem List Items Addressed This Visit        Respiratory    Non-seasonal allergic rhinitis    Relevant Medications    predniSONE 20 mg tablet    Chronic obstructive pulmonary disease (HCC)    Relevant Medications    fluticasone-umeclidinium-vilanterol 100-62.5-25 mcg/actuation inhaler    predniSONE 20 mg tablet       Cardiovascular and Mediastinum    Essential hypertension    Relevant Medications    EPINEPHrine (EPIPEN) 0.3 mg/0.3 mL SOAJ    Other Relevant Orders    TSH, 3rd generation with Free T4 reflex    VAS carotid complete study       Musculoskeletal and Integument    Osteopenia    Relevant Orders    Vitamin D 25 hydroxy    DXA bone density spine hip and pelvis    PTH, intact       Other    Hyperlipidemia    Relevant Orders    CBC and differential    Comprehensive metabolic panel    Lipid Panel with Direct LDL reflex    Bee sting allergy    Relevant Medications    EPINEPHrine (EPIPEN) 0.3 mg/0.3 mL SOAJ   Other Visit Diagnoses     Medicare annual wellness visit, subsequent    -  Primary    Screening for cervical cancer        Relevant Orders    Ambulatory Referral to Gynecology    Screening mammogram, encounter for        Relevant Orders    Mammo screening bilateral w 3d & cad    Elevated glucose        Relevant Orders    HEMOGLOBIN A1C W/ EAG ESTIMATION        BMI Counseling: Body mass index is 25.06 kg/m². The BMI is above normal. Nutrition recommendations include decreasing portion sizes, encouraging healthy choices of fruits and vegetables, decreasing fast food intake, consuming healthier snacks, moderation in carbohydrate intake, increasing intake of lean protein and reducing intake of saturated and trans fat. Rationale for BMI follow-up plan is due to patient being overweight or obese. Depression Screening and Follow-up Plan: Patient was screened for depression during today's encounter. They screened negative with a PHQ-2 score of 0.       Preventive health issues were discussed with patient, and age appropriate screening tests were ordered as noted in patient's After Visit Summary. Personalized health advice and appropriate referrals for health education or preventive services given if needed, as noted in patient's After Visit Summary. She declines further testing/pulmonary consult for her COPD, will trial Trelegy, stop Advair and Spiriva. She would like to have prednisone on hand at home if flare begins which I think is reasonable, advised to call if she starts. Blood work ordered. Follow-up 3 months. History of Present Illness:     Patient presents for a Medicare Wellness Visit    HPI     COPD- CT chest ordered, pulmonary consult ordered, not yet completed. Currently taking Spiriva and Advair, albuterol PRN. Still coughing but better, finished prednisone and antibiotic, only using albuterol once and nebulizer twice daily. Can go up and down steps. Allergic rhinitis- Currently taking Singulair and Xyzal.     Hypertension- Currently taking amlodipine 5 mg daily. Patient Care Team:  Dirk Velazco DO as PCP - General (Family Medicine)  Veronica Lemus PA-C as Physician Assistant (Gastroenterology)  Sukhdeep Jackson MD (Gastroenterology)     Review of Systems:     Review of Systems   All other systems reviewed and are negative.        Problem List:     Patient Active Problem List   Diagnosis   • Hyperlipidemia   • Moderate asthma   • Chronic kidney disease (CKD) stage G2/A1, mildly decreased glomerular filtration rate (GFR) between 60-89 mL/min/1.73 square meter and albuminuria creatinine ratio less than 30 mg/g   • Essential hypertension   • Non-seasonal allergic rhinitis   • Right hip pain   • Elevated alkaline phosphatase level   • Osteopenia   • Normocytic anemia   • Leukocytosis   • Diffuse idiopathic skeletal hyperostosis of cervical spine   • Hypophosphatemia   • Abnormal EKG   • Elevated liver enzymes   • Hepatic steatosis   • Gastroesophageal reflux disease   • Anxiety   • Small airways disease   • Hilar adenopathy   • Vitamin D deficiency   • Elevated fasting glucose   • Left foot pain   • Allergic rhinitis due to pollen   • TOSCANO (dyspnea on exertion)   • Wheezing   • Chronic obstructive pulmonary disease (HCC)   • Bee sting allergy      Past Medical and Surgical History:     Past Medical History:   Diagnosis Date   • Acute bronchitis    • Acute pharyngitis    • Anxiety state    • Arthropathy of ankle and foot     and/or   • Asthma    • Asthma without status asthmaticus    • Carotid artery occlusion    • COPD (chronic obstructive pulmonary disease) ([facility])    • Cough    • COVID-19 vaccine series completed    • Disorder of shoulder    • Dyspnea    • Hand joint pain    • Heartburn    • Herpes simplex without complication    • Hyperlipidemia    • Infection of skin and subcutaneous tissue    • Localized superficial swelling of skin    • Low back pain    • Lymphadenopathy    • Malaise and fatigue    • Neck pain    • Osteoarthritis    • Pleurisy without effusion or active tuberculosis    • Pneumonia    • Right upper quadrant pain    • Shoulder joint pain    • Skin eruption    • Vitamin D deficiency    • Vomiting      Past Surgical History:   Procedure Laterality Date   • BREAST BIOPSY Left 2017 benign   • CHOLECYSTECTOMY     • CHOLECYSTECTOMY  03/2014    Dr. Isaura Arredondo    • COLONOSCOPY  02/2013    WNL   • FL LUMBAR PUNCTURE DIAGNOSTIC  05/25/2021   • HYSTERECTOMY      Total    • NASAL POLYP EXCISION Left 2009    with lysis of intranasal adhesions from packing      Family History:     Family History   Problem Relation Age of Onset   • Diabetes Mother         Non-insulin dependent diabetes   • Emphysema Father    • No Known Problems Sister    • No Known Problems Daughter    • No Known Problems Maternal Grandmother    • No Known Problems Paternal Grandmother    • No Known Problems Sister    • No Known Problems Sister    • No Known Problems Daughter    • No Known Problems Daughter    • No Known Problems Maternal Aunt       Social History:     Social History     Socioeconomic History   • Marital status:      Spouse name: None   • Number of children: None   • Years of education: None   • Highest education level: None   Occupational History   • Occupation: Homemaker   Tobacco Use   • Smoking status: Never   • Smokeless tobacco: Never   Vaping Use   • Vaping Use: Never used   Substance and Sexual Activity   • Alcohol use: Yes     Alcohol/week: 21.0 standard drinks of alcohol     Types: 21 Cans of beer per week     Comment: Daily   • Drug use: Never   • Sexual activity: None   Other Topics Concern   • None   Social History Narrative   • None     Social Determinants of Health     Financial Resource Strain: Low Risk  (8/28/2023)    Overall Financial Resource Strain (CARDIA)    • Difficulty of Paying Living Expenses: Not hard at all   Food Insecurity: Not on file   Transportation Needs: No Transportation Needs (8/28/2023)    PRAPARE - Transportation    • Lack of Transportation (Medical): No    • Lack of Transportation (Non-Medical):  No   Physical Activity: Not on file   Stress: Not on file   Social Connections: Not on file   Intimate Partner Violence: Not on file   Housing Stability: Not on file      Medications and Allergies:     Current Outpatient Medications   Medication Sig Dispense Refill   • albuterol (2.5 mg/3 mL) 0.083 % nebulizer solution Take 3 mL (2.5 mg total) by nebulization 4 (four) times a day 180 mL 1   • albuterol (ProAir HFA) 90 mcg/act inhaler Inhale 2 puffs every 6 (six) hours as needed for wheezing 8.5 g 1   • ALPRAZolam (XANAX) 0.25 mg tablet Take 1 tablet (0.25 mg total) by mouth daily at bedtime as needed for anxiety 30 tablet 1   • amLODIPine (NORVASC) 5 mg tablet Take 1 tablet (5 mg total) by mouth daily 100 tablet 3   • aspirin 81 mg chewable tablet Chew 81 mg daily     • EPINEPHrine (EPIPEN) 0.3 mg/0.3 mL SOAJ Inject 0.3 mL (0.3 mg total) into a muscle once for 1 dose 2 each 1   • fluticasone-umeclidinium-vilanterol 100-62.5-25 mcg/actuation inhaler Inhale 1 puff daily Rinse mouth after use. 1 each 5   • guaifenesin-codeine (GUAIFENESIN AC) 100-10 MG/5ML liquid Take 5 mL by mouth 3 (three) times a day as needed for cough 118 mL 0   • levocetirizine (XYZAL) 5 MG tablet Take 0.5 tablets (2.5 mg total) by mouth every evening 100 tablet 1   • montelukast (SINGULAIR) 10 mg tablet Take 1 tablet (10 mg total) by mouth daily at bedtime 100 tablet 3   • nystatin (MYCOSTATIN) cream Apply topically 2 (two) times a day . Apply for additional 2 weeks after rash resolves. 30 g 0   • omeprazole (PriLOSEC) 20 mg delayed release capsule Take 1 capsule (20 mg total) by mouth 2 (two) times a day 200 capsule 3   • predniSONE 20 mg tablet Take 2 tablets (40 mg total) by mouth daily for 5 days 10 tablet 0   • VITAMIN D PO Take by mouth       No current facility-administered medications for this visit. Allergies   Allergen Reactions   • Azithromycin    • Darvon [Propoxyphene]    • Crestor [Rosuvastatin] Hives and GI Intolerance   • Wasp Venom Hives      Immunizations:     Immunization History   Administered Date(s) Administered   • COVID-19 MODERNA VACC 0.5 ML IM 04/02/2021, 05/05/2021   • INFLUENZA 10/22/2015, 09/24/2018   • Tdap 08/13/2015      Health Maintenance:         Topic Date Due   • Breast Cancer Screening: Mammogram  10/28/2022   • Colorectal Cancer Screening  01/14/2024   • Hepatitis C Screening  Completed         Topic Date Due   • Pneumococcal Vaccine: 65+ Years (1 - PCV) Never done   • COVID-19 Vaccine (3 - Moderna series) 06/30/2021   • Influenza Vaccine (1) 09/01/2023      Medicare Screening Tests and Risk Assessments:     Lakeisha SIERRA is here for her Subsequent Wellness visit. Last Medicare Wellness visit information reviewed, patient interviewed and updates made to the record today. Health Risk Assessment:   Patient rates overall health as good.  Patient feels that their physical health rating is same. Patient is satisfied with their life. Eyesight was rated as same. Hearing was rated as same. Patient feels that their emotional and mental health rating is same. Patients states they are never, rarely angry. Patient states they are never, rarely unusually tired/fatigued. Pain experienced in the last 7 days has been none. Patient states that she has experienced no weight loss or gain in last 6 months. Depression Screening:   PHQ-2 Score: 0      Fall Risk Screening: In the past year, patient has experienced: no history of falling in past year      Urinary Incontinence Screening:   Patient has not leaked urine accidently in the last six months. Home Safety:  Patient has trouble with stairs inside or outside of their home. Patient has working smoke alarms and has working carbon monoxide detector. Home safety hazards include: none. Nutrition:   Current diet is Regular. Medications:   Patient is not currently taking any over-the-counter supplements. Patient is able to manage medications. Activities of Daily Living (ADLs)/Instrumental Activities of Daily Living (IADLs):   Walk and transfer into and out of bed and chair?: Yes  Dress and groom yourself?: Yes    Bathe or shower yourself?: Yes    Feed yourself?  Yes  Do your laundry/housekeeping?: Yes  Manage your money, pay your bills and track your expenses?: Yes  Make your own meals?: Yes    Do your own shopping?: Yes    Previous Hospitalizations:   Any hospitalizations or ED visits within the last 12 months?: No      Advance Care Planning:   Living will: No    Durable POA for healthcare: No    Advanced directive: No      Cognitive Screening:   Provider or family/friend/caregiver concerned regarding cognition?: No    PREVENTIVE SCREENINGS      Cardiovascular Screening:    General: Screening Not Indicated and History Lipid Disorder      Diabetes Screening:     General: Screening Current      Colorectal Cancer Screening:     General: Screening Current      Breast Cancer Screening:     General: Screening Current      Cervical Cancer Screening:    General: Screening Not Indicated      Osteoporosis Screening:    General: Risks and Benefits Discussed      Abdominal Aortic Aneurysm (AAA) Screening:        General: Screening Not Indicated      Lung Cancer Screening:     General: Screening Not Indicated      Hepatitis C Screening:    General: Screening Current    Screening, Brief Intervention, and Referral to Treatment (SBIRT)    Screening  Typical number of drinks in a day: 0  Typical number of drinks in a week: 0  Interpretation: Low risk drinking behavior. Single Item Drug Screening:  How often have you used an illegal drug (including marijuana) or a prescription medication for non-medical reasons in the past year? never    Single Item Drug Screen Score: 0  Interpretation: Negative screen for possible drug use disorder    Brief Intervention  Alcohol & drug use screenings were reviewed. No concerns regarding substance use disorder identified. Physical Exam:     /68   Pulse 57   Temp 97.5 °F (36.4 °C)   Resp 18   Ht 5' 4" (1.626 m)   Wt 66.2 kg (146 lb)   SpO2 98%   BMI 25.06 kg/m²     Physical Exam  Vitals reviewed. Constitutional:       General: She is not in acute distress. Appearance: Normal appearance. She is not ill-appearing, toxic-appearing or diaphoretic. Cardiovascular:      Rate and Rhythm: Normal rate and regular rhythm. Heart sounds: Normal heart sounds. No murmur heard. No friction rub. No gallop. Pulmonary:      Effort: Pulmonary effort is normal. No respiratory distress. Breath sounds: Wheezing present. Comments: Diffuse crackles   Musculoskeletal:      Cervical back: Neck supple. Right lower leg: Edema present. Left lower leg: Edema present. Comments: Trace pitting edema    Lymphadenopathy:      Cervical: No cervical adenopathy.    Skin: General: Skin is warm. Findings: No erythema or rash. Neurological:      Mental Status: She is alert and oriented to person, place, and time. Motor: No weakness.    Psychiatric:         Mood and Affect: Mood normal.         Behavior: Behavior normal.          Marilin Gay, DO

## 2023-08-28 NOTE — PATIENT INSTRUCTIONS
Medicare Preventive Visit Patient Instructions  Thank you for completing your Welcome to Medicare Visit or Medicare Annual Wellness Visit today. Your next wellness visit will be due in one year (8/28/2024). The screening/preventive services that you may require over the next 5-10 years are detailed below. Some tests may not apply to you based off risk factors and/or age. Screening tests ordered at today's visit but not completed yet may show as past due. Also, please note that scanned in results may not display below. Preventive Screenings:  Service Recommendations Previous Testing/Comments   Colorectal Cancer Screening  * Colonoscopy    * Fecal Occult Blood Test (FOBT)/Fecal Immunochemical Test (FIT)  * Fecal DNA/Cologuard Test  * Flexible Sigmoidoscopy Age: 43-73 years old   Colonoscopy: every 10 years (may be performed more frequently if at higher risk)  OR  FOBT/FIT: every 1 year  OR  Cologuard: every 3 years  OR  Sigmoidoscopy: every 5 years  Screening may be recommended earlier than age 39 if at higher risk for colorectal cancer. Also, an individualized decision between you and your healthcare provider will decide whether screening between the ages of 77-80 would be appropriate. Colonoscopy: Not on file  FOBT/FIT: Not on file  Cologuard: 01/14/2021  Sigmoidoscopy: Not on file    Screening Current     Breast Cancer Screening Age: 36 years old  Frequency: every 1-2 years  Not required if history of left and right mastectomy Mammogram: 10/28/2021    Screening Current   Cervical Cancer Screening Between the ages of 21-29, pap smear recommended once every 3 years. Between the ages of 32-69, can perform pap smear with HPV co-testing every 5 years.    Recommendations may differ for women with a history of total hysterectomy, cervical cancer, or abnormal pap smears in past. Pap Smear: Not on file    Screening Not Indicated   Hepatitis C Screening Once for adults born between 1945 and 1965  More frequently in patients at high risk for Hepatitis C Hep C Antibody: 06/15/2021    Screening Current   Diabetes Screening 1-2 times per year if you're at risk for diabetes or have pre-diabetes Fasting glucose: 115 mg/dL (10/13/2022)  A1C: 5.5 % (3/5/2021)  Screening Current   Cholesterol Screening Once every 5 years if you don't have a lipid disorder. May order more often based on risk factors. Lipid panel: 10/13/2022    Screening Not Indicated  History Lipid Disorder     Other Preventive Screenings Covered by Medicare:  1. Abdominal Aortic Aneurysm (AAA) Screening: covered once if your at risk. You're considered to be at risk if you have a family history of AAA. 2. Lung Cancer Screening: covers low dose CT scan once per year if you meet all of the following conditions: (1) Age 48-67; (2) No signs or symptoms of lung cancer; (3) Current smoker or have quit smoking within the last 15 years; (4) You have a tobacco smoking history of at least 20 pack years (packs per day multiplied by number of years you smoked); (5) You get a written order from a healthcare provider. 3. Glaucoma Screening: covered annually if you're considered high risk: (1) You have diabetes OR (2) Family history of glaucoma OR (3)  aged 48 and older OR (3)  American aged 72 and older  3. Osteoporosis Screening: covered every 2 years if you meet one of the following conditions: (1) You're estrogen deficient and at risk for osteoporosis based off medical history and other findings; (2) Have a vertebral abnormality; (3) On glucocorticoid therapy for more than 3 months; (4) Have primary hyperparathyroidism; (5) On osteoporosis medications and need to assess response to drug therapy. · Last bone density test (DXA Scan): 03/15/2021.  5. HIV Screening: covered annually if you're between the age of 15-65. Also covered annually if you are younger than 13 and older than 72 with risk factors for HIV infection.  For pregnant patients, it is covered up to 3 times per pregnancy. Immunizations:  Immunization Recommendations   Influenza Vaccine Annual influenza vaccination during flu season is recommended for all persons aged >= 6 months who do not have contraindications   Pneumococcal Vaccine   * Pneumococcal conjugate vaccine = PCV13 (Prevnar 13), PCV15 (Vaxneuvance), PCV20 (Prevnar 20)  * Pneumococcal polysaccharide vaccine = PPSV23 (Pneumovax) Adults 20-63 years old: 1-3 doses may be recommended based on certain risk factors  Adults 72 years old: 1-2 doses may be recommended based off what pneumonia vaccine you previously received   Hepatitis B Vaccine 3 dose series if at intermediate or high risk (ex: diabetes, end stage renal disease, liver disease)   Tetanus (Td) Vaccine - COST NOT COVERED BY MEDICARE PART B Following completion of primary series, a booster dose should be given every 10 years to maintain immunity against tetanus. Td may also be given as tetanus wound prophylaxis. Tdap Vaccine - COST NOT COVERED BY MEDICARE PART B Recommended at least once for all adults. For pregnant patients, recommended with each pregnancy. Shingles Vaccine (Shingrix) - COST NOT COVERED BY MEDICARE PART B  2 shot series recommended in those aged 48 and above     Health Maintenance Due:      Topic Date Due   • Breast Cancer Screening: Mammogram  10/28/2022   • Colorectal Cancer Screening  01/14/2024   • Hepatitis C Screening  Completed     Immunizations Due:      Topic Date Due   • Pneumococcal Vaccine: 65+ Years (1 - PCV) Never done   • COVID-19 Vaccine (3 - Moderna series) 06/30/2021   • Influenza Vaccine (1) 09/01/2023     Advance Directives   What are advance directives? Advance directives are legal documents that state your wishes and plans for medical care. These plans are made ahead of time in case you lose your ability to make decisions for yourself.  Advance directives can apply to any medical decision, such as the treatments you want, and if you want to donate organs. What are the types of advance directives? There are many types of advance directives, and each state has rules about how to use them. You may choose a combination of any of the following:  · Living will: This is a written record of the treatment you want. You can also choose which treatments you do not want, which to limit, and which to stop at a certain time. This includes surgery, medicine, IV fluid, and tube feedings. · Durable power of  for healthcare [facility]): This is a written record that states who you want to make healthcare choices for you when you are unable to make them for yourself. This person, called a proxy, is usually a family member or a friend. You may choose more than 1 proxy. · Do not resuscitate (DNR) order:  A DNR order is used in case your heart stops beating or you stop breathing. It is a request not to have certain forms of treatment, such as CPR. A DNR order may be included in other types of advance directives. · Medical directive: This covers the care that you want if you are in a coma, near death, or unable to make decisions for yourself. You can list the treatments you want for each condition. Treatment may include pain medicine, surgery, blood transfusions, dialysis, IV or tube feedings, and a ventilator (breathing machine). · Values history: This document has questions about your views, beliefs, and how you feel and think about life. This information can help others choose the care that you would choose. Why are advance directives important? An advance directive helps you control your care. Although spoken wishes may be used, it is better to have your wishes written down. Spoken wishes can be misunderstood, or not followed. Treatments may be given even if you do not want them. An advance directive may make it easier for your family to make difficult choices about your care.    Urinary Incontinence   Urinary incontinence (UI)  is when you lose control of your bladder. UI develops because your bladder cannot store or empty urine properly. The 3 most common types of UI are stress incontinence, urge incontinence, or both. Medicines:   · May be given to help strengthen your bladder control. Report any side effects of medication to your healthcare provider. Do pelvic muscle exercises often:  Your pelvic muscles help you stop urinating. Squeeze these muscles tight for 5 seconds, then relax for 5 seconds. Gradually work up to squeezing for 10 seconds. Do 3 sets of 15 repetitions a day, or as directed. This will help strengthen your pelvic muscles and improve bladder control. Train your bladder:  Go to the bathroom at set times, such as every 2 hours, even if you do not feel the urge to go. You can also try to hold your urine when you feel the urge to go. For example, hold your urine for 5 minutes when you feel the urge to go. As that becomes easier, hold your urine for 10 minutes. Self-care:   · Keep a UI record. Write down how often you leak urine and how much you leak. Make a note of what you were doing when you leaked urine. · Drink liquids as directed. You may need to limit the amount of liquid you drink to help control your urine leakage. Do not drink any liquid right before you go to bed. Limit or do not have drinks that contain caffeine or alcohol. · Prevent constipation. Eat a variety of high-fiber foods. Good examples are high-fiber cereals, beans, vegetables, and whole-grain breads. Walking is the best way to trigger your intestines to have a bowel movement. · Exercise regularly and maintain a healthy weight. Weight loss and exercise will decrease pressure on your bladder and help you control your leakage. · Use a catheter as directed  to help empty your bladder. A catheter is a tiny, plastic tube that is put into your bladder to drain your urine. · Go to behavior therapy as directed.   Behavior therapy may be used to help you learn to control your urge to urinate. Weight Management   Why it is important to manage your weight:  Being overweight increases your risk of health conditions such as heart disease, high blood pressure, type 2 diabetes, and certain types of cancer. It can also increase your risk for osteoarthritis, sleep apnea, and other respiratory problems. Aim for a slow, steady weight loss. Even a small amount of weight loss can lower your risk of health problems. How to lose weight safely:  A safe and healthy way to lose weight is to eat fewer calories and get regular exercise. You can lose up about 1 pound a week by decreasing the number of calories you eat by 500 calories each day. Healthy meal plan for weight management:  A healthy meal plan includes a variety of foods, contains fewer calories, and helps you stay healthy. A healthy meal plan includes the following:  · Eat whole-grain foods more often. A healthy meal plan should contain fiber. Fiber is the part of grains, fruits, and vegetables that is not broken down by your body. Whole-grain foods are healthy and provide extra fiber in your diet. Some examples of whole-grain foods are whole-wheat breads and pastas, oatmeal, brown rice, and bulgur. · Eat a variety of vegetables every day. Include dark, leafy greens such as spinach, kale, jody greens, and mustard greens. Eat yellow and orange vegetables such as carrots, sweet potatoes, and winter squash. · Eat a variety of fruits every day. Choose fresh or canned fruit (canned in its own juice or light syrup) instead of juice. Fruit juice has very little or no fiber. · Eat low-fat dairy foods. Drink fat-free (skim) milk or 1% milk. Eat fat-free yogurt and low-fat cottage cheese. Try low-fat cheeses such as mozzarella and other reduced-fat cheeses. · Choose meat and other protein foods that are low in fat. Choose beans or other legumes such as split peas or lentils.  Choose fish, skinless poultry (chicken or turkey), or lean cuts of red meat (beef or pork). Before you cook meat or poultry, cut off any visible fat. · Use less fat and oil. Try baking foods instead of frying them. Add less fat, such as margarine, sour cream, regular salad dressing and mayonnaise to foods. Eat fewer high-fat foods. Some examples of high-fat foods include french fries, doughnuts, ice cream, and cakes. · Eat fewer sweets. Limit foods and drinks that are high in sugar. This includes candy, cookies, regular soda, and sweetened drinks. Exercise:  Exercise at least 30 minutes per day on most days of the week. Some examples of exercise include walking, biking, dancing, and swimming. You can also fit in more physical activity by taking the stairs instead of the elevator or parking farther away from stores. Ask your healthcare provider about the best exercise plan for you. © Copyright [employer] 2018 Information is for End User's use only and may not be sold, redistributed or otherwise used for commercial purposes.  All illustrations and images included in CareNotes® are the copyrighted property of [employer].D.A[employer]M., Inc. or [address] [place] [address]

## 2023-08-28 NOTE — TELEPHONE ENCOUNTER
Yes, this is Lakeisha Galvez calling. I'm calling to let her know that the new inhaler she put me on is real extensive and I won't be able to afford it. She wanted to know no and wanted me to call her back. And my date of birth is [birth year] and you can call back and leave a message at [phone]. Thank you.  Bye.

## 2023-08-28 NOTE — PROGRESS NOTES
COPD- CT chest ordered, pulmonary consult ordered, not yet completed. Currently taking Spiriva and Advair, albuterol PRN. Still coughing but better, finished prednisone and antibiotic, only using albuterol once and nebulizer twice daily. Can go up and down steps. Allergic rhinitis- Currently taking Singulair and Xyzal.     Hypertension- Currently taking amlodipine 5 mg daily.

## 2023-08-28 NOTE — TELEPHONE ENCOUNTER
Advised as per provider, she does need refills sent to the,      [address] [facility], [address]  [address]   [address] [facility].  [place] [address]   Phone:  [phone]  Fax:  [phone]

## 2023-09-08 ENCOUNTER — HOSPITAL ENCOUNTER (OUTPATIENT)
Dept: NON INVASIVE DIAGNOSTICS | Facility: HOSPITAL | Age: 68
Discharge: HOME | End: 2023-09-08
Payer: COMMERCIAL

## 2023-09-08 DIAGNOSIS — I10 ESSENTIAL HYPERTENSION: ICD-10-CM

## 2023-09-08 PROCEDURE — 93880 EXTRACRANIAL BILAT STUDY: CPT

## 2023-09-08 PROCEDURE — 93880 EXTRACRANIAL BILAT STUDY: CPT | Performed by: SURGERY

## 2023-09-15 ENCOUNTER — APPOINTMENT (OUTPATIENT)
Dept: LAB | Facility: CLINIC | Age: 68
End: 2023-09-15
Payer: COMMERCIAL

## 2023-09-15 DIAGNOSIS — R73.01 ELEVATED FASTING GLUCOSE: ICD-10-CM

## 2023-09-15 DIAGNOSIS — I10 ESSENTIAL HYPERTENSION: ICD-10-CM

## 2023-09-15 DIAGNOSIS — R73.09 ELEVATED GLUCOSE: ICD-10-CM

## 2023-09-15 DIAGNOSIS — E78.5 HYPERLIPIDEMIA, UNSPECIFIED HYPERLIPIDEMIA TYPE: ICD-10-CM

## 2023-09-15 DIAGNOSIS — M85.80 OSTEOPENIA, UNSPECIFIED LOCATION: ICD-10-CM

## 2023-09-15 LAB
25(OH)D3 SERPL-MCNC: 39.7 NG/ML (ref 30–100)
ALBUMIN SERPL BCP-MCNC: 4.1 G/DL (ref 3.5–5)
ALP SERPL-CCNC: 128 U/L (ref 34–104)
ALT SERPL W P-5'-P-CCNC: 18 U/L (ref 7–52)
ANION GAP SERPL CALCULATED.3IONS-SCNC: 6 MMOL/L
AST SERPL W P-5'-P-CCNC: 17 U/L (ref 13–39)
BASOPHILS # BLD AUTO: 0.09 THOUSANDS/ÂΜL (ref 0–0.1)
BASOPHILS NFR BLD AUTO: 1 % (ref 0–1)
BILIRUB SERPL-MCNC: 0.57 MG/DL (ref 0.2–1)
BUN SERPL-MCNC: 14 MG/DL (ref 5–25)
CALCIUM SERPL-MCNC: 9.3 MG/DL (ref 8.4–10.2)
CHLORIDE SERPL-SCNC: 107 MMOL/L (ref 96–108)
CHOLEST SERPL-MCNC: 209 MG/DL
CO2 SERPL-SCNC: 25 MMOL/L (ref 21–32)
CREAT SERPL-MCNC: 0.82 MG/DL (ref 0.6–1.3)
EOSINOPHIL # BLD AUTO: 0.63 THOUSAND/ÂΜL (ref 0–0.61)
EOSINOPHIL NFR BLD AUTO: 5 % (ref 0–6)
ERYTHROCYTE [DISTWIDTH] IN BLOOD BY AUTOMATED COUNT: 12.4 % (ref 11.6–15.1)
EST. AVERAGE GLUCOSE BLD GHB EST-MCNC: 126 MG/DL
GFR SERPL CREATININE-BSD FRML MDRD: 73 ML/MIN/1.73SQ M
GLUCOSE P FAST SERPL-MCNC: 104 MG/DL (ref 65–99)
HBA1C MFR BLD: 6 %
HCT VFR BLD AUTO: 45.4 % (ref 34.8–46.1)
HDLC SERPL-MCNC: 48 MG/DL
HGB BLD-MCNC: 14.8 G/DL (ref 11.5–15.4)
IMM GRANULOCYTES # BLD AUTO: 0.04 THOUSAND/UL (ref 0–0.2)
IMM GRANULOCYTES NFR BLD AUTO: 0 % (ref 0–2)
LDLC SERPL CALC-MCNC: 145 MG/DL (ref 0–100)
LYMPHOCYTES # BLD AUTO: 3.29 THOUSANDS/ÂΜL (ref 0.6–4.47)
LYMPHOCYTES NFR BLD AUTO: 28 % (ref 14–44)
MCH RBC QN AUTO: 30.1 PG (ref 26.8–34.3)
MCHC RBC AUTO-ENTMCNC: 32.6 G/DL (ref 31.4–37.4)
MCV RBC AUTO: 92 FL (ref 82–98)
MONOCYTES # BLD AUTO: 0.97 THOUSAND/ÂΜL (ref 0.17–1.22)
MONOCYTES NFR BLD AUTO: 8 % (ref 4–12)
NEUTROPHILS # BLD AUTO: 6.68 THOUSANDS/ÂΜL (ref 1.85–7.62)
NEUTS SEG NFR BLD AUTO: 58 % (ref 43–75)
NRBC BLD AUTO-RTO: 0 /100 WBCS
PLATELET # BLD AUTO: 253 THOUSANDS/UL (ref 149–390)
PMV BLD AUTO: 11.9 FL (ref 8.9–12.7)
POTASSIUM SERPL-SCNC: 4.3 MMOL/L (ref 3.5–5.3)
PROT SERPL-MCNC: 6.8 G/DL (ref 6.4–8.4)
PTH-INTACT SERPL-MCNC: 30.9 PG/ML (ref 12–88)
RBC # BLD AUTO: 4.92 MILLION/UL (ref 3.81–5.12)
SODIUM SERPL-SCNC: 138 MMOL/L (ref 135–147)
TRIGL SERPL-MCNC: 80 MG/DL
TSH SERPL DL<=0.05 MIU/L-ACNC: 1.44 UIU/ML (ref 0.45–4.5)
WBC # BLD AUTO: 11.7 THOUSAND/UL (ref 4.31–10.16)

## 2023-09-15 PROCEDURE — 80053 COMPREHEN METABOLIC PANEL: CPT

## 2023-09-15 PROCEDURE — 83970 ASSAY OF PARATHORMONE: CPT

## 2023-09-15 PROCEDURE — 36415 COLL VENOUS BLD VENIPUNCTURE: CPT

## 2023-09-15 PROCEDURE — 80061 LIPID PANEL: CPT

## 2023-09-15 PROCEDURE — 84443 ASSAY THYROID STIM HORMONE: CPT

## 2023-09-15 PROCEDURE — 83036 HEMOGLOBIN GLYCOSYLATED A1C: CPT

## 2023-09-15 PROCEDURE — 82306 VITAMIN D 25 HYDROXY: CPT

## 2023-09-15 PROCEDURE — 85025 COMPLETE CBC W/AUTO DIFF WBC: CPT

## 2023-09-28 ENCOUNTER — HOSPITAL ENCOUNTER (OUTPATIENT)
Dept: MAMMOGRAPHY | Facility: HOSPITAL | Age: 68
End: 2023-09-28
Payer: COMMERCIAL

## 2023-09-28 ENCOUNTER — HOSPITAL ENCOUNTER (OUTPATIENT)
Dept: BONE DENSITY | Facility: HOSPITAL | Age: 68
End: 2023-09-28
Payer: COMMERCIAL

## 2023-09-28 VITALS — BODY MASS INDEX: 23.94 KG/M2 | WEIGHT: 140.21 LBS | HEIGHT: 64 IN

## 2023-09-28 DIAGNOSIS — Z12.31 SCREENING MAMMOGRAM, ENCOUNTER FOR: ICD-10-CM

## 2023-09-28 DIAGNOSIS — M85.80 OSTEOPENIA, UNSPECIFIED LOCATION: ICD-10-CM

## 2023-09-28 PROCEDURE — 77067 SCR MAMMO BI INCL CAD: CPT

## 2023-09-28 PROCEDURE — 77063 BREAST TOMOSYNTHESIS BI: CPT

## 2023-09-28 PROCEDURE — 77080 DXA BONE DENSITY AXIAL: CPT

## 2023-11-17 ENCOUNTER — RA CDI HCC (OUTPATIENT)
Dept: OTHER | Facility: HOSPITAL | Age: 68
End: 2023-11-17

## 2023-11-17 NOTE — PROGRESS NOTES
[address] [facility] coding opportunities       Chart reviewed, no opportunity found: [id number]        Patients Insurance     Medicare Insurance: [facility]

## 2023-11-23 DIAGNOSIS — I10 ESSENTIAL HYPERTENSION: ICD-10-CM

## 2023-11-23 RX ORDER — AMLODIPINE BESYLATE 5 MG/1
5 TABLET ORAL DAILY
Qty: 100 TABLET | Refills: 3 | Status: SHIPPED | OUTPATIENT
Start: 2023-11-23 | End: 2023-11-28 | Stop reason: SDUPTHER

## 2023-11-28 ENCOUNTER — OFFICE VISIT (OUTPATIENT)
Dept: FAMILY MEDICINE CLINIC | Facility: CLINIC | Age: 68
End: 2023-11-28
Payer: COMMERCIAL

## 2023-11-28 VITALS
BODY MASS INDEX: 25.2 KG/M2 | DIASTOLIC BLOOD PRESSURE: 66 MMHG | HEIGHT: 64 IN | TEMPERATURE: 99 F | HEART RATE: 64 BPM | SYSTOLIC BLOOD PRESSURE: 134 MMHG | RESPIRATION RATE: 18 BRPM | OXYGEN SATURATION: 98 % | WEIGHT: 147.6 LBS

## 2023-11-28 DIAGNOSIS — I10 ESSENTIAL HYPERTENSION: ICD-10-CM

## 2023-11-28 DIAGNOSIS — R73.03 PREDIABETES: ICD-10-CM

## 2023-11-28 DIAGNOSIS — R05.8 PRODUCTIVE COUGH: ICD-10-CM

## 2023-11-28 DIAGNOSIS — K21.9 GASTROESOPHAGEAL REFLUX DISEASE WITHOUT ESOPHAGITIS: ICD-10-CM

## 2023-11-28 DIAGNOSIS — J45.40 MODERATE PERSISTENT ASTHMA WITHOUT COMPLICATION: ICD-10-CM

## 2023-11-28 DIAGNOSIS — J44.9 CHRONIC OBSTRUCTIVE PULMONARY DISEASE, UNSPECIFIED COPD TYPE (HCC): Primary | ICD-10-CM

## 2023-11-28 PROCEDURE — 99214 OFFICE O/P EST MOD 30 MIN: CPT | Performed by: FAMILY MEDICINE

## 2023-11-28 RX ORDER — TIOTROPIUM BROMIDE 18 UG/1
18 CAPSULE ORAL; RESPIRATORY (INHALATION) DAILY
Qty: 30 CAPSULE | Refills: 5 | Status: SHIPPED | OUTPATIENT
Start: 2023-11-28 | End: 2023-11-30

## 2023-11-28 RX ORDER — ALBUTEROL SULFATE 90 UG/1
2 AEROSOL, METERED RESPIRATORY (INHALATION) EVERY 6 HOURS PRN
Qty: 8.5 G | Refills: 5 | Status: SHIPPED | OUTPATIENT
Start: 2023-11-28

## 2023-11-28 RX ORDER — AMLODIPINE BESYLATE 5 MG/1
5 TABLET ORAL DAILY
Qty: 100 TABLET | Refills: 3 | Status: SHIPPED | OUTPATIENT
Start: 2023-11-28

## 2023-11-28 RX ORDER — DOXYCYCLINE HYCLATE 100 MG/1
100 CAPSULE ORAL EVERY 12 HOURS SCHEDULED
Qty: 14 CAPSULE | Refills: 0 | Status: SHIPPED | OUTPATIENT
Start: 2023-11-28 | End: 2023-12-05

## 2023-11-28 NOTE — PROGRESS NOTES
Assessment/Plan:       Problem List Items Addressed This Visit          Digestive    Gastroesophageal reflux disease       Respiratory    Moderate asthma    Relevant Medications    albuterol (ProAir HFA) 90 mcg/act inhaler    Chronic obstructive pulmonary disease (HCC) - Primary    Relevant Medications    albuterol (ProAir HFA) 90 mcg/act inhaler       Cardiovascular and Mediastinum    Essential hypertension    Relevant Medications    amLODIPine (NORVASC) 5 mg tablet    Other Relevant Orders    CBC and differential    Comprehensive metabolic panel    HEMOGLOBIN A1C W/ EAG ESTIMATION     Other Visit Diagnoses       Productive cough        Relevant Medications    doxycycline hyclate (VIBRAMYCIN) 100 mg capsule    Prediabetes        Relevant Medications    metFORMIN (GLUCOPHAGE) 500 mg tablet    Other Relevant Orders    CBC and differential    Comprehensive metabolic panel    HEMOGLOBIN A1C W/ EAG ESTIMATION              See if Spiriva is covered, continue Advair. Antibiotic provided for acute cough with sputum color change. Trial off of PPI, she will let me know how this goes, restart Prilosec 20 mg BID if needed. Diet discussed, start metformin 500 mg daily. Blood work ordered. Subjective:      Patient ID: Moshe Barnes is a 70 y.o. female. HPI    COPD/asthma- Breathing difficulty off and on, going up steps out of breath, breathing difficulty today but didn't take medicine. Only on Advair, unsure if Spiriva is covered. Using albuterol at least once daily. Coughing green mucous, past couple days. GERD- No reflux symptoms, taking for a long time. Pre-diabetes- A1c 6, she does not eat sweets, higher carb. Concerned about developing diabetes, would like to start medication.      The following portions of the patient's history were reviewed and updated as appropriate: allergies, current medications, past family history, past medical history, past social history, past surgical history, and problem list.    Review of Systems   All other systems reviewed and are negative. Objective:      /66   Pulse 64   Temp 99 °F (37.2 °C) (Tympanic)   Resp 18   Ht 5' 4" (1.626 m)   Wt 67 kg (147 lb 9.6 oz)   SpO2 98%   BMI 25.34 kg/m²          Physical Exam  Vitals reviewed. Constitutional:       General: She is not in acute distress. Appearance: Normal appearance. She is not ill-appearing, toxic-appearing or diaphoretic. HENT:      Head: Normocephalic and atraumatic. Eyes:      General:         Right eye: No discharge. Left eye: No discharge. Extraocular Movements: Extraocular movements intact. Conjunctiva/sclera: Conjunctivae normal.   Cardiovascular:      Rate and Rhythm: Normal rate and regular rhythm. Heart sounds: Normal heart sounds. No murmur heard. No friction rub. No gallop. Pulmonary:      Effort: Pulmonary effort is normal. No respiratory distress. Breath sounds: No stridor. Wheezing present. No rhonchi. Musculoskeletal:         General: No swelling, tenderness or signs of injury. Right lower leg: No edema. Left lower leg: No edema. Skin:     General: Skin is warm. Coloration: Skin is not pale. Findings: No erythema or rash. Neurological:      Mental Status: She is alert and oriented to person, place, and time. Motor: No weakness.    Psychiatric:         Mood and Affect: Mood normal.         Behavior: Behavior normal.             Sarah Cuevas DO  [facility]

## 2023-11-29 ENCOUNTER — TELEPHONE (OUTPATIENT)
Dept: FAMILY MEDICINE CLINIC | Facility: CLINIC | Age: 68
End: 2023-11-29

## 2023-11-29 DIAGNOSIS — J45.40 MODERATE PERSISTENT ASTHMA WITHOUT COMPLICATION: Primary | ICD-10-CM

## 2023-11-30 PROBLEM — R73.03 PREDIABETES: Status: ACTIVE | Noted: 2023-11-30

## 2023-11-30 RX ORDER — TIOTROPIUM BROMIDE INHALATION SPRAY 1.56 UG/1
2 SPRAY, METERED RESPIRATORY (INHALATION) DAILY
Qty: 4 G | Refills: 5 | Status: SHIPPED | OUTPATIENT
Start: 2023-11-30

## 2023-12-04 NOTE — TELEPHONE ENCOUNTER
Is there a patient assistance program to help with cost or if she open to seeing pulmonology to see if they can provide samples?

## 2023-12-05 RX ORDER — TIOTROPIUM BROMIDE INHALATION SPRAY 1.56 UG/1
2 SPRAY, METERED RESPIRATORY (INHALATION) DAILY
Qty: 12 G | Refills: 0 | Status: SHIPPED | COMMUNITY
Start: 2023-12-05

## 2023-12-05 NOTE — TELEPHONE ENCOUNTER
Called and left message for patient to call back.      Office has 3 sample boxes to give patient, will place up front with patients name on them

## 2024-02-05 ENCOUNTER — APPOINTMENT (OUTPATIENT)
Dept: LAB | Facility: CLINIC | Age: 69
End: 2024-02-05
Payer: COMMERCIAL

## 2024-02-05 DIAGNOSIS — I10 ESSENTIAL HYPERTENSION: ICD-10-CM

## 2024-02-05 DIAGNOSIS — R73.03 PREDIABETES: ICD-10-CM

## 2024-02-05 LAB
ALBUMIN SERPL BCP-MCNC: 4.2 G/DL (ref 3.5–5)
ALP SERPL-CCNC: 125 U/L (ref 34–104)
ALT SERPL W P-5'-P-CCNC: 14 U/L (ref 7–52)
ANION GAP SERPL CALCULATED.3IONS-SCNC: 10 MMOL/L
AST SERPL W P-5'-P-CCNC: 16 U/L (ref 13–39)
BASOPHILS # BLD AUTO: 0.1 THOUSANDS/ÂΜL (ref 0–0.1)
BASOPHILS NFR BLD AUTO: 1 % (ref 0–1)
BILIRUB SERPL-MCNC: 0.55 MG/DL (ref 0.2–1)
BUN SERPL-MCNC: 16 MG/DL (ref 5–25)
CALCIUM SERPL-MCNC: 9.4 MG/DL (ref 8.4–10.2)
CHLORIDE SERPL-SCNC: 105 MMOL/L (ref 96–108)
CO2 SERPL-SCNC: 25 MMOL/L (ref 21–32)
CREAT SERPL-MCNC: 0.94 MG/DL (ref 0.6–1.3)
EOSINOPHIL # BLD AUTO: 0.68 THOUSAND/ÂΜL (ref 0–0.61)
EOSINOPHIL NFR BLD AUTO: 7 % (ref 0–6)
ERYTHROCYTE [DISTWIDTH] IN BLOOD BY AUTOMATED COUNT: 12.4 % (ref 11.6–15.1)
EST. AVERAGE GLUCOSE BLD GHB EST-MCNC: 123 MG/DL
GFR SERPL CREATININE-BSD FRML MDRD: 62 ML/MIN/1.73SQ M
GLUCOSE P FAST SERPL-MCNC: 97 MG/DL (ref 65–99)
HBA1C MFR BLD: 5.9 %
HCT VFR BLD AUTO: 45.1 % (ref 34.8–46.1)
HGB BLD-MCNC: 14.4 G/DL (ref 11.5–15.4)
IMM GRANULOCYTES # BLD AUTO: 0.03 THOUSAND/UL (ref 0–0.2)
IMM GRANULOCYTES NFR BLD AUTO: 0 % (ref 0–2)
LYMPHOCYTES # BLD AUTO: 3.71 THOUSANDS/ÂΜL (ref 0.6–4.47)
LYMPHOCYTES NFR BLD AUTO: 36 % (ref 14–44)
MCH RBC QN AUTO: 29.5 PG (ref 26.8–34.3)
MCHC RBC AUTO-ENTMCNC: 31.9 G/DL (ref 31.4–37.4)
MCV RBC AUTO: 92 FL (ref 82–98)
MONOCYTES # BLD AUTO: 0.69 THOUSAND/ÂΜL (ref 0.17–1.22)
MONOCYTES NFR BLD AUTO: 7 % (ref 4–12)
NEUTROPHILS # BLD AUTO: 5.05 THOUSANDS/ÂΜL (ref 1.85–7.62)
NEUTS SEG NFR BLD AUTO: 49 % (ref 43–75)
NRBC BLD AUTO-RTO: 0 /100 WBCS
PLATELET # BLD AUTO: 275 THOUSANDS/UL (ref 149–390)
PMV BLD AUTO: 11.7 FL (ref 8.9–12.7)
POTASSIUM SERPL-SCNC: 4.4 MMOL/L (ref 3.5–5.3)
PROT SERPL-MCNC: 6.8 G/DL (ref 6.4–8.4)
RBC # BLD AUTO: 4.88 MILLION/UL (ref 3.81–5.12)
SODIUM SERPL-SCNC: 140 MMOL/L (ref 135–147)
WBC # BLD AUTO: 10.26 THOUSAND/UL (ref 4.31–10.16)

## 2024-02-05 PROCEDURE — 85025 COMPLETE CBC W/AUTO DIFF WBC: CPT

## 2024-02-05 PROCEDURE — 80053 COMPREHEN METABOLIC PANEL: CPT

## 2024-02-05 PROCEDURE — 83036 HEMOGLOBIN GLYCOSYLATED A1C: CPT

## 2024-02-05 PROCEDURE — 36415 COLL VENOUS BLD VENIPUNCTURE: CPT

## 2024-02-07 ENCOUNTER — OFFICE VISIT (OUTPATIENT)
Dept: FAMILY MEDICINE CLINIC | Facility: CLINIC | Age: 69
End: 2024-02-07
Payer: COMMERCIAL

## 2024-02-07 VITALS
BODY MASS INDEX: 24.96 KG/M2 | HEART RATE: 78 BPM | RESPIRATION RATE: 18 BRPM | SYSTOLIC BLOOD PRESSURE: 132 MMHG | TEMPERATURE: 98 F | DIASTOLIC BLOOD PRESSURE: 70 MMHG | HEIGHT: 64 IN | WEIGHT: 146.2 LBS | OXYGEN SATURATION: 98 %

## 2024-02-07 DIAGNOSIS — E55.9 VITAMIN D DEFICIENCY: ICD-10-CM

## 2024-02-07 DIAGNOSIS — R73.03 PREDIABETES: ICD-10-CM

## 2024-02-07 DIAGNOSIS — E53.8 B12 DEFICIENCY: ICD-10-CM

## 2024-02-07 DIAGNOSIS — Z13.220 ENCOUNTER FOR SCREENING FOR LIPID DISORDER: ICD-10-CM

## 2024-02-07 DIAGNOSIS — D72.829 LEUKOCYTOSIS, UNSPECIFIED TYPE: ICD-10-CM

## 2024-02-07 DIAGNOSIS — R74.8 ELEVATED ALKALINE PHOSPHATASE LEVEL: ICD-10-CM

## 2024-02-07 DIAGNOSIS — D72.10 EOSINOPHILIA, UNSPECIFIED TYPE: ICD-10-CM

## 2024-02-07 DIAGNOSIS — Z12.11 SCREENING FOR COLON CANCER: ICD-10-CM

## 2024-02-07 DIAGNOSIS — J44.9 CHRONIC OBSTRUCTIVE PULMONARY DISEASE, UNSPECIFIED COPD TYPE (HCC): Primary | ICD-10-CM

## 2024-02-07 DIAGNOSIS — J45.40 MODERATE PERSISTENT ASTHMA WITHOUT COMPLICATION: ICD-10-CM

## 2024-02-07 DIAGNOSIS — N18.2 STAGE 2 CHRONIC KIDNEY DISEASE: ICD-10-CM

## 2024-02-07 DIAGNOSIS — E01.0 THYROMEGALY: ICD-10-CM

## 2024-02-07 PROCEDURE — 99214 OFFICE O/P EST MOD 30 MIN: CPT | Performed by: FAMILY MEDICINE

## 2024-02-07 RX ORDER — TIOTROPIUM BROMIDE INHALATION SPRAY 1.56 UG/1
2 SPRAY, METERED RESPIRATORY (INHALATION) DAILY
Qty: 4 G | Refills: 5 | Status: SHIPPED | OUTPATIENT
Start: 2024-02-07

## 2024-02-07 RX ORDER — FLUTICASONE PROPIONATE AND SALMETEROL 500; 50 UG/1; UG/1
1 POWDER RESPIRATORY (INHALATION) 2 TIMES DAILY
Qty: 60 BLISTER | Refills: 5 | Status: SHIPPED | OUTPATIENT
Start: 2024-02-07

## 2024-02-07 NOTE — PROGRESS NOTES
Assessment/Plan:       Problem List Items Addressed This Visit          Respiratory    Moderate asthma    Relevant Medications    Fluticasone-Salmeterol (Advair Diskus) 500-50 mcg/dose inhaler    tiotropium (Spiriva Respimat) 1.25 MCG/ACT AERS inhaler    Chronic obstructive pulmonary disease (HCC) - Primary    Relevant Medications    Fluticasone-Salmeterol (Advair Diskus) 500-50 mcg/dose inhaler    tiotropium (Spiriva Respimat) 1.25 MCG/ACT AERS inhaler    Other Relevant Orders    CBC and differential    Comprehensive metabolic panel       Genitourinary    Stage 2 chronic kidney disease       Other    Elevated alkaline phosphatase level    Leukocytosis    Vitamin D deficiency    Relevant Orders    Vitamin D 25 hydroxy    Prediabetes    Relevant Medications    metFORMIN (GLUCOPHAGE) 500 mg tablet    Other Relevant Orders    Hemoglobin A1C     Other Visit Diagnoses       B12 deficiency        Relevant Orders    Vitamin B12    Encounter for screening for lipid disorder        Relevant Orders    Lipid Panel with Direct LDL reflex    Thyromegaly        Relevant Orders    US thyroid    TSH, 3rd generation with Free T4 reflex    Screening for colon cancer        Relevant Orders    Cologuard              Continue Advair and Spiriva.   Increase metformin 500 mg BID.   Mild elevation in eosinophils, will trend CBC and if increasing referral hematology.   Will continue to trend alk phos and kidney function.   Thyromegaly noted on exam, recommended thyroid US.   Follow-up 6 months for AMW, blood work prior.   Cologuard ordered.     Subjective:      Patient ID: Lakeisha Trejo is a 68 y.o. female.    HPI    COPD/asthma- Her breathing has been significant improved since starting Spiriva in addition to Advair. Can walk farther distances without being winded.     Pre-diabetes- Last visit we started metformin 500 mg daily. A1c improved 5.9. Tolerating well.     Leukocytosis- WBC mildly elevated 10.2, eosinophils elevated as well  "0.68. Taking Singulair. Reports allergies have been okay.     Elevated alkaline phosphatase- Mild 125.     CKD stage 2- GFR 62 with creatinine 0.94, stable.     The following portions of the patient's history were reviewed and updated as appropriate: allergies, current medications, past family history, past medical history, past social history, past surgical history, and problem list.    Review of Systems   All other systems reviewed and are negative.        Objective:      /70   Pulse 78   Temp 98 °F (36.7 °C) (Temporal)   Resp 18   Ht 5' 4\" (1.626 m)   Wt 66.3 kg (146 lb 3.2 oz)   SpO2 98%   BMI 25.10 kg/m²          Physical Exam  Vitals reviewed.   Constitutional:       General: She is not in acute distress.     Appearance: Normal appearance. She is not ill-appearing, toxic-appearing or diaphoretic.   HENT:      Head: Normocephalic and atraumatic.   Eyes:      General:         Right eye: No discharge.         Left eye: No discharge.      Extraocular Movements: Extraocular movements intact.      Conjunctiva/sclera: Conjunctivae normal.   Neck:      Comments: Thyromegaly   Cardiovascular:      Rate and Rhythm: Normal rate and regular rhythm.      Heart sounds: Normal heart sounds. No murmur heard.     No friction rub. No gallop.   Pulmonary:      Effort: Pulmonary effort is normal. No respiratory distress.      Breath sounds: Normal breath sounds. No stridor. No wheezing or rhonchi.   Musculoskeletal:         General: No swelling, tenderness or signs of injury.      Right lower leg: No edema.      Left lower leg: No edema.   Skin:     General: Skin is warm.      Coloration: Skin is not pale.      Findings: No erythema or rash.   Neurological:      Mental Status: She is alert and oriented to person, place, and time.      Motor: No weakness.   Psychiatric:         Mood and Affect: Mood normal.         Behavior: Behavior normal.             Kelli Dhaliwal,   [facility] [place]     "

## 2024-02-20 ENCOUNTER — HOSPITAL ENCOUNTER (OUTPATIENT)
Dept: ULTRASOUND IMAGING | Facility: HOSPITAL | Age: 69
Discharge: HOME | End: 2024-02-20
Payer: COMMERCIAL

## 2024-02-20 DIAGNOSIS — E01.0 THYROMEGALY: ICD-10-CM

## 2024-02-20 PROCEDURE — 76536 US EXAM OF HEAD AND NECK: CPT

## 2024-02-21 ENCOUNTER — TELEPHONE (OUTPATIENT)
Dept: FAMILY MEDICINE CLINIC | Facility: CLINIC | Age: 69
End: 2024-02-21

## 2024-02-21 DIAGNOSIS — R05.1 ACUTE COUGH: Primary | ICD-10-CM

## 2024-02-21 NOTE — TELEPHONE ENCOUNTER
Patient called because her cough is not getting any better and was told to follow up with Dr Dhaliwal.

## 2024-02-22 RX ORDER — BENZONATATE 200 MG/1
200 CAPSULE ORAL 3 TIMES DAILY PRN
Qty: 20 CAPSULE | Refills: 0 | Status: SHIPPED | OUTPATIENT
Start: 2024-02-22

## 2024-02-22 NOTE — TELEPHONE ENCOUNTER
Pt called back, states she always has trouble breathing due to her asthma. States she has been coughing but no mucous but has not taken any medication because she was unsure if she could with the medications she's on. Please advise.

## 2024-02-22 NOTE — TELEPHONE ENCOUNTER
Spoke with pt, breathing at baseline. Would like [facility]on perles [facility] [address] [address]

## 2024-02-22 NOTE — TELEPHONE ENCOUNTER
Reviewed thanks, is breathing at baseline/any worsening? I can send in tessalon perles for her for cough

## 2024-02-27 ENCOUNTER — TELEPHONE (OUTPATIENT)
Dept: FAMILY MEDICINE CLINIC | Facility: CLINIC | Age: 69
End: 2024-02-27

## 2024-02-27 DIAGNOSIS — J44.9 CHRONIC OBSTRUCTIVE PULMONARY DISEASE, UNSPECIFIED COPD TYPE (HCC): Primary | ICD-10-CM

## 2024-02-27 NOTE — TELEPHONE ENCOUNTER
Patient called with concerns for the spriva inhaler that was called in on 2/7 She said she has a hard time using it and her Spiriva inhaler that she had previously was much easier. Is asking if you could call that one back in instead. Please advise.

## 2024-02-28 LAB — COLOGUARD RESULT REPORTABLE: NEGATIVE

## 2024-02-28 RX ORDER — TIOTROPIUM BROMIDE 18 UG/1
18 CAPSULE ORAL; RESPIRATORY (INHALATION) DAILY
Qty: 30 CAPSULE | Refills: 5 | Status: SHIPPED | OUTPATIENT
Start: 2024-02-28

## 2024-02-28 NOTE — TELEPHONE ENCOUNTER
See phone conversation from Nov 29, insurance did not cover Spiriva HandiHaler but they did cover Spiriva Respimat which she has now. I can try sending it again but I'm not sure it'll be covered. Sent.

## 2024-03-06 NOTE — TELEPHONE ENCOUNTER
Insurance sent a letter saying it will not cover the inhaler without PA as preferred medication is the Spiriva Respimat inhaler.

## 2024-03-07 NOTE — TELEPHONE ENCOUNTER
Can we submit prior auth that she has difficulty using the Spiriva Respimat? If denied, I can see if an alternate is covered.

## 2024-05-23 ENCOUNTER — OFFICE VISIT (OUTPATIENT)
Dept: FAMILY MEDICINE CLINIC | Facility: CLINIC | Age: 69
End: 2024-05-23
Payer: COMMERCIAL

## 2024-05-23 VITALS
HEART RATE: 76 BPM | DIASTOLIC BLOOD PRESSURE: 80 MMHG | OXYGEN SATURATION: 98 % | RESPIRATION RATE: 18 BRPM | SYSTOLIC BLOOD PRESSURE: 122 MMHG | WEIGHT: 145 LBS | HEIGHT: 64 IN | TEMPERATURE: 98.1 F | BODY MASS INDEX: 24.75 KG/M2

## 2024-05-23 DIAGNOSIS — J45.41 MODERATE PERSISTENT ASTHMA WITH EXACERBATION: ICD-10-CM

## 2024-05-23 DIAGNOSIS — J44.9 CHRONIC OBSTRUCTIVE PULMONARY DISEASE, UNSPECIFIED COPD TYPE (HCC): Primary | ICD-10-CM

## 2024-05-23 DIAGNOSIS — R05.1 ACUTE COUGH: ICD-10-CM

## 2024-05-23 PROCEDURE — 99214 OFFICE O/P EST MOD 30 MIN: CPT | Performed by: FAMILY MEDICINE

## 2024-05-23 RX ORDER — PREDNISONE 20 MG/1
40 TABLET ORAL DAILY
Qty: 8 TABLET | Refills: 0 | Status: SHIPPED | OUTPATIENT
Start: 2024-05-24 | End: 2024-05-28

## 2024-05-23 RX ORDER — PREDNISONE 10 MG/1
40 TABLET ORAL DAILY
Status: SHIPPED | OUTPATIENT
Start: 2024-05-23

## 2024-05-23 RX ORDER — AMOXICILLIN AND CLAVULANATE POTASSIUM 875; 125 MG/1; MG/1
1 TABLET, FILM COATED ORAL EVERY 12 HOURS SCHEDULED
Qty: 14 TABLET | Refills: 0 | Status: SHIPPED | OUTPATIENT
Start: 2024-05-23 | End: 2024-05-30

## 2024-05-23 RX ORDER — DOXYCYCLINE HYCLATE 100 MG/1
100 CAPSULE ORAL EVERY 12 HOURS SCHEDULED
Qty: 14 CAPSULE | Refills: 0 | Status: SHIPPED | OUTPATIENT
Start: 2024-05-23 | End: 2024-05-30

## 2024-05-23 RX ORDER — BENZONATATE 200 MG/1
200 CAPSULE ORAL 3 TIMES DAILY PRN
Qty: 20 CAPSULE | Refills: 0 | Status: SHIPPED | OUTPATIENT
Start: 2024-05-23

## 2024-05-23 RX ADMIN — PREDNISONE 40 MG: 10 TABLET ORAL at 09:46

## 2024-05-23 NOTE — PROGRESS NOTES
"Assessment/Plan:       Problem List Items Addressed This Visit          Respiratory    Chronic obstructive pulmonary disease (HCC) - Primary    Relevant Medications    predniSONE tablet 40 mg    benzonatate (TESSALON) 200 MG capsule     Other Visit Diagnoses       Moderate persistent asthma with exacerbation        Relevant Medications    predniSONE tablet 40 mg    Acute cough        Relevant Medications    benzonatate (TESSALON) 200 MG capsule              Subjective:      Patient ID: Lakeisha Trejo is a 69 y.o. female.    HPI    Coughing productive with yellow/green mucous, increased albuterol use, coughing fits. Otherwise feeling well no fevers or chills.     The following portions of the patient's history were reviewed and updated as appropriate: allergies, current medications, past family history, past medical history, past social history, past surgical history, and problem list.    Review of Systems   All other systems reviewed and are negative.        Objective:      /80   Pulse 76   Temp 98.1 °F (36.7 °C)   Resp 18   Ht 5' 4\" (1.626 m)   Wt 65.8 kg (145 lb)   SpO2 98%   BMI 24.89 kg/m²          Physical Exam  Vitals reviewed.   Constitutional:       General: She is not in acute distress.     Appearance: Normal appearance. She is not ill-appearing, toxic-appearing or diaphoretic.   HENT:      Head: Normocephalic and atraumatic.   Eyes:      General:         Right eye: No discharge.         Left eye: No discharge.      Extraocular Movements: Extraocular movements intact.      Conjunctiva/sclera: Conjunctivae normal.   Cardiovascular:      Rate and Rhythm: Normal rate and regular rhythm.      Heart sounds: Normal heart sounds. No murmur heard.     No friction rub. No gallop.   Pulmonary:      Effort: Pulmonary effort is normal. No respiratory distress.      Breath sounds: No stridor. Wheezing present. No rhonchi.      Comments: Scattered crackles   Musculoskeletal:         General: No swelling, " tenderness or signs of injury.      Right lower leg: No edema.      Left lower leg: No edema.   Skin:     General: Skin is warm.      Coloration: Skin is not pale.      Findings: No erythema or rash.   Neurological:      Mental Status: She is alert and oriented to person, place, and time.      Motor: No weakness.   Psychiatric:         Mood and Affect: Mood normal.         Behavior: Behavior normal.             Kelli Dhaliwal DO  [facility]

## 2024-05-29 ENCOUNTER — TELEPHONE (OUTPATIENT)
Age: 69
End: 2024-05-29

## 2024-05-29 NOTE — TELEPHONE ENCOUNTER
Patient called stating she is not feeling any better with the medication Dr. Dhaliwal prescribed at her last visit. Is there something else she can send over to the pharmacy. Still has chest tightness and wheezing and persistent cough.  [facility] - [place]

## 2024-05-30 ENCOUNTER — OFFICE VISIT (OUTPATIENT)
Dept: FAMILY MEDICINE CLINIC | Facility: CLINIC | Age: 69
End: 2024-05-30
Payer: COMMERCIAL

## 2024-05-30 ENCOUNTER — APPOINTMENT (OUTPATIENT)
Dept: RADIOLOGY | Facility: CLINIC | Age: 69
End: 2024-05-30
Payer: COMMERCIAL

## 2024-05-30 VITALS
DIASTOLIC BLOOD PRESSURE: 74 MMHG | HEART RATE: 86 BPM | SYSTOLIC BLOOD PRESSURE: 120 MMHG | BODY MASS INDEX: 23.56 KG/M2 | WEIGHT: 138 LBS | TEMPERATURE: 98.7 F | HEIGHT: 64 IN | OXYGEN SATURATION: 94 %

## 2024-05-30 DIAGNOSIS — J44.9 CHRONIC OBSTRUCTIVE PULMONARY DISEASE, UNSPECIFIED COPD TYPE (HCC): ICD-10-CM

## 2024-05-30 DIAGNOSIS — J44.1 COPD WITH EXACERBATION (HCC): ICD-10-CM

## 2024-05-30 DIAGNOSIS — J44.9 CHRONIC OBSTRUCTIVE PULMONARY DISEASE, UNSPECIFIED COPD TYPE (HCC): Primary | ICD-10-CM

## 2024-05-30 PROCEDURE — 71046 X-RAY EXAM CHEST 2 VIEWS: CPT

## 2024-05-30 PROCEDURE — G2211 COMPLEX E/M VISIT ADD ON: HCPCS | Performed by: NURSE PRACTITIONER

## 2024-05-30 PROCEDURE — 99214 OFFICE O/P EST MOD 30 MIN: CPT | Performed by: NURSE PRACTITIONER

## 2024-05-30 RX ORDER — PREDNISONE 20 MG/1
TABLET ORAL
Qty: 18 TABLET | Refills: 0 | Status: SHIPPED | OUTPATIENT
Start: 2024-05-30 | End: 2024-06-09

## 2024-05-30 NOTE — PATIENT INSTRUCTIONS
Continue Albuterol every 4 hours.   Use Advair 2 times a day, rinse mouth after use.    Take prednisone taper as prescribed.   CXR get done today.

## 2024-05-30 NOTE — PROGRESS NOTES
Ambulatory Visit  Name: Lakeisha Trejo      : 1955      MRN: [id number]  Encounter Provider: KAYCEE Addison  Encounter Date: 2024   Encounter department: [facility]    Assessment & Plan   1. Chronic obstructive pulmonary disease, unspecified COPD type (HCC)  -     Ambulatory Referral to Pulmonology; Future  -     XR chest pa & lateral; Future; Expected date: 2024  -     predniSONE 20 mg tablet; Take 3 tablets (60 mg total) by mouth daily for 2 days, THEN 2 tablets (40 mg total) daily for 4 days, THEN 1 tablet (20 mg total) daily for 4 days.  2. COPD with exacerbation (HCC)  =- suspect COPD exacerbation.     Depression Screening and Follow-up Plan: Patient was screened for depression during today's encounter. They screened negative with a PHQ-2 score of 0.        History of Present Illness      Paitent here for sick visit with c/o cough, wheezing. Starting about 10 days ago.  Was in to see PCP, was started on amoxicillin, and doxycycline, predinsone. Has not had any relief with this.  Had COPD, using albuterol and       Review of Systems   Constitutional: Negative.  Negative for fatigue and fever.   HENT:  Negative for congestion and postnasal drip.    Respiratory:  Positive for cough and wheezing. Negative for shortness of breath.    Cardiovascular: Negative.  Negative for chest pain and palpitations.   Psychiatric/Behavioral: Negative.       Past Medical History:   Diagnosis Date    Acute bronchitis     Acute pharyngitis     Anxiety state     Arthropathy of ankle and foot     and/or    Asthma     Asthma without status asthmaticus     Carotid artery occlusion     COPD (chronic obstructive pulmonary disease) ([facility])     Cough     COVID-19 vaccine series completed     Disorder of shoulder     Dyspnea     Hand joint pain     Heartburn     Herpes simplex without complication     Hyperlipidemia     Infection of skin and subcutaneous tissue     Localized superficial  swelling of skin     Low back pain     Lymphadenopathy     Malaise and fatigue     Neck pain     Osteoarthritis     Pleurisy without effusion or active tuberculosis     Pneumonia     Right upper quadrant pain     Shoulder joint pain     Skin eruption     Vitamin D deficiency     Vomiting      Past Surgical History:   Procedure Laterality Date    BREAST BIOPSY Left 2017 benign    CHOLECYSTECTOMY      CHOLECYSTECTOMY  03/2014    Dr. Henry     COLONOSCOPY  02/2013    WNL    FL LUMBAR PUNCTURE DIAGNOSTIC  05/25/2021    HYSTERECTOMY      Total     NASAL POLYP EXCISION Left 2009    with lysis of intranasal adhesions from packing     Family History   Problem Relation Age of Onset    Diabetes Mother         Non-insulin dependent diabetes    Emphysema Father     No Known Problems Sister     No Known Problems Daughter     No Known Problems Maternal Grandmother     No Known Problems Paternal Grandmother     No Known Problems Sister     No Known Problems Sister     No Known Problems Daughter     No Known Problems Daughter     No Known Problems Maternal Aunt      Social History     Tobacco Use    Smoking status: Never    Smokeless tobacco: Never   Vaping Use    Vaping status: Never Used   Substance and Sexual Activity    Alcohol use: Yes     Alcohol/week: 21.0 standard drinks of alcohol     Types: 21 Cans of beer per week     Comment: Daily    Drug use: Never    Sexual activity: Not on file     Current Outpatient Medications on File Prior to Visit   Medication Sig    albuterol (2.5 mg/3 mL) 0.083 % nebulizer solution Take 3 mL (2.5 mg total) by nebulization 4 (four) times a day    albuterol (ProAir HFA) 90 mcg/act inhaler Inhale 2 puffs every 6 (six) hours as needed for wheezing    ALPRAZolam (XANAX) 0.25 mg tablet Take 1 tablet (0.25 mg total) by mouth daily at bedtime as needed for anxiety    amLODIPine (NORVASC) 5 mg tablet Take 1 tablet (5 mg total) by mouth daily    aspirin 81 mg chewable tablet Chew 81 mg daily     "benzonatate (TESSALON) 200 MG capsule Take 1 capsule (200 mg total) by mouth 3 (three) times a day as needed for cough    doxycycline hyclate (VIBRAMYCIN) 100 mg capsule Take 1 capsule (100 mg total) by mouth every 12 (twelve) hours for 7 days    Fluticasone-Salmeterol (Advair Diskus) 500-50 mcg/dose inhaler Inhale 1 puff 2 (two) times a day Rinse mouth after use.    levocetirizine (XYZAL) 5 MG tablet Take 0.5 tablets (2.5 mg total) by mouth every evening    metFORMIN (GLUCOPHAGE) 500 mg tablet Take 1 tablet (500 mg total) by mouth 2 (two) times a day with meals    montelukast (SINGULAIR) 10 mg tablet Take 1 tablet (10 mg total) by mouth daily at bedtime    nystatin (MYCOSTATIN) cream Apply topically 2 (two) times a day . Apply for additional 2 weeks after rash resolves.    VITAMIN D PO Take by mouth    [DISCONTINUED] tiotropium (Spiriva HandiHaler) 18 mcg inhalation capsule Place 1 capsule (18 mcg total) into inhaler and inhale daily    amoxicillin-clavulanate (AUGMENTIN) 875-125 mg per tablet Take 1 tablet by mouth every 12 (twelve) hours for 7 days (Patient not taking: Reported on 5/30/2024)    EPINEPHrine (EPIPEN) 0.3 mg/0.3 mL SOAJ Inject 0.3 mL (0.3 mg total) into a muscle once for 1 dose     Allergies   Allergen Reactions    Azithromycin     Darvon [Propoxyphene]     Crestor [Rosuvastatin] Hives and GI Intolerance    Wasp Venom Hives     Immunization History   Administered Date(s) Administered    COVID-19 MODERNA VACC 0.5 ML IM 04/02/2021, 05/05/2021    INFLUENZA 10/22/2015, 09/24/2018    Tdap 08/13/2015     Objective     /74   Pulse 86   Temp 98.7 °F (37.1 °C)   Ht 5' 4\" (1.626 m)   Wt 62.6 kg (138 lb)   SpO2 94%   BMI 23.69 kg/m²     Physical Exam  Vitals and nursing note reviewed.   Constitutional:       General: She is not in acute distress.     Appearance: She is well-developed. She is not ill-appearing or diaphoretic.   HENT:      Head: Normocephalic and atraumatic.      Right Ear: Hearing, " tympanic membrane and ear canal normal.      Left Ear: Hearing, tympanic membrane and ear canal normal.      Nose: Nose normal.      Mouth/Throat:      Mouth: Oropharynx is clear and moist.      Pharynx: Uvula midline.   Eyes:      General: Lids are normal.      Extraocular Movements: EOM normal.      Conjunctiva/sclera: Conjunctivae normal.   Cardiovascular:      Rate and Rhythm: Normal rate and regular rhythm.      Heart sounds: Normal heart sounds, S1 normal and S2 normal. No murmur heard.     No gallop.   Pulmonary:      Effort: Pulmonary effort is normal. No respiratory distress.      Breath sounds: Normal breath sounds.   Musculoskeletal:         General: No tenderness or edema. Normal range of motion.   Lymphadenopathy:      Cervical: No cervical adenopathy.   Skin:     General: Skin is warm.      Capillary Refill: Capillary refill takes less than 2 seconds.      Findings: No rash.   Neurological:      Mental Status: She is alert.   Psychiatric:         Mood and Affect: Mood and affect normal.         Behavior: Behavior normal. Behavior is cooperative.         Thought Content: Thought content normal.         Judgment: Judgment normal.       Administrative Statements

## 2024-05-30 NOTE — TELEPHONE ENCOUNTER
Phone call to patient and states she started with some left lower lip swelling on Saturday and has not improved. Patient states the area is painful to touch and does not remember getting bit by a bug or spider.   There is no open area and denies fever.    Patient states she has seen ENT - Dr Resendiz in 2022  For allergic contact dermatitis and advised to discontinue Flonase.   Patient has but now experiencing the same symptoms and the swelling is only in the left lower lip area.  Denies tongue swelling, problems breathing or swallowing.      Advised to be seen today and transferred to  to schedule an appt.     Would she be willing to come in for a visit if in office provider has same day? Likely needs chest XR and longer prednisone taper. How is her oxygen at home?

## 2024-05-31 DIAGNOSIS — R91.8 LUNG NODULES: ICD-10-CM

## 2024-05-31 DIAGNOSIS — R93.89 ABNORMAL CXR: Primary | ICD-10-CM

## 2024-05-31 DIAGNOSIS — A31.0 ATYPICAL MYCOBACTERIAL INFECTION OF LUNG (HCC): Primary | ICD-10-CM

## 2024-05-31 DIAGNOSIS — R93.89 ABNORMAL CXR: ICD-10-CM

## 2024-05-31 RX ORDER — LEVOFLOXACIN 500 MG/1
500 TABLET, FILM COATED ORAL EVERY 24 HOURS
Qty: 7 TABLET | Refills: 0 | Status: SHIPPED | OUTPATIENT
Start: 2024-05-31 | End: 2024-06-07

## 2024-06-06 ENCOUNTER — HOSPITAL ENCOUNTER (OUTPATIENT)
Dept: CT IMAGING | Facility: HOSPITAL | Age: 69
End: 2024-06-06
Payer: COMMERCIAL

## 2024-06-06 DIAGNOSIS — R93.89 ABNORMAL CXR: ICD-10-CM

## 2024-06-06 DIAGNOSIS — R91.8 LUNG NODULES: ICD-10-CM

## 2024-06-06 PROCEDURE — 71250 CT THORAX DX C-: CPT

## 2024-06-10 ENCOUNTER — TELEPHONE (OUTPATIENT)
Age: 69
End: 2024-06-10

## 2024-06-10 NOTE — TELEPHONE ENCOUNTER
Pt called for test results. Advised CT from 6/6 has not been read yet and review x-ray results for 5/30/24.

## 2024-06-17 ENCOUNTER — NURSE TRIAGE (OUTPATIENT)
Age: 69
End: 2024-06-17

## 2024-06-17 ENCOUNTER — HOSPITAL ENCOUNTER (EMERGENCY)
Facility: HOSPITAL | Age: 69
Discharge: HOME | End: 2024-06-17
Attending: EMERGENCY MEDICINE
Payer: COMMERCIAL

## 2024-06-17 ENCOUNTER — APPOINTMENT (EMERGENCY)
Dept: RADIOLOGY | Facility: HOSPITAL | Age: 69
End: 2024-06-17
Payer: COMMERCIAL

## 2024-06-17 VITALS
TEMPERATURE: 98 F | OXYGEN SATURATION: 93 % | HEIGHT: 64 IN | BODY MASS INDEX: 23.56 KG/M2 | DIASTOLIC BLOOD PRESSURE: 61 MMHG | HEART RATE: 101 BPM | SYSTOLIC BLOOD PRESSURE: 124 MMHG | WEIGHT: 138 LBS | RESPIRATION RATE: 22 BRPM

## 2024-06-17 DIAGNOSIS — J44.1 COPD EXACERBATION (HCC): Primary | ICD-10-CM

## 2024-06-17 LAB
ALBUMIN SERPL BCP-MCNC: 3.9 G/DL (ref 3.5–5)
ALP SERPL-CCNC: 92 U/L (ref 34–104)
ALT SERPL W P-5'-P-CCNC: 24 U/L (ref 7–52)
ANION GAP SERPL CALCULATED.3IONS-SCNC: 10 MMOL/L (ref 4–13)
AST SERPL W P-5'-P-CCNC: 18 U/L (ref 13–39)
BASOPHILS # BLD AUTO: 0.04 THOUSANDS/ÂΜL (ref 0–0.1)
BASOPHILS NFR BLD AUTO: 0 % (ref 0–1)
BILIRUB SERPL-MCNC: 0.62 MG/DL (ref 0.2–1)
BNP SERPL-MCNC: 11 PG/ML (ref 0–100)
BUN SERPL-MCNC: 17 MG/DL (ref 5–25)
CALCIUM SERPL-MCNC: 9.5 MG/DL (ref 8.4–10.2)
CARDIAC TROPONIN I PNL SERPL HS: 3 NG/L
CHLORIDE SERPL-SCNC: 105 MMOL/L (ref 96–108)
CO2 SERPL-SCNC: 26 MMOL/L (ref 21–32)
CREAT SERPL-MCNC: 0.91 MG/DL (ref 0.6–1.3)
EOSINOPHIL # BLD AUTO: 0.76 THOUSAND/ÂΜL (ref 0–0.61)
EOSINOPHIL NFR BLD AUTO: 7 % (ref 0–6)
ERYTHROCYTE [DISTWIDTH] IN BLOOD BY AUTOMATED COUNT: 12.7 % (ref 11.6–15.1)
GFR SERPL CREATININE-BSD FRML MDRD: 64 ML/MIN/1.73SQ M
GLUCOSE SERPL-MCNC: 103 MG/DL (ref 65–140)
HCT VFR BLD AUTO: 44.9 % (ref 34.8–46.1)
HGB BLD-MCNC: 14.3 G/DL (ref 11.5–15.4)
IMM GRANULOCYTES # BLD AUTO: 0.06 THOUSAND/UL (ref 0–0.2)
IMM GRANULOCYTES NFR BLD AUTO: 1 % (ref 0–2)
LYMPHOCYTES # BLD AUTO: 2.23 THOUSANDS/ÂΜL (ref 0.6–4.47)
LYMPHOCYTES NFR BLD AUTO: 20 % (ref 14–44)
MAGNESIUM SERPL-MCNC: 2 MG/DL (ref 1.9–2.7)
MCH RBC QN AUTO: 29.6 PG (ref 26.8–34.3)
MCHC RBC AUTO-ENTMCNC: 31.8 G/DL (ref 31.4–37.4)
MCV RBC AUTO: 93 FL (ref 82–98)
MONOCYTES # BLD AUTO: 1.33 THOUSAND/ÂΜL (ref 0.17–1.22)
MONOCYTES NFR BLD AUTO: 12 % (ref 4–12)
NEUTROPHILS # BLD AUTO: 6.86 THOUSANDS/ÂΜL (ref 1.85–7.62)
NEUTS SEG NFR BLD AUTO: 60 % (ref 43–75)
NRBC BLD AUTO-RTO: 0 /100 WBCS
PHOSPHATE SERPL-MCNC: 3 MG/DL (ref 2.3–4.1)
PLATELET # BLD AUTO: 220 THOUSANDS/UL (ref 149–390)
PMV BLD AUTO: 10.6 FL (ref 8.9–12.7)
POTASSIUM SERPL-SCNC: 3.7 MMOL/L (ref 3.5–5.3)
PROT SERPL-MCNC: 6.4 G/DL (ref 6.4–8.4)
RBC # BLD AUTO: 4.83 MILLION/UL (ref 3.81–5.12)
SODIUM SERPL-SCNC: 141 MMOL/L (ref 135–147)
WBC # BLD AUTO: 11.28 THOUSAND/UL (ref 4.31–10.16)

## 2024-06-17 PROCEDURE — 84484 ASSAY OF TROPONIN QUANT: CPT | Performed by: EMERGENCY MEDICINE

## 2024-06-17 PROCEDURE — 83880 ASSAY OF NATRIURETIC PEPTIDE: CPT | Performed by: EMERGENCY MEDICINE

## 2024-06-17 PROCEDURE — 36415 COLL VENOUS BLD VENIPUNCTURE: CPT | Performed by: EMERGENCY MEDICINE

## 2024-06-17 PROCEDURE — 71045 X-RAY EXAM CHEST 1 VIEW: CPT

## 2024-06-17 PROCEDURE — 80053 COMPREHEN METABOLIC PANEL: CPT | Performed by: EMERGENCY MEDICINE

## 2024-06-17 PROCEDURE — 96365 THER/PROPH/DIAG IV INF INIT: CPT

## 2024-06-17 PROCEDURE — 99285 EMERGENCY DEPT VISIT HI MDM: CPT

## 2024-06-17 PROCEDURE — 84100 ASSAY OF PHOSPHORUS: CPT | Performed by: EMERGENCY MEDICINE

## 2024-06-17 PROCEDURE — 99285 EMERGENCY DEPT VISIT HI MDM: CPT | Performed by: EMERGENCY MEDICINE

## 2024-06-17 PROCEDURE — 93005 ELECTROCARDIOGRAM TRACING: CPT

## 2024-06-17 PROCEDURE — 83735 ASSAY OF MAGNESIUM: CPT | Performed by: EMERGENCY MEDICINE

## 2024-06-17 PROCEDURE — 85025 COMPLETE CBC W/AUTO DIFF WBC: CPT | Performed by: EMERGENCY MEDICINE

## 2024-06-17 PROCEDURE — 96375 TX/PRO/DX INJ NEW DRUG ADDON: CPT

## 2024-06-17 PROCEDURE — 94640 AIRWAY INHALATION TREATMENT: CPT

## 2024-06-17 RX ORDER — DEXAMETHASONE SODIUM PHOSPHATE 10 MG/ML
8 INJECTION, SOLUTION INTRAMUSCULAR; INTRAVENOUS ONCE
Status: COMPLETED | OUTPATIENT
Start: 2024-06-17 | End: 2024-06-17

## 2024-06-17 RX ORDER — MAGNESIUM SULFATE 1 G/100ML
1 INJECTION INTRAVENOUS ONCE
Status: COMPLETED | OUTPATIENT
Start: 2024-06-17 | End: 2024-06-17

## 2024-06-17 RX ORDER — PREDNISONE 20 MG/1
40 TABLET ORAL DAILY
Qty: 8 TABLET | Refills: 0 | Status: SHIPPED | OUTPATIENT
Start: 2024-06-18 | End: 2024-06-22

## 2024-06-17 RX ORDER — IPRATROPIUM BROMIDE AND ALBUTEROL SULFATE 2.5; .5 MG/3ML; MG/3ML
3 SOLUTION RESPIRATORY (INHALATION) ONCE
Status: COMPLETED | OUTPATIENT
Start: 2024-06-17 | End: 2024-06-17

## 2024-06-17 RX ADMIN — IPRATROPIUM BROMIDE AND ALBUTEROL SULFATE 3 ML: 2.5; .5 SOLUTION RESPIRATORY (INHALATION) at 20:29

## 2024-06-17 RX ADMIN — DEXAMETHASONE SODIUM PHOSPHATE 8 MG: 10 INJECTION, SOLUTION INTRAMUSCULAR; INTRAVENOUS at 20:29

## 2024-06-17 RX ADMIN — MAGNESIUM SULFATE HEPTAHYDRATE 1 G: 1 INJECTION, SOLUTION INTRAVENOUS at 21:30

## 2024-06-17 NOTE — ED PROVIDER NOTES
History  Chief Complaint   Patient presents with    Shortness of Breath     SOB, history of asthma and copd; has had a cough for about a month; was diagnosed with an infection in her lungs through a CT- unsure if she was given antibiotics; pt states she does not want to be admitted     69-year-old female presents emergency room complaining of shortness of breath and cough.  The patient has been seen by Kelli Dhaliwal in the past and states that she is still tight with the cough even after steroids ended at the end of last month.  The patient notes that she is currently on Advair, albuterol, and Singulair.  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  The patient denies fever or chills.        Prior to Admission Medications   Prescriptions Last Dose Informant Patient Reported? Taking?   ALPRAZolam (XANAX) 0.25 mg tablet  Self No No   Sig: Take 1 tablet (0.25 mg total) by mouth daily at bedtime as needed for anxiety   EPINEPHrine (EPIPEN) 0.3 mg/0.3 mL SOAJ   No No   Sig: Inject 0.3 mL (0.3 mg total) into a muscle once for 1 dose   Fluticasone-Salmeterol (Advair Diskus) 500-50 mcg/dose inhaler   No No   Sig: Inhale 1 puff 2 (two) times a day Rinse mouth after use.   VITAMIN D PO   Yes No   Sig: Take by mouth   albuterol (2.5 mg/3 mL) 0.083 % nebulizer solution  Self No No   Sig: Take 3 mL (2.5 mg total) by nebulization 4 (four) times a day   albuterol (ProAir HFA) 90 mcg/act inhaler   No No   Sig: Inhale 2 puffs every 6 (six) hours as needed for wheezing   amLODIPine (NORVASC) 5 mg tablet   No No   Sig: Take 1 tablet (5 mg total) by mouth daily   aspirin 81 mg chewable tablet  Self Yes No   Sig: Chew 81 mg daily   benzonatate (TESSALON) 200 MG capsule   No No   Sig: Take 1 capsule (200 mg total) by mouth 3 (three) times a day as needed for cough   levocetirizine (XYZAL) 5 MG tablet   No No   Sig: Take 0.5 tablets (2.5 mg  total) by mouth every evening   metFORMIN (GLUCOPHAGE) 500 mg tablet   No No   Sig: Take 1 tablet (500 mg total) by mouth 2 (two) times a day with meals   montelukast (SINGULAIR) 10 mg tablet   No No   Sig: Take 1 tablet (10 mg total) by mouth daily at bedtime   nystatin (MYCOSTATIN) cream  Self No No   Sig: Apply topically 2 (two) times a day . Apply for additional 2 weeks after rash resolves.      Facility-Administered Medications Last Administration Doses Remaining   predniSONE tablet 40 mg 5/23/2024  9:46 AM           Past Medical History:   Diagnosis Date    Acute bronchitis     Acute pharyngitis     Anxiety state     Arthropathy of ankle and foot     and/or    Asthma     Asthma without status asthmaticus     Carotid artery occlusion     COPD (chronic obstructive pulmonary disease) ([facility])     Cough     COVID-19 vaccine series completed     Disorder of shoulder     Dyspnea     Hand joint pain     Heartburn     Herpes simplex without complication     Hyperlipidemia     Infection of skin and subcutaneous tissue     Localized superficial swelling of skin     Low back pain     Lymphadenopathy     Malaise and fatigue     Neck pain     Osteoarthritis     Pleurisy without effusion or active tuberculosis     Pneumonia     Right upper quadrant pain     Shoulder joint pain     Skin eruption     Vitamin D deficiency     Vomiting        Past Surgical History:   Procedure Laterality Date    BREAST BIOPSY Left 2017 benign    CHOLECYSTECTOMY      CHOLECYSTECTOMY  03/2014    Dr. Henry     COLONOSCOPY  02/2013    WNL    FL LUMBAR PUNCTURE DIAGNOSTIC  05/25/2021    HYSTERECTOMY      Total     NASAL POLYP EXCISION Left 2009    with lysis of intranasal adhesions from packing       Family History   Problem Relation Age of Onset    Diabetes Mother         Non-insulin dependent diabetes    Emphysema Father     No Known Problems Sister     No Known Problems Daughter     No Known Problems Maternal Grandmother     No Known Problems  Paternal Grandmother     No Known Problems Sister     No Known Problems Sister     No Known Problems Daughter     No Known Problems Daughter     No Known Problems Maternal Aunt      I have reviewed and agree with the history as documented.    E-Cigarette/Vaping    E-Cigarette Use Never User      E-Cigarette/Vaping Substances    Nicotine No     THC No     CBD No     Flavoring No      Social History     Tobacco Use    Smoking status: Never    Smokeless tobacco: Never   Vaping Use    Vaping status: Never Used   Substance Use Topics    Alcohol use: Yes     Alcohol/week: 21.0 standard drinks of alcohol     Types: 21 Cans of beer per week     Comment: Daily    Drug use: Never       Review of Systems    Physical Exam  Physical Exam    Vital Signs  ED Triage Vitals   Temperature Pulse Respirations Blood Pressure SpO2   06/17/24 1941 06/17/24 1941 06/17/24 1941 06/17/24 1941 06/17/24 1941   98 °F (36.7 °C) 90 18 142/65 95 %      Temp Source Heart Rate Source Patient Position - Orthostatic VS BP Location FiO2 (%)   06/17/24 2130 06/17/24 1941 06/17/24 1941 06/17/24 1941 --   Temporal Monitor Sitting Left arm       Pain Score       06/17/24 1941       No Pain           Vitals:    06/17/24 1941 06/17/24 2130   BP: 142/65 124/61   Pulse: 90 101   Patient Position - Orthostatic VS: Sitting Sitting         Visual Acuity      ED Medications  Medications   ipratropium-albuterol (DUO-NEB) 0.5-2.5 mg/3 mL inhalation solution 3 mL (3 mL Nebulization Given 6/17/24 2029)   dexamethasone (PF) (DECADRON) injection 8 mg (8 mg Intravenous Given 6/17/24 2029)   magnesium sulfate IVPB (premix) SOLN 1 g (0 g Intravenous Stopped 6/17/24 2225)       Diagnostic Studies  Results Reviewed       Procedure Component Value Units Date/Time    Comprehensive metabolic panel [[id number]] Collected: 06/17/24 2027    Lab Status: Final result Specimen: Blood from Arm, Right Updated: 06/17/24 2103     Sodium 141 mmol/L      Potassium 3.7 mmol/L      Chloride  105 mmol/L      CO2 26 mmol/L      ANION GAP 10 mmol/L      BUN 17 mg/dL      Creatinine 0.91 mg/dL      Glucose 103 mg/dL      Calcium 9.5 mg/dL      AST 18 U/L      ALT 24 U/L      Alkaline Phosphatase 92 U/L      Total Protein 6.4 g/dL      Albumin 3.9 g/dL      Total Bilirubin 0.62 mg/dL      eGFR 64 ml/min/1.73sq m     Narrative:      National Kid[employer] Disease Foundation guidelines for Chronic Kidney Disease (CKD):     Stage 1 with normal or high GFR (GFR > 90 mL/min/1.73 square meters)    Stage 2 Mild CKD (GFR = 60-89 mL/min/1.73 square meters)    Stage 3A Moderate CKD (GFR = 45-59 mL/min/1.73 square meters)    Stage 3B Moderate CKD (GFR = 30-44 mL/min/1.73 square meters)    Stage 4 Severe CKD (GFR = 15-29 mL/min/1.73 square meters)    Stage 5 End Stage CKD (GFR <15 mL/min/1.73 square meters)  Note: GFR calculation is accurate only with a steady state creatinine    Magnesium [[id number]]  (Normal) Collected: 06/17/24 2027    Lab Status: Final result Specimen: Blood from Arm, Right Updated: 06/17/24 2103     Magnesium 2.0 mg/dL     Phosphorus [[id number]]  (Normal) Collected: 06/17/24 2027    Lab Status: Final result Specimen: Blood from Arm, Right Updated: 06/17/24 2103     Phosphorus 3.0 mg/dL     HS Troponin 0hr (reflex protocol) [[id number]]  (Normal) Collected: 06/17/24 2027    Lab Status: Final result Specimen: Blood from Arm, Right Updated: 06/17/24 2057     hs TnI 0hr 3 ng/L     B-Type Natriuretic Peptide(BNP) [[id number]]  (Normal) Collected: 06/17/24 2027    Lab Status: Final result Specimen: Blood from Arm, Right Updated: 06/17/24 2057     BNP 11 pg/mL     CBC and differential [[id number]]  (Abnormal) Collected: 06/17/24 2027    Lab Status: Final result Specimen: Blood from Arm, Right Updated: 06/17/24 2034     WBC 11.28 Thousand/uL      RBC 4.83 Million/uL      Hemoglobin 14.3 g/dL      Hematocrit 44.9 %      MCV 93 fL      MCH 29.6 pg      MCHC 31.8 g/dL      RDW 12.7 %      MPV 10.6 fL      Platelets 220  Thousands/uL      nRBC 0 /100 WBCs      Segmented % 60 %      Immature Grans % 1 %      Lymphocytes % 20 %      Monocytes % 12 %      Eosinophils Relative 7 %      Basophils Relative 0 %      Absolute Neutrophils 6.86 Thousands/µL      Absolute Immature Grans 0.06 Thousand/uL      Absolute Lymphocytes 2.23 Thousands/µL      Absolute Monocytes 1.33 Thousand/µL      Eosinophils Absolute 0.76 Thousand/µL      Basophils Absolute 0.04 Thousands/µL                    XR chest 1 view portable   ED Interpretation by Michael Pollack Jr., DO (06/17 2036)   Mild interstitial changes noted.                 Procedures  ECG 12 Lead Documentation Only    Date/Time: 6/17/2024 8:41 PM    Performed by: Michael Pollack Jr., DO  Authorized by: Michael Pollack Jr., DO    ECG reviewed by me, the ED Provider: yes    Patient location:  ED  Comments:      EKG shows a sinus rhythm at 97 beats a minute with a left axis deviation.  There is nonspecific anterior septal ST segment changes.  There is no other definitive acute ST or T wave changes present.           ED Course  ED Course as of 06/18/24 0139   Mon Jun 17, 2024 2012 Patient became upset with the thought of getting a COVID swab.  Patient notes that she has a broken nose and does not want anything put up there, and states that she does not remember a week of her life after getting COVID shots, and she does not want more of them.                               SBIRT 22yo+      Flowsheet Row Most Recent Value   Initial Alcohol Screen: US AUDIT-C     1. How often do you have a drink containing alcohol? 0 Filed at: 06/17/2024 2140   2. How many drinks containing alcohol do you have on a typical day you are drinking?  0 Filed at: 06/17/2024 2140   3a. Male UNDER 65: How often do you have five or more drinks on one occasion? 0 Filed at: 06/17/2024 2140   3b. FEMALE Any Age, or MALE 65+: How often do you have 4 or more drinks on one occassion? 0 Filed at: 06/17/2024 2140   Audit-C  Score 0 Filed at: 06/17/2024 2140   GIULIANA: How many times in the past year have you...    Used an illegal drug or used a prescription medication for non-medical reasons? Never Filed at: 06/17/2024 2140                      Medical Decision Making  6-year-old female presents emergency department complaining of cough and shortness of breath.  Patient has a history of COPD and asthma and states that her inhalers worked but only temporarily and she continues to have a cough despite being treated and off of steroids at the end of last month.  The patient denies fever or chills and states her doctor sent her to the ER for evaluation.  Differential diagnoses based on my evaluation is COPD exacerbation, asthma, pneumonia, COVID-19, pneumonia, or bronchitis.  Less likely cardiac abnormality.  Lab tests are nonacute including chest x-ray which shows some interstitial changes but no definitive evidence of lobar pneumonia at this time.  Patient was refusing to get a COVID swab.  Patient was given magnesium nebulizer treatments as well as steroids.  She notes she has a appointment with pulmonary but not until the end of July.  Patient however not hypoxic not showing signs of sepsis, and vital signs otherwise stable.  Patient will be placed on another bolus of steroids for 5 days and urged to call the pulmonary doctor for an earlier appointment.  Patient discharged.    Amount and/or Complexity of Data Reviewed  Labs: ordered.  Radiology: ordered and independent interpretation performed.    Risk  Prescription drug management.             Disposition  Final diagnoses:   COPD exacerbation (HCC)     Time reflects when diagnosis was documented in both MDM as applicable and the Disposition within this note       Time User Action Codes Description Comment    6/17/2024  9:53 PM Michael Pollack [J44.1] COPD exacerbation (HCC)           ED Disposition       ED Disposition   Discharge    Condition   Stable    Date/Time   Mon Jun 17,  2024 2156    Comment   Lakeisha Trejo discharge to home/self care.                   Follow-up Information       Follow up With Specialties Details Why Contact Info    Kelli Dhaliwal DO Family Medicine On 6/21/2024  [address]  [address]  [place] [id number] [phone]              Discharge Medication List as of 6/17/2024  9:56 PM        START taking these medications    Details   predniSONE 20 mg tablet Take 2 tablets (40 mg total) by mouth daily for 4 days Do not start before June 18, 2024., Starting Tue 6/18/2024, Until Sat 6/22/2024, Normal           CONTINUE these medications which have NOT CHANGED    Details   albuterol (2.5 mg/3 mL) 0.083 % nebulizer solution Take 3 mL (2.5 mg total) by nebulization 4 (four) times a day, Starting Sat 3/26/2022, Normal      albuterol (ProAir HFA) 90 mcg/act inhaler Inhale 2 puffs every 6 (six) hours as needed for wheezing, Starting Tue 11/28/2023, Normal      ALPRAZolam (XANAX) 0.25 mg tablet Take 1 tablet (0.25 mg total) by mouth daily at bedtime as needed for anxiety, Starting Sat 7/9/2022, Normal      amLODIPine (NORVASC) 5 mg tablet Take 1 tablet (5 mg total) by mouth daily, Starting Tue 11/28/2023, Normal      aspirin 81 mg chewable tablet Chew 81 mg daily, Historical Med      benzonatate (TESSALON) 200 MG capsule Take 1 capsule (200 mg total) by mouth 3 (three) times a day as needed for cough, Starting Thu 5/23/2024, Normal      EPINEPHrine (EPIPEN) 0.3 mg/0.3 mL SOAJ Inject 0.3 mL (0.3 mg total) into a muscle once for 1 dose, Starting Mon 8/28/2023, Normal      Fluticasone-Salmeterol (Advair Diskus) 500-50 mcg/dose inhaler Inhale 1 puff 2 (two) times a day Rinse mouth after use., Starting Wed 2/7/2024, Normal      levocetirizine (XYZAL) 5 MG tablet Take 0.5 tablets (2.5 mg total) by mouth every evening, Starting Wed 11/9/2022, Normal      metFORMIN (GLUCOPHAGE) 500 mg tablet Take 1 tablet (500 mg total) by mouth 2 (two) times a day with meals, Starting Wed  2/7/2024, Normal      montelukast (SINGULAIR) 10 mg tablet Take 1 tablet (10 mg total) by mouth daily at bedtime, Starting Wed 11/9/2022, Normal      nystatin (MYCOSTATIN) cream Apply topically 2 (two) times a day . Apply for additional 2 weeks after rash resolves., Starting Sun 6/14/2020, Normal      VITAMIN D PO Take by mouth, Historical Med             No discharge procedures on file.    PDMP Review         Value Time User    PDMP Reviewed  Yes 8/15/2023  8:54 AM Kelli Dhaliwal DO            ED Provider  Electronically Signed by             Michael Pollack Jr.,   06/18/24 0139

## 2024-06-17 NOTE — TELEPHONE ENCOUNTER
Regarding: Difficulty Breathing and continued cough  ----- Message from Yane HUBBARD sent at 6/17/2024  2:43 PM EDT -----  Patient was seen on 5/30/24 for a cough and wheezing.  Patient was advised to call back if cough did not get better.    Patient's cough continues and she is complaining of difficultly breathing.  Attempted to warm transfer to nurse with no success.

## 2024-06-17 NOTE — TELEPHONE ENCOUNTER
"Called pt to gather further information on s/s.  Pt sounds very tight and audibly wheezing on the phone.  Pt unable to speak more than a couple words without severe coughing.  Cough productive at times with green sputum.  Pt denies fevers but states her Pulse Ox is 90%.  Did not proceed to further triage.  Advised pt d/t the above to go to ED for further evaluation and treatment.  Pt agreeable with plan.      Reason for Disposition   SEVERE difficulty breathing (e.g., struggling for each breath, speaks in single words, pulse > 120)    Answer Assessment - Initial Assessment Questions  1. RESPIRATORY STATUS: \"Describe your breathing?\" (e.g., wheezing, shortness of breath, unable to speak, severe coughing)       Wheezing, SOB, difficult to speak, coughing    2. ONSET: \"When did this breathing problem begin?\"       *No Answer*    3. PATTERN \"Does the difficult breathing come and go, or has it been constant since it started?\"       *No Answer*    4. SEVERITY: \"How bad is your breathing?\" (e.g., mild, moderate, severe)     - MILD: No SOB at rest, mild SOB with walking, speaks normally in sentences, can lay down, no retractions, pulse < 100.     - MODERATE: SOB at rest, SOB with minimal exertion and prefers to sit, cannot lie down flat, speaks in phrases, mild retractions, audible wheezing, pulse 100-120.     - SEVERE: Very SOB at rest, speaks in single words, struggling to breathe, sitting hunched forward, retractions, pulse > 120       Severe    5. RECURRENT SYMPTOM: \"Have you had difficulty breathing before?\" If Yes, ask: \"When was the last time?\" and \"What happened that time?\"       *No Answer*    6. CARDIAC HISTORY: \"Do you have any history of heart disease?\" (e.g., heart attack, angina, bypass surgery, angioplasty)       HTN    7. LUNG HISTORY: \"Do you have any history of lung disease?\"  (e.g., pulmonary embolus, asthma, emphysema)      Moderate asthma, small airway disease, COPD     8. CAUSE: \"What do you think is " "causing the breathing problem?\"       *No Answer*    9. OTHER SYMPTOMS: \"Do you have any other symptoms? (e.g., dizziness, runny nose, cough, chest pain, fever)      Cough      11. TRAVEL: \"Have you traveled out of the country in the last month?\" (e.g., travel history, exposures)        *No Answer*    Protocols used: Breathing Difficulty-ADULT-[place]    "

## 2024-06-18 LAB
ATRIAL RATE: 97 BPM
P AXIS: 59 DEGREES
PR INTERVAL: 170 MS
QRS AXIS: -2 DEGREES
QRSD INTERVAL: 76 MS
QT INTERVAL: 334 MS
QTC INTERVAL: 424 MS
T WAVE AXIS: 26 DEGREES
VENTRICULAR RATE: 97 BPM

## 2024-06-18 PROCEDURE — 93010 ELECTROCARDIOGRAM REPORT: CPT | Performed by: INTERNAL MEDICINE

## 2024-06-18 NOTE — DISCHARGE INSTRUCTIONS
Return to the ER for any new, concerning, worsening issues.    Take prednisone 40 mg a day for the next 4 days starting tomorrow 6/18/2024.  Take with food.    Use your albuterol inhaler as you are prescribed.    I would contact your pulmonologist to try to get an appointment earlier than the end of July being that you keep having issues.

## 2024-06-19 DIAGNOSIS — J98.01 BRONCHOSPASM: ICD-10-CM

## 2024-06-19 RX ORDER — ALBUTEROL SULFATE 2.5 MG/3ML
2.5 SOLUTION RESPIRATORY (INHALATION) 4 TIMES DAILY
Qty: 180 ML | Refills: 1 | Status: SHIPPED | OUTPATIENT
Start: 2024-06-19

## 2024-06-19 NOTE — TELEPHONE ENCOUNTER
Reason for call:   [x] Refill   [] Prior Auth  [] Other:     Office:   [x] PCP/Provider -   [] Specialty/Provider -     Medication: albuterol (2.5 mg/3 mL) 0.083 % nebulizer solution    Dose/Frequency: Take 3 mL (2.5 mg total) by nebulization 4 (four) times a day     Quantity: 180    Pharmacy: [id number]  [address] [facility] [address]        Does the patient have enough for 3 days?   [] Yes   [x] No - Send as HP to POD

## 2024-06-24 ENCOUNTER — OFFICE VISIT (OUTPATIENT)
Dept: FAMILY MEDICINE CLINIC | Facility: CLINIC | Age: 69
End: 2024-06-24
Payer: COMMERCIAL

## 2024-06-24 ENCOUNTER — E-CONSULT (OUTPATIENT)
Dept: PULMONOLOGY | Facility: CLINIC | Age: 69
End: 2024-06-24

## 2024-06-24 VITALS
DIASTOLIC BLOOD PRESSURE: 70 MMHG | RESPIRATION RATE: 16 BRPM | HEART RATE: 84 BPM | OXYGEN SATURATION: 97 % | HEIGHT: 64 IN | WEIGHT: 138 LBS | SYSTOLIC BLOOD PRESSURE: 124 MMHG | TEMPERATURE: 99.2 F | BODY MASS INDEX: 23.56 KG/M2

## 2024-06-24 DIAGNOSIS — J45.41 MODERATE PERSISTENT ASTHMA WITH EXACERBATION: ICD-10-CM

## 2024-06-24 DIAGNOSIS — I51.7 CARDIOMEGALY: ICD-10-CM

## 2024-06-24 DIAGNOSIS — R93.89 ABNORMAL CT OF THE CHEST: Primary | ICD-10-CM

## 2024-06-24 DIAGNOSIS — J44.9 CHRONIC OBSTRUCTIVE PULMONARY DISEASE, UNSPECIFIED COPD TYPE (HCC): ICD-10-CM

## 2024-06-24 DIAGNOSIS — J45.40 MODERATE PERSISTENT ASTHMA WITHOUT COMPLICATION: ICD-10-CM

## 2024-06-24 DIAGNOSIS — A31.0 ATYPICAL MYCOBACTERIAL INFECTION OF LUNG (HCC): Primary | ICD-10-CM

## 2024-06-24 DIAGNOSIS — R93.89 ABNORMAL CHEST CT: ICD-10-CM

## 2024-06-24 PROCEDURE — 99214 OFFICE O/P EST MOD 30 MIN: CPT | Performed by: FAMILY MEDICINE

## 2024-06-24 PROCEDURE — G2211 COMPLEX E/M VISIT ADD ON: HCPCS | Performed by: FAMILY MEDICINE

## 2024-06-24 RX ORDER — FLUTICASONE PROPIONATE AND SALMETEROL 500; 50 UG/1; UG/1
1 POWDER RESPIRATORY (INHALATION) 2 TIMES DAILY
Qty: 60 BLISTER | Refills: 5 | Status: SHIPPED | OUTPATIENT
Start: 2024-06-24

## 2024-06-24 NOTE — PROGRESS NOTES
E-Consult  Lakeisha Trejo 69 y.o. female MRN: [id number]  Encounter Date: 06/24/24      Reason for Consult / Principal Problem: Abnormal CT chest    Consulting Provider: Aba Huber MD    Requesting Provider: Kelli Dhaliwal DO       ASSESSMENT:  COPD  Abnormal CT of the chest    RECOMMENDATIONS:  Patient will need an in person evaluation consultation with pulmonary medicine for possible further procedures such as bronchoscopy with bronchoalveolar lavage proceeding if deemed to be necessary and indicated    Total time spent 5-10 minutes, >50% of the total time devoted to medical consultative verbal/EMR discussion between providers. Written report will be generated in the EMR. .

## 2024-06-24 NOTE — PROGRESS NOTES
"Assessment/Plan:       Problem List Items Addressed This Visit          Respiratory    Moderate asthma    Relevant Medications    Fluticasone-Salmeterol (Advair Diskus) 500-50 mcg/dose inhaler    Chronic obstructive pulmonary disease (HCC)    Relevant Medications    Fluticasone-Salmeterol (Advair Diskus) 500-50 mcg/dose inhaler    Other Relevant Orders    AMB E-CONSULT TO PULMONOLOGY     Other Visit Diagnoses       Atypical mycobacterial infection of lung (HCC)    -  Primary    Relevant Medications    Fluticasone-Salmeterol (Advair Diskus) 500-50 mcg/dose inhaler    Other Relevant Orders    AMB E-CONSULT TO PULMONOLOGY    Moderate persistent asthma with exacerbation        Relevant Medications    Fluticasone-Salmeterol (Advair Diskus) 500-50 mcg/dose inhaler    Other Relevant Orders    AMB E-CONSULT TO PULMONOLOGY    Abnormal chest CT        Cardiomegaly        Relevant Orders    Echo complete w/ contrast if indicated              Follow-up with pulmonology, recommended e-consult to see if there are any recommendations in the meantime.   ECHO given cardiomegaly on CT.     Subjective:      Patient ID: Lakeisha Trejo is a 69 y.o. female.    HPI    She has a continued dry cough, breathing better, no fevers or chills. Feels back to baseline. Has pulmonology appointment 7/10.     Seen in ED 6/17 for COPD exacerbation, chest XR showed \"No focal airspace consolidation.  Redemonstrated diffuse tree-in-bud nodularity consistent with endobronchial impaction/infection. \" Blood work unremarkable except for mild leukocytosis. She was given Duoneb, dexamethasone, magnesium and discharged with prednisone.      CT chest shows-     \"Extensive tree-in-bud nodularity with scattered dominant nodules and minimal bronchiolectasis, present since at least May 2021, compatible with severe bronchiolitis. This could be due to nontuberculous mycobacterial infection. Consider pulmonary referral   for further evaluation if clinically " "warranted.\" \"Mild cardiomegaly. \"    The following portions of the patient's history were reviewed and updated as appropriate: allergies, current medications, past family history, past medical history, past social history, past surgical history, and problem list.    Review of Systems   All other systems reviewed and are negative.        Objective:      /70   Pulse 84   Temp 99.2 °F (37.3 °C) (Temporal)   Resp 16   Ht 5' 4\" (1.626 m)   Wt 62.6 kg (138 lb)   SpO2 97%   BMI 23.69 kg/m²          Physical Exam  Vitals reviewed.   Constitutional:       General: She is not in acute distress.     Appearance: Normal appearance. She is not ill-appearing, toxic-appearing or diaphoretic.   HENT:      Head: Normocephalic and atraumatic.   Eyes:      General:         Right eye: No discharge.         Left eye: No discharge.      Extraocular Movements: Extraocular movements intact.      Conjunctiva/sclera: Conjunctivae normal.   Cardiovascular:      Rate and Rhythm: Normal rate and regular rhythm.      Heart sounds: Normal heart sounds. No murmur heard.     No friction rub. No gallop.   Pulmonary:      Effort: Pulmonary effort is normal. No respiratory distress.      Breath sounds: Normal breath sounds. No stridor. No wheezing or rhonchi.   Musculoskeletal:         General: No swelling, tenderness or signs of injury.      Right lower leg: No edema.      Left lower leg: No edema.   Skin:     General: Skin is warm.      Coloration: Skin is not pale.      Findings: No erythema or rash.   Neurological:      Mental Status: She is alert and oriented to person, place, and time.      Motor: No weakness.   Psychiatric:         Mood and Affect: Mood normal.         Behavior: Behavior normal.             Kelli Dhaliwal DO  [facility]"

## 2024-06-25 ENCOUNTER — TELEPHONE (OUTPATIENT)
Dept: FAMILY MEDICINE CLINIC | Facility: CLINIC | Age: 69
End: 2024-06-25

## 2024-06-25 NOTE — TELEPHONE ENCOUNTER
Please let Lakeisha know I heard back from pulmonology e-consult, her case needs to be discussed at her in person visit as scheduled in July, no changes/recommendations right now. Please ask her to reach out to with any concerns/questions in the meantime.

## 2024-07-10 ENCOUNTER — CONSULT (OUTPATIENT)
Dept: PULMONOLOGY | Facility: CLINIC | Age: 69
End: 2024-07-10
Payer: COMMERCIAL

## 2024-07-10 VITALS
SYSTOLIC BLOOD PRESSURE: 118 MMHG | BODY MASS INDEX: 23.56 KG/M2 | OXYGEN SATURATION: 90 % | DIASTOLIC BLOOD PRESSURE: 64 MMHG | HEART RATE: 82 BPM | WEIGHT: 138 LBS | TEMPERATURE: 99.2 F | HEIGHT: 64 IN

## 2024-07-10 DIAGNOSIS — R06.09 DOE (DYSPNEA ON EXERTION): ICD-10-CM

## 2024-07-10 DIAGNOSIS — R93.89 ABNORMAL CT OF THE CHEST: ICD-10-CM

## 2024-07-10 DIAGNOSIS — J45.40 MODERATE PERSISTENT ASTHMA WITHOUT COMPLICATION: Primary | ICD-10-CM

## 2024-07-10 DIAGNOSIS — J44.9 CHRONIC OBSTRUCTIVE PULMONARY DISEASE, UNSPECIFIED COPD TYPE (HCC): ICD-10-CM

## 2024-07-10 PROCEDURE — 99204 OFFICE O/P NEW MOD 45 MIN: CPT | Performed by: INTERNAL MEDICINE

## 2024-07-10 PROCEDURE — 94618 PULMONARY STRESS TESTING: CPT

## 2024-07-10 NOTE — PROGRESS NOTES
Pulmonary Consultation   Lakeisha Trejo 69 y.o. female MRN: [id number]    Encounter: [id number]      Reason for consultation:   Dyspnea on exertion and abnormal CT scan of the chest    Requesting physician:  KAYCEE Asif      Impressions:   Dyspnea on exertion.  Bronchial asthma.  Abnormal CT scan of the chest.      Recommendations:  6-minute walk test.  Advair 250/50, 1 inhalation twice a day.  Albuterol rescue inhaler 2 inhalations 4 times a day as needed.  Follow-up in 6 months.      Discussion:  The patient's dyspnea on exertion is most likely due to exposure from the trees cutting that was happening around their house at that time.  She did not desaturate on the 6-minute walk test.  The abnormal CT scan of the chest showed tree-in-bud infiltrates which are chronic and has not changed.  The patient is fairly asymptomatic.  No indication for bronchoscopy at this point in time unless she develops symptoms of fever, chills, night sweats or worsening chronic cough in addition to weight loss.  Her bronchial asthma is well-controlled.  I have maintained her on the Advair 250/50, 1 inhalation twice a day.  She will use albuterol rescue inhaler 2 inhalations 4 times a day as needed.  I have advised her to take regular walks to improve her stamina.  I will see her in 6 months.              History of Present Illness   HPI:  Lakeisha Trejo is a 69 y.o. female who is here for evaluation of shortness of breath.  The patient stated that about 3 weeks ago she started having shortness of breath.  She had to go to the emergency room for evaluation.  She had blood work and CT chest done which was abnormal.  For this reason she is referred to me for further management.  During that time did work cutting trees around her house.  She had chronic cough which has not changed.  The cough sometimes producing clear or whitish sputum.  No wheezing or chest tightness.  She had no chest pain.  No fever or chills.  Denies lower  extremity edema.  Patient was diagnosed with bronchial asthma in the past.  She is on Advair twice a day.  Sometimes she needs to use her rescue inhaler or albuterol nebulizer treatments.  She has no nocturnal symptoms.    Review of systems:  12 point review of systems was completed and was otherwise negative except as listed in HPI.      Historical Information   Past Medical History:   Diagnosis Date    Acute bronchitis     Acute pharyngitis     Anxiety state     Arthropathy of ankle and foot     and/or    Asthma     Asthma without status asthmaticus     Carotid artery occlusion     COPD (chronic obstructive pulmonary disease) (HCC)     Cough     COVID-19 vaccine series completed     Disorder of shoulder     Dyspnea     Hand joint pain     Heartburn     Herpes simplex without complication     Hyperlipidemia     Infection of skin and subcutaneous tissue     Localized superficial swelling of skin     Low back pain     Lymphadenopathy     Malaise and fatigue     Neck pain     Osteoarthritis     Pleurisy without effusion or active tuberculosis     Pneumonia     Right upper quadrant pain     Shoulder joint pain     Skin eruption     Vitamin D deficiency     Vomiting      Past Surgical History:   Procedure Laterality Date    BREAST BIOPSY Left 2017 benign    CHOLECYSTECTOMY      CHOLECYSTECTOMY  03/2014    Dr. Henry     COLONOSCOPY  02/2013    WNL    FL LUMBAR PUNCTURE DIAGNOSTIC  05/25/2021    HYSTERECTOMY      Total     NASAL POLYP EXCISION Left 2009    with lysis of intranasal adhesions from packing     Family History   Problem Relation Age of Onset    Diabetes Mother         Non-insulin dependent diabetes    Emphysema Father     No Known Problems Sister     No Known Problems Daughter     No Known Problems Maternal Grandmother     No Known Problems Paternal Grandmother     No Known Problems Sister     No Known Problems Sister     No Known Problems Daughter     No Known Problems Daughter     No Known Problems Maternal  "Aunt        Family History:  No family history of chronic lung disease.     Social History:  The patient lives with her daughter.  She is a lifelong non-smoker.  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  They have a dog and a cat.  No birds.      Meds/Allergies   Current Facility-Administered Medications   Medication Dose Route Frequency    predniSONE tablet 40 mg  40 mg Oral Daily     Not in a hospital admission.  Allergies   Allergen Reactions    Azithromycin     Darvon [Propoxyphene]     Crestor [Rosuvastatin] Hives and GI Intolerance    Wasp Venom Hives       Vitals: Blood pressure 118/64, pulse 82, temperature 99.2 °F (37.3 °C), temperature source Tympanic, height 5' 4\" (1.626 m), weight 62.6 kg (138 lb), SpO2 90%.,      Physical exam:        Head/eyes:    Normocephalic, without obvious abnormality, atraumatic,         PERRL, extraocular muscles intact, no scleral icterus    Nose:   Nares normal, septum midline, mucosa normal, no drainage    or sinus tenderness   Throat:   Moist mucous membranes, no thrush   Neck:   Supple, trachea midline, no adenopathy; no carotid    bruit or JVD   Lungs:   Decreased breath sounds.  No wheezing or rhonchi.        Heart:    Regular rate and rhythm, S1 and S2 normal, no murmur, rub   or gallop   Abdomen:     Soft, non-tender, bowel sounds active all four quadrants,     no masses, no organomegaly   Extremities:   Extremities normal, atraumatic, no cyanosis or edema   Skin:   Warm, dry, turgor normal, no rashes or lesions   Neurologic:   CNII-XII intact, normal strength, non-focal             Imaging and other studies: I have personally reviewed pertinent films in PACS CT of the chest is reviewed on the PACS system and compared to the study from 2022.  She has bilateral tree-in-bud infiltrates.    6 minute walk test was done today in the office.  The patient started with a heart rate of 72 bpm and O2 saturation 95%.  At the end of the test the heart rate was " 102 bpm and the O2 saturation 95%.  She walked 350 m.    Lab Results   Component Value Date    WBC 11.28 (H) 06/17/2024    HGB 14.3 06/17/2024    HCT 44.9 06/17/2024    MCV 93 06/17/2024     06/17/2024     Lab Results   Component Value Date    SODIUM 141 06/17/2024    K 3.7 06/17/2024     06/17/2024    CO2 26 06/17/2024    BUN 17 06/17/2024    CREATININE 0.91 06/17/2024    GLUC 103 06/17/2024    CALCIUM 9.5 06/17/2024             Magy Velasquez MD

## 2024-07-26 ENCOUNTER — HOSPITAL ENCOUNTER (OUTPATIENT)
Dept: NON INVASIVE DIAGNOSTICS | Facility: HOSPITAL | Age: 69
Discharge: HOME | End: 2024-07-26
Payer: COMMERCIAL

## 2024-07-26 VITALS
SYSTOLIC BLOOD PRESSURE: 118 MMHG | BODY MASS INDEX: 23.56 KG/M2 | HEIGHT: 64 IN | HEART RATE: 68 BPM | DIASTOLIC BLOOD PRESSURE: 64 MMHG | WEIGHT: 138 LBS

## 2024-07-26 DIAGNOSIS — I51.7 CARDIOMEGALY: ICD-10-CM

## 2024-07-26 LAB
AORTIC ROOT: 2.9 CM
AORTIC VALVE MEAN VELOCITY: 7.7 M/S
APICAL FOUR CHAMBER EJECTION FRACTION: 59 %
ASCENDING AORTA: 3 CM
AV AREA BY CONTINUOUS VTI: 2.3 CM2
AV AREA PEAK VELOCITY: 2 CM2
AV LVOT MEAN GRADIENT: 1 MMHG
AV LVOT PEAK GRADIENT: 2 MMHG
AV MEAN GRADIENT: 3 MMHG
AV PEAK GRADIENT: 6 MMHG
AV VALVE AREA: 2.29 CM2
AV VELOCITY RATIO: 0.63
BSA FOR ECHO PROCEDURE: 1.67 M2
DOP CALC AO PEAK VEL: 1.2 M/S
DOP CALC AO VTI: 22.97 CM
DOP CALC LVOT AREA: 3.14 CM2
DOP CALC LVOT CARDIAC INDEX: 2.26 L/MIN/M2
DOP CALC LVOT CARDIAC OUTPUT: 3.78 L/MIN
DOP CALC LVOT DIAMETER: 2 CM
DOP CALC LVOT PEAK VEL VTI: 16.74 CM
DOP CALC LVOT PEAK VEL: 0.75 M/S
DOP CALC LVOT STROKE INDEX: 31.1 ML/M2
DOP CALC LVOT STROKE VOLUME: 52.56
E WAVE DECELERATION TIME: 142 MS
E/A RATIO: 0.76
FRACTIONAL SHORTENING: 32 (ref 28–44)
INTERVENTRICULAR SEPTUM IN DIASTOLE (PARASTERNAL SHORT AXIS VIEW): 1.2 CM
INTERVENTRICULAR SEPTUM: 1.2 CM (ref 0.6–1.1)
LAAS-AP2: 12.9 CM2
LAAS-AP4: 12.2 CM2
LEFT ATRIUM SIZE: 2.9 CM
LEFT ATRIUM VOLUME (MOD BIPLANE): 29 ML
LEFT ATRIUM VOLUME INDEX (MOD BIPLANE): 17.4 ML/M2
LEFT INTERNAL DIMENSION IN SYSTOLE: 2.8 CM (ref 2.1–4)
LEFT VENTRICULAR INTERNAL DIMENSION IN DIASTOLE: 4.1 CM (ref 3.5–6)
LEFT VENTRICULAR POSTERIOR WALL IN END DIASTOLE: 1.1 CM
LEFT VENTRICULAR STROKE VOLUME: 43 ML
LVSV (TEICH): 43 ML
MV E'TISSUE VEL-SEP: 7 CM/S
MV PEAK A VEL: 0.95 M/S
MV PEAK E VEL: 72 CM/S
MV STENOSIS PRESSURE HALF TIME: 41 MS
MV VALVE AREA P 1/2 METHOD: 5.37
RIGHT ATRIUM AREA SYSTOLE A4C: 8.9 CM2
RIGHT VENTRICLE ID DIMENSION: 2.7 CM
SL CV LEFT ATRIUM LENGTH A2C: 4.3 CM
SL CV LV EF: 55
SL CV PED ECHO LEFT VENTRICLE DIASTOLIC VOLUME (MOD BIPLANE) 2D: 74 ML
SL CV PED ECHO LEFT VENTRICLE SYSTOLIC VOLUME (MOD BIPLANE) 2D: 31 ML
TRICUSPID ANNULAR PLANE SYSTOLIC EXCURSION: 1.8 CM

## 2024-07-26 PROCEDURE — 93306 TTE W/DOPPLER COMPLETE: CPT

## 2024-07-26 PROCEDURE — 93306 TTE W/DOPPLER COMPLETE: CPT | Performed by: INTERNAL MEDICINE

## 2024-09-05 ENCOUNTER — OFFICE VISIT (OUTPATIENT)
Dept: FAMILY MEDICINE CLINIC | Facility: CLINIC | Age: 69
End: 2024-09-05
Payer: COMMERCIAL

## 2024-09-05 VITALS
BODY MASS INDEX: 22.24 KG/M2 | HEART RATE: 74 BPM | RESPIRATION RATE: 18 BRPM | OXYGEN SATURATION: 98 % | SYSTOLIC BLOOD PRESSURE: 118 MMHG | TEMPERATURE: 99.2 F | WEIGHT: 130.3 LBS | DIASTOLIC BLOOD PRESSURE: 68 MMHG | HEIGHT: 64 IN

## 2024-09-05 DIAGNOSIS — R93.89 ABNORMAL CT OF THE CHEST: ICD-10-CM

## 2024-09-05 DIAGNOSIS — Z12.31 SCREENING MAMMOGRAM FOR BREAST CANCER: ICD-10-CM

## 2024-09-05 DIAGNOSIS — R06.09 DOE (DYSPNEA ON EXERTION): ICD-10-CM

## 2024-09-05 DIAGNOSIS — I10 ESSENTIAL HYPERTENSION: ICD-10-CM

## 2024-09-05 DIAGNOSIS — Z00.00 MEDICARE ANNUAL WELLNESS VISIT, SUBSEQUENT: Primary | ICD-10-CM

## 2024-09-05 PROCEDURE — G0439 PPPS, SUBSEQ VISIT: HCPCS | Performed by: FAMILY MEDICINE

## 2024-09-05 PROCEDURE — 93000 ELECTROCARDIOGRAM COMPLETE: CPT | Performed by: FAMILY MEDICINE

## 2024-09-05 PROCEDURE — 99213 OFFICE O/P EST LOW 20 MIN: CPT | Performed by: FAMILY MEDICINE

## 2024-09-05 NOTE — PROGRESS NOTES
Ambulatory Visit  Name: Lakeisha Trejo      : 1955      MRN: [id number]  Encounter Provider: Kelli Dhaliwal DO  Encounter Date: 2024   Encounter department: [facility] PRIMARY CARE    Assessment & Plan   1. Medicare annual wellness visit, subsequent  2. TOSCANO (dyspnea on exertion)  -     POCT ECG  3. Abnormal CT of the chest  4. Essential hypertension  5. Screening mammogram for breast cancer  -     Mammo screening bilateral w 3d and cad; Future      Depression Screening and Follow-up Plan: Patient was screened for depression during today's encounter. They screened negative with a PHQ-2 score of 0.      Preventive health issues were discussed with patient, and age appropriate screening tests were ordered as noted in patient's After Visit Summary. Personalized health advice and appropriate referrals for health education or preventive services given if needed, as noted in patient's After Visit Summary.    EKG today Normal sinus rhythm, T wave abnormality with no significant change when compared to prior. She declines stress testing.       History of Present Illness     HPI     Following with pulmonology for TOSCANO and abnormal CT chest, plan for Advair BID. ECHO reviewed, borderline concentric hypertrophy left ventricle but systolic function normal, right ventricular cavity upper normal. She is feeling well, occasional cough with white mucous, better today. No complaints or concerns.       Patient Care Team:  Kelli Dhaliwal DO as PCP - General (Family Medicine)  Gemma Baron PA-C as Physician Assistant (Gastroenterology)  Renetta Sorensen MD (Gastroenterology)    Review of Systems   All other systems reviewed and are negative.    Medical History Reviewed by provider this encounter:       Annual Wellness Visit Questionnaire   Lakeisha SIERRA is here for her Subsequent Wellness visit. Last Medicare Wellness visit information reviewed, patient interviewed and updates made to the record today.       Health Risk Assessment:   Patient rates overall health as very good. Patient feels that their physical health rating is same. Patient is very satisfied with their life. Eyesight was rated as same. Hearing was rated as same. Patient feels that their emotional and mental health rating is slightly better. Patients states they are never, rarely angry. Patient states they are never, rarely unusually tired/fatigued. Pain experienced in the last 7 days has been none. Patient states that she has experienced no weight loss or gain in last 6 months.     Depression Screening:   PHQ-2 Score: 0      Fall Risk Screening:   In the past year, patient has experienced: no history of falling in past year      Urinary Incontinence Screening:   Patient has not leaked urine accidently in the last six months.     Home Safety:  Patient does not have trouble with stairs inside or outside of their home. Patient has working smoke alarms and has no working carbon monoxide detector. Home safety hazards include: none.     Nutrition:   Current diet is Regular.     Medications:   Patient is currently taking over-the-counter supplements. OTC medications include: see medication list. Patient is able to manage medications.     Activities of Daily Living (ADLs)/Instrumental Activities of Daily Living (IADLs):   Walk and transfer into and out of bed and chair?: Yes  Dress and groom yourself?: Yes    Bathe or shower yourself?: Yes    Feed yourself? Yes  Do your laundry/housekeeping?: Yes  Manage your money, pay your bills and track your expenses?: Yes  Make your own meals?: Yes    Do your own shopping?: Yes    Previous Hospitalizations:   Any hospitalizations or ED visits within the last 12 months?: Yes    How many hospitalizations have you had in the last year?: 1-2    Advance Care Planning:   Living will: No    Durable POA for healthcare: Yes    Advanced directive: No      Cognitive Screening:   Provider or family/friend/caregiver concerned  regarding cognition?: No    PREVENTIVE SCREENINGS      Cardiovascular Screening:    General: Screening Not Indicated and History Lipid Disorder      Diabetes Screening:     General: Screening Current      Colorectal Cancer Screening:     General: Screening Current      Breast Cancer Screening:     General: Screening Current      Cervical Cancer Screening:    General: Patient Declines      Osteoporosis Screening:    General: Screening Current      Abdominal Aortic Aneurysm (AAA) Screening:        General: Screening Not Indicated      Lung Cancer Screening:     General: Screening Not Indicated      Hepatitis C Screening:    General: Screening Current    Screening, Brief Intervention, and Referral to Treatment (SBIRT)    Screening  Typical number of drinks in a day: 0  Typical number of drinks in a week: 1  Interpretation: Low risk drinking behavior.    Single Item Drug Screening:  How often have you used an illegal drug (including marijuana) or a prescription medication for non-medical reasons in the past year? never    Single Item Drug Screen Score: 0  Interpretation: Negative screen for possible drug use disorder    Brief Intervention  Alcohol & drug use screenings were reviewed. No concerns regarding substance use disorder identified.     Social Determinants of Health     Financial Resource Strain: Low Risk  (8/28/2023)    Overall Financial Resource Strain (CARDIA)     Difficulty of Paying Living Expenses: Not hard at all   Food Insecurity: No Food Insecurity (9/5/2024)    Hunger Vital Sign     Worried About Running Out of Food in the Last Year: Never true     Ran Out of Food in the Last Year: Never true   Transportation Needs: No Transportation Needs (9/5/2024)    PRAPARE - Transportation     Lack of Transportation (Medical): No     Lack of Transportation (Non-Medical): No   Housing Stability: Low Risk  (9/5/2024)    Housing Stability Vital Sign     Unable to Pay for Housing in the Last Year: No     Number of  "Times Moved in the Last Year: 1     Homeless in the Last Year: No   Utilities: Not At Risk (9/5/2024)    [facility] Utilities     Threatened with loss of utilities: No     No results found.    Objective     /68   Pulse 74   Temp 99.2 °F (37.3 °C) (Temporal)   Resp 18   Ht 5' 4\" (1.626 m)   Wt 59.1 kg (130 lb 4.8 oz)   SpO2 98%   BMI 22.37 kg/m²     Physical Exam  Vitals and nursing note reviewed.   Constitutional:       General: She is not in acute distress.     Appearance: Normal appearance. She is well-developed. She is not ill-appearing, toxic-appearing or diaphoretic.   HENT:      Head: Normocephalic and atraumatic.   Eyes:      General:         Right eye: No discharge.         Left eye: No discharge.      Extraocular Movements: Extraocular movements intact.      Conjunctiva/sclera: Conjunctivae normal.   Cardiovascular:      Rate and Rhythm: Normal rate and regular rhythm.      Heart sounds: Normal heart sounds. No murmur heard.     No friction rub. No gallop.   Pulmonary:      Effort: Pulmonary effort is normal. No respiratory distress.      Breath sounds: No stridor. No wheezing or rhonchi.      Comments: Scattered crackles   Musculoskeletal:         General: No swelling.      Cervical back: Neck supple.      Right lower leg: No edema.      Left lower leg: No edema.   Skin:     General: Skin is warm and dry.      Capillary Refill: Capillary refill takes less than 2 seconds.   Neurological:      Mental Status: She is alert and oriented to person, place, and time.      Motor: No weakness.   Psychiatric:         Mood and Affect: Mood normal.         Behavior: Behavior normal.           Kelli Dhaliwal, DO  [facility], [place]     "

## 2024-09-05 NOTE — PROGRESS NOTES
Following with pulmonology for TOSCANO and abnormal CT chest, plan for Advair BID. ECHO reviewed, borderline concentric hypertrophy left ventricle but systolic function normal, right ventricular cavity upper normal.

## 2024-09-07 PROBLEM — I65.23 ATHEROSCLEROSIS OF BOTH CAROTID ARTERIES: Status: ACTIVE | Noted: 2024-09-07

## 2024-09-25 NOTE — ASSESSMENT & PLAN NOTE
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  · Found on work up for AMS / encephalopathy at the end of May 2021    Imaging:   CTA stroke alert 5/23/2021:  No large vessel occlusion/flow limiting stenosis  2 mm aneurysm projecting off the posterior aspect of the left internal carotid arterial terminus  Plan:   Exam:  GCS 15, nonfocal   Imaging reviewed with patient and family   Extensively discussed natural history of aneurysms  Discussed that based on ISUIA she has a < 1%/yr risk of aneurysm rupture  Discussed modifiable risk factors including blood pressure control, cholesterol control and smoking cessation  Discussed signs and symptoms of aneurysm rupture including severe, sudden onset headache, neck pain, nausea and vomiting, weakness, confusion and seizure  Reiterated that these symptoms should prompt the patient to visit an emergency department immediately  o BP:  On Norvasc as an outpatient, blood pressure 120/68 in the office  o Cholesterol:   On Crestor as an outpatient, no updated lipid panel noted  o Smoking:  Lifetime nonsmoker  o Family history:  No family history of aneurysms or sudden death   Patient is to return to the office in 1 year with repeat [facility] head [facility] neck w [facility] or sooner should patient develop worsening symptoms or red flag signs Please call referral team at [phone] to schedule referrals

## 2024-09-30 ENCOUNTER — HOSPITAL ENCOUNTER (OUTPATIENT)
Dept: MAMMOGRAPHY | Facility: HOSPITAL | Age: 69
Discharge: HOME | End: 2024-09-30
Payer: COMMERCIAL

## 2024-09-30 DIAGNOSIS — Z12.31 SCREENING MAMMOGRAM FOR BREAST CANCER: ICD-10-CM

## 2024-09-30 PROCEDURE — 77067 SCR MAMMO BI INCL CAD: CPT

## 2024-09-30 PROCEDURE — 77063 BREAST TOMOSYNTHESIS BI: CPT

## 2024-11-05 ENCOUNTER — OFFICE VISIT (OUTPATIENT)
Dept: FAMILY MEDICINE CLINIC | Facility: CLINIC | Age: 69
End: 2024-11-05
Payer: COMMERCIAL

## 2024-11-05 VITALS
DIASTOLIC BLOOD PRESSURE: 64 MMHG | HEIGHT: 64 IN | HEART RATE: 78 BPM | OXYGEN SATURATION: 97 % | TEMPERATURE: 98.2 F | WEIGHT: 128 LBS | RESPIRATION RATE: 18 BRPM | BODY MASS INDEX: 21.85 KG/M2 | SYSTOLIC BLOOD PRESSURE: 136 MMHG

## 2024-11-05 DIAGNOSIS — J44.9 CHRONIC OBSTRUCTIVE PULMONARY DISEASE, UNSPECIFIED COPD TYPE (HCC): Primary | ICD-10-CM

## 2024-11-05 DIAGNOSIS — F41.9 ANXIETY: ICD-10-CM

## 2024-11-05 DIAGNOSIS — R05.1 ACUTE COUGH: ICD-10-CM

## 2024-11-05 PROCEDURE — G2211 COMPLEX E/M VISIT ADD ON: HCPCS | Performed by: NURSE PRACTITIONER

## 2024-11-05 PROCEDURE — 99214 OFFICE O/P EST MOD 30 MIN: CPT | Performed by: NURSE PRACTITIONER

## 2024-11-05 RX ORDER — ALPRAZOLAM 0.25 MG/1
0.25 TABLET ORAL
Qty: 30 TABLET | Refills: 1 | Status: SHIPPED | OUTPATIENT
Start: 2024-11-05

## 2024-11-05 RX ORDER — PREDNISONE 20 MG/1
TABLET ORAL
Qty: 12 TABLET | Refills: 0 | Status: SHIPPED | OUTPATIENT
Start: 2024-11-05 | End: 2024-11-11

## 2024-11-05 RX ORDER — BENZONATATE 200 MG/1
200 CAPSULE ORAL 3 TIMES DAILY PRN
Qty: 20 CAPSULE | Refills: 0 | Status: SHIPPED | OUTPATIENT
Start: 2024-11-05

## 2024-11-05 NOTE — PROGRESS NOTES
Ambulatory Visit  Name: Lakeisha Trejo      : 1955      MRN: [id number]  Encounter Provider: KAYCEE Addison  Encounter Date: 2024   Encounter department: [facility] PRIMARY CARE    Assessment & Plan  Anxiety    Orders:    ALPRAZolam (XANAX) 0.25 mg tablet; Take 1 tablet (0.25 mg total) by mouth daily at bedtime as needed for anxiety    Acute cough    Orders:    predniSONE 20 mg tablet; Take 3 tablets (60 mg total) by mouth daily for 2 days, THEN 2 tablets (40 mg total) daily for 2 days, THEN 1 tablet (20 mg total) daily for 2 days.    benzonatate (TESSALON) 200 MG capsule; Take 1 capsule (200 mg total) by mouth 3 (three) times a day as needed for cough    Chronic obstructive pulmonary disease, unspecified COPD type (HCC)    Orders:    predniSONE 20 mg tablet; Take 3 tablets (60 mg total) by mouth daily for 2 days, THEN 2 tablets (40 mg total) daily for 2 days, THEN 1 tablet (20 mg total) daily for 2 days.    benzonatate (TESSALON) 200 MG capsule; Take 1 capsule (200 mg total) by mouth 3 (three) times a day as needed for cough         History of Present Illness     Patient here for sick visit with c/o coughing for 6 days.  SOB, Coughing, fatigue. Has been using inhalers.  No OTC medications.       Review of Systems   Constitutional: Negative.  Negative for activity change, appetite change, chills, fatigue and fever.   HENT:  Negative for congestion, ear pain, nosebleeds, rhinorrhea and sore throat.    Eyes:  Negative for photophobia, pain, redness and visual disturbance.   Respiratory:  Negative for cough, shortness of breath and wheezing.    Cardiovascular: Negative.  Negative for chest pain.   Gastrointestinal: Negative.  Negative for abdominal pain, constipation, diarrhea and vomiting.   Endocrine: Negative.    Genitourinary:  Negative for difficulty urinating, dysuria and flank pain.   Musculoskeletal: Negative.  Negative for gait problem.   Skin: Negative.  Negative for color  change and rash.   Neurological: Negative.  Negative for dizziness, weakness, numbness and headaches.   Hematological:  Negative for adenopathy.   Psychiatric/Behavioral: Negative.  Negative for agitation and confusion. The patient is not nervous/anxious.      Past Medical History:   Diagnosis Date    Acute bronchitis     Acute pharyngitis     Anxiety state     Arthropathy of ankle and foot     and/or    Asthma     Asthma without status asthmaticus     Carotid artery occlusion     COPD (chronic obstructive pulmonary disease) (HCC)     Cough     COVID-19 vaccine series completed     Disorder of shoulder     Dyspnea     Hand joint pain     Heartburn     Herpes simplex without complication     Hyperlipidemia     Infection of skin and subcutaneous tissue     Localized superficial swelling of skin     Low back pain     Lymphadenopathy     Malaise and fatigue     Neck pain     Osteoarthritis     Pleurisy without effusion or active tuberculosis     Pneumonia     Right upper quadrant pain     Shoulder joint pain     Skin eruption     Vitamin D deficiency     Vomiting      Past Surgical History:   Procedure Laterality Date    BREAST BIOPSY Left 2017 benign    CHOLECYSTECTOMY      CHOLECYSTECTOMY  03/2014    Dr. Henry     COLONOSCOPY  02/2013    WNL    FL LUMBAR PUNCTURE DIAGNOSTIC  05/25/2021    HYSTERECTOMY      Total     NASAL POLYP EXCISION Left 2009    with lysis of intranasal adhesions from packing     Family History   Problem Relation Age of Onset    Diabetes Mother         Non-insulin dependent diabetes    Emphysema Father     No Known Problems Sister     No Known Problems Daughter     No Known Problems Maternal Grandmother     No Known Problems Paternal Grandmother     No Known Problems Sister     No Known Problems Sister     No Known Problems Daughter     No Known Problems Daughter     No Known Problems Maternal Aunt      Social History     Tobacco Use    Smoking status: Never    Smokeless tobacco: Never   Vaping  Use    Vaping status: Never Used   Substance and Sexual Activity    Alcohol use: Yes     Alcohol/week: 21.0 standard drinks of alcohol     Types: 21 Cans of beer per week     Comment: social    Drug use: Never    Sexual activity: Not on file     Current Outpatient Medications on File Prior to Visit   Medication Sig    albuterol (2.5 mg/3 mL) 0.083 % nebulizer solution Take 3 mL (2.5 mg total) by nebulization 4 (four) times a day    albuterol (ProAir HFA) 90 mcg/act inhaler Inhale 2 puffs every 6 (six) hours as needed for wheezing    amLODIPine (NORVASC) 5 mg tablet Take 1 tablet (5 mg total) by mouth daily    aspirin 81 mg chewable tablet Chew 81 mg daily    Fluticasone-Salmeterol (Advair Diskus) 500-50 mcg/dose inhaler Inhale 1 puff 2 (two) times a day Rinse mouth after use.    levocetirizine (XYZAL) 5 MG tablet Take 0.5 tablets (2.5 mg total) by mouth every evening    metFORMIN (GLUCOPHAGE) 500 mg tablet Take 1 tablet (500 mg total) by mouth 2 (two) times a day with meals    montelukast (SINGULAIR) 10 mg tablet Take 1 tablet (10 mg total) by mouth daily at bedtime    nystatin (MYCOSTATIN) cream Apply topically 2 (two) times a day . Apply for additional 2 weeks after rash resolves.    VITAMIN D PO Take by mouth    [DISCONTINUED] ALPRAZolam (XANAX) 0.25 mg tablet Take 1 tablet (0.25 mg total) by mouth daily at bedtime as needed for anxiety    EPINEPHrine (EPIPEN) 0.3 mg/0.3 mL SOAJ Inject 0.3 mL (0.3 mg total) into a muscle once for 1 dose    [DISCONTINUED] benzonatate (TESSALON) 200 MG capsule Take 1 capsule (200 mg total) by mouth 3 (three) times a day as needed for cough (Patient not taking: Reported on 9/5/2024)     Allergies   Allergen Reactions    Azithromycin     Darvon [Propoxyphene]     Crestor [Rosuvastatin] Hives and GI Intolerance    Wasp Venom Hives     Immunization History   Administered Date(s) Administered    COVID-19 MODERNA VACC 0.5 ML IM 04/02/2021, 05/05/2021    INFLUENZA 10/22/2015, 09/24/2018  "   Tdap 08/13/2015     Objective     /64   Pulse 78   Temp 98.2 °F (36.8 °C)   Resp 18   Ht 5' 4\" (1.626 m)   Wt 58.1 kg (128 lb)   SpO2 97%   BMI 21.97 kg/m²     Physical Exam  Vitals and nursing note reviewed.   Constitutional:       General: She is not in acute distress.     Appearance: Normal appearance. She is well-developed. She is not diaphoretic.   HENT:      Head: Normocephalic and atraumatic.   Pulmonary:      Effort: Pulmonary effort is normal. No tachypnea or respiratory distress.      Breath sounds: Wheezing present. No rhonchi.   Neurological:      General: No focal deficit present.      Mental Status: She is alert and oriented to person, place, and time.   Psychiatric:         Speech: Speech normal.         Behavior: Behavior normal. Behavior is cooperative.         Thought Content: Thought content normal.         Judgment: Judgment normal.         "

## 2024-11-05 NOTE — ASSESSMENT & PLAN NOTE
Orders:    predniSONE 20 mg tablet; Take 3 tablets (60 mg total) by mouth daily for 2 days, THEN 2 tablets (40 mg total) daily for 2 days, THEN 1 tablet (20 mg total) daily for 2 days.    benzonatate (TESSALON) 200 MG capsule; Take 1 capsule (200 mg total) by mouth 3 (three) times a day as needed for cough

## 2024-11-08 ENCOUNTER — APPOINTMENT (OUTPATIENT)
Dept: LAB | Facility: CLINIC | Age: 69
End: 2024-11-08
Payer: COMMERCIAL

## 2024-11-08 DIAGNOSIS — J44.9 CHRONIC OBSTRUCTIVE PULMONARY DISEASE, UNSPECIFIED COPD TYPE (HCC): ICD-10-CM

## 2024-11-08 DIAGNOSIS — E01.0 THYROMEGALY: ICD-10-CM

## 2024-11-08 DIAGNOSIS — R73.03 PREDIABETES: ICD-10-CM

## 2024-11-08 DIAGNOSIS — E53.8 B12 DEFICIENCY: ICD-10-CM

## 2024-11-08 DIAGNOSIS — Z13.220 ENCOUNTER FOR SCREENING FOR LIPID DISORDER: ICD-10-CM

## 2024-11-08 DIAGNOSIS — E55.9 VITAMIN D DEFICIENCY: ICD-10-CM

## 2024-11-08 LAB
25(OH)D3 SERPL-MCNC: 70.1 NG/ML (ref 30–100)
ALBUMIN SERPL BCG-MCNC: 4.1 G/DL (ref 3.5–5)
ALP SERPL-CCNC: 125 U/L (ref 34–104)
ALT SERPL W P-5'-P-CCNC: 16 U/L (ref 7–52)
ANION GAP SERPL CALCULATED.3IONS-SCNC: 8 MMOL/L (ref 4–13)
AST SERPL W P-5'-P-CCNC: 19 U/L (ref 13–39)
BASOPHILS # BLD AUTO: 0.07 THOUSANDS/ÂΜL (ref 0–0.1)
BASOPHILS NFR BLD AUTO: 1 % (ref 0–1)
BILIRUB SERPL-MCNC: 0.66 MG/DL (ref 0.2–1)
BUN SERPL-MCNC: 18 MG/DL (ref 5–25)
CALCIUM SERPL-MCNC: 9.1 MG/DL (ref 8.4–10.2)
CHLORIDE SERPL-SCNC: 106 MMOL/L (ref 96–108)
CHOLEST SERPL-MCNC: 198 MG/DL
CO2 SERPL-SCNC: 24 MMOL/L (ref 21–32)
CREAT SERPL-MCNC: 0.84 MG/DL (ref 0.6–1.3)
EOSINOPHIL # BLD AUTO: 0.7 THOUSAND/ÂΜL (ref 0–0.61)
EOSINOPHIL NFR BLD AUTO: 8 % (ref 0–6)
ERYTHROCYTE [DISTWIDTH] IN BLOOD BY AUTOMATED COUNT: 12.9 % (ref 11.6–15.1)
EST. AVERAGE GLUCOSE BLD GHB EST-MCNC: 114 MG/DL
GFR SERPL CREATININE-BSD FRML MDRD: 70 ML/MIN/1.73SQ M
GLUCOSE P FAST SERPL-MCNC: 100 MG/DL (ref 65–99)
HBA1C MFR BLD: 5.6 %
HCT VFR BLD AUTO: 41.6 % (ref 34.8–46.1)
HDLC SERPL-MCNC: 47 MG/DL
HGB BLD-MCNC: 13.6 G/DL (ref 11.5–15.4)
IMM GRANULOCYTES # BLD AUTO: 0.02 THOUSAND/UL (ref 0–0.2)
IMM GRANULOCYTES NFR BLD AUTO: 0 % (ref 0–2)
LDLC SERPL CALC-MCNC: 130 MG/DL (ref 0–100)
LYMPHOCYTES # BLD AUTO: 3.34 THOUSANDS/ÂΜL (ref 0.6–4.47)
LYMPHOCYTES NFR BLD AUTO: 40 % (ref 14–44)
MCH RBC QN AUTO: 29.8 PG (ref 26.8–34.3)
MCHC RBC AUTO-ENTMCNC: 32.7 G/DL (ref 31.4–37.4)
MCV RBC AUTO: 91 FL (ref 82–98)
MONOCYTES # BLD AUTO: 0.8 THOUSAND/ÂΜL (ref 0.17–1.22)
MONOCYTES NFR BLD AUTO: 10 % (ref 4–12)
NEUTROPHILS # BLD AUTO: 3.4 THOUSANDS/ÂΜL (ref 1.85–7.62)
NEUTS SEG NFR BLD AUTO: 41 % (ref 43–75)
NRBC BLD AUTO-RTO: 0 /100 WBCS
PLATELET # BLD AUTO: 239 THOUSANDS/UL (ref 149–390)
PMV BLD AUTO: 11.7 FL (ref 8.9–12.7)
POTASSIUM SERPL-SCNC: 4.3 MMOL/L (ref 3.5–5.3)
PROT SERPL-MCNC: 6.6 G/DL (ref 6.4–8.4)
RBC # BLD AUTO: 4.56 MILLION/UL (ref 3.81–5.12)
SODIUM SERPL-SCNC: 138 MMOL/L (ref 135–147)
TRIGL SERPL-MCNC: 103 MG/DL
TSH SERPL DL<=0.05 MIU/L-ACNC: 2.12 UIU/ML (ref 0.45–4.5)
VIT B12 SERPL-MCNC: 181 PG/ML (ref 180–914)
WBC # BLD AUTO: 8.33 THOUSAND/UL (ref 4.31–10.16)

## 2024-11-08 PROCEDURE — 84443 ASSAY THYROID STIM HORMONE: CPT

## 2024-11-08 PROCEDURE — 80053 COMPREHEN METABOLIC PANEL: CPT

## 2024-11-08 PROCEDURE — 85025 COMPLETE CBC W/AUTO DIFF WBC: CPT

## 2024-11-08 PROCEDURE — 83036 HEMOGLOBIN GLYCOSYLATED A1C: CPT

## 2024-11-08 PROCEDURE — 36415 COLL VENOUS BLD VENIPUNCTURE: CPT

## 2024-11-08 PROCEDURE — 82607 VITAMIN B-12: CPT

## 2024-11-08 PROCEDURE — 82306 VITAMIN D 25 HYDROXY: CPT

## 2024-11-08 PROCEDURE — 80061 LIPID PANEL: CPT

## 2025-01-03 ENCOUNTER — APPOINTMENT (OUTPATIENT)
Dept: RADIOLOGY | Facility: CLINIC | Age: 70
End: 2025-01-03
Payer: COMMERCIAL

## 2025-01-03 ENCOUNTER — OFFICE VISIT (OUTPATIENT)
Dept: URGENT CARE | Facility: CLINIC | Age: 70
End: 2025-01-03
Payer: COMMERCIAL

## 2025-01-03 ENCOUNTER — NURSE TRIAGE (OUTPATIENT)
Age: 70
End: 2025-01-03

## 2025-01-03 VITALS
TEMPERATURE: 98.1 F | SYSTOLIC BLOOD PRESSURE: 108 MMHG | OXYGEN SATURATION: 93 % | DIASTOLIC BLOOD PRESSURE: 58 MMHG | HEART RATE: 111 BPM | RESPIRATION RATE: 20 BRPM

## 2025-01-03 DIAGNOSIS — J18.9 PNEUMONIA OF LEFT LOWER LOBE DUE TO INFECTIOUS ORGANISM: Primary | ICD-10-CM

## 2025-01-03 DIAGNOSIS — R06.02 SHORTNESS OF BREATH: ICD-10-CM

## 2025-01-03 PROCEDURE — 87636 SARSCOV2 & INF A&B AMP PRB: CPT | Performed by: PHYSICIAN ASSISTANT

## 2025-01-03 PROCEDURE — 94640 AIRWAY INHALATION TREATMENT: CPT | Performed by: PHYSICIAN ASSISTANT

## 2025-01-03 PROCEDURE — 71046 X-RAY EXAM CHEST 2 VIEWS: CPT

## 2025-01-03 PROCEDURE — 96372 THER/PROPH/DIAG INJ SC/IM: CPT | Performed by: PHYSICIAN ASSISTANT

## 2025-01-03 PROCEDURE — 99214 OFFICE O/P EST MOD 30 MIN: CPT | Performed by: PHYSICIAN ASSISTANT

## 2025-01-03 RX ORDER — IPRATROPIUM BROMIDE AND ALBUTEROL SULFATE 2.5; .5 MG/3ML; MG/3ML
3 SOLUTION RESPIRATORY (INHALATION) ONCE
Status: COMPLETED | OUTPATIENT
Start: 2025-01-03 | End: 2025-01-03

## 2025-01-03 RX ORDER — METHYLPREDNISOLONE SODIUM SUCCINATE 125 MG/2ML
125 INJECTION, POWDER, LYOPHILIZED, FOR SOLUTION INTRAMUSCULAR; INTRAVENOUS ONCE
Status: COMPLETED | OUTPATIENT
Start: 2025-01-03 | End: 2025-01-03

## 2025-01-03 RX ORDER — DOXYCYCLINE 100 MG/1
100 CAPSULE ORAL EVERY 12 HOURS SCHEDULED
Qty: 20 CAPSULE | Refills: 0 | Status: SHIPPED | OUTPATIENT
Start: 2025-01-03 | End: 2025-01-13

## 2025-01-03 RX ORDER — PREDNISONE 10 MG/1
TABLET ORAL
Qty: 26 TABLET | Refills: 0 | Status: SHIPPED | OUTPATIENT
Start: 2025-01-03

## 2025-01-03 RX ADMIN — METHYLPREDNISOLONE SODIUM SUCCINATE 125 MG: 125 INJECTION, POWDER, LYOPHILIZED, FOR SOLUTION INTRAMUSCULAR; INTRAVENOUS at 18:01

## 2025-01-03 RX ADMIN — IPRATROPIUM BROMIDE AND ALBUTEROL SULFATE 3 ML: 2.5; .5 SOLUTION RESPIRATORY (INHALATION) at 17:23

## 2025-01-03 NOTE — PROGRESS NOTES
[facility]    NAME: Lakeisha Trejo is a 69 y.o. female  : 1955    MRN: [id number]  DATE: January 3, 2025  TIME: 5:56 PM    Assessment and Plan   Pneumonia of left lower lobe due to infectious organism [J18.9]  1. Pneumonia of left lower lobe due to infectious organism  doxycycline hyclate (VIBRAMYCIN) 100 mg capsule      2. Shortness of breath  ipratropium-albuterol (DUO-NEB) 0.5-2.5 mg/3 mL inhalation solution 3 mL    XR chest pa and lateral    Covid/Flu-Office Collect    methylPREDNISolone sodium succinate (Solu-MEDROL) injection 125 mg    predniSONE 10 mg tablet      Mini neb    Performed by: Michelle Behler, PA-C  Authorized by: Michelle Behler, PA-C    Number of treatments:  1  Treatment 1:   Pre-Procedure     Symptoms:  Wheezing, cough and shortness of breath    Lung Sounds:  Decreased    HR:  111    RR:  20    SP02:  93    Medication Administered:  Duoneb - Albuterol 2.5 mg/Atrovent 0.5 mg  Post-Procedure     Symptoms:  Cough    Lung sounds:  Wheeze    HR:  106    RR:  18    SP02:  95        Patient Instructions     Patient Instructions   I have prescribed an antibiotic for the infection.  Please take the antibiotic as prescribed and finish the entire prescription.  Take an over the counter probiotic or eat yogurt with live cultures in it (activia) to keep good bacteria in the gut and help prevent diarrhea.  Wash hands frequently to prevent the spread of infection.  Can use over the counter cough and cold medications to help with symptoms.  Ibuprofen and/or tylenol as needed for pain or fever.  If not improving over the next 7-10 days, follow up with PCP.      Start prednisone tomorrow.  Albuterol every 4-6 hours  Go to the emergency room with worsening symptoms    Chief Complaint     Chief Complaint   Patient presents with    Cough     Chest congestion wheezing coughing for 2 days        History of Present Illness   69-year-old female here with complaint of shortness of breath and chest  congestion.  Cough for the last 2 days.  Has been coughing up productive green sputum.  She has been using albuterol and did a breathing treatment last at 3 PM.  Still feeling short of breath.  Has a history of asthma.  She denies any fever or chills.  No nasal congestion or rhinorrhea.  No sore throat.        Review of Systems   Review of Systems   Constitutional:  Negative for appetite change, chills and fever.   HENT:  Negative for congestion, ear discharge, ear pain, facial swelling, postnasal drip, sinus pressure, sneezing and sore throat.    Respiratory:  Positive for cough, chest tightness, shortness of breath and wheezing.    Neurological:  Negative for headaches.       Current Medications     Current Outpatient Medications:     albuterol (2.5 mg/3 mL) 0.083 % nebulizer solution, Take 3 mL (2.5 mg total) by nebulization 4 (four) times a day, Disp: 180 mL, Rfl: 1    albuterol (ProAir HFA) 90 mcg/act inhaler, Inhale 2 puffs every 6 (six) hours as needed for wheezing, Disp: 8.5 g, Rfl: 5    ALPRAZolam (XANAX) 0.25 mg tablet, Take 1 tablet (0.25 mg total) by mouth daily at bedtime as needed for anxiety, Disp: 30 tablet, Rfl: 1    amLODIPine (NORVASC) 5 mg tablet, Take 1 tablet (5 mg total) by mouth daily, Disp: 100 tablet, Rfl: 3    aspirin 81 mg chewable tablet, Chew 81 mg daily, Disp: , Rfl:     benzonatate (TESSALON) 200 MG capsule, Take 1 capsule (200 mg total) by mouth 3 (three) times a day as needed for cough, Disp: 20 capsule, Rfl: 0    doxycycline hyclate (VIBRAMYCIN) 100 mg capsule, Take 1 capsule (100 mg total) by mouth every 12 (twelve) hours for 10 days, Disp: 20 capsule, Rfl: 0    Fluticasone-Salmeterol (Advair Diskus) 500-50 mcg/dose inhaler, Inhale 1 puff 2 (two) times a day Rinse mouth after use., Disp: 60 blister, Rfl: 5    levocetirizine (XYZAL) 5 MG tablet, Take 0.5 tablets (2.5 mg total) by mouth every evening, Disp: 100 tablet, Rfl: 1    metFORMIN (GLUCOPHAGE) 500 mg tablet, Take 1 tablet  (500 mg total) by mouth 2 (two) times a day with meals, Disp: 180 tablet, Rfl: 3    montelukast (SINGULAIR) 10 mg tablet, Take 1 tablet (10 mg total) by mouth daily at bedtime, Disp: 100 tablet, Rfl: 3    nystatin (MYCOSTATIN) cream, Apply topically 2 (two) times a day . Apply for additional 2 weeks after rash resolves., Disp: 30 g, Rfl: 0    predniSONE 10 mg tablet, Start on 1/4/25. Take 3 tabs BID X 2 days, 2 tabs BID X 2 days, 1 tab BID X 2 days, 1 tab daily X 2 days, Disp: 26 tablet, Rfl: 0    VITAMIN D PO, Take by mouth, Disp: , Rfl:     EPINEPHrine (EPIPEN) 0.3 mg/0.3 mL SOAJ, Inject 0.3 mL (0.3 mg total) into a muscle once for 1 dose, Disp: 2 each, Rfl: 1    Current Facility-Administered Medications:     methylPREDNISolone sodium succinate (Solu-MEDROL) injection 125 mg, 125 mg, Intramuscular, Once,     predniSONE tablet 40 mg, 40 mg, Oral, Daily, , 40 mg at 05/23/24 0946    Current Allergies     Allergies as of 01/03/2025 - Reviewed 01/03/2025   Allergen Reaction Noted    Azithromycin  09/13/2019    Darvon [propoxyphene]  09/13/2019    Crestor [rosuvastatin] Hives and GI Intolerance 11/09/2022    Wasp venom Hives 08/28/2023          The following portions of the patient's history were reviewed and updated as appropriate: allergies, current medications, past family history, past medical history, past social history, past surgical history and problem list.   Past Medical History:   Diagnosis Date    Acute bronchitis     Acute pharyngitis     Anxiety state     Arthropathy of ankle and foot     and/or    Asthma     Asthma without status asthmaticus     Carotid artery occlusion     COPD (chronic obstructive pulmonary disease) (HCC)     Cough     COVID-19 vaccine series completed     Disorder of shoulder     Dyspnea     Hand joint pain     Heartburn     Herpes simplex without complication     Hyperlipidemia     Infection of skin and subcutaneous tissue     Localized superficial swelling of skin     Low back pain      Lymphadenopathy     Malaise and fatigue     Neck pain     Osteoarthritis     Pleurisy without effusion or active tuberculosis     Pneumonia     Right upper quadrant pain     Shoulder joint pain     Skin eruption     Vitamin D deficiency     Vomiting      Past Surgical History:   Procedure Laterality Date    BREAST BIOPSY Left 2017 benign    CHOLECYSTECTOMY      CHOLECYSTECTOMY  03/2014    Dr. Henry     COLONOSCOPY  02/2013    WNL    FL LUMBAR PUNCTURE DIAGNOSTIC  05/25/2021    HYSTERECTOMY      Total     NASAL POLYP EXCISION Left 2009    with lysis of intranasal adhesions from packing     Family History   Problem Relation Age of Onset    Diabetes Mother         Non-insulin dependent diabetes    Emphysema Father     No Known Problems Sister     No Known Problems Daughter     No Known Problems Maternal Grandmother     No Known Problems Paternal Grandmother     No Known Problems Sister     No Known Problems Sister     No Known Problems Daughter     No Known Problems Daughter     No Known Problems Maternal Aunt      Social History     Socioeconomic History    Marital status:      Spouse name: Not on file    Number of children: Not on file    Years of education: Not on file    Highest education level: Not on file   Occupational History    Occupation: Homemaker   Tobacco Use    Smoking status: Never    Smokeless tobacco: Never   Vaping Use    Vaping status: Never Used   Substance and Sexual Activity    Alcohol use: Yes     Alcohol/week: 21.0 standard drinks of alcohol     Types: 21 Cans of beer per week     Comment: social    Drug use: Never    Sexual activity: Not on file   Other Topics Concern    Not on file   Social History Narrative    Not on file     Social Drivers of Health     Financial Resource Strain: Low Risk  (8/28/2023)    Overall Financial Resource Strain (CARDIA)     Difficulty of Paying Living Expenses: Not hard at all   Food Insecurity: No Food Insecurity (9/5/2024)    Nursing - Inadequate Food  Risk Classification     Worried About Running Out of Food in the Last Year: Never true     Ran Out of Food in the Last Year: Never true     Ran Out of Food in the Last Year: Not on file   Transportation Needs: No Transportation Needs (9/5/2024)    PRAPARE - Transportation     Lack of Transportation (Medical): No     Lack of Transportation (Non-Medical): No   Physical Activity: Not on file   Stress: Not on file   Social Connections: Unknown (6/18/2024)    Received from [employer]     How often do you feel lonely or isolated from those around you? (Adult - for ages 18 years and over): Not on file   Intimate Partner Violence: Not on file   Housing Stability: Low Risk  (9/5/2024)    Housing Stability Vital Sign     Unable to Pay for Housing in the Last Year: No     Number of Times Moved in the Last Year: 1     Homeless in the Last Year: No     Medications have been verified.    Objective   /58   Pulse (!) 111   Temp 98.1 °F (36.7 °C)   Resp 20   SpO2 93%      Physical Exam   Physical Exam  Vitals and nursing note reviewed.   Constitutional:       General: She is not in acute distress.     Appearance: She is well-developed.   HENT:      Head: Normocephalic and atraumatic.      Right Ear: Tympanic membrane normal.      Left Ear: Tympanic membrane normal.      Nose: Nose normal. No mucosal edema or rhinorrhea.      Right Sinus: No maxillary sinus tenderness or frontal sinus tenderness.      Left Sinus: No maxillary sinus tenderness or frontal sinus tenderness.      Mouth/Throat:      Pharynx: No oropharyngeal exudate or posterior oropharyngeal erythema.   Eyes:      Conjunctiva/sclera: Conjunctivae normal.   Cardiovascular:      Rate and Rhythm: Normal rate and regular rhythm.      Heart sounds: Normal heart sounds. No murmur heard.  Pulmonary:      Effort: Pulmonary effort is normal.      Breath sounds: Decreased air movement present. Decreased breath sounds present.

## 2025-01-03 NOTE — TELEPHONE ENCOUNTER
I recommend that she be seen. She should go to Urgent care or the ED. I could send in prednisone for her if she would like to try this first but if any worsening symptom she needs to be seen

## 2025-01-03 NOTE — PATIENT INSTRUCTIONS
I have prescribed an antibiotic for the infection.  Please take the antibiotic as prescribed and finish the entire prescription.  Take an over the counter probiotic or eat yogurt with live cultures in it (activia) to keep good bacteria in the gut and help prevent diarrhea.  Wash hands frequently to prevent the spread of infection.  Can use over the counter cough and cold medications to help with symptoms.  Ibuprofen and/or tylenol as needed for pain or fever.  If not improving over the next 7-10 days, follow up with PCP.      Start prednisone tomorrow.  Albuterol every 4-6 hours  Go to the emergency room with worsening symptoms

## 2025-01-03 NOTE — TELEPHONE ENCOUNTER
"Reason for Disposition   MODERATE difficulty breathing (e.g., speaks in phrases, SOB even at rest, pulse 100-120) of new-onset or worse than normal    Answer Assessment - Initial Assessment Questions  1. RESPIRATORY STATUS: \"Describe your breathing?\" (e.g., wheezing, shortness of breath, unable to speak, severe coughing)       SOB, COUGHING-CONSTANT/ WHEEZING  2. ONSET: \"When did this breathing problem begin?\"       2-DAYS  3. PATTERN \"Does the difficult breathing come and go, or has it been constant since it started?\"       CONSTANT   4. SEVERITY: \"How bad is your breathing?\" (e.g., mild, moderate, severe)       MODERATE  5. RECURRENT SYMPTOM: \"Have you had difficulty breathing before?\" If Yes, ask: \"When was the last time?\" and \"What happened that time?\"       YES HAS COPD  6. CARDIAC HISTORY: \"Do you have any history of heart disease?\" (e.g., heart attack, angina, bypass surgery, angioplasty)       DENIES  7. LUNG HISTORY: \"Do you have any history of lung disease?\"  (e.g., pulmonary embolus, asthma, emphysema)      COPD, ASTHMA   8. CAUSE: \"What do you think is causing the breathing problem?\"       FAMILY IS SICK  9. OTHER SYMPTOMS: \"Do you have any other symptoms?\" (e.g., chest pain, cough, dizziness, fever, runny nose)      JUST THE COUGH, DENIES REST OF S/S  10. O2 SATURATION MONITOR:  \"Do you use an oxygen saturation monitor (pulse oximeter) at home?\" If Yes, ask: \"What is your reading (oxygen level) today?\" \"What is your usual oxygen saturation reading?\" (e.g., 95%)        DENIES SHE ISNT AT HER HOUSE  11. PREGNANCY: \"Is there any chance you are pregnant?\" \"When was your last menstrual period?\"        N/A  12. TRAVEL: \"Have you traveled out of the country in the last month?\" (e.g., travel history, exposures)        DENIES    Protocols used: Breathing Difficulty-Adult-[place]    "

## 2025-01-04 LAB
FLUAV RNA RESP QL NAA+PROBE: NEGATIVE
FLUBV RNA RESP QL NAA+PROBE: NEGATIVE
SARS-COV-2 RNA RESP QL NAA+PROBE: NEGATIVE

## 2025-01-09 DIAGNOSIS — J98.01 BRONCHOSPASM: ICD-10-CM

## 2025-01-10 DIAGNOSIS — J45.40 MODERATE PERSISTENT ASTHMA WITHOUT COMPLICATION: ICD-10-CM

## 2025-01-10 RX ORDER — ALBUTEROL SULFATE 90 UG/1
2 INHALANT RESPIRATORY (INHALATION) EVERY 6 HOURS PRN
Qty: 8.5 G | Refills: 5 | Status: SHIPPED | OUTPATIENT
Start: 2025-01-10

## 2025-01-10 RX ORDER — ALBUTEROL SULFATE 0.83 MG/ML
2.5 SOLUTION RESPIRATORY (INHALATION) 4 TIMES DAILY
Qty: 150 ML | Refills: 6 | Status: SHIPPED | OUTPATIENT
Start: 2025-01-10

## 2025-01-10 NOTE — TELEPHONE ENCOUNTER
Reason for call:   [x] Refill   [] Prior Auth  [x] Other: the nebulizer solution was called in but she needs the inhaler      Office:   [x] PCP/Provider - Kelli Dhaliwal DO   [] Specialty/Provider -     Medication: albuterol (ProAir HFA) 90 mcg/act inhaler     Dose/Frequency:     Inhale 2 puffs every 6 (six) hours as needed for wheezing       Quantity: 8.5g    Pharmacy: [id number]  [address] [facility] [address] [phone]     Does the patient have enough for 3 days?   [] Yes   [x] No - Send as HP to POD

## 2025-03-01 ENCOUNTER — OFFICE VISIT (OUTPATIENT)
Dept: URGENT CARE | Facility: CLINIC | Age: 70
End: 2025-03-01
Payer: COMMERCIAL

## 2025-03-01 ENCOUNTER — APPOINTMENT (OUTPATIENT)
Dept: RADIOLOGY | Facility: CLINIC | Age: 70
End: 2025-03-01
Payer: COMMERCIAL

## 2025-03-01 VITALS
SYSTOLIC BLOOD PRESSURE: 128 MMHG | HEART RATE: 95 BPM | TEMPERATURE: 98.1 F | DIASTOLIC BLOOD PRESSURE: 72 MMHG | RESPIRATION RATE: 18 BRPM | OXYGEN SATURATION: 96 %

## 2025-03-01 DIAGNOSIS — M25.532 LEFT WRIST PAIN: Primary | ICD-10-CM

## 2025-03-01 DIAGNOSIS — M25.532 LEFT WRIST PAIN: ICD-10-CM

## 2025-03-01 DIAGNOSIS — M79.643 TENDERNESS OF ANATOMICAL SNUFFBOX: ICD-10-CM

## 2025-03-01 PROCEDURE — 73110 X-RAY EXAM OF WRIST: CPT

## 2025-03-01 PROCEDURE — 99213 OFFICE O/P EST LOW 20 MIN: CPT | Performed by: PHYSICIAN ASSISTANT

## 2025-03-01 NOTE — PATIENT INSTRUCTIONS
Thumb spica splint.  Ibuprofen or tylenol as needed for pain.    Elevate to keep swelling down  Ice  If not improving over the next week follow up with ortho.

## 2025-03-02 NOTE — PROGRESS NOTES
[facility]    NAME: Lakeisha Trejo is a 69 y.o. female  : 1955    MRN: [id number]  DATE: 2025  TIME: 7:16 PM    Assessment and Plan   Left wrist pain [M25.532]  1. Left wrist pain  XR wrist 3+ vw left      2. Tenderness of anatomical snuffbox            Patient Instructions     Patient Instructions   Thumb spica splint.  Ibuprofen or tylenol as needed for pain.    Elevate to keep swelling down  Ice  If not improving over the next week follow up with ortho.      Chief Complaint     Chief Complaint   Patient presents with    left wrist injury     Fell on ice today landing on left hand / wrist        History of Present Illness   69-year-old female here after falling on ice.  Landed on left hand has pain in the left wrist.  Injury occurred today.        Review of Systems   Review of Systems   Constitutional:  Negative for diaphoresis and fever.   Musculoskeletal:         Left wrist pain   Neurological:  Negative for weakness and numbness.       Current Medications     Current Outpatient Medications:     albuterol (2.5 mg/3 mL) 0.083 % nebulizer solution, inhale contents of 1 vial ( 3 MILLILITERS ) in nebulizer by mouth and INTO THE LUNGS four times a day, Disp: 150 mL, Rfl: 6    albuterol (ProAir HFA) 90 mcg/act inhaler, Inhale 2 puffs every 6 (six) hours as needed for wheezing, Disp: 8.5 g, Rfl: 5    ALPRAZolam (XANAX) 0.25 mg tablet, Take 1 tablet (0.25 mg total) by mouth daily at bedtime as needed for anxiety, Disp: 30 tablet, Rfl: 1    amLODIPine (NORVASC) 5 mg tablet, Take 1 tablet (5 mg total) by mouth daily, Disp: 100 tablet, Rfl: 3    aspirin 81 mg chewable tablet, Chew 81 mg daily, Disp: , Rfl:     benzonatate (TESSALON) 200 MG capsule, Take 1 capsule (200 mg total) by mouth 3 (three) times a day as needed for cough, Disp: 20 capsule, Rfl: 0    Fluticasone-Salmeterol (Advair Diskus) 500-50 mcg/dose inhaler, Inhale 1 puff 2 (two) times a day Rinse mouth after use., Disp: 60 blister,  Rfl: 5    levocetirizine (XYZAL) 5 MG tablet, Take 0.5 tablets (2.5 mg total) by mouth every evening, Disp: 100 tablet, Rfl: 1    metFORMIN (GLUCOPHAGE) 500 mg tablet, Take 1 tablet (500 mg total) by mouth 2 (two) times a day with meals, Disp: 180 tablet, Rfl: 3    montelukast (SINGULAIR) 10 mg tablet, Take 1 tablet (10 mg total) by mouth daily at bedtime, Disp: 100 tablet, Rfl: 3    nystatin (MYCOSTATIN) cream, Apply topically 2 (two) times a day . Apply for additional 2 weeks after rash resolves., Disp: 30 g, Rfl: 0    predniSONE 10 mg tablet, Start on 1/4/25. Take 3 tabs BID X 2 days, 2 tabs BID X 2 days, 1 tab BID X 2 days, 1 tab daily X 2 days, Disp: 26 tablet, Rfl: 0    VITAMIN D PO, Take by mouth, Disp: , Rfl:     EPINEPHrine (EPIPEN) 0.3 mg/0.3 mL SOAJ, Inject 0.3 mL (0.3 mg total) into a muscle once for 1 dose, Disp: 2 each, Rfl: 1    Current Facility-Administered Medications:     predniSONE tablet 40 mg, 40 mg, Oral, Daily, , 40 mg at 05/23/24 0946    Current Allergies     Allergies as of 03/01/2025 - Reviewed 03/01/2025   Allergen Reaction Noted    Azithromycin  09/13/2019    Darvon [propoxyphene]  09/13/2019    Crestor [rosuvastatin] Hives and GI Intolerance 11/09/2022    Wasp venom Hives 08/28/2023          The following portions of the patient's history were reviewed and updated as appropriate: allergies, current medications, past family history, past medical history, past social history, past surgical history and problem list.   Past Medical History:   Diagnosis Date    Acute bronchitis     Acute pharyngitis     Anxiety state     Arthropathy of ankle and foot     and/or    Asthma     Asthma without status asthmaticus     Carotid artery occlusion     COPD (chronic obstructive pulmonary disease) (HCC)     Cough     COVID-19 vaccine series completed     Disorder of shoulder     Dyspnea     Hand joint pain     Heartburn     Herpes simplex without complication     Hyperlipidemia     Infection of skin and  subcutaneous tissue     Localized superficial swelling of skin     Low back pain     Lymphadenopathy     Malaise and fatigue     Neck pain     Osteoarthritis     Pleurisy without effusion or active tuberculosis     Pneumonia     Right upper quadrant pain     Shoulder joint pain     Skin eruption     Vitamin D deficiency     Vomiting      Past Surgical History:   Procedure Laterality Date    BREAST BIOPSY Left 2017 benign    CHOLECYSTECTOMY      CHOLECYSTECTOMY  03/2014    Dr. Henry     COLONOSCOPY  02/2013    WNL    FL LUMBAR PUNCTURE DIAGNOSTIC  05/25/2021    HYSTERECTOMY      Total     NASAL POLYP EXCISION Left 2009    with lysis of intranasal adhesions from packing     Family History   Problem Relation Age of Onset    Diabetes Mother         Non-insulin dependent diabetes    Emphysema Father     No Known Problems Sister     No Known Problems Daughter     No Known Problems Maternal Grandmother     No Known Problems Paternal Grandmother     No Known Problems Sister     No Known Problems Sister     No Known Problems Daughter     No Known Problems Daughter     No Known Problems Maternal Aunt      Social History     Socioeconomic History    Marital status:      Spouse name: Not on file    Number of children: Not on file    Years of education: Not on file    Highest education level: Not on file   Occupational History    Occupation: Homemaker   Tobacco Use    Smoking status: Never    Smokeless tobacco: Never   Vaping Use    Vaping status: Never Used   Substance and Sexual Activity    Alcohol use: Yes     Alcohol/week: 21.0 standard drinks of alcohol     Types: 21 Cans of beer per week     Comment: social    Drug use: Never    Sexual activity: Not on file   Other Topics Concern    Not on file   Social History Narrative    Not on file     Social Drivers of Health     Financial Resource Strain: Low Risk  (8/28/2023)    Overall Financial Resource Strain (CARDIA)     Difficulty of Paying Living Expenses: Not hard at  all   Food Insecurity: No Food Insecurity (9/5/2024)    Nursing - Inadequate Food Risk Classification     Worried About Running Out of Food in the Last Year: Never true     Ran Out of Food in the Last Year: Never true     Ran Out of Food in the Last Year: Not on file   Transportation Needs: No Transportation Needs (9/5/2024)    PRAPARE - Transportation     Lack of Transportation (Medical): No     Lack of Transportation (Non-Medical): No   Physical Activity: Not on file   Stress: Not on file   Social Connections: Unknown (6/18/2024)    Received from [employer]     How often do you feel lonely or isolated from those around you? (Adult - for ages 18 years and over): Not on file   Intimate Partner Violence: Not on file   Housing Stability: Low Risk  (9/5/2024)    Housing Stability Vital Sign     Unable to Pay for Housing in the Last Year: No     Number of Times Moved in the Last Year: 1     Homeless in the Last Year: No     Medications have been verified.    Objective   /72   Pulse 95   Temp 98.1 °F (36.7 °C)   Resp 18   SpO2 96%      Physical Exam   Physical Exam  Vitals and nursing note reviewed.   Constitutional:       Appearance: Normal appearance.   Cardiovascular:      Rate and Rhythm: Normal rate and regular rhythm.      Pulses: Normal pulses.      Heart sounds: Normal heart sounds.   Pulmonary:      Effort: Pulmonary effort is normal.      Breath sounds: Normal breath sounds.   Musculoskeletal:      Left wrist: Tenderness and snuff box tenderness present. No swelling or effusion. Decreased range of motion. Normal pulse.   Neurological:      Mental Status: She is alert.

## 2025-03-10 ENCOUNTER — OFFICE VISIT (OUTPATIENT)
Dept: PULMONOLOGY | Facility: CLINIC | Age: 70
End: 2025-03-10
Payer: COMMERCIAL

## 2025-03-10 VITALS
DIASTOLIC BLOOD PRESSURE: 60 MMHG | HEART RATE: 82 BPM | TEMPERATURE: 98.6 F | RESPIRATION RATE: 18 BRPM | SYSTOLIC BLOOD PRESSURE: 120 MMHG | BODY MASS INDEX: 20.83 KG/M2 | WEIGHT: 122 LBS | OXYGEN SATURATION: 95 % | HEIGHT: 64 IN

## 2025-03-10 DIAGNOSIS — R06.09 DOE (DYSPNEA ON EXERTION): ICD-10-CM

## 2025-03-10 DIAGNOSIS — J45.40 MODERATE PERSISTENT ASTHMA WITHOUT COMPLICATION: Primary | ICD-10-CM

## 2025-03-10 PROCEDURE — 99214 OFFICE O/P EST MOD 30 MIN: CPT | Performed by: INTERNAL MEDICINE

## 2025-03-10 RX ORDER — FLUTICASONE PROPIONATE AND SALMETEROL 500; 50 UG/1; UG/1
1 POWDER RESPIRATORY (INHALATION) 2 TIMES DAILY
Qty: 60 BLISTER | Refills: 5 | Status: SHIPPED | OUTPATIENT
Start: 2025-03-10

## 2025-03-10 NOTE — PROGRESS NOTES
"Office Progress Note - Pulmonary    Lakeisha Trejo 69 y.o. female MRN: [id number]    Encounter: [id number]      Assessment:  Bronchial asthma.  Dyspnea on exertion.    Plan:   Advair 250/50, 1 inhalation twice a day.  Albuterol rescue inhaler 2 inhalations 4 times a day as needed.  Albuterol nebulizer treatments 4 times a day as needed.  Follow-up in 6 months.    Discussion:   The patient's bronchial asthma is well treated.  I have maintained her on Advair 250/50, 1 inhalation twice a day.  She will rinse her mouth after each use.  I have renewed the prescription of the Advair.  She will use albuterol rescue inhaler 2 inhalations 4 times a day as needed.  She can also use the albuterol nebulizer treatments 4 times a day as needed.  I will see her in 6 months.      Subjective:   The patient is here for a follow-up visit.  She was doing fairly well until last weekend when she was at [facility] and came next to a pine tree.  At that time she felt tight.  Otherwise she had no significant wheezing or cough.  She is using the Advair 250/50, 1 inhalation twice a day.  In general she does not need to use her rescue inhaler often.  She is using her albuterol nebulizer treatments every night before bedtime.  She does not necessarily need it but she developed that happened.    Review of systems:  A 12 point system review is done and aside from what is stated above the rest of the review of systems is negative.      Family history and social history are reviewed.    Medications list is reviewed.      Vitals: Blood pressure 120/60, pulse 82, temperature 98.6 °F (37 °C), temperature source Temporal, resp. rate 18, height 5' 4\" (1.626 m), weight 55.3 kg (122 lb), SpO2 95%.,     Physical Exam  Gen: Awake, alert, oriented x 3, no acute distress  HEENT: Mucous membranes moist, no oral lesions, no thrush  NECK: No accessory muscle use, JVP not elevated  Cardiac: Regular, single S1, single S2, no murmurs, no rubs, no gallops  Lungs: " Clear breath sounds.  No wheezing or rhonchi.  Abdomen: normoactive bowel sounds, soft nontender, nondistended, no rebound or rigidity, no guarding  Extremities: no cyanosis, no clubbing, no edema  Neuro:  Grossly nonfocal.  Skin:  No rash.    Lab Results   Component Value Date    WBC 8.33 11/08/2024    HGB 13.6 11/08/2024    HCT 41.6 11/08/2024    MCV 91 11/08/2024     11/08/2024     Lab Results   Component Value Date    SODIUM 138 11/08/2024    K 4.3 11/08/2024     11/08/2024    CO2 24 11/08/2024    BUN 18 11/08/2024    CREATININE 0.84 11/08/2024    GLUC 103 06/17/2024    CALCIUM 9.1 11/08/2024

## 2025-03-12 ENCOUNTER — OFFICE VISIT (OUTPATIENT)
Dept: FAMILY MEDICINE CLINIC | Facility: CLINIC | Age: 70
End: 2025-03-12
Payer: COMMERCIAL

## 2025-03-12 VITALS
DIASTOLIC BLOOD PRESSURE: 66 MMHG | TEMPERATURE: 97.8 F | BODY MASS INDEX: 20.66 KG/M2 | WEIGHT: 121 LBS | HEART RATE: 84 BPM | SYSTOLIC BLOOD PRESSURE: 118 MMHG | HEIGHT: 64 IN | OXYGEN SATURATION: 96 %

## 2025-03-12 DIAGNOSIS — J44.9 CHRONIC OBSTRUCTIVE PULMONARY DISEASE, UNSPECIFIED COPD TYPE (HCC): ICD-10-CM

## 2025-03-12 DIAGNOSIS — N18.2 STAGE 2 CHRONIC KIDNEY DISEASE: ICD-10-CM

## 2025-03-12 DIAGNOSIS — E78.5 HYPERLIPIDEMIA, UNSPECIFIED HYPERLIPIDEMIA TYPE: ICD-10-CM

## 2025-03-12 DIAGNOSIS — Z00.00 ENCOUNTER FOR MEDICAL EXAMINATION TO ESTABLISH CARE: Primary | ICD-10-CM

## 2025-03-12 DIAGNOSIS — R73.03 PREDIABETES: ICD-10-CM

## 2025-03-12 DIAGNOSIS — K21.9 GASTROESOPHAGEAL REFLUX DISEASE, UNSPECIFIED WHETHER ESOPHAGITIS PRESENT: ICD-10-CM

## 2025-03-12 DIAGNOSIS — I10 ESSENTIAL HYPERTENSION: ICD-10-CM

## 2025-03-12 PROBLEM — R94.31 ABNORMAL EKG: Status: RESOLVED | Noted: 2021-11-30 | Resolved: 2025-03-12

## 2025-03-12 PROBLEM — R93.89 ABNORMAL CT OF THE CHEST: Status: RESOLVED | Noted: 2024-06-24 | Resolved: 2025-03-12

## 2025-03-12 PROCEDURE — 99214 OFFICE O/P EST MOD 30 MIN: CPT | Performed by: NURSE PRACTITIONER

## 2025-03-12 PROCEDURE — G2211 COMPLEX E/M VISIT ADD ON: HCPCS | Performed by: NURSE PRACTITIONER

## 2025-03-12 RX ORDER — AMLODIPINE BESYLATE 5 MG/1
5 TABLET ORAL DAILY
Qty: 100 TABLET | Refills: 3 | Status: SHIPPED | OUTPATIENT
Start: 2025-03-12

## 2025-03-12 NOTE — ASSESSMENT & PLAN NOTE
Orders:  •  amLODIPine (NORVASC) 5 mg tablet; Take 1 tablet (5 mg total) by mouth daily  •  Comprehensive metabolic panel; Future

## 2025-03-12 NOTE — ASSESSMENT & PLAN NOTE
Lab Results   Component Value Date    EGFR 70 11/08/2024    EGFR 64 06/17/2024    EGFR 62 02/05/2024    CREATININE 0.84 11/08/2024    CREATININE 0.91 06/17/2024    CREATININE 0.94 02/05/2024     Orders:  •  Comprehensive metabolic panel; Future

## 2025-03-12 NOTE — PROGRESS NOTES
Name: Lakeisha Trejo      : 1955      MRN: [id number]  Encounter Provider: KAYCEE Addison  Encounter Date: 3/12/2025   Encounter department: [facility]  :  Assessment & Plan  Encounter for medical examination to establish care         Essential hypertension    Orders:  •  amLODIPine (NORVASC) 5 mg tablet; Take 1 tablet (5 mg total) by mouth daily  •  Comprehensive metabolic panel; Future    Chronic obstructive pulmonary disease, unspecified COPD type (HCC)         Gastroesophageal reflux disease, unspecified whether esophagitis present         Stage 2 chronic kidney disease  Lab Results   Component Value Date    EGFR 70 2024    EGFR 64 2024    EGFR 62 2024    CREATININE 0.84 2024    CREATININE 0.91 2024    CREATININE 0.94 2024     Orders:  •  Comprehensive metabolic panel; Future    Prediabetes    Orders:  •  Hemoglobin A1C; Future    Hyperlipidemia, unspecified hyperlipidemia type    Orders:  •  Lipid panel; Future          Depression Screening and Follow-up Plan: Patient was screened for depression during today's encounter. They screened negative with a PHQ-2 score of 0.        History of Present Illness   Patient here for visit to establish care. Has been doing well. History of prediabetes, on metformin.    COPD well controlled on Advair and albuterol as needed. Taking singular and xyzal everyday.   Prediabetes- on metformin. HgbA1c 5.6.   HTN- well controlled on amlodipine.   Anxiety- xanax as needed.   Epi pen for bee stings.       Review of Systems   Constitutional: Negative.  Negative for appetite change, chills and fatigue.   HENT:  Negative for ear pain and nosebleeds.    Eyes:  Negative for visual disturbance.   Respiratory:  Negative for cough, shortness of breath and wheezing.    Cardiovascular: Negative.  Negative for chest pain.   Gastrointestinal: Negative.  Negative for abdominal pain, constipation, diarrhea and vomiting.  "  Endocrine: Negative.    Genitourinary:  Negative for difficulty urinating, dysuria and flank pain.   Musculoskeletal: Negative.    Skin: Negative.  Negative for color change and rash.   Neurological:  Negative for dizziness, weakness, numbness and headaches.   Hematological:  Negative for adenopathy.   Psychiatric/Behavioral:  Negative for agitation and confusion. The patient is not nervous/anxious.        Objective   /66   Pulse 84   Temp 97.8 °F (36.6 °C)   Ht 5' 4\" (1.626 m)   Wt 54.9 kg (121 lb)   SpO2 96%   BMI 20.77 kg/m²      Physical Exam  Vitals and nursing note reviewed.   Constitutional:       General: She is not in acute distress.     Appearance: She is well-developed. She is not ill-appearing or diaphoretic.   HENT:      Head: Normocephalic and atraumatic.      Right Ear: Hearing, tympanic membrane and ear canal normal.      Left Ear: Hearing, tympanic membrane and ear canal normal.      Nose: Nose normal. No congestion.      Mouth/Throat:      Pharynx: Uvula midline.   Eyes:      General: Lids are normal.      Conjunctiva/sclera: Conjunctivae normal.   Cardiovascular:      Rate and Rhythm: Normal rate and regular rhythm.      Heart sounds: Normal heart sounds, S1 normal and S2 normal. No murmur heard.     No gallop.   Pulmonary:      Effort: Pulmonary effort is normal. No respiratory distress.      Breath sounds: Normal breath sounds.   Musculoskeletal:         General: No tenderness. Normal range of motion.   Lymphadenopathy:      Cervical: No cervical adenopathy.   Skin:     General: Skin is warm.      Capillary Refill: Capillary refill takes less than 2 seconds.      Findings: No rash.   Neurological:      General: No focal deficit present.      Mental Status: She is alert and oriented to person, place, and time.   Psychiatric:         Behavior: Behavior normal. Behavior is cooperative.         Thought Content: Thought content normal.         Judgment: Judgment normal.         "

## 2025-03-23 ENCOUNTER — HOSPITAL ENCOUNTER (EMERGENCY)
Facility: HOSPITAL | Age: 70
Discharge: HOME | End: 2025-03-23
Attending: EMERGENCY MEDICINE
Payer: COMMERCIAL

## 2025-03-23 ENCOUNTER — APPOINTMENT (EMERGENCY)
Dept: RADIOLOGY | Facility: HOSPITAL | Age: 70
End: 2025-03-23
Payer: COMMERCIAL

## 2025-03-23 VITALS
OXYGEN SATURATION: 96 % | WEIGHT: 128.75 LBS | SYSTOLIC BLOOD PRESSURE: 137 MMHG | RESPIRATION RATE: 18 BRPM | TEMPERATURE: 98 F | HEART RATE: 86 BPM | DIASTOLIC BLOOD PRESSURE: 65 MMHG | BODY MASS INDEX: 22.1 KG/M2

## 2025-03-23 DIAGNOSIS — S30.0XXA CONTUSION OF SACRUM, INITIAL ENCOUNTER: ICD-10-CM
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
## 2025-03-23 PROCEDURE — 72220 X-RAY EXAM SACRUM TAILBONE: CPT

## 2025-03-23 PROCEDURE — 99284 EMERGENCY DEPT VISIT MOD MDM: CPT | Performed by: EMERGENCY MEDICINE

## 2025-03-23 PROCEDURE — 73110 X-RAY EXAM OF WRIST: CPT

## 2025-03-23 PROCEDURE — 99284 EMERGENCY DEPT VISIT MOD MDM: CPT
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
## 2025-03-23 NOTE — DISCHARGE INSTRUCTIONS
Return to the ER for any new, concerning, worsening issues.  Use ice to sore areas 4-6 times a day for 10 to 15 minutes at a time.  Use Motrin or Tylenol as needed for pain.  Follow-up with your family doctor for repeat evaluation in a week.

## 2025-04-01 ENCOUNTER — OFFICE VISIT (OUTPATIENT)
Dept: FAMILY MEDICINE CLINIC | Facility: CLINIC | Age: 70
End: 2025-04-01
Payer: COMMERCIAL

## 2025-04-01 VITALS
HEIGHT: 64 IN | BODY MASS INDEX: 21.85 KG/M2 | WEIGHT: 128 LBS | HEART RATE: 75 BPM | SYSTOLIC BLOOD PRESSURE: 124 MMHG | TEMPERATURE: 98.4 F | RESPIRATION RATE: 18 BRPM | DIASTOLIC BLOOD PRESSURE: 74 MMHG | OXYGEN SATURATION: 98 %

## 2025-04-01 DIAGNOSIS — M25.532 ACUTE PAIN OF LEFT WRIST: ICD-10-CM

## 2025-04-01 DIAGNOSIS — M53.3 PAIN IN SACRUM: ICD-10-CM
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
## 2025-04-01 PROCEDURE — 99213 OFFICE O/P EST LOW 20 MIN: CPT | Performed by: NURSE PRACTITIONER

## 2025-04-01 PROCEDURE — G2211 COMPLEX E/M VISIT ADD ON: HCPCS | Performed by: NURSE PRACTITIONER
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
## 2025-04-22 ENCOUNTER — APPOINTMENT (EMERGENCY)
Dept: RADIOLOGY | Facility: HOSPITAL | Age: 70
DRG: 871 | End: 2025-04-22
Payer: COMMERCIAL

## 2025-04-22 ENCOUNTER — APPOINTMENT (EMERGENCY)
Dept: CT IMAGING | Facility: HOSPITAL | Age: 70
DRG: 871 | End: 2025-04-22
Payer: COMMERCIAL

## 2025-04-22 ENCOUNTER — HOSPITAL ENCOUNTER (INPATIENT)
Facility: HOSPITAL | Age: 70
LOS: 2 days | Discharge: HOME | DRG: 871 | End: 2025-04-24
Attending: EMERGENCY MEDICINE | Admitting: INTERNAL MEDICINE
Payer: COMMERCIAL

## 2025-04-22 DIAGNOSIS — E83.42 HYPOMAGNESEMIA: ICD-10-CM

## 2025-04-22 DIAGNOSIS — J18.9 MULTIFOCAL PNEUMONIA: ICD-10-CM

## 2025-04-22 DIAGNOSIS — A41.9 SEPSIS (HCC): Primary | ICD-10-CM

## 2025-04-22 DIAGNOSIS — J44.1 COPD EXACERBATION (HCC): ICD-10-CM

## 2025-04-22 LAB
2HR DELTA HS TROPONIN: 0 NG/L
4HR DELTA HS TROPONIN: 2 NG/L
ALBUMIN SERPL BCG-MCNC: 3.8 G/DL (ref 3.5–5)
ALP SERPL-CCNC: 119 U/L (ref 34–104)
ALT SERPL W P-5'-P-CCNC: 25 U/L (ref 7–52)
ANION GAP SERPL CALCULATED.3IONS-SCNC: 10 MMOL/L (ref 4–13)
APTT PPP: 30 SECONDS (ref 23–34)
AST SERPL W P-5'-P-CCNC: 23 U/L (ref 13–39)
BASE EX.OXY STD BLDV CALC-SCNC: 67.6 % (ref 60–80)
BASE EXCESS BLDV CALC-SCNC: -1.1 MMOL/L
BASOPHILS # BLD AUTO: 0.04 THOUSANDS/ÂΜL (ref 0–0.1)
BASOPHILS NFR BLD AUTO: 0 % (ref 0–1)
BILIRUB SERPL-MCNC: 1.51 MG/DL (ref 0.2–1)
BILIRUB UR QL STRIP: NEGATIVE
BNP SERPL-MCNC: 68 PG/ML (ref 0–100)
BUN SERPL-MCNC: 11 MG/DL (ref 5–25)
CALCIUM SERPL-MCNC: 9.3 MG/DL (ref 8.4–10.2)
CARDIAC TROPONIN I PNL SERPL HS: 10 NG/L (ref ?–50)
CARDIAC TROPONIN I PNL SERPL HS: 8 NG/L (ref ?–50)
CARDIAC TROPONIN I PNL SERPL HS: 8 NG/L (ref ?–50)
CHLORIDE SERPL-SCNC: 99 MMOL/L (ref 96–108)
CLARITY UR: CLEAR
CO2 SERPL-SCNC: 22 MMOL/L (ref 21–32)
COLOR UR: YELLOW
CREAT SERPL-MCNC: 0.96 MG/DL (ref 0.6–1.3)
EOSINOPHIL # BLD AUTO: 0.15 THOUSAND/ÂΜL (ref 0–0.61)
EOSINOPHIL NFR BLD AUTO: 1 % (ref 0–6)
ERYTHROCYTE [DISTWIDTH] IN BLOOD BY AUTOMATED COUNT: 12.1 % (ref 11.6–15.1)
FLUAV RNA RESP QL NAA+PROBE: NEGATIVE
FLUBV RNA RESP QL NAA+PROBE: NEGATIVE
GFR SERPL CREATININE-BSD FRML MDRD: 59 ML/MIN/1.73SQ M
GLUCOSE SERPL-MCNC: 144 MG/DL (ref 65–140)
GLUCOSE SERPL-MCNC: 166 MG/DL (ref 65–140)
GLUCOSE UR STRIP-MCNC: NEGATIVE MG/DL
HCO3 BLDV-SCNC: 22.1 MMOL/L (ref 24–30)
HCT VFR BLD AUTO: 38.3 % (ref 34.8–46.1)
HGB BLD-MCNC: 12.5 G/DL (ref 11.5–15.4)
HGB UR QL STRIP.AUTO: NEGATIVE
IMM GRANULOCYTES # BLD AUTO: 0.13 THOUSAND/UL (ref 0–0.2)
IMM GRANULOCYTES NFR BLD AUTO: 1 % (ref 0–2)
INR PPP: 1.1 (ref 0.85–1.19)
KETONES UR STRIP-MCNC: NEGATIVE MG/DL
LACTATE SERPL-SCNC: 1.8 MMOL/L (ref 0.5–2)
LEUKOCYTE ESTERASE UR QL STRIP: NEGATIVE
LIPASE SERPL-CCNC: 14 U/L (ref 11–82)
LYMPHOCYTES # BLD AUTO: 1.88 THOUSANDS/ÂΜL (ref 0.6–4.47)
LYMPHOCYTES NFR BLD AUTO: 10 % (ref 14–44)
MAGNESIUM SERPL-MCNC: 1.8 MG/DL (ref 1.9–2.7)
MCH RBC QN AUTO: 29.7 PG (ref 26.8–34.3)
MCHC RBC AUTO-ENTMCNC: 32.6 G/DL (ref 31.4–37.4)
MCV RBC AUTO: 91 FL (ref 82–98)
MONOCYTES # BLD AUTO: 1.48 THOUSAND/ÂΜL (ref 0.17–1.22)
MONOCYTES NFR BLD AUTO: 8 % (ref 4–12)
NEUTROPHILS # BLD AUTO: 14.39 THOUSANDS/ÂΜL (ref 1.85–7.62)
NEUTS SEG NFR BLD AUTO: 80 % (ref 43–75)
NITRITE UR QL STRIP: NEGATIVE
NRBC BLD AUTO-RTO: 0 /100 WBCS
O2 CT BLDV-SCNC: 12.6 ML/DL
PCO2 BLDV: 32.6 MM HG (ref 42–50)
PH BLDV: 7.45 [PH] (ref 7.3–7.4)
PH UR STRIP.AUTO: 6 [PH]
PLATELET # BLD AUTO: 202 THOUSANDS/UL (ref 149–390)
PMV BLD AUTO: 11.4 FL (ref 8.9–12.7)
PO2 BLDV: 31.8 MM HG (ref 35–45)
POTASSIUM SERPL-SCNC: 3.9 MMOL/L (ref 3.5–5.3)
PROCALCITONIN SERPL-MCNC: 0.33 NG/ML
PROT SERPL-MCNC: 7.2 G/DL (ref 6.4–8.4)
PROT UR STRIP-MCNC: NEGATIVE MG/DL
PROTHROMBIN TIME: 14.8 SECONDS (ref 12.3–15)
RBC # BLD AUTO: 4.21 MILLION/UL (ref 3.81–5.12)
RSV RNA RESP QL NAA+PROBE: NEGATIVE
SARS-COV-2 RNA RESP QL NAA+PROBE: NEGATIVE
SODIUM SERPL-SCNC: 131 MMOL/L (ref 135–147)
SP GR UR STRIP.AUTO: <=1.005
UROBILINOGEN UR QL STRIP.AUTO: 0.2 E.U./DL
WBC # BLD AUTO: 18.07 THOUSAND/UL (ref 4.31–10.16)

## 2025-04-22 PROCEDURE — 0241U HB NFCT DS VIR RESP RNA 4 TRGT: CPT | Performed by: PHYSICIAN ASSISTANT

## 2025-04-22 PROCEDURE — 83036 HEMOGLOBIN GLYCOSYLATED A1C: CPT | Performed by: PHYSICIAN ASSISTANT

## 2025-04-22 PROCEDURE — 81003 URINALYSIS AUTO W/O SCOPE: CPT | Performed by: PHYSICIAN ASSISTANT

## 2025-04-22 PROCEDURE — 96367 TX/PROPH/DG ADDL SEQ IV INF: CPT

## 2025-04-22 PROCEDURE — 80053 COMPREHEN METABOLIC PANEL: CPT | Performed by: PHYSICIAN ASSISTANT

## 2025-04-22 PROCEDURE — 94664 DEMO&/EVAL PT USE INHALER: CPT

## 2025-04-22 PROCEDURE — 36415 COLL VENOUS BLD VENIPUNCTURE: CPT | Performed by: PHYSICIAN ASSISTANT

## 2025-04-22 PROCEDURE — 96375 TX/PRO/DX INJ NEW DRUG ADDON: CPT

## 2025-04-22 PROCEDURE — 94640 AIRWAY INHALATION TREATMENT: CPT

## 2025-04-22 PROCEDURE — 99223 1ST HOSP IP/OBS HIGH 75: CPT | Performed by: INTERNAL MEDICINE

## 2025-04-22 PROCEDURE — 83880 ASSAY OF NATRIURETIC PEPTIDE: CPT | Performed by: PHYSICIAN ASSISTANT

## 2025-04-22 PROCEDURE — 85025 COMPLETE CBC W/AUTO DIFF WBC: CPT | Performed by: PHYSICIAN ASSISTANT

## 2025-04-22 PROCEDURE — 83605 ASSAY OF LACTIC ACID: CPT | Performed by: PHYSICIAN ASSISTANT

## 2025-04-22 PROCEDURE — 82948 REAGENT STRIP/BLOOD GLUCOSE: CPT

## 2025-04-22 PROCEDURE — 96365 THER/PROPH/DIAG IV INF INIT: CPT

## 2025-04-22 PROCEDURE — 87040 BLOOD CULTURE FOR BACTERIA: CPT | Performed by: PHYSICIAN ASSISTANT

## 2025-04-22 PROCEDURE — 99285 EMERGENCY DEPT VISIT HI MDM: CPT | Performed by: EMERGENCY MEDICINE

## 2025-04-22 PROCEDURE — 83735 ASSAY OF MAGNESIUM: CPT | Performed by: PHYSICIAN ASSISTANT

## 2025-04-22 PROCEDURE — 87449 NOS EACH ORGANISM AG IA: CPT | Performed by: INTERNAL MEDICINE

## 2025-04-22 PROCEDURE — 84145 PROCALCITONIN (PCT): CPT | Performed by: PHYSICIAN ASSISTANT

## 2025-04-22 PROCEDURE — 83690 ASSAY OF LIPASE: CPT | Performed by: PHYSICIAN ASSISTANT

## 2025-04-22 PROCEDURE — 71046 X-RAY EXAM CHEST 2 VIEWS: CPT

## 2025-04-22 PROCEDURE — 85610 PROTHROMBIN TIME: CPT | Performed by: PHYSICIAN ASSISTANT

## 2025-04-22 PROCEDURE — 94760 N-INVAS EAR/PLS OXIMETRY 1: CPT

## 2025-04-22 PROCEDURE — 85730 THROMBOPLASTIN TIME PARTIAL: CPT | Performed by: PHYSICIAN ASSISTANT

## 2025-04-22 PROCEDURE — 71275 CT ANGIOGRAPHY CHEST: CPT

## 2025-04-22 PROCEDURE — 93005 ELECTROCARDIOGRAM TRACING: CPT

## 2025-04-22 PROCEDURE — 82805 BLOOD GASES W/O2 SATURATION: CPT | Performed by: PHYSICIAN ASSISTANT

## 2025-04-22 PROCEDURE — 84484 ASSAY OF TROPONIN QUANT: CPT | Performed by: PHYSICIAN ASSISTANT

## 2025-04-22 PROCEDURE — 99285 EMERGENCY DEPT VISIT HI MDM: CPT

## 2025-04-22 PROCEDURE — 96360 HYDRATION IV INFUSION INIT: CPT

## 2025-04-22 PROCEDURE — 74177 CT ABD & PELVIS W/CONTRAST: CPT

## 2025-04-22 RX ORDER — ONDANSETRON 2 MG/ML
4 INJECTION INTRAMUSCULAR; INTRAVENOUS EVERY 6 HOURS PRN
Status: DISCONTINUED | OUTPATIENT
Start: 2025-04-22 | End: 2025-04-24 | Stop reason: HOSPADM

## 2025-04-22 RX ORDER — ALBUTEROL SULFATE 90 UG/1
2 INHALANT RESPIRATORY (INHALATION) EVERY 6 HOURS PRN
Status: DISCONTINUED | OUTPATIENT
Start: 2025-04-22 | End: 2025-04-24 | Stop reason: HOSPADM

## 2025-04-22 RX ORDER — ACETAMINOPHEN 325 MG/1
650 TABLET ORAL EVERY 6 HOURS PRN
Status: DISCONTINUED | OUTPATIENT
Start: 2025-04-22 | End: 2025-04-24 | Stop reason: HOSPADM

## 2025-04-22 RX ORDER — INSULIN LISPRO 100 [IU]/ML
1-5 INJECTION, SOLUTION INTRAVENOUS; SUBCUTANEOUS
Status: DISCONTINUED | OUTPATIENT
Start: 2025-04-23 | End: 2025-04-24 | Stop reason: HOSPADM

## 2025-04-22 RX ORDER — METHYLPREDNISOLONE SODIUM SUCCINATE 40 MG/ML
40 INJECTION, POWDER, LYOPHILIZED, FOR SOLUTION INTRAMUSCULAR; INTRAVENOUS ONCE
Status: COMPLETED | OUTPATIENT
Start: 2025-04-22 | End: 2025-04-22

## 2025-04-22 RX ORDER — SODIUM CHLORIDE, SODIUM GLUCONATE, SODIUM ACETATE, POTASSIUM CHLORIDE, MAGNESIUM CHLORIDE, SODIUM PHOSPHATE, DIBASIC, AND POTASSIUM PHOSPHATE .53; .5; .37; .037; .03; .012; .00082 G/100ML; G/100ML; G/100ML; G/100ML; G/100ML; G/100ML; G/100ML
75 INJECTION, SOLUTION INTRAVENOUS ONCE
Status: COMPLETED | OUTPATIENT
Start: 2025-04-22 | End: 2025-04-23

## 2025-04-22 RX ORDER — MONTELUKAST SODIUM 10 MG/1
10 TABLET ORAL
Status: DISCONTINUED | OUTPATIENT
Start: 2025-04-22 | End: 2025-04-24 | Stop reason: HOSPADM

## 2025-04-22 RX ORDER — ASPIRIN 81 MG/1
81 TABLET, CHEWABLE ORAL DAILY
Status: DISCONTINUED | OUTPATIENT
Start: 2025-04-23 | End: 2025-04-24 | Stop reason: HOSPADM

## 2025-04-22 RX ORDER — MAGNESIUM SULFATE 1 G/100ML
1 INJECTION INTRAVENOUS ONCE
Status: COMPLETED | OUTPATIENT
Start: 2025-04-22 | End: 2025-04-22

## 2025-04-22 RX ORDER — LEVALBUTEROL INHALATION SOLUTION 1.25 MG/3ML
1.25 SOLUTION RESPIRATORY (INHALATION)
Status: DISCONTINUED | OUTPATIENT
Start: 2025-04-22 | End: 2025-04-24 | Stop reason: HOSPADM

## 2025-04-22 RX ORDER — ALPRAZOLAM 0.25 MG
0.25 TABLET ORAL
Status: DISCONTINUED | OUTPATIENT
Start: 2025-04-22 | End: 2025-04-24 | Stop reason: HOSPADM

## 2025-04-22 RX ORDER — INSULIN LISPRO 100 [IU]/ML
1-5 INJECTION, SOLUTION INTRAVENOUS; SUBCUTANEOUS
Status: DISCONTINUED | OUTPATIENT
Start: 2025-04-22 | End: 2025-04-24 | Stop reason: HOSPADM

## 2025-04-22 RX ORDER — IPRATROPIUM BROMIDE AND ALBUTEROL SULFATE 2.5; .5 MG/3ML; MG/3ML
3 SOLUTION RESPIRATORY (INHALATION) ONCE
Status: COMPLETED | OUTPATIENT
Start: 2025-04-22 | End: 2025-04-22

## 2025-04-22 RX ORDER — AMLODIPINE BESYLATE 5 MG/1
5 TABLET ORAL DAILY
Status: DISCONTINUED | OUTPATIENT
Start: 2025-04-23 | End: 2025-04-24 | Stop reason: HOSPADM

## 2025-04-22 RX ORDER — FLUTICASONE FUROATE AND VILANTEROL 200; 25 UG/1; UG/1
1 POWDER RESPIRATORY (INHALATION)
Status: DISCONTINUED | OUTPATIENT
Start: 2025-04-23 | End: 2025-04-24 | Stop reason: HOSPADM

## 2025-04-22 RX ADMIN — SODIUM CHLORIDE, SODIUM GLUCONATE, SODIUM ACETATE, POTASSIUM CHLORIDE, MAGNESIUM CHLORIDE, SODIUM PHOSPHATE, DIBASIC, AND POTASSIUM PHOSPHATE 75 ML/HR: .53; .5; .37; .037; .03; .012; .00082 INJECTION, SOLUTION INTRAVENOUS at 18:31

## 2025-04-22 RX ADMIN — LEVALBUTEROL HYDROCHLORIDE 1.25 MG: 1.25 SOLUTION RESPIRATORY (INHALATION) at 20:16

## 2025-04-22 RX ADMIN — IOHEXOL 100 ML: 350 INJECTION, SOLUTION INTRAVENOUS at 15:17

## 2025-04-22 RX ADMIN — CEFTRIAXONE SODIUM 1000 MG: 10 INJECTION, POWDER, FOR SOLUTION INTRAVENOUS at 14:27

## 2025-04-22 RX ADMIN — MONTELUKAST 10 MG: 10 TABLET, FILM COATED ORAL at 21:18

## 2025-04-22 RX ADMIN — IPRATROPIUM BROMIDE AND ALBUTEROL SULFATE 3 ML: 2.5; .5 SOLUTION RESPIRATORY (INHALATION) at 15:07

## 2025-04-22 RX ADMIN — METHYLPREDNISOLONE SODIUM SUCCINATE 40 MG: 40 INJECTION, POWDER, FOR SOLUTION INTRAMUSCULAR; INTRAVENOUS at 15:05

## 2025-04-22 RX ADMIN — MAGNESIUM SULFATE HEPTAHYDRATE 1 G: 1 INJECTION, SOLUTION INTRAVENOUS at 15:05

## 2025-04-22 RX ADMIN — SODIUM CHLORIDE 1000 ML: 0.9 INJECTION, SOLUTION INTRAVENOUS at 14:18

## 2025-04-22 NOTE — ASSESSMENT & PLAN NOTE
No obvious signs of acute exacerbation  Continue home inhalers.  Fluticasone/vilanterol substituted for patient's home Advair  Respiratory protocol

## 2025-04-22 NOTE — PLAN OF CARE
Problem: Potential for Falls  Goal: Patient will remain free of falls  Description: INTERVENTIONS:- Educate patient/family on patient safety including physical limitations- Instruct patient to call for assistance with activity - Consult OT/PT to assist with strengthening/mobility - Keep Call bell within reach- Keep bed low and locked with side rails adjusted as appropriate- Keep care items and personal belongings within reach- Initiate and maintain comfort rounds- Make Fall Risk Sign visible to staff- Offer Toileting every 2 Hours, in advance of need- Initiate/Maintain alarms- Obtain necessary fall risk management equipment: - Apply yellow socks and bracelet for high fall risk patients- Consider moving patient to room near nurses station    Outcome: Not Progressing     Problem: INFECTION - ADULT  Goal: Absence of fever/infection during neutropenic period  Description: INTERVENTIONS:- Monitor WBC  Outcome: Not Progressing     Problem: RESPIRATORY - ADULT  Goal: Achieves optimal ventilation and oxygenation  Description: INTERVENTIONS:- Assess for changes in respiratory status- Assess for changes in mentation and behavior- Position to facilitate oxygenation and minimize respiratory effort- Oxygen administered by appropriate delivery if ordered- Initiate smoking cessation education as indicated- Encourage broncho-pulmonary hygiene including cough, deep breathe, Incentive Spirometry- Assess the need for suctioning and aspirate as needed- Assess and instruct to report SOB or any respiratory difficulty- Respiratory Therapy support as indicated  Outcome: Not Progressing      wheezes

## 2025-04-22 NOTE — ASSESSMENT & PLAN NOTE
Secondary to pneumonia with tachycardia and tachypnea on admission  CT imaging with multifocal pneumonia  Trend procalcitonin  Continue antibiotic with ceftriaxone 1 g daily  Check strep pneumo/urine Legionella  Initial lactate normal  Will give 1 L Isolyte in addition to fluids received in the ER  Follow-up cultures  Check sputum culture

## 2025-04-22 NOTE — ED PROVIDER NOTES
Time reflects when diagnosis was documented in both MDM as applicable and the Disposition within this note       Time User Action Codes Description Comment    4/22/2025  5:05 PM Ruff, Hang Add [A41.9] Sepsis (HCC)     4/22/2025  5:06 PM Ruff, Hang Add [J18.9] Multifocal pneumonia     4/22/2025  5:06 PM Ruff, Hang Add [J44.1] COPD exacerbation (HCC)     4/22/2025  5:06 PM Ruff, Hang Add [E83.42] Hypomagnesemia           ED Disposition       ED Disposition   Admit    Condition   Stable    Date/Time   Tue Apr 22, 2025  5:05 PM    Comment   Case was discussed with Dr. Nicolas and the patient's admission status was agreed to be Admission Status: inpatient status to the service of Dr. Nicolas .               Assessment & Plan       Medical Decision Making  70-year-old female presenting to the emergency room today for evaluation of shortness of breath, generalized weakness, and not feeling well over the last few days.  She has a history of COPD and asthma which she has been trying to use her inhalers at home but has not been able to get her symptoms under control.  Has had a productive cough with green tinge sputum.  Denies any hemoptysis.  Denies any recent surgeries.  Denies any traumas.  Denies any numbness or tingling.  She has had low-grade.  At home, and today on arrival she is 99.5 °F, tachycardic, and tachypneic with a pulse ox of 90% on room air.  Sepsis alert was activated.  New oxygen requirement of 2 L nasal cannula.  She took her inhaler prior to arrival without any relief.  A DuoNeb was given as well as IV steroids to treat suspected COPD exacerbation, empiric IV antibiotics with Rocephin was administered to cover for community-acquired pneumonia.  She was noted to have a significant leukocytosis here today with a left shift.  Hypomagnesemia, in which IV magnesium was given.  On examination she does have rhonchi primarily over the left side of the lungs, and chest film was ordered.  This shows on my  independent interpretation left lower lobe pneumonia.  CT pulmonary embolism scan was done to rule out pulmonary infarction, pulmonary embolism, or other complicating factors which does show a multifocal pneumonia.  Given her age, comorbidities, and her labs here today decision was made to do inpatient therapy with telemetry monitoring for her sepsis due to multifocal pneumonia.  Advised patient of all incidental findings of the nodules and lymph nodes seen on imaging.    Amount and/or Complexity of Data Reviewed  Labs: ordered.  Radiology: ordered. Decision-making details documented in ED Course.    Risk  Prescription drug management.  Decision regarding hospitalization.        ED Course as of 04/22/25 1716   Tue Apr 22, 2025   1632 CT pe study w abdomen pelvis w contrast       Medications   sodium chloride 0.9 % bolus 1,000 mL (0 mL Intravenous Stopped 4/22/25 1525)   ceftriaxone (ROCEPHIN) 1 g/50 mL in dextrose IVPB (0 mg Intravenous Stopped 4/22/25 1457)   ipratropium-albuterol (DUO-NEB) 0.5-2.5 mg/3 mL inhalation solution 3 mL (3 mL Nebulization Given 4/22/25 1507)   methylPREDNISolone sodium succinate (Solu-MEDROL) injection 40 mg (40 mg Intravenous Given 4/22/25 1505)   magnesium sulfate IVPB (premix) SOLN 1 g (0 g Intravenous Stopped 4/22/25 1605)   iohexol (OMNIPAQUE) 350 MG/ML injection (MULTI-DOSE) 100 mL (100 mL Intravenous Given 4/22/25 1517)       ED Risk Strat Scores   HEART Risk Score      Flowsheet Row Most Recent Value   Heart Score Risk Calculator    History 0 Filed at: 04/22/2025 1716   ECG 1 Filed at: 04/22/2025 1716   Age 1 Filed at: 04/22/2025 1716   Risk Factors 2 Filed at: 04/22/2025 1716   Troponin 0 Filed at: 04/22/2025 1716   HEART Score 4 Filed at: 04/22/2025 1716          HEART Risk Score      Flowsheet Row Most Recent Value   Heart Score Risk Calculator    History 0 Filed at: 04/22/2025 1716   ECG 1 Filed at: 04/22/2025 1716   Age 1 Filed at: 04/22/2025 1716   Risk Factors 2 Filed at:  04/22/2025 1716   Troponin 0 Filed at: 04/22/2025 1716   HEART Score 4 Filed at: 04/22/2025 1716                      No data recorded        SBIRT 22yo+      Flowsheet Row Most Recent Value   Initial Alcohol Screen: US AUDIT-C     1. How often do you have a drink containing alcohol? 0 Filed at: 04/22/2025 1351   2. How many drinks containing alcohol do you have on a typical day you are drinking?  0 Filed at: 04/22/2025 1351   3a. Male UNDER 65: How often do you have five or more drinks on one occasion? 0 Filed at: 04/22/2025 6239   3b. FEMALE Any Age, or MALE 65+: How often do you have 4 or more drinks on one occassion? 0 Filed at: 04/22/2025 1359   Audit-C Score 0 Filed at: 04/22/2025 1350   GIULIANA: How many times in the past year have you...    Used an illegal drug or used a prescription medication for non-medical reasons? Never Filed at: 04/22/2025 5573                            History of Present Illness       Chief Complaint   Patient presents with    Shortness of Breath    Weakness - Generalized     According to the patient she has SOB with weakness since Sunday.  Patient has hx of COPD and asthma.       Past Medical History:   Diagnosis Date    Acute bronchitis     Acute pharyngitis     Anxiety state     Arthropathy of ankle and foot     and/or    Asthma     Asthma without status asthmaticus     Carotid artery occlusion     COPD (chronic obstructive pulmonary disease) (HCC)     Cough     COVID-19 vaccine series completed     Disorder of shoulder     Dyspnea     Hand joint pain     Heartburn     Herpes simplex without complication     Hyperlipidemia     Infection of skin and subcutaneous tissue     Localized superficial swelling of skin     Low back pain     Lymphadenopathy     Malaise and fatigue     Neck pain     Osteoarthritis     Pleurisy without effusion or active tuberculosis     Pneumonia     Right upper quadrant pain     Shoulder joint pain     Skin eruption     Vitamin D deficiency     Vomiting        Past Surgical History:   Procedure Laterality Date    BREAST BIOPSY Left 2017 benign    CHOLECYSTECTOMY      CHOLECYSTECTOMY  03/2014    Dr. Henry     COLONOSCOPY  02/2013    WNL    FL LUMBAR PUNCTURE DIAGNOSTIC  05/25/2021    HYSTERECTOMY      Total     NASAL POLYP EXCISION Left 2009    with lysis of intranasal adhesions from packing      Family History   Problem Relation Age of Onset    Diabetes Mother         Non-insulin dependent diabetes    Emphysema Father     No Known Problems Sister     No Known Problems Daughter     No Known Problems Maternal Grandmother     No Known Problems Paternal Grandmother     No Known Problems Sister     No Known Problems Sister     No Known Problems Daughter     No Known Problems Daughter     No Known Problems Maternal Aunt       Social History     Tobacco Use    Smoking status: Never    Smokeless tobacco: Never   Vaping Use    Vaping status: Never Used   Substance Use Topics    Alcohol use: Yes     Alcohol/week: 21.0 standard drinks of alcohol     Types: 21 Cans of beer per week     Comment: social    Drug use: Never      E-Cigarette/Vaping    E-Cigarette Use Never User       E-Cigarette/Vaping Substances    Nicotine No     THC No     CBD No     Flavoring No       I have reviewed and agree with the history as documented.       History provided by:  Patient   used: No    Shortness of Breath  Severity:  Moderate  Onset quality:  Gradual  Duration:  4 days  Timing:  Constant  Progression:  Worsening  Chronicity:  New  Context: activity, URI and weather changes    Associated symptoms: sputum production and wheezing    Associated symptoms: no abdominal pain, no chest pain, no fever, no headaches, no hemoptysis and no vomiting        Review of Systems   Constitutional:  Positive for chills. Negative for fever.   Respiratory:  Positive for sputum production, chest tightness, shortness of breath and wheezing. Negative for hemoptysis.    Cardiovascular:  Negative for  chest pain.   Gastrointestinal:  Negative for abdominal pain, nausea and vomiting.   Neurological:  Negative for headaches.   All other systems reviewed and are negative.          Objective       ED Triage Vitals   Temperature Pulse Blood Pressure Respirations SpO2 Patient Position - Orthostatic VS   04/22/25 1355 04/22/25 1355 04/22/25 1355 04/22/25 1355 04/22/25 1355 04/22/25 1355   99.9 °F (37.7 °C) (!) 108 124/63 (!) 24 94 % Lying      Temp Source Heart Rate Source BP Location FiO2 (%) Pain Score    04/22/25 1355 04/22/25 1415 04/22/25 1355 -- 04/22/25 1355    Oral Monitor Right arm  No Pain      Vitals      Date and Time Temp Pulse SpO2 Resp BP Pain Score FACES Pain Rating User   04/22/25 1630 -- 100 95 % 24 108/55 -- -- EM   04/22/25 1615 99.5 °F (37.5 °C) 103 94 % 24 119/57 -- -- EM   04/22/25 1600 -- 104 90 % 22 123/59 -- -- EM   04/22/25 1545 -- 105 92 % 22 118/58 -- -- EM   04/22/25 1530 -- 101 100 % 24 103/54 -- -- EM   04/22/25 1445 -- 98 94 % 24 117/63 -- -- EM   04/22/25 1430 -- 110 94 % 23 118/57 -- -- EM   04/22/25 1415 -- 109 94 % 24 133/63 -- -- EM   04/22/25 1355 99.9 °F (37.7 °C) 108 94 % 24 124/63 No Pain -- [facility]            Physical Exam  Vitals and nursing note reviewed.   Constitutional:       General: She is not in acute distress.     Appearance: She is well-developed.   HENT:      Head: Normocephalic and atraumatic.   Eyes:      Conjunctiva/sclera: Conjunctivae normal.   Cardiovascular:      Rate and Rhythm: Normal rate and regular rhythm.      Heart sounds: No murmur heard.  Pulmonary:      Effort: Pulmonary effort is normal. No respiratory distress.      Breath sounds: Examination of the left-middle field reveals rhonchi and rales. Examination of the right-lower field reveals rhonchi. Examination of the left-lower field reveals rhonchi and rales. Rhonchi and rales present. No decreased breath sounds or wheezing.   Chest:      Chest wall: No tenderness.   Abdominal:      Palpations: Abdomen  is soft.      Tenderness: There is no abdominal tenderness.   Musculoskeletal:         General: No swelling.      Cervical back: Neck supple.   Skin:     General: Skin is warm and dry.      Capillary Refill: Capillary refill takes less than 2 seconds.   Neurological:      Mental Status: She is alert.   Psychiatric:         Mood and Affect: Mood normal.         Results Reviewed       Procedure Component Value Units Date/Time    UA w Reflex to Microscopic w Reflex to Culture [[id number]]  (Abnormal) Collected: 04/22/25 1647    Lab Status: Final result Specimen: Urine, Clean Catch Updated: 04/22/25 1711     Color, UA Yellow     Clarity, UA Clear     Specific Gravity, UA <=1.005     pH, UA 6.0     Leukocytes, UA Negative     Nitrite, UA Negative     Protein, UA Negative mg/dl      Glucose, UA Negative mg/dl      Ketones, UA Negative mg/dl      Urobilinogen, UA 0.2 E.U./dl      Bilirubin, UA Negative     Occult Blood, UA Negative    HS Troponin I 2hr [[id number]]  (Normal) Collected: 04/22/25 1613    Lab Status: Final result Specimen: Blood from Arm, Right Updated: 04/22/25 1703     hs TnI 2hr 8 ng/L      Delta 2hr hsTnI 0 ng/L     HS Troponin I 4hr [[id number]]     Lab Status: No result Specimen: Blood     FLU/RSV/COVID - if FLU/RSV clinically relevant [[id number]]  (Normal) Collected: 04/22/25 1417    Lab Status: Final result Specimen: Nares from Nose Updated: 04/22/25 1509     SARS-CoV-2 Negative     INFLUENZA A PCR Negative     INFLUENZA B PCR Negative     RSV PCR Negative    Narrative:      This test has been performed using the CoV-2/Flu/RSV plus assay on the [employer] Gene[employer]pert platform. This test has been validated by the  and verified by the performing laboratory.     This test is designed to amplify and detect the following: nucleocapsid (N), envelope (E), and RNA-dependent RNA polymerase (RdRP) genes of the SARS-CoV-2 genome; matrix (M), basic polymerase (PB2), and acidic protein (PA) segments of the  influenza A genome; matrix (M) and non-structural protein (NS) segments of the influenza B genome, and the nucleocapsid genes of RSV A and RSV B.     Positive results are indicative of the presence of Flu A, Flu B, RSV, and/or SARS-CoV-2 RNA. Positive results for SARS-CoV-2 or suspected novel influenza should be reported to state, local, or federal health departments according to local reporting requirements.      All results should be assessed in conjunction with clinical presentation and other laboratory markers for clinical management.     FOR PEDIATRIC PATIENTS - copy/paste COVID Guidelines URL to browser: [email or web address]       Procalcitonin [[id number]]  (Abnormal) Collected: 04/22/25 1417    Lab Status: Final result Specimen: Blood from Arm, Right Updated: 04/22/25 1501     Procalcitonin 0.33 ng/ml     B-Type Natriuretic Peptide(BNP) [[id number]]  (Normal) Collected: 04/22/25 1417    Lab Status: Final result Specimen: Blood from Arm, Right Updated: 04/22/25 1457     BNP 68 pg/mL     HS Troponin 0hr (reflex protocol) [[id number]]  (Normal) Collected: 04/22/25 1417    Lab Status: Final result Specimen: Blood from Arm, Right Updated: 04/22/25 1456     hs TnI 0hr 8 ng/L     Lipase [[id number]]  (Normal) Collected: 04/22/25 1417    Lab Status: Final result Specimen: Blood from Arm, Right Updated: 04/22/25 1451     Lipase 14 u/L     Comprehensive metabolic panel [[id number]]  (Abnormal) Collected: 04/22/25 1417    Lab Status: Final result Specimen: Blood from Arm, Right Updated: 04/22/25 1451     Sodium 131 mmol/L      Potassium 3.9 mmol/L      Chloride 99 mmol/L      CO2 22 mmol/L      ANION GAP 10 mmol/L      BUN 11 mg/dL      Creatinine 0.96 mg/dL      Glucose 144 mg/dL      Calcium 9.3 mg/dL      AST 23 U/L      ALT 25 U/L      Alkaline Phosphatase 119 U/L      Total Protein 7.2 g/dL      Albumin 3.8 g/dL      Total Bilirubin 1.51 mg/dL      eGFR 59  ml/min/1.73sq m     Narrative:      National Kidney Disease Foundation guidelines for Chronic Kidney Disease (CKD):     Stage 1 with normal or high GFR (GFR > 90 mL/min/1.73 square meters)    Stage 2 Mild CKD (GFR = 60-89 mL/min/1.73 square meters)    Stage 3A Moderate CKD (GFR = 45-59 mL/min/1.73 square meters)    Stage 3B Moderate CKD (GFR = 30-44 mL/min/1.73 square meters)    Stage 4 Severe CKD (GFR = 15-29 mL/min/1.73 square meters)    Stage 5 End Stage CKD (GFR <15 mL/min/1.73 square meters)  Note: GFR calculation is accurate only with a steady state creatinine    Magnesium [[id number]]  (Abnormal) Collected: 04/22/25 1417    Lab Status: Final result Specimen: Blood from Arm, Right Updated: 04/22/25 1451     Magnesium 1.8 mg/dL     Lactic acid [[id number]]  (Normal) Collected: 04/22/25 1417    Lab Status: Final result Specimen: Blood from Arm, Right Updated: 04/22/25 1450     LACTIC ACID 1.8 mmol/L     Narrative:      Result may be elevated if tourniquet was used during collection.    Protime-INR [[id number]]  (Normal) Collected: 04/22/25 1417    Lab Status: Final result Specimen: Blood from Arm, Right Updated: 04/22/25 1444     Protime 14.8 seconds      INR 1.10    Narrative:      INR Therapeutic Range    Indication                                             INR Range      Atrial Fibrillation                                               2.0-3.0  Hypercoagulable State                                    2.0.2.3  Left Ventricular Asist Device                            2.0-3.0  Mechanical Heart Valve                                  -    Aortic(with afib, MI, embolism, HF, LA enlargement,    and/or coagulopathy)                                     2.0-3.0 (2.5-3.5)     Mitral                                                             2.5-3.5  Prosthetic/Bioprosthetic Heart Valve               2.0-3.0  Venous thromboembolism (VTE: VT, PE        2.0-3.0    APTT [[id number]]  (Normal) Collected: 04/22/25 1417    Lab  Status: Final result Specimen: Blood from Arm, Right Updated: 04/22/25 1444     PTT 30 seconds     Blood gas, Venous [[id number]]  (Abnormal) Collected: 04/22/25 1417    Lab Status: Final result Specimen: Blood from Arm, Right Updated: 04/22/25 1431     pH, Gael 7.450     pCO2, Gael 32.6 mm Hg      pO2, Gael 31.8 mm Hg      HCO3, Gael 22.1 mmol/L      Base Excess, Gael -1.1 mmol/L      O2 Content, Gael 12.6 ml/dL      O2 HGB, VENOUS 67.6 %     CBC and differential [[id number]]  (Abnormal) Collected: 04/22/25 1417    Lab Status: Final result Specimen: Blood from Arm, Right Updated: 04/22/25 1430     WBC 18.07 Thousand/uL      RBC 4.21 Million/uL      Hemoglobin 12.5 g/dL      Hematocrit 38.3 %      MCV 91 fL      MCH 29.7 pg      MCHC 32.6 g/dL      RDW 12.1 %      MPV 11.4 fL      Platelets 202 Thousands/uL      nRBC 0 /100 WBCs      Segmented % 80 %      Immature Grans % 1 %      Lymphocytes % 10 %      Monocytes % 8 %      Eosinophils Relative 1 %      Basophils Relative 0 %      Absolute Neutrophils 14.39 Thousands/µL      Absolute Immature Grans 0.13 Thousand/uL      Absolute Lymphocytes 1.88 Thousands/µL      Absolute Monocytes 1.48 Thousand/µL      Eosinophils Absolute 0.15 Thousand/µL      Basophils Absolute 0.04 Thousands/µL     Blood culture #2 [[id number]] Collected: 04/22/25 1417    Lab Status: In process Specimen: Blood from Arm, Right Updated: 04/22/25 1427    Blood culture #1 [[id number]] Collected: 04/22/25 1417    Lab Status: In process Specimen: Blood from Hand, Left Updated: 04/22/25 1427            CT pe study w abdomen pelvis w contrast   Final Interpretation by Chacho Mcguire MD (04/22 1636)      1.  Interval worsening of the diffuse centrilobular nodular and patchy nodular opacities throughout both lungs. This is consistent with nonspecific multifocal pneumonia. There is also new low-grade mediastinal and hilar lymphadenopathy, likely reactive.    A contrast-enhanced follow-up chest CT in 4 to 6  weeks is recommended for further evaluation.   2.  Please refer to the report body for description of other incidental, chronic and/or benign findings.      The study was marked in EPIC for significant notification.               Workstation performed: [id number]         XR chest pa & lateral    (Results Pending)     Sepsis Note   Lakeisha Trejo 70 y.o. female MRN: [id number]  Unit/Bed#: ED 27 Encounter: [id number]          Sepsis Reassessment:    5:16 PM  Post infusion pt has improved  Heart rate improved   Mucus membranes moist  Mild tachypnea  No rales  Normal strength and sensation in BUE BLE  Abd soft non tender  Cap refill 1-2seconds    Feels better. Not overloaded with volume. Normal lactate    Procedures    ED Medication and Procedure Management   Prior to Admission Medications   Prescriptions Last Dose Informant Patient Reported? Taking?   ALPRAZolam (XANAX) 0.25 mg tablet  Self No No   Sig: Take 1 tablet (0.25 mg total) by mouth daily at bedtime as needed for anxiety   EPINEPHrine (EPIPEN) 0.3 mg/0.3 mL SOAJ   No No   Sig: Inject 0.3 mL (0.3 mg total) into a muscle once for 1 dose   Fluticasone-Salmeterol (Advair Diskus) 500-50 mcg/dose inhaler   No No   Sig: Inhale 1 puff 2 (two) times a day Rinse mouth after use.   VITAMIN D PO  Self Yes No   Sig: Take by mouth   albuterol (2.5 mg/3 mL) 0.083 % nebulizer solution  Self No No   Sig: inhale contents of 1 vial ( 3 MILLILITERS ) in nebulizer by mouth and INTO THE LUNGS four times a day   albuterol (ProAir HFA) 90 mcg/act inhaler  Self No No   Sig: Inhale 2 puffs every 6 (six) hours as needed for wheezing   amLODIPine (NORVASC) 5 mg tablet   No No   Sig: Take 1 tablet (5 mg total) by mouth daily   aspirin 81 mg chewable tablet  Self Yes No   Sig: Chew 81 mg daily   levocetirizine (XYZAL) 5 MG tablet  Self No No   Sig: Take 0.5 tablets (2.5 mg total) by mouth every evening   metFORMIN (GLUCOPHAGE) 500 mg tablet  Self No No   Sig: Take 1 tablet (500 mg total)  by mouth 2 (two) times a day with meals   montelukast (SINGULAIR) 10 mg tablet  Self No No   Sig: Take 1 tablet (10 mg total) by mouth daily at bedtime   nystatin (MYCOSTATIN) cream  Self No No   Sig: Apply topically 2 (two) times a day . Apply for additional 2 weeks after rash resolves.      Facility-Administered Medications Last Administration Doses Remaining   predniSONE tablet 40 mg 5/23/2024  9:46 AM         Patient's Medications   Discharge Prescriptions    No medications on file     No discharge procedures on file.  ED SEPSIS DOCUMENTATION   Time reflects when diagnosis was documented in both MDM as applicable and the Disposition within this note       Time User Action Codes Description Comment    4/22/2025  5:05 PM Hang Ruff [A41.9] Sepsis (HCC)     4/22/2025  5:06 PM Hang Ruff [J18.9] Multifocal pneumonia     4/22/2025  5:06 PM Hang Ruff [J44.1] COPD exacerbation ([facility])     4/22/2025  5:06 PM Hang Ruff [E83.42] Hypomagnesemia            Initial Sepsis Screening       Row Name 04/22/25 1503 04/22/25 1400             Is the patient's history suggestive of a new or worsening infection? Yes (Proceed)  -CR Yes (Proceed)  -CR       Suspected source of infection pneumonia  -CR pneumonia  -CR       Indicate SIRS criteria Leukocytosis (WBC > 21873 IJL) OR Leukopenia (WBC <4000 IJL) OR Bandemia (WBC >10% bands);Tachypnea > 20 resp per min;Tachycardia > 90 bpm  -CR Tachycardia > 90 bpm;Tachypnea > 20 resp per min  -CR       Are two or more of the above signs & symptoms of infection both present and new to the patient? Yes (Proceed)  -CR --       Assess for evidence of organ dysfunction: Are any of the below criteria present within 6 hours of suspected infection and SIRS criteria that are NOT considered to be chronic conditions? -- --                 User Key  (r) = Recorded By, (t) = Taken By, (c) = Cosigned By      Initials Name Provider Type    CR Hang Ruff PA-C Physician  Assistant                       Hang Ruff PA-C  04/22/25 5114       Hang Ruff PA-C  04/22/25 1175

## 2025-04-22 NOTE — SEPSIS NOTE
Sepsis Note   Lakeisha Trejo 70 y.o. female MRN: [id number]  Unit/Bed#: ED 27 Encounter: [id number]       Initial Sepsis Screening       Row Name 04/22/25 1503 04/22/25 1400             Is the patient's history suggestive of a new or worsening infection? Yes (Proceed)  -CR Yes (Proceed)  -CR       Suspected source of infection pneumonia  -CR pneumonia  -CR       Indicate SIRS criteria Leukocytosis (WBC > 86908 IJL) OR Leukopenia (WBC <4000 IJL) OR Bandemia (WBC >10% bands);Tachypnea > 20 resp per min;Tachycardia > 90 bpm  -CR Tachycardia > 90 bpm;Tachypnea > 20 resp per min  -CR       Are two or more of the above signs & symptoms of infection both present and new to the patient? Yes (Proceed)  -CR --       Assess for evidence of organ dysfunction: Are any of the below criteria present within 6 hours of suspected infection and SIRS criteria that are NOT considered to be chronic conditions? -- --                 User Key  (r) = Recorded By, (t) = Taken By, (c) = Cosigned By      Initials Name Provider Type    CR Hang Ruff PA-C Physician Assistant                    The 30ml/kg fluid bolus was not given to the patient despite hypotension and/or significantly elevated lactate of >= 4 and/or presence of septic shock due to: Concern for fluid/volume overload. The patient will be administered 1L of crystalloid fluid instead. Orders for this have been placed in [facility]. The patient may receive additional colloid or crystalloid fluids thereafter based on clinical condition.     Hang Ruff PA-C     Body mass index is 23.94 kg/m².  Wt Readings from Last 1 Encounters:   04/22/25 55.6 kg (122 lb 9.2 oz)     IBW (Ideal Body Weight): 45.5 kg    Ideal body weight: 45.5 kg (100 lb 4.9 oz)  Adjusted ideal body weight: 49.5 kg (109 lb 3.5 oz)

## 2025-04-22 NOTE — H&P
H&P - Hospitalist   Name: Lakeisha Trejo 70 y.o. female I MRN: [id number]  Unit/Bed#: -01 I Date of Admission: 4/22/2025   Date of Service: 4/22/2025 I Hospital Day: 0     Assessment & Plan  Sepsis (HCC)  Secondary to pneumonia with tachycardia and tachypnea on admission  CT imaging with multifocal pneumonia  Trend procalcitonin  Continue antibiotic with ceftriaxone 1 g daily  Check strep pneumo/urine Legionella  Initial lactate normal  Will give 1 L Isolyte in addition to fluids received in the ER  Follow-up cultures  Check sputum culture  Moderate asthma  No obvious signs of acute exacerbation  Continue home inhalers.  Fluticasone/vilanterol substituted for patient's home Advair  Respiratory protocol  Stage 2 chronic kidney disease  Lab Results   Component Value Date    EGFR 59 04/22/2025    EGFR 70 11/08/2024    EGFR 64 06/17/2024    CREATININE 0.96 04/22/2025    CREATININE 0.84 11/08/2024    CREATININE 0.91 06/17/2024     Creatinine seems to be around baseline.  Will continue routine lab monitoring  Essential hypertension  BP currently stable  Continue amlodipine with holding parameters      VTE Pharmacologic Prophylaxis: VTE Score: 2 Low Risk (Score 0-2) - Encourage Ambulation.  Code Status: Level 1 - Full Code   Discussion with family:  Updated patient regarding plan of care.  Patient states that her daughter is up to speed regarding current plan    Anticipated Length of Stay: Patient will be admitted on an inpatient basis with an anticipated length of stay of greater than 2 midnights secondary to sepsis.    History of Present Illness   Chief Complaint: Shortness of breath    Lakeisha Trejo is a 70 y.o. female with a PMH of asthma/COPD, hypertension, GERD, anxiety who presents with shortness of breath.  Patient states that she has had worsening shortness of breath over the last few days.  Also with cough productive of green/yellow sputum.  No recent hospitalizations.  No sick contacts.  Patient  does not use oxygen at home.  In the ER patient had CTA chest which was negative for PE.  Found to have multifocal pneumonia.  Started on antibiotics and sepsis treatment.    Review of Systems   Constitutional:  Positive for activity change, chills and fatigue.   Respiratory:  Positive for cough and shortness of breath.    All other systems reviewed and are negative.      Historical Information   Past Medical History:   Diagnosis Date    Acute bronchitis     Acute pharyngitis     Anxiety state     Arthropathy of ankle and foot     and/or    Asthma     Asthma without status asthmaticus     Carotid artery occlusion     COPD (chronic obstructive pulmonary disease) (HCC)     Cough     COVID-19 vaccine series completed     Disorder of shoulder     Dyspnea     Hand joint pain     Heartburn     Herpes simplex without complication     Hyperlipidemia     Infection of skin and subcutaneous tissue     Localized superficial swelling of skin     Low back pain     Lymphadenopathy     Malaise and fatigue     Neck pain     Osteoarthritis     Pleurisy without effusion or active tuberculosis     Pneumonia     Right upper quadrant pain     Shoulder joint pain     Skin eruption     Vitamin D deficiency     Vomiting      Past Surgical History:   Procedure Laterality Date    BREAST BIOPSY Left 2017 benign    CHOLECYSTECTOMY      CHOLECYSTECTOMY  03/2014    Dr. Henry     COLONOSCOPY  02/2013    WNL    FL LUMBAR PUNCTURE DIAGNOSTIC  05/25/2021    HYSTERECTOMY      Total     NASAL POLYP EXCISION Left 2009    with lysis of intranasal adhesions from packing     Social History     Tobacco Use    Smoking status: Never    Smokeless tobacco: Never   Vaping Use    Vaping status: Never Used   Substance and Sexual Activity    Alcohol use: Yes     Alcohol/week: 21.0 standard drinks of alcohol     Types: 21 Cans of beer per week     Comment: social    Drug use: Never    Sexual activity: Not on file     E-Cigarette/Vaping    E-Cigarette Use Never User       E-Cigarette/Vaping Substances    Nicotine No     THC No     CBD No     Flavoring No      Family History   Problem Relation Age of Onset    Diabetes Mother         Non-insulin dependent diabetes    Emphysema Father     No Known Problems Sister     No Known Problems Daughter     No Known Problems Maternal Grandmother     No Known Problems Paternal Grandmother     No Known Problems Sister     No Known Problems Sister     No Known Problems Daughter     No Known Problems Daughter     No Known Problems Maternal Aunt      Social History:  Marital Status:    Occupation: none  Patient Pre-hospital Living Situation: Home  Patient Pre-hospital Level of Mobility: walks  Patient Pre-hospital Diet Restrictions: none    Meds/Allergies   I have reviewed home medications with patient personally.  Prior to Admission medications    Medication Sig Start Date End Date Taking? Authorizing Provider   albuterol (2.5 mg/3 mL) 0.083 % nebulizer solution inhale contents of 1 vial ( 3 MILLILITERS ) in nebulizer by mouth and INTO THE LUNGS four times a day 1/10/25  Yes KAYCEE Addison   albuterol (ProAir HFA) 90 mcg/act inhaler Inhale 2 puffs every 6 (six) hours as needed for wheezing 1/10/25  Yes KAYCEE Addison   amLODIPine (NORVASC) 5 mg tablet Take 1 tablet (5 mg total) by mouth daily 3/12/25  Yes KAYCEE Addison   aspirin 81 mg chewable tablet Chew 81 mg daily   Yes Historical Provider, MD   levocetirizine (XYZAL) 5 MG tablet Take 0.5 tablets (2.5 mg total) by mouth every evening 11/9/22  Yes Kelli Dhaliwal DO   metFORMIN (GLUCOPHAGE) 500 mg tablet Take 1 tablet (500 mg total) by mouth 2 (two) times a day with meals 2/7/24  Yes Kelli Dhaliwal DO   montelukast (SINGULAIR) 10 mg tablet Take 1 tablet (10 mg total) by mouth daily at bedtime 11/9/22  Yes Kelli Dhaliwal DO   VITAMIN D PO Take by mouth   Yes Historical Provider, MD   ALPRAZolam (XANAX) 0.25 mg tablet Take 1 tablet (0.25 mg total) by mouth  daily at bedtime as needed for anxiety 11/5/24   KAYCEE Addison   EPINEPHrine (EPIPEN) 0.3 mg/0.3 mL SOAJ Inject 0.3 mL (0.3 mg total) into a muscle once for 1 dose 8/28/23 7/10/24  Kelli Dhaliwal DO   Fluticasone-Salmeterol (Advair Diskus) 500-50 mcg/dose inhaler Inhale 1 puff 2 (two) times a day Rinse mouth after use. 3/10/25   Magy Velasquez MD   nystatin (MYCOSTATIN) cream Apply topically 2 (two) times a day . Apply for additional 2 weeks after rash resolves. 6/14/20   Livia Miles PA-C     Allergies   Allergen Reactions    Azithromycin     Darvon [Propoxyphene]     Crestor [Rosuvastatin] Hives and GI Intolerance    Wasp Venom Hives       Objective :  Temp:  [97.9 °F (36.6 °C)-99.9 °F (37.7 °C)] 97.9 °F (36.6 °C)  HR:  [] 89  BP: (103-133)/(51-63) 115/58  Resp:  [22-24] 22  SpO2:  [90 %-100 %] 97 %  O2 Device: Nasal cannula  Nasal Cannula O2 Flow Rate (L/min):  [2 L/min] 2 L/min    Physical Exam  Constitutional:       General: She is not in acute distress.     Appearance: She is ill-appearing.   HENT:      Head: Normocephalic and atraumatic.      Nose: Nose normal.      Mouth/Throat:      Mouth: Mucous membranes are dry.   Eyes:      Extraocular Movements: Extraocular movements intact.      Conjunctiva/sclera: Conjunctivae normal.   Cardiovascular:      Rate and Rhythm: Normal rate and regular rhythm.   Pulmonary:      Effort: Pulmonary effort is normal. No respiratory distress.      Breath sounds: Rhonchi present.   Abdominal:      Tenderness: There is no abdominal tenderness.   Musculoskeletal:         General: Normal range of motion.      Cervical back: Normal range of motion and neck supple.   Skin:     General: Skin is warm and dry.   Neurological:      General: No focal deficit present.      Mental Status: She is alert. Mental status is at baseline.      Cranial Nerves: No cranial nerve deficit.   Psychiatric:         Mood and Affect: Mood normal.         Behavior:  Behavior normal.          Lines/Drains:        Lab Results: I have reviewed the following results:  Results from last 7 days   Lab Units 04/22/25  1417   WBC Thousand/uL 18.07*   HEMOGLOBIN g/dL 12.5   HEMATOCRIT % 38.3   PLATELETS Thousands/uL 202   SEGS PCT % 80*   LYMPHO PCT % 10*   MONO PCT % 8   EOS PCT % 1     Results from last 7 days   Lab Units 04/22/25  1417   SODIUM mmol/L 131*   POTASSIUM mmol/L 3.9   CHLORIDE mmol/L 99   CO2 mmol/L 22   BUN mg/dL 11   CREATININE mg/dL 0.96   ANION GAP mmol/L 10   CALCIUM mg/dL 9.3   ALBUMIN g/dL 3.8   TOTAL BILIRUBIN mg/dL 1.51*   ALK PHOS U/L 119*   ALT U/L 25   AST U/L 23   GLUCOSE RANDOM mg/dL 144*     Results from last 7 days   Lab Units 04/22/25  1417   INR  1.10         Lab Results   Component Value Date    HGBA1C 5.6 11/08/2024    HGBA1C 5.9 (H) 02/05/2024    HGBA1C 6.0 (H) 09/15/2023     Results from last 7 days   Lab Units 04/22/25  1417   LACTIC ACID mmol/L 1.8   PROCALCITONIN ng/ml 0.33*       Imaging Results Review: I reviewed radiology reports from this admission including: CT chest.  Other Study Results Review: No additional pertinent studies reviewed.    Administrative Statements       ** Please Note: This note has been constructed using a voice recognition system. **

## 2025-04-22 NOTE — ASSESSMENT & PLAN NOTE
Lab Results   Component Value Date    EGFR 59 04/22/2025    EGFR 70 11/08/2024    EGFR 64 06/17/2024    CREATININE 0.96 04/22/2025    CREATININE 0.84 11/08/2024    CREATININE 0.91 06/17/2024     Creatinine seems to be around baseline.  Will continue routine lab monitoring

## 2025-04-22 NOTE — RESPIRATORY THERAPY NOTE
RT Protocol Note  Lakeisha Trejo 70 y.o. female MRN: [id number]  Unit/Bed#: -01 Encounter: [id number]    Assessment    Principal Problem:    Sepsis (HCC)  Active Problems:    Moderate asthma    Stage 2 chronic kidney disease    Essential hypertension      Home Pulmonary Medications:  Albuterol 4 times a day   Albuterol MDI PRN   Advair     Home Devices/Therapy: Other (Comment) (None)    Past Medical History:   Diagnosis Date    Acute bronchitis     Acute pharyngitis     Anxiety state     Arthropathy of ankle and foot     and/or    Asthma     Asthma without status asthmaticus     Carotid artery occlusion     COPD (chronic obstructive pulmonary disease) ([facility])     Cough     COVID-19 vaccine series completed     Disorder of shoulder     Dyspnea     Hand joint pain     Heartburn     Herpes simplex without complication     Hyperlipidemia     Infection of skin and subcutaneous tissue     Localized superficial swelling of skin     Low back pain     Lymphadenopathy     Malaise and fatigue     Neck pain     Osteoarthritis     Pleurisy without effusion or active tuberculosis     Pneumonia     Right upper quadrant pain     Shoulder joint pain     Skin eruption     Vitamin D deficiency     Vomiting      Social History     Socioeconomic History    Marital status:      Spouse name: None    Number of children: None    Years of education: None    Highest education level: None   Occupational History    Occupation: Homemaker   Tobacco Use    Smoking status: Never    Smokeless tobacco: Never   Vaping Use    Vaping status: Never Used   Substance and Sexual Activity    Alcohol use: Yes     Alcohol/week: 21.0 standard drinks of alcohol     Types: 21 Cans of beer per week     Comment: social    Drug use: Never    Sexual activity: None   Other Topics Concern    None   Social History Narrative    None     Social Drivers of Health     Financial Resource Strain: Low Risk  (8/28/2023)    Overall Financial Resource Strain  (CARDIA)     Difficulty of Paying Living Expenses: Not hard at all   Food Insecurity: No Food Insecurity (2025)    Nursing - Inadequate Food Risk Classification     Worried About Running Out of Food in the Last Year: Never true     Ran Out of Food in the Last Year: Never true     Ran Out of Food in the Last Year: Never true   Transportation Needs: No Transportation Needs (2025)    Nursing - Transportation Risk Classification     Lack of Transportation: Not on file     Lack of Transportation: No   Physical Activity: Not on file   Stress: Not on file   Social Connections: Unknown (2024)    Received from [facility]     How often do you feel lonely or isolated from those around you? (Adult - for ages 18 years and over): Not on file   Intimate Partner Violence: Unknown (2025)    Nursing IPS     Feels Physically and Emotionally Safe: Not on file     Physically Hurt by Someone: Not on file     Humiliated or Emotionally Abused by Someone: Not on file     Physically Hurt by Someone: No     Hurt or Threatened by Someone: No   Housing Stability: Unknown (2025)    Nursing: Inadequate Housing Risk Classification     Has Housing: Not on file     Worried About Losing Housing: Not on file     Unable to Get Utilities: Not on file     Unable to Pay for Housing in the Last Year: No     Has Housin       Subjective         Objective    Physical Exam:   Assessment Type: Assess only  General Appearance: Alert, Awake  Respiratory Pattern: Dyspnea with exertion  Chest Assessment: Chest expansion symmetrical  Bilateral Breath Sounds: Diminished  Cough: None    Vitals:  Blood pressure 115/58, pulse 89, temperature 97.9 °F (36.6 °C), temperature source Oral, resp. rate 22, height 5' (1.524 m), weight 55.1 kg (121 lb 7.6 oz), SpO2 97%.          Imaging and other studies: Results Review Statement: I reviewed radiology reports from this admission including: CT chest.          Plan    Respiratory  Plan: Mild Distress pathway        Resp Comments: Patient takes albuterol nebulizer 4 times a day as per patient. Will order patient on 1.25 mg Xopenex / Nss TID . Her home Albuterol MDI is ordered PRN by the Hospitalist. Patient was given a duoneb in the ED at 1507.  Will continue to monitor the patient.

## 2025-04-22 NOTE — SEPSIS NOTE
Sepsis Note   Lakeisha Trejo 70 y.o. female MRN: [id number]  Unit/Bed#: ED 27 Encounter: [id number]       Initial Sepsis Screening       Row Name 04/22/25 1503 04/22/25 1400             Is the patient's history suggestive of a new or worsening infection? Yes (Proceed)  -CR Yes (Proceed)  -CR       Suspected source of infection pneumonia  -CR pneumonia  -CR       Indicate SIRS criteria Leukocytosis (WBC > 13695 IJL) OR Leukopenia (WBC <4000 IJL) OR Bandemia (WBC >10% bands);Tachypnea > 20 resp per min;Tachycardia > 90 bpm  -CR Tachycardia > 90 bpm;Tachypnea > 20 resp per min  -CR       Are two or more of the above signs & symptoms of infection both present and new to the patient? Yes (Proceed)  -CR --       Assess for evidence of organ dysfunction: Are any of the below criteria present within 6 hours of suspected infection and SIRS criteria that are NOT considered to be chronic conditions? -- --                 User Key  (r) = Recorded By, (t) = Taken By, (c) = Cosigned By      Initials Name Provider Type    CR Hang Ruff PA-C Physician Assistant                  The 30ml/kg fluid bolus was not given to the patient despite hypotension and/or significantly elevated lactate of >= 4 and/or presence of septic shock due to: Concern for fluid/volume overload. The patient will be administered 1L of crystalloid fluid instead. Orders for this have been placed in [facility]. The patient may receive additional colloid or crystalloid fluids thereafter based on clinical condition.     Hang Ruff PA-C       Body mass index is 23.94 kg/m².  Wt Readings from Last 1 Encounters:   04/22/25 55.6 kg (122 lb 9.2 oz)     IBW (Ideal Body Weight): 45.5 kg    Ideal body weight: 45.5 kg (100 lb 4.9 oz)  Adjusted ideal body weight: 49.5 kg (109 lb 3.5 oz)

## 2025-04-23 PROBLEM — E11.9 TYPE 2 DIABETES MELLITUS WITHOUT COMPLICATION, WITHOUT LONG-TERM CURRENT USE OF INSULIN (HCC): Status: ACTIVE | Noted: 2025-04-23

## 2025-04-23 LAB
ALBUMIN SERPL BCG-MCNC: 3.9 G/DL (ref 3.5–5)
ALP SERPL-CCNC: 122 U/L (ref 34–104)
ALT SERPL W P-5'-P-CCNC: 27 U/L (ref 7–52)
ANION GAP SERPL CALCULATED.3IONS-SCNC: 11 MMOL/L (ref 4–13)
AST SERPL W P-5'-P-CCNC: 22 U/L (ref 13–39)
ATRIAL RATE: 104 BPM
ATRIAL RATE: 107 BPM
BASOPHILS # BLD AUTO: 0.04 THOUSANDS/ÂΜL (ref 0–0.1)
BASOPHILS NFR BLD AUTO: 0 % (ref 0–1)
BILIRUB SERPL-MCNC: 0.59 MG/DL (ref 0.2–1)
BUN SERPL-MCNC: 11 MG/DL (ref 5–25)
CALCIUM SERPL-MCNC: 9.1 MG/DL (ref 8.4–10.2)
CHLORIDE SERPL-SCNC: 105 MMOL/L (ref 96–108)
CO2 SERPL-SCNC: 21 MMOL/L (ref 21–32)
CREAT SERPL-MCNC: 0.76 MG/DL (ref 0.6–1.3)
EOSINOPHIL # BLD AUTO: 0 THOUSAND/ÂΜL (ref 0–0.61)
EOSINOPHIL NFR BLD AUTO: 0 % (ref 0–6)
ERYTHROCYTE [DISTWIDTH] IN BLOOD BY AUTOMATED COUNT: 11.9 % (ref 11.6–15.1)
EST. AVERAGE GLUCOSE BLD GHB EST-MCNC: 108 MG/DL
GFR SERPL CREATININE-BSD FRML MDRD: 79 ML/MIN/1.73SQ M
GLUCOSE SERPL-MCNC: 126 MG/DL (ref 65–140)
GLUCOSE SERPL-MCNC: 145 MG/DL (ref 65–140)
GLUCOSE SERPL-MCNC: 169 MG/DL (ref 65–140)
GLUCOSE SERPL-MCNC: 191 MG/DL (ref 65–140)
GLUCOSE SERPL-MCNC: 200 MG/DL (ref 65–140)
HBA1C MFR BLD: 5.4 %
HCT VFR BLD AUTO: 39.3 % (ref 34.8–46.1)
HGB BLD-MCNC: 12.6 G/DL (ref 11.5–15.4)
IMM GRANULOCYTES # BLD AUTO: 0.07 THOUSAND/UL (ref 0–0.2)
IMM GRANULOCYTES NFR BLD AUTO: 1 % (ref 0–2)
L PNEUMO1 AG UR QL IA.RAPID: NEGATIVE
LYMPHOCYTES # BLD AUTO: 1.17 THOUSANDS/ÂΜL (ref 0.6–4.47)
LYMPHOCYTES NFR BLD AUTO: 8 % (ref 14–44)
MAGNESIUM SERPL-MCNC: 2.5 MG/DL (ref 1.9–2.7)
MCH RBC QN AUTO: 29.3 PG (ref 26.8–34.3)
MCHC RBC AUTO-ENTMCNC: 32.1 G/DL (ref 31.4–37.4)
MCV RBC AUTO: 91 FL (ref 82–98)
MONOCYTES # BLD AUTO: 0.35 THOUSAND/ÂΜL (ref 0.17–1.22)
MONOCYTES NFR BLD AUTO: 2 % (ref 4–12)
NEUTROPHILS # BLD AUTO: 12.91 THOUSANDS/ÂΜL (ref 1.85–7.62)
NEUTS SEG NFR BLD AUTO: 89 % (ref 43–75)
NRBC BLD AUTO-RTO: 0 /100 WBCS
P AXIS: 64 DEGREES
P AXIS: 73 DEGREES
PLATELET # BLD AUTO: 208 THOUSANDS/UL (ref 149–390)
PMV BLD AUTO: 11.8 FL (ref 8.9–12.7)
POTASSIUM SERPL-SCNC: 3.8 MMOL/L (ref 3.5–5.3)
PR INTERVAL: 170 MS
PR INTERVAL: 174 MS
PROCALCITONIN SERPL-MCNC: 0.25 NG/ML
PROT SERPL-MCNC: 7.2 G/DL (ref 6.4–8.4)
QRS AXIS: -6 DEGREES
QRS AXIS: 16 DEGREES
QRSD INTERVAL: 78 MS
QRSD INTERVAL: 80 MS
QT INTERVAL: 320 MS
QT INTERVAL: 320 MS
QTC INTERVAL: 420 MS
QTC INTERVAL: 427 MS
RBC # BLD AUTO: 4.3 MILLION/UL (ref 3.81–5.12)
S PNEUM AG UR QL: NEGATIVE
SODIUM SERPL-SCNC: 137 MMOL/L (ref 135–147)
T WAVE AXIS: 30 DEGREES
T WAVE AXIS: 46 DEGREES
VENTRICULAR RATE: 104 BPM
VENTRICULAR RATE: 107 BPM
WBC # BLD AUTO: 14.54 THOUSAND/UL (ref 4.31–10.16)

## 2025-04-23 PROCEDURE — 84145 PROCALCITONIN (PCT): CPT | Performed by: INTERNAL MEDICINE

## 2025-04-23 PROCEDURE — 82948 REAGENT STRIP/BLOOD GLUCOSE: CPT

## 2025-04-23 PROCEDURE — 94640 AIRWAY INHALATION TREATMENT: CPT

## 2025-04-23 PROCEDURE — 94664 DEMO&/EVAL PT USE INHALER: CPT

## 2025-04-23 PROCEDURE — 99232 SBSQ HOSP IP/OBS MODERATE 35: CPT | Performed by: NURSE PRACTITIONER

## 2025-04-23 PROCEDURE — 97162 PT EVAL MOD COMPLEX 30 MIN: CPT

## 2025-04-23 PROCEDURE — 83735 ASSAY OF MAGNESIUM: CPT | Performed by: INTERNAL MEDICINE

## 2025-04-23 PROCEDURE — 94760 N-INVAS EAR/PLS OXIMETRY 1: CPT

## 2025-04-23 PROCEDURE — 93010 ELECTROCARDIOGRAM REPORT: CPT | Performed by: INTERNAL MEDICINE

## 2025-04-23 PROCEDURE — 85025 COMPLETE CBC W/AUTO DIFF WBC: CPT | Performed by: INTERNAL MEDICINE

## 2025-04-23 PROCEDURE — 80053 COMPREHEN METABOLIC PANEL: CPT | Performed by: INTERNAL MEDICINE

## 2025-04-23 PROCEDURE — 97166 OT EVAL MOD COMPLEX 45 MIN: CPT

## 2025-04-23 RX ADMIN — LEVALBUTEROL HYDROCHLORIDE 1.25 MG: 1.25 SOLUTION RESPIRATORY (INHALATION) at 13:06

## 2025-04-23 RX ADMIN — LEVALBUTEROL HYDROCHLORIDE 1.25 MG: 1.25 SOLUTION RESPIRATORY (INHALATION) at 20:42

## 2025-04-23 RX ADMIN — FLUTICASONE FUROATE AND VILANTEROL TRIFENATATE 1 PUFF: 200; 25 POWDER RESPIRATORY (INHALATION) at 08:20

## 2025-04-23 RX ADMIN — ASPIRIN 81 MG: 81 TABLET, CHEWABLE ORAL at 08:19

## 2025-04-23 RX ADMIN — LEVALBUTEROL HYDROCHLORIDE 1.25 MG: 1.25 SOLUTION RESPIRATORY (INHALATION) at 07:01

## 2025-04-23 RX ADMIN — MONTELUKAST 10 MG: 10 TABLET, FILM COATED ORAL at 22:54

## 2025-04-23 RX ADMIN — AMLODIPINE BESYLATE 5 MG: 5 TABLET ORAL at 08:19

## 2025-04-23 RX ADMIN — CEFTRIAXONE SODIUM 1000 MG: 10 INJECTION, POWDER, FOR SOLUTION INTRAVENOUS at 11:29

## 2025-04-23 NOTE — PROGRESS NOTES
Progress Note - Hospitalist   Name: Lakeisha Trejo 70 y.o. female I MRN: [id number]  Unit/Bed#: -01 I Date of Admission: 4/22/2025   Date of Service: 4/23/2025 I Hospital Day: 1    Assessment & Plan  Sepsis (HCC)  Secondary to pneumonia with tachycardia and tachypnea on admission  CT imaging with multifocal pneumonia  Procalcitonin 0.33, 0.25  Strep pneumo/urine Legionella- negative   Initial lactate normal  Will give 1 L Isolyte in addition to fluids received in the ER  Continue antibiotic with ceftriaxone 1 g daily  Follow-up cultures  Check sputum culture  Moderate asthma  No obvious signs of acute exacerbation  Continue home inhalers.  Fluticasone/vilanterol substituted for patient's home Advair  Respiratory protocol   Stage 2 chronic kidney disease  Lab Results   Component Value Date    EGFR 79 04/23/2025    EGFR 59 04/22/2025    EGFR 70 11/08/2024    CREATININE 0.76 04/23/2025    CREATININE 0.96 04/22/2025    CREATININE 0.84 11/08/2024     Creatinine seems to be around baseline.    Will continue routine lab monitoring.   Essential hypertension  BP currently stable  Continue amlodipine with holding parameters   Type 2 diabetes mellitus without complication, without long-term current use of insulin ([facility])  Lab Results   Component Value Date    HGBA1C 5.4 04/22/2025       Recent Labs     04/22/25  2014 04/23/25  0725 04/23/25  1059 04/23/25  1555   POCGLU 166* 169* 191* 145*       Blood Sugar Average: Last 72 hrs:  (P) 167.75  Home regimen-metformin  Placed on SSI  The patient has been declining to take insulin.  I discussed risks of having hyperglycemia during ongoing infection with the patient in details.  She is aware that we cannot resume metformin until 48 hours post IV contrast from CAT scan.    VTE Pharmacologic Prophylaxis: VTE Score: 2 Low Risk (Score 0-2) - Encourage Ambulation.    Mobility:   Basic Mobility Inpatient Raw Score: 20  JH-HLM Goal: 6: Walk 10 steps or more  JH-HLM Achieved: 7: Walk  25 feet or more  -HLM Goal achieved. Continue to encourage appropriate mobility.    Patient Centered Rounds: I performed bedside rounds with nursing staff today.   Discussions with Specialists or Other Care Team Provider: MAUDE    Education and Discussions with Family / Patient:  patient was updated.     Current Length of Stay: 1 day(s)  Current Patient Status: Inpatient   Certification Statement: The patient will continue to require additional inpatient hospital stay due to pneumonia  Discharge Plan: Anticipate discharge in 24-48 hrs to home.    Code Status: Level 1 - Full Code    Subjective   Patient was seen and examined. She stated that she was feeling better since admission. She denies any dyspnea.     Objective :  Temp:  [97.4 °F (36.3 °C)-99.5 °F (37.5 °C)] 97.7 °F (36.5 °C)  HR:  [55-97] 68  BP: (101-119)/(51-58) 112/57  Resp:  [18-24] 18  SpO2:  [95 %-98 %] 96 %  O2 Device: None (Room air)  Nasal Cannula O2 Flow Rate (L/min):  [2 L/min] 2 L/min    Body mass index is 23.72 kg/m².     Input and Output Summary (last 24 hours):     Intake/Output Summary (Last 24 hours) at 4/23/2025 1653  Last data filed at 4/23/2025 1405  Gross per 24 hour   Intake 930 ml   Output --   Net 930 ml       Physical Exam  Vitals and nursing note reviewed.   Constitutional:       General: She is not in acute distress.     Appearance: Normal appearance.   HENT:      Head: Normocephalic and atraumatic.      Right Ear: External ear normal.      Left Ear: External ear normal.      Nose: Nose normal. No rhinorrhea.      Mouth/Throat:      Mouth: Mucous membranes are moist.      Pharynx: Oropharynx is clear.   Eyes:      General:         Right eye: No discharge.         Left eye: No discharge.      Pupils: Pupils are equal, round, and reactive to light.   Cardiovascular:      Rate and Rhythm: Normal rate and regular rhythm.      Pulses: Normal pulses.      Heart sounds: Normal heart sounds. No murmur heard.  Pulmonary:      Effort:  Pulmonary effort is normal. No respiratory distress.      Breath sounds: Normal breath sounds.      Comments: Coarse breath sounds     Abdominal:      General: Bowel sounds are normal. There is no distension.      Palpations: Abdomen is soft. There is no mass.      Tenderness: There is no abdominal tenderness.   Musculoskeletal:         General: No swelling or tenderness. Normal range of motion.      Cervical back: Normal range of motion and neck supple. No muscular tenderness.   Skin:     General: Skin is warm and dry.      Capillary Refill: Capillary refill takes less than 2 seconds.      Findings: No erythema or rash.   Neurological:      General: No focal deficit present.      Mental Status: She is alert and oriented to person, place, and time. Mental status is at baseline.   Psychiatric:         Mood and Affect: Mood normal.         Behavior: Behavior normal.         Thought Content: Thought content normal.         Judgment: Judgment normal.       Lines/Drains:              Lab Results: I have reviewed the following results:   Results from last 7 days   Lab Units 04/23/25  0430   WBC Thousand/uL 14.54*   HEMOGLOBIN g/dL 12.6   HEMATOCRIT % 39.3   PLATELETS Thousands/uL 208   SEGS PCT % 89*   LYMPHO PCT % 8*   MONO PCT % 2*   EOS PCT % 0     Results from last 7 days   Lab Units 04/23/25  0430   SODIUM mmol/L 137   POTASSIUM mmol/L 3.8   CHLORIDE mmol/L 105   CO2 mmol/L 21   BUN mg/dL 11   CREATININE mg/dL 0.76   ANION GAP mmol/L 11   CALCIUM mg/dL 9.1   ALBUMIN g/dL 3.9   TOTAL BILIRUBIN mg/dL 0.59   ALK PHOS U/L 122*   ALT U/L 27   AST U/L 22   GLUCOSE RANDOM mg/dL 200*     Results from last 7 days   Lab Units 04/22/25  1417   INR  1.10     Results from last 7 days   Lab Units 04/23/25  1555 04/23/25  1059 04/23/25  0725 04/22/25 2014   POC GLUCOSE mg/dl 145* 191* 169* 166*     Results from last 7 days   Lab Units 04/22/25  1417   HEMOGLOBIN A1C % 5.4     Results from last 7 days   Lab Units 04/23/25  0430  04/22/25  1417   LACTIC ACID mmol/L  --  1.8   PROCALCITONIN ng/ml 0.25 0.33*       Recent Cultures (last 7 days):   Results from last 7 days   Lab Units 04/22/25 2009 04/22/25  1417   BLOOD CULTURE   --  Received in Microbiology Lab. Culture in Progress.  Received in Microbiology Lab. Culture in Progress.   LEGIONELLA URINARY ANTIGEN  Negative  --        Imaging Results Review: I reviewed radiology reports from this admission including: CT chest.  Other Study Results Review: No additional pertinent studies reviewed.    Last 24 Hours Medication List:     Current Facility-Administered Medications:     acetaminophen (TYLENOL) tablet 650 mg, Q6H PRN    albuterol (PROVENTIL HFA,VENTOLIN HFA) inhaler 2 puff, Q6H PRN    ALPRAZolam (XANAX) tablet 0.25 mg, HS PRN    amLODIPine (NORVASC) tablet 5 mg, Daily    aspirin chewable tablet 81 mg, Daily    [START ON 4/24/2025] ceftriaxone (ROCEPHIN) 1 g/50 mL in dextrose IVPB, Q24H    fluticasone-vilanterol 200-25 mcg/actuation 1 puff, Daily    insulin lispro (HumALOG/ADMELOG) 100 units/mL subcutaneous injection 1-5 Units, TID AC **AND** Fingerstick Glucose (POCT), TID AC    insulin lispro (HumALOG/ADMELOG) 100 units/mL subcutaneous injection 1-5 Units, HS    levalbuterol (XOPENEX) inhalation solution 1.25 mg, TID    montelukast (SINGULAIR) tablet 10 mg, HS    ondansetron (ZOFRAN) injection 4 mg, Q6H PRN    Administrative Statements   Today, Patient Was Seen By: KAYCEE Small  I have spent a total time of 38 minutes in caring for this patient on the day of the visit/encounter including Diagnostic results, Prognosis, Risks and benefits of tx options, Instructions for management, Patient and family education, Importance of tx compliance, Risk factor reductions, Impressions, Counseling / Coordination of care, Documenting in the medical record, Reviewing/placing orders in the medical record (including tests, medications, and/or procedures), Obtaining or reviewing history  ,  and Communicating with other healthcare professionals .    **Please Note: This note may have been constructed using a voice recognition system.**

## 2025-04-23 NOTE — ASSESSMENT & PLAN NOTE
Lab Results   Component Value Date    HGBA1C 5.4 04/22/2025       Recent Labs     04/22/25 2014 04/23/25  0725 04/23/25  1059 04/23/25  1555   POCGLU 166* 169* 191* 145*       Blood Sugar Average: Last 72 hrs:  (P) 167.75  Home regimen-metformin  Placed on SSI  The patient has been declining to take insulin.  I discussed risks of having hyperglycemia during ongoing infection with the patient in details.  She is aware that we cannot resume metformin until 48 hours post IV contrast from CAT scan.

## 2025-04-23 NOTE — UTILIZATION REVIEW
Initial Clinical Review    Admission: Date/Time/Statement:   Admission Orders (From admission, onward)       Ordered        04/22/25 1706  INPATIENT ADMISSION  Once                          Orders Placed This Encounter   Procedures    INPATIENT ADMISSION     Standing Status:   Standing     Number of Occurrences:   1     Level of Care:   Med Surg [16]     Bed request comments:   tele     Estimated length of stay:   More than 2 Midnights     Certification:   I certify that inpatient services are medically necessary for this patient for a duration of greater than two midnights. See H&P and MD Progress Notes for additional information about the patient's course of treatment.     ED Arrival Information       Expected   -    Arrival   4/22/2025 13:44    Acuity   Urgent              Means of arrival   Walk-In    Escorted by   Friend    Service   Hospitalist    Admission type   Emergency              Arrival complaint   weakness, sob             Chief Complaint   Patient presents with    Shortness of Breath    Weakness - Generalized     According to the patient she has SOB with weakness since Sunday.  Patient has hx of COPD and asthma.       Initial Presentation: 70 y.o. female with a PMH of asthma/COPD, HTN, GERD, anxiety, who presented from home to [facility] ED. Admitted as Inpatient for evaluation and treatment of Sepsis.      Presented w/ worsening SOB over last couple of days w/ cough productive of green/yellow sputum. On exam, pt tachycardic, tachypneic and Rhonchi present. Pt's sats on RA 90%, placed on 2L NC @ 94%.  Abnormal Labs VBG ph 7.450, pCO2 32.6, pO2 31.8, HCO3 22.1, Na 131, Alk phos 119, T bili 1.51, Mg 1.8, WBC 18.07. CT imaging with multifocal pneumonia. Please see below med list for meds given in the ED.     Plan: Continue Ceftriaxone, Check Strep pneumo/urine legionella, F/U cultures, Continue home inhalers. PT/OT evals. F/U labs in am.     Anticipated Length of Stay/Certification Statement:  Patient will be admitted on an inpatient basis with an anticipated length of stay of greater than 2 midnights secondary to sepsis.     Date: 4/23/25   Day 2:   Pt states she's feeling better since admission, denies dyspnea. Pt on 2 L NC @ 98%. Course BS on exam. Therapy recommending home w/ home services. Strep pneumo/urine Legionella- negative. Abnormal labs: Glucose 200, Alk phos 122, WBC 14.54. BS elevated. Pt has been declining to take insulin. Discussed the risk of having hyperglycemia during ongoing infection. Aware we cannot resume Metformin until 48hrs post IV contrast from CAT scan. Plan: Continue Ceftriaxone, F/U sputum culture. Continue home inhaler, SSI, monitor BS.     Certification Statement: The patient will continue to require additional inpatient hospital stay due to pneumonia     Date: 4/24/25  Day 3: Has surpassed a 2nd midnight with active treatments and services.    Pt stable for discharge home on this date. Pt feeling better today and is looking forward to going home.  The patient initially required oxygen supplement for respiratory insufficiency without hypoxia and was able to titrated off to room air.  Her procalcitonin initially was slightly elevated but normalized after a repeat.  The patient is feeling significantly improved overall and she is medically stable for discharge home.  She will continue to complete 2 more days of antibiotics-cefdinir 300 mg twice daily upon discharge. Pt to f/u w/ PCP and have CBC repeated next week. Pt will have CT w/ contrast in 4-6 wks to evaluate for a finding of mediastinal and hilar lynphadenopathy.     ED Treatment-Medication Administration from 04/22/2025 1344 to 04/22/2025 1743         Date/Time Order Dose Route Action     04/22/2025 1418 sodium chloride 0.9 % bolus 1,000 mL 1,000 mL Intravenous New Bag     04/22/2025 1427 ceftriaxone (ROCEPHIN) 1 g/50 mL in dextrose IVPB 1,000 mg Intravenous New Bag     04/22/2025 1507 ipratropium-albuterol (DUO-NEB)  0.5-2.5 mg/3 mL inhalation solution 3 mL 3 mL Nebulization Given     04/22/2025 1505 methylPREDNISolone sodium succinate (Solu-MEDROL) injection 40 mg 40 mg Intravenous Given     04/22/2025 1505 magnesium sulfate IVPB (premix) SOLN 1 g 1 g Intravenous New Bag     04/22/2025 1517 iohexol (OMNIPAQUE) 350 MG/ML injection (MULTI-DOSE) 100 mL 100 mL Intravenous Given            Scheduled Medications:  amLODIPine, 5 mg, Oral, Daily  aspirin, 81 mg, Oral, Daily  fluticasone-vilanterol, 1 puff, Inhalation, Daily  insulin lispro, 1-5 Units, Subcutaneous, TID AC  insulin lispro, 1-5 Units, Subcutaneous, HS  levalbuterol, 1.25 mg, Nebulization, TID  montelukast, 10 mg, Oral, HS  ceftriaxone (ROCEPHIN) 1 g/50 mL in dextrose IVPB  Dose: 1,000 mg  Freq: Once Route: IV  Last Dose: 1,000 mg (04/23/25 1129)  Start: 04/23/25 1200 End: 04/23/25 1159  ceftriaxone (ROCEPHIN) 1 g/50 mL in dextrose IVPB  Dose: 1,000 mg  Freq: Every 24 hours Route: IV  Start: 04/24/25 1100      Continuous IV Infusions: NONE      PRN Meds:  acetaminophen, 650 mg, Oral, Q6H PRN  albuterol, 2 puff, Inhalation, Q6H PRN  ALPRAZolam, 0.25 mg, Oral, HS PRN  ondansetron, 4 mg, Intravenous, Q6H PRN      ED Triage Vitals [04/22/25 1355]   Temperature Pulse Respirations Blood Pressure SpO2 Pain Score   99.9 °F (37.7 °C) (!) 108 (!) 24 124/63 94 % No Pain     Weight (last 2 days) before discharge       Date/Time Weight    04/22/25 17:51:26 55.1 (121.47)    04/22/25 1423 55.6 (122.58)    04/22/25 1355 58.1 (128)            Vital Signs (last 3 days) before discharge       Date/Time Temp Pulse Resp BP MAP (mmHg) SpO2 Calculated FIO2 (%) - Nasal Cannula Nasal Cannula O2 Flow Rate (L/min) O2 Device Patient Position - Orthostatic VS [place] Coma Scale Score Pain    04/24/25 09:24:17 -- 91 -- 126/65 85 98 % -- -- -- -- -- --    04/24/25 0924 -- -- -- 126/65 -- -- -- -- -- -- -- --    04/24/25 07:19:15 99.3 °F (37.4 °C) 78 17 114/58 77 97 % -- -- None (Room air) -- 15 No Pain     04/24/25 0712 -- -- -- -- -- 96 % -- -- None (Room air) -- -- --    04/23/25 22:52:39 97.4 °F (36.3 °C) 67 19 110/57 75 96 % -- -- -- -- -- --    04/23/25 2100 -- -- -- -- -- -- -- -- -- -- 15 No Pain    04/23/25 2042 -- -- -- -- -- -- -- -- None (Room air) -- -- --    04/23/25 15:39:01 97.7 °F (36.5 °C) 68 18 112/57 75 96 % -- -- None (Room air) Sitting -- --    04/23/25 1306 -- -- -- -- -- 96 % -- -- None (Room air) -- -- --    04/23/25 1100 -- -- -- -- -- -- -- -- -- -- 15 No Pain    04/23/25 0916 -- -- -- -- -- -- -- -- -- -- -- No Pain    04/23/25 0908 -- -- -- -- -- -- -- -- -- -- -- No Pain    04/23/25 08:18:40 -- 71 -- 119/54 76 97 % -- -- -- -- -- --    04/23/25 07:23:41 -- 55 -- 103/54 70 98 % -- -- -- -- -- --    04/23/25 0701 -- -- -- -- -- 97 % 28 2 L/min  Nasal cannula -- -- --    04/22/25 22:40:07 97.4 °F (36.3 °C) 79 18 101/55 70 98 % -- -- -- -- -- --    04/22/25 2016 -- -- -- -- -- -- 28 2 L/min Nasal cannula -- -- --    04/22/25 1906 -- -- -- -- -- 96 % 28 2 L/min Nasal cannula -- 15 No Pain    04/22/25 1821 -- -- -- -- -- -- 28 2 L/min Nasal cannula -- -- --    04/22/25 1758 -- -- -- -- -- -- -- -- -- -- -- No Pain    04/22/25 17:51:26 97.9 °F (36.6 °C) 89 22 115/58 77 97 % -- -- -- -- 15 No Pain    04/22/25 1700 99.5 °F (37.5 °C) 97 24 106/51 -- 95 % 28 2 L/min Nasal cannula -- -- --    04/22/25 1630 -- 100 24 108/55 79 95 % 28 2 L/min Nasal cannula -- -- --    04/22/25 1615 99.5 °F (37.5 °C) 103 24 119/57 -- 94 % 28 2 L/min Nasal cannula -- -- --    04/22/25 1600 -- 104 22 123/59 85 90 % -- -- None (Room air) -- -- --    04/22/25 1545 -- 105 22 118/58 81 92 % -- -- None (Room air) -- -- --    04/22/25 1530 -- 101 24 103/54 78 100 % -- -- -- -- -- --    04/22/25 1445 -- 98 24 117/63 83 94 % -- -- None (Room air) -- -- --    04/22/25 1430 -- 110 23 118/57 82 94 % -- -- None (Room air) -- -- --    04/22/25 1415 -- 109 24 133/63 90 94 % -- -- None (Room air) -- 15 --    04/22/25 1411 -- -- -- --  -- -- -- -- None (Room air) -- -- --    04/22/25 1355 99.9 °F (37.7 °C) 108 24 124/63 89 94 % -- -- None (Room air) Lying -- No Pain              Pertinent Labs/Diagnostic Test Results:   Radiology:  CT pe study w abdomen pelvis w contrast   Final Interpretation by Chacho Mcguire MD (04/22 1636)      1.  Interval worsening of the diffuse centrilobular nodular and patchy nodular opacities throughout both lungs. This is consistent with nonspecific multifocal pneumonia. There is also new low-grade mediastinal and hilar lymphadenopathy, likely reactive.    A contrast-enhanced follow-up chest CT in 4 to 6 weeks is recommended for further evaluation.   2.  Please refer to the report body for description of other incidental, chronic and/or benign findings.      The study was marked in EPIC for significant notification.               Workstation performed: [id number]         XR chest pa & lateral   Final Interpretation by Tristin Phan MD (04/23 0731)      Increased bilateral bronchiolitis/pneumonia.            Workstation performed: [id number]           Cardiology:  ECG 12 lead   Final Result by Louis Nash DO (04/23 0516)   Sinus tachycardia   Low voltage QRS   Nonspecific T wave abnormality   Abnormal ECG   When compared with ECG of 22-Apr-2025 14:04, (unconfirmed)   No significant change was found   Confirmed by Louis Nash ([id number]) on 4/23/2025 5:16:39 AM      ECG 12 lead   Final Result by Louis Nash DO (04/23 0516)   Poor data quality, interpretation may be adversely affected   Sinus tachycardia   Nonspecific ST and T wave abnormality   When compared with ECG of 17-Jun-2024 20:27,   Criteria for Anterior infarct are no longer Present   Confirmed by Louis Nash ([id number]) on 4/23/2025 5:16:25 AM          Results from last 7 days   Lab Units 04/22/25  1417   SARS-COV-2  Negative     Results from last 7 days   Lab Units 04/24/25  0515 04/23/25  0430 04/22/25  1417   WBC Thousand/uL 18.09* 14.54*  18.07*   HEMOGLOBIN g/dL 11.2* 12.6 12.5   HEMATOCRIT % 34.9 39.3 38.3   PLATELETS Thousands/uL 214 208 202   TOTAL NEUT ABS Thousands/µL  --  12.91* 14.39*         Results from last 7 days   Lab Units 04/24/25  0515 04/23/25  0430 04/22/25  1417   SODIUM mmol/L 139 137 131*   POTASSIUM mmol/L 4.1 3.8 3.9   CHLORIDE mmol/L 109* 105 99   CO2 mmol/L 23 21 22   ANION GAP mmol/L 7 11 10   BUN mg/dL 19 11 11   CREATININE mg/dL 0.70 0.76 0.96   EGFR ml/min/1.73sq m 87 79 59   CALCIUM mg/dL 8.5 9.1 9.3   MAGNESIUM mg/dL  --  2.5 1.8*     Results from last 7 days   Lab Units 04/23/25  0430 04/22/25  1417   AST U/L 22 23   ALT U/L 27 25   ALK PHOS U/L 122* 119*   TOTAL PROTEIN g/dL 7.2 7.2   ALBUMIN g/dL 3.9 3.8   TOTAL BILIRUBIN mg/dL 0.59 1.51*     Results from last 7 days   Lab Units 04/24/25  1119 04/24/25  0719 04/23/25  2057 04/23/25  1555 04/23/25  1059 04/23/25  0725 04/22/25  2014   POC GLUCOSE mg/dl 98 98 126 145* 191* 169* 166*     Results from last 7 days   Lab Units 04/24/25  0515 04/23/25  0430 04/22/25  1417   GLUCOSE RANDOM mg/dL 105 200* 144*         Results from last 7 days   Lab Units 04/22/25  1417   HEMOGLOBIN A1C % 5.4   EAG mg/dl 108     Results from last 7 days   Lab Units 04/22/25  1417   PH INO  7.450*   PCO2 INO mm Hg 32.6*   PO2 INO mm Hg 31.8*   HCO3 INO mmol/L 22.1*   BASE EXC INO mmol/L -1.1   O2 CONTENT INO ml/dL 12.6   O2 HGB, VENOUS % 67.6             Results from last 7 days   Lab Units 04/22/25  1806 04/22/25  1613 04/22/25  1417   HS TNI 0HR ng/L  --   --  8   HS TNI 2HR ng/L  --  8  --    HSTNI D2 ng/L  --  0  --    HS TNI 4HR ng/L 10  --   --    HSTNI D4 ng/L 2  --   --          Results from last 7 days   Lab Units 04/22/25  1417   PROTIME seconds 14.8   INR  1.10   PTT seconds 30         Results from last 7 days   Lab Units 04/23/25  0430 04/22/25  1417   PROCALCITONIN ng/ml 0.25 0.33*     Results from last 7 days   Lab Units 04/22/25  1417   LACTIC ACID mmol/L 1.8     Results from  last 7 days   Lab Units 04/22/25  1417   BNP pg/mL 68       Results from last 7 days   Lab Units 04/22/25  1417   LIPASE u/L 14       Results from last 7 days   Lab Units 04/22/25  1647   CLARITY UA  Clear   COLOR UA  Yellow   SPEC GRAV UA  <=1.005*   PH UA  6.0   GLUCOSE UA mg/dl Negative   KETONES UA mg/dl Negative   BLOOD UA  Negative   PROTEIN UA mg/dl Negative   NITRITE UA  Negative   BILIRUBIN UA  Negative   UROBILINOGEN UA E.U./dl 0.2   LEUKOCYTES UA  Negative     Results from last 7 days   Lab Units 04/22/25 2009 04/22/25  1417   STREP PNEUMONIAE ANTIGEN, URINE  Negative  --    LEGIONELLA URINARY ANTIGEN  Negative  --    INFLUENZA A PCR   --  Negative   INFLUENZA B PCR   --  Negative   RSV PCR   --  Negative     Results from last 7 days   Lab Units 04/22/25  1417   BLOOD CULTURE  No Growth at 24 hrs.  No Growth at 24 hrs.       Past Medical History:   Diagnosis Date    Acute bronchitis     Acute pharyngitis     Anxiety state     Arthropathy of ankle and foot     and/or    Asthma     Asthma without status asthmaticus     Carotid artery occlusion     COPD (chronic obstructive pulmonary disease) ([facility])     Cough     COVID-19 vaccine series completed     Disorder of shoulder     Dyspnea     Hand joint pain     Heartburn     Herpes simplex without complication     Hyperlipidemia     Infection of skin and subcutaneous tissue     Localized superficial swelling of skin     Low back pain     Lymphadenopathy     Malaise and fatigue     Neck pain     Osteoarthritis     Pleurisy without effusion or active tuberculosis     Pneumonia     Right upper quadrant pain     Shoulder joint pain     Skin eruption     Vitamin D deficiency     Vomiting      Present on Admission:   Moderate asthma   Stage 2 chronic kidney disease   Essential hypertension      Admitting Diagnosis: Hypomagnesemia [E83.42]  Weakness [R53.1]  SOB (shortness of breath) [R06.02]  COPD exacerbation ([facility]) [J44.1]  Sepsis ([facility]) [A41.9]  Multifocal pneumonia  [J18.9]  Age/Sex: 70 y.o. female    Network Utilization Review Department  ATTENTION: Please call with any questions or concerns to [phone] and carefully listen to the prompts so that you are directed to the right person. All voicemails are confidential.   For Discharge needs, contact Care Management DC Support Team at [phone] opt. 2  Send all requests for admission clinical reviews, approved or denied determinations and any other requests to dedicated fax number below belonging to the campus where the patient is receiving treatment. List of dedicated fax numbers for the Facilities:  FACILITY NAME UR FAX NUMBER   ADMISSION DENIALS (Administrative/Medical Necessity) [phone]   DISCHARGE SUPPORT TEAM (NETWORK) [phone]   PARENT CHILD HEALTH (Maternity/NICU/Pediatrics) [phone]   [facility] [phone]   [facility] [phone]   [facility] [phone]   [facility] [phone]   [facility] [phone]   [facility] [phone]   [facility] [phone]   [facility] [phone]   [facility] [phone]   [facility] [phone]   [facility] [phone]   [facility] [phone]

## 2025-04-23 NOTE — OCCUPATIONAL THERAPY NOTE
Occupational Therapy Evaluation     Patient Name: Lakeisha Trejo  Today's Date: 4/23/2025  Problem List  Principal Problem:    Sepsis (HCC)  Active Problems:    Moderate asthma    Stage 2 chronic kidney disease    Essential hypertension    Past Medical History  Past Medical History:   Diagnosis Date    Acute bronchitis     Acute pharyngitis     Anxiety state     Arthropathy of ankle and foot     and/or    Asthma     Asthma without status asthmaticus     Carotid artery occlusion     COPD (chronic obstructive pulmonary disease) (HCC)     Cough     COVID-19 vaccine series completed     Disorder of shoulder     Dyspnea     Hand joint pain     Heartburn     Herpes simplex without complication     Hyperlipidemia     Infection of skin and subcutaneous tissue     Localized superficial swelling of skin     Low back pain     Lymphadenopathy     Malaise and fatigue     Neck pain     Osteoarthritis     Pleurisy without effusion or active tuberculosis     Pneumonia     Right upper quadrant pain     Shoulder joint pain     Skin eruption     Vitamin D deficiency     Vomiting      Past Surgical History  Past Surgical History:   Procedure Laterality Date    BREAST BIOPSY Left 2017 benign    CHOLECYSTECTOMY      CHOLECYSTECTOMY  03/2014    Dr. Henry     COLONOSCOPY  02/2013    WNL    FL LUMBAR PUNCTURE DIAGNOSTIC  05/25/2021    HYSTERECTOMY      Total     NASAL POLYP EXCISION Left 2009    with lysis of intranasal adhesions from packing        04/23/25 0908   OT Last Visit   OT Visit Date 04/23/25   Note Type   Note type Evaluation   Additional Comments Pt seen as a co-eval with PT due to the patient's co-morbidities, clinically unstable presentation, and present impairments which are a regression from the patient's baseline.   Pain Assessment   Pain Assessment Tool 0-10   Pain Score No Pain   Restrictions/Precautions   Weight Bearing Precautions Per Order No   Other Precautions Fall Risk;Pain;O2  (1L/min, does not wear at  "baseline)   Home Living   Type of Home House   Home Layout One level;Stairs to enter with rails;Performs ADLs on one level  (12 JUAN R w/ HR)   Bathroom Shower/Tub Tub/shower unit   Bathroom Toilet Standard   Bathroom Equipment   (no DME reported at baseline)   Bathroom Accessibility Accessible   Home Equipment   (No AD used at baseline)   Prior Function   Level of [place] Independent with ADLs;Independent with functional mobility;Independent with IADLS   Lives With Daughter  (+ grandkids)   Receives Help From Family   IADLs Independent with driving;Independent with meal prep;Independent with medication management   Falls in the last 6 months 1 to 4  (x1 fall reported on ice)   Vocational Retired   Lifestyle   Autonomy Pt resides in one level house w/ family; I with ADLs, mobility and IADLs at baseline; +    Reciprocal Relationships supportive family   Service to Others retired   Intrinsic Gratification watching her grandkids   Subjective   Subjective \"I don't need someone to hold onto me\"   ADL   Where Assessed Edge of bed   Eating Assistance 6  Modified independent   Grooming Assistance 6  Modified Independent   UB Bathing Assistance 5  Supervision/Setup   LB Bathing Assistance 5  Supervision/Setup   UB Dressing Assistance 5  Supervision/Setup   LB Dressing Assistance 5  Supervision/Setup   Toileting Assistance  4  Minimal Assistance   Bed Mobility   Supine to Sit 5  Supervision   Additional items HOB elevated;Bedrails;Impulsive   Sit to Supine   (DNT; pt seated in recliner upon conclusion of session)   Additional Comments VC for bedrail utilization and proper body mechanics; denied dizziness with transitional movements   Transfers   Sit to Stand 5  Supervision   Additional items Assist x 1;Increased time required;Impulsive;Verbal cues   Stand to Sit 5  Supervision   Additional items Assist x 1;Increased time required;Verbal cues   Additional Comments no device used; VC for safe hand placement, proper body " mechanics and overall safety   Functional Mobility   Functional Mobility   (CGA)   Additional Comments Pt completed short household distance simulated mobility at CGA level within room w/out device. x2 LOB observed with min A for correction. Mild to moderate instability  (SpO2 on 1L/min post mobility >90%)   Additional items   (no device used)   Balance   Static Sitting Good   Dynamic Sitting Fair +   Static Standing Fair +   Dynamic Standing Fair   Ambulatory Fair -   Activity Tolerance   Activity Tolerance Patient limited by fatigue   Medical Staff Made Aware Yes, CM made aware of d/c recs   Nurse Made Aware Yes, nursing staff made aware of session outcomes   RUE Assessment   RUE Assessment WFL   RUE Strength   RUE Overall Strength   (4-/5)   LUE Assessment   LUE Assessment WFL   LUE Strength   LUE Overall Strength   (4-/5)   Hand Function   Gross Motor Coordination Functional   Fine Motor Coordination Functional   Sensation   Additional Comments denies numbness/tingling   Vision-Basic Assessment   Current Vision Wears glasses all the time   Psychosocial   Psychosocial (WDL) WDL   Cognition   Overall Cognitive Status WFL   Arousal/Participation Alert;Responsive;Cooperative   Attention Within functional limits   Orientation Level Oriented X4   Memory Decreased recall of precautions;Decreased recall of recent events   Following Commands Follows one step commands without difficulty   Comments Pt agreeable to OT evaluation, pleasant   Assessment   Limitation Decreased ADL status;Decreased UE strength;Decreased Safe judgement during ADL;Decreased endurance   Prognosis Good   Assessment Pt is a 70 y.o. female seen for OT evaluation s/p admit to [facility] on 4/22/2025 w/ Sepsis (HCC).  Comorbidities affecting pt's functional performance at time of assessment include: asthma, CKD, HTN, hyperlipidemia, right hip pain, anemia, hepatic steatosis, GERD, vit D deficiency, COPD. Personal factors affecting pt at time of IE  include:steps to enter environment, difficulty performing ADLS, difficulty performing IADLS , and health management . OT order placed to assess Pt's ADLs, cognitive status, and performance during functional tasks in order to maximize safety and independence while making most appropriate d/c recommendations. Prior to admission, pt was I with ADLs, mobility and IADLs. Upon evaluation: the following deficits impact occupational performance: weakness, decreased strength, decreased balance, decreased tolerance, and decreased safety awareness. Pt's clinical presentation is currently stable  given new onset deficits that effect Pt's occupational performance and ability to safely return to OF including decrease activity tolerance, decrease standing balance, decrease sitting balance, decrease performance during ADL tasks, decrease generalized strength, decrease activity engagement, and decrease performance during functional transfers combined with medical complications of hypertension , new onset O2 use, decreased skin integrity, and need for input for mobility technique/safety.  Pt to benefit from continued skilled OT tx while in the hospital to address deficits as defined above and maximize level of functional independence w ADL's and functional mobility. Occupational Performance areas to address include: grooming, bathing/shower, toilet hygiene, dressing, functional mobility, community mobility, and clothing management. From OT standpoint, recommendation at time of d/c would with minimal intensity OT resources.   Goals   Patient Goals to go home   Plan   Treatment Interventions ADL retraining;Functional transfer training;UE strengthening/ROM;Endurance training;Patient/family training;Activityengagement   Goal Expiration Date 05/03/25   OT Treatment Day 0   OT Frequency 1-2x/wk   Discharge Recommendation   Rehab Resource Intensity Level, OT III (Minimum Resource Intensity)   Additional Comments  The patient's raw score  on the AM-PAC Daily Activity inpatient short form is 21, standardized score is 44.27, greater than 39.4. Patients at this level are likely to benefit from discharge with minimal intensity OT resources. Please refer to the recommendation of the Occupational Therapist for safe discharge planning.   AM-PAC Daily Activity Inpatient   Lower Body Dressing 3   Bathing 3   Toileting 3   Upper Body Dressing 4   Grooming 4   Eating 4   Daily Activity Raw Score 21   Daily Activity Standardized Score (Calc for Raw Score >=11) 44.27   AM-PAC Applied Cognition Inpatient   Following a Speech/Presentation 3   Understanding Ordinary Conversation 4   Taking Medications 4   Remembering Where Things Are Placed or Put Away 3   Remembering List of 4-5 Errands 3   Taking Care of Complicated Tasks 3   Applied Cognition Raw Score 20   Applied Cognition Standardized Score 41.76     GOALS    Pt will achieve the following within specified time frame: LTG  Pt will achieve the following goals within 10 days    *ADL transfers with (I) for inc'd independence with ADLs/purposeful tasks    *UB ADL with (I) for inc'd independence with self cares    *LB ADL with (I) using AE prn for inc'd independence with self cares    *Toileting with (I) for clothing management and hygiene for return to PLOF with personal care    *Increase static stand balance and dyn stand balance to F+ for inc'd safety with standing purposeful tasks    *Increase stand tolerance x7 m for inc'd tolerance with standing purposeful tasks    *Bed mobility- (I) for inc'd independence to manage own comfort and initiate EOB & OOB purposeful tasks      *Participate in 10-15m UE therex to increase overall stamina/activity tolerance for purposeful tasks      Isabell White MS, OTR/L

## 2025-04-23 NOTE — CASE MANAGEMENT
Case Management Assessment & Discharge Planning Note    Patient name Lakeisha Trejo  Location /-01 MRN [id number]  : 1955 Date 2025       Current Admission Date: 2025  Current Admission Diagnosis:Sepsis (HCC)   Patient Active Problem List    Diagnosis Date Noted Date Diagnosed    Sepsis (HCC) 2025     Atherosclerosis of both carotid arteries 2024     Prediabetes 2023     Bee sting allergy 2023     Chronic obstructive pulmonary disease (HCC) 2023     Wheezing      Vitamin D deficiency 2022     Elevated fasting glucose 2022     Left foot pain 2022     Allergic rhinitis due to pollen 2022     Small airways disease 2022     Hilar adenopathy 2022     Anxiety 2022     Elevated liver enzymes 12/15/2021     Hepatic steatosis 12/15/2021     Gastroesophageal reflux disease 12/15/2021     Hypophosphatemia 2021     Normocytic anemia 2021     Leukocytosis 2021     Diffuse idiopathic skeletal hyperostosis of cervical spine 2021     Right hip pain 2021     Elevated alkaline phosphatase level 2021     Osteopenia 2021     Non-seasonal allergic rhinitis 2021     Stage 2 chronic kidney disease 10/07/2019     Essential hypertension 10/07/2019     Hyperlipidemia 2019     Moderate asthma 2019       LOS (days): 1  Geometric Mean LOS (GMLOS) (days): 4.9  Days to GMLOS:4.2     OBJECTIVE:    Risk of Unplanned Readmission Score: 11.66         Current admission status: Inpatient  Referral Reason: Other (Discharge planning)    Preferred Pharmacy:   [id number] - [place] - [address]  [address]  [place] [address]  Phone: [phone] Fax: [phone]    Primary Care Provider: KAYCEE Addison    Primary Insurance: [facility]  Secondary Insurance:  FOR LIFE    ASSESSMENT:  Active Health Care Proxies    There are  no active Health Care Proxies on file.       Advance Directives  Does patient have a Health Care POA?: No  Was patient offered paperwork?: Yes (Declined forms)  Does patient currently have a Health Care decision maker?: Yes, please see Health Care Proxy section  Does patient have Advance Directives?: No  Was patient offered paperwork?: Yes (Patient declined)  Primary Contact: Chelsie Bangura-Daughter         Readmission Root Cause  30 Day Readmission: No    Patient Information  Admitted from:: Home  Mental Status: Alert  During Assessment patient was accompanied by: Not accompanied during assessment  Assessment information provided by:: Patient  Primary Caregiver: Self  Support Systems: Children  County of Residence: [place]  What city do you live in?: [place]  Home entry access options. Select all that apply.: Stairs  Number of steps to enter home.: 10 (12 JUAN R)  Do the steps have railings?: Yes  [facility]  Living Arrangements: Lives w/ Family members  Is patient a ?: No    Activities of Daily Living Prior to Admission  Functional Status: Independent  Completes ADLs independently?: Yes  Ambulates independently?: Yes  Does patient use assisted devices?: No  Does patient currently own DME?: No  Does patient have a history of Outpatient Therapy (PT/OT)?: No  Does the patient have a history of Short-Term Rehab?: No  Does patient have a history of HHC?: No  Does patient currently have HHC?: No         Patient Information Continued  Does patient have prescription coverage?: Yes  Can the patient afford their medications and any related supplies (such as glucometers or test strips)?: Yes  Does patient receive dialysis treatments?: No  Does patient have a history of substance abuse?: No  Does patient have a history of Mental Health Diagnosis?: Yes  Is patient receiving treatment for mental health?: Yes (PCP)  Has patient received inpatient treatment related to mental health in the last 2 years?:  No         Means of Transportation  Means of Transport to Appts:: Drives Self          DISCHARGE DETAILS:    Discharge planning discussed with:: CM spoke with patient at bedside no anticipated DC needs.  Patient currently on 2L, does not wear oxygen at baseline.  Freedom of Choice: Yes     CM contacted family/caregiver?: No- see comments (Patient declined)                  Requested Home Health Care         Is the patient interested in HHC at discharge?: No    DME Referral Provided  Referral made for DME?: No         Would you like to participate in our [facility] service program?  : No - Declined       Discharge Destination Plan:: Home  Transport at Discharge : Family

## 2025-04-23 NOTE — PLAN OF CARE
Problem: PAIN - ADULT  Goal: Verbalizes/displays adequate comfort level or baseline comfort level  Description: Interventions:- Encourage patient to monitor pain and request assistance- Assess pain using appropriate pain scale- Administer analgesics based on type and severity of pain and evaluate response- Implement non-pharmacological measures as appropriate and evaluate response- Consider cultural and social influences on pain and pain management- Notify physician/advanced practitioner if interventions unsuccessful or patient reports new pain  Outcome: Progressing     Problem: SAFETY ADULT  Goal: Maintain or return to baseline ADL function  Description: INTERVENTIONS:-  Assess patient's ability to carry out ADLs; assess patient's baseline for ADL function and identify physical deficits which impact ability to perform ADLs (bathing, care of mouth/teeth, toileting, grooming, dressing, etc.)- Assess/evaluate cause of self-care deficits - Assess range of motion- Assess patient's mobility; develop plan if impaired- Assess patient's need for assistive devices and provide as appropriate- Encourage maximum independence but intervene and supervise when necessary- Involve family in performance of ADLs- Assess for home care needs following discharge - Consider OT consult to assist with ADL evaluation and planning for discharge- Provide patient education as appropriate  Outcome: Progressing     Problem: RESPIRATORY - ADULT  Goal: Achieves optimal ventilation and oxygenation  Description: INTERVENTIONS:- Assess for changes in respiratory status- Assess for changes in mentation and behavior- Position to facilitate oxygenation and minimize respiratory effort- Oxygen administered by appropriate delivery if ordered- Initiate smoking cessation education as indicated- Encourage broncho-pulmonary hygiene including cough, deep breathe, Incentive Spirometry- Assess the need for suctioning and aspirate as needed- Assess and instruct  to report SOB or any respiratory difficulty- Respiratory Therapy support as indicated  Outcome: Progressing

## 2025-04-23 NOTE — PLAN OF CARE
Problem: OCCUPATIONAL THERAPY ADULT  Goal: Performs self-care activities at highest level of function for planned discharge setting.  See evaluation for individualized goals.  Description: Treatment Interventions: ADL retraining, Functional transfer training, UE strengthening/ROM, Endurance training, Patient/family training, Activityengagement     See flowsheet documentation for full assessment, interventions and recommendations.   Note: Limitation: Decreased ADL status, Decreased UE strength, Decreased Safe judgement during ADL, Decreased endurance  Prognosis: Good  Assessment: Pt is a 70 y.o. female seen for OT evaluation s/p admit to [facility] on 4/22/2025 w/ Sepsis (HCC).  Comorbidities affecting pt's functional performance at time of assessment include: asthma, CKD, HTN, hyperlipidemia, right hip pain, anemia, hepatic steatosis, GERD, vit D deficiency, COPD. Personal factors affecting pt at time of IE include:steps to enter environment, difficulty performing ADLS, difficulty performing IADLS , and health management . OT order placed to assess Pt's ADLs, cognitive status, and performance during functional tasks in order to maximize safety and independence while making most appropriate d/c recommendations. Prior to admission, pt was I with ADLs, mobility and IADLs. Upon evaluation: the following deficits impact occupational performance: weakness, decreased strength, decreased balance, decreased tolerance, and decreased safety awareness. Pt's clinical presentation is currently stable  given new onset deficits that effect Pt's occupational performance and ability to safely return to OF including decrease activity tolerance, decrease standing balance, decrease sitting balance, decrease performance during ADL tasks, decrease generalized strength, decrease activity engagement, and decrease performance during functional transfers combined with medical complications of hypertension , new onset O2 use, decreased  skin integrity, and need for input for mobility technique/safety.  Pt to benefit from continued skilled OT tx while in the hospital to address deficits as defined above and maximize level of functional independence w ADL's and functional mobility. Occupational Performance areas to address include: grooming, bathing/shower, toilet hygiene, dressing, functional mobility, community mobility, and clothing management. From OT standpoint, recommendation at time of d/c would with minimal intensity OT resources.     Rehab Resource Intensity Level, OT: III (Minimum Resource Intensity)     Isabell White OTR/L

## 2025-04-23 NOTE — ASSESSMENT & PLAN NOTE
Secondary to pneumonia with tachycardia and tachypnea on admission  CT imaging with multifocal pneumonia  Procalcitonin 0.33, 0.25  Strep pneumo/urine Legionella- negative   Initial lactate normal  Will give 1 L Isolyte in addition to fluids received in the ER  Continue antibiotic with ceftriaxone 1 g daily  Follow-up cultures  Check sputum culture

## 2025-04-23 NOTE — ASSESSMENT & PLAN NOTE
Lab Results   Component Value Date    EGFR 79 04/23/2025    EGFR 59 04/22/2025    EGFR 70 11/08/2024    CREATININE 0.76 04/23/2025    CREATININE 0.96 04/22/2025    CREATININE 0.84 11/08/2024     Creatinine seems to be around baseline.    Will continue routine lab monitoring.

## 2025-04-23 NOTE — PLAN OF CARE
Problem: RESPIRATORY - ADULT  Goal: Achieves optimal ventilation and oxygenation  Description: INTERVENTIONS:- Assess for changes in respiratory status- Assess for changes in mentation and behavior- Position to facilitate oxygenation and minimize respiratory effort- Oxygen administered by appropriate delivery if ordered- Initiate smoking cessation education as indicated- Encourage broncho-pulmonary hygiene including cough, deep breathe, Incentive Spirometry- Assess the need for suctioning and aspirate as needed- Assess and instruct to report SOB or any respiratory difficulty- Respiratory Therapy support as indicated  Outcome: Progressing     Problem: METABOLIC, FLUID AND ELECTROLYTES - ADULT  Goal: Glucose maintained within target range  Description: INTERVENTIONS:- Monitor Blood Glucose as ordered- Assess for signs and symptoms of hyperglycemia and hypoglycemia- Administer ordered medications to maintain glucose within target range- Assess nutritional intake and initiate nutrition service referral as needed  Outcome: Not Progressing

## 2025-04-23 NOTE — PLAN OF CARE
Problem: PHYSICAL THERAPY ADULT  Goal: Performs mobility at highest level of function for planned discharge setting.  See evaluation for individualized goals.  Description: Treatment/Interventions: Functional transfer training, Therapeutic exercise, Endurance training, Gait training, Bed mobility, Equipment eval/education, Elevations          See flowsheet documentation for full assessment, interventions and recommendations.  Outcome: Progressing  Note: Prognosis: Good     Assessment: Pt is 70 y.o. female seen for PT evaluation s/p admit to [facility] on 4/22/2025 w/ Sepsis (HCC). PT consulted to assess pt's functional mobility and d/c needs. Order placed for PT eval and tx, w/ ambulate pt order. Pt agreeable to PT  session upon arrival, pt found supine in bed.  PTA, pt was independent w/ all functional mobility w/ no AD, has 12 JUAN R, and lives w/ family in 1 level home .  Pt to benefit from continued PT tx to address deficits and maximize level of functional independent mobility and consistency. Upon conclusion pt  seated in recliner. Complexity: Comorbidities affecting pt's physical performance at time of assessment include: COPD, htn, anxiety, and CKD. Personal factors affecting pt at time of IE include: limited mobility, steps to enter home, and anxiety. Please find objective findings from PT assessment regarding body systems outlined above with impairments and limitations including impaired balance, decreased safety awareness, and fall risk.  Pt's clinical presentation is currently evolving seen in pt's presentation of abnormal WBC count, abnormal blood sugar levels, and new onset of O2 use. The patient's AM-PAC Basic Mobility Inpatient Short Form Raw Score is 20.  Based on patient presentations and impairments, pt would most appropriately benefit from Level 3 resource intensity upon discharge. A Raw score of greater than 16 suggests the patient may benefit from discharge to home. Please also refer to  the recommendation of the Physical Therapist for safe discharge planning. RN verbalized pt appropriate for PT session. Pt seen as a co-eval with OT due to the patient's co-morbidities, clinically evolving presentation, and present impairments which are a regression from the patient's baseline.  Barriers to Discharge: Inaccessible home environment     Rehab Resource Intensity Level, PT: III (Minimum Resource Intensity)    See flowsheet documentation for full assessment.

## 2025-04-23 NOTE — PHYSICAL THERAPY NOTE
PHYSICAL THERAPY EVALUATION  NAME:  Lakeisha Trejo  DATE: 04/23/25    AGE:   70 y.o.  Mrn:   [id number]  ADMIT DX:  Hypomagnesemia [E83.42]  Weakness [R53.1]  SOB (shortness of breath) [R06.02]  COPD exacerbation (HCC) [J44.1]  Sepsis (HCC) [A41.9]  Multifocal pneumonia [J18.9]  Problem List:   Patient Active Problem List   Diagnosis    Hyperlipidemia    Moderate asthma    Stage 2 chronic kidney disease    Essential hypertension    Non-seasonal allergic rhinitis    Right hip pain    Elevated alkaline phosphatase level    Osteopenia    Normocytic anemia    Leukocytosis    Diffuse idiopathic skeletal hyperostosis of cervical spine    Hypophosphatemia    Elevated liver enzymes    Hepatic steatosis    Gastroesophageal reflux disease    Anxiety    Small airways disease    Hilar adenopathy    Vitamin D deficiency    Elevated fasting glucose    Left foot pain    Allergic rhinitis due to pollen    Wheezing    Chronic obstructive pulmonary disease (HCC)    Bee sting allergy    Prediabetes    Atherosclerosis of both carotid arteries    Sepsis (HCC)       Past Medical History  Past Medical History:   Diagnosis Date    Acute bronchitis     Acute pharyngitis     Anxiety state     Arthropathy of ankle and foot     and/or    Asthma     Asthma without status asthmaticus     Carotid artery occlusion     COPD (chronic obstructive pulmonary disease) (HCC)     Cough     COVID-19 vaccine series completed     Disorder of shoulder     Dyspnea     Hand joint pain     Heartburn     Herpes simplex without complication     Hyperlipidemia     Infection of skin and subcutaneous tissue     Localized superficial swelling of skin     Low back pain     Lymphadenopathy     Malaise and fatigue     Neck pain     Osteoarthritis     Pleurisy without effusion or active tuberculosis     Pneumonia     Right upper quadrant pain     Shoulder joint pain     Skin eruption     Vitamin D deficiency     Vomiting        Past Surgical History  Past Surgical  History:   Procedure Laterality Date    BREAST BIOPSY Left 2017 benign    CHOLECYSTECTOMY      CHOLECYSTECTOMY  03/2014    Dr. Henry     COLONOSCOPY  02/2013    WNL    FL LUMBAR PUNCTURE DIAGNOSTIC  05/25/2021    HYSTERECTOMY      Total     NASAL POLYP EXCISION Left 2009    with lysis of intranasal adhesions from packing       Length Of Stay: 1  Performed at least 2 patient identifiers during session: Name and ID brenna       04/23/25 0916   PT Last Visit   PT Visit Date 04/23/25   Note Type   Note type Evaluation   Pain Assessment   Pain Assessment Tool 0-10   Pain Score No Pain   Restrictions/Precautions   Weight Bearing Precautions Per Order No   Other Precautions Fall Risk;Pain;O2  (1 L/min)   Home Living   Type of Home House   Home Layout One level;Stairs to enter with rails;Performs ADLs on one level  (12 JUAN R w/ HR)   Bathroom Shower/Tub Tub/shower unit   Bathroom Toilet Standard   Bathroom Equipment   (no DME reported at baseline)   Home Equipment   (No AD used at baseline)   Additional Comments doesn't wear O2 at baseline   Prior Function   Level of [place] Independent with ADLs;Independent with functional mobility;Independent with IADLS   Lives With Daughter  (+ grandkids)   Receives Help From Family   IADLs Independent with driving;Independent with meal prep;Independent with medication management   Falls in the last 6 months 1 to 4  (x1 fall reported on ice)   Vocational Retired   General   Family/Caregiver Present No   Cognition   Overall Cognitive Status WFL   Arousal/Participation Alert   Attention Within functional limits   Orientation Level Oriented X4   Memory Decreased recall of precautions;Decreased recall of recent events   Following Commands Follows one step commands without difficulty   RUE Assessment   RUE Assessment WFL   RUE Strength   RUE Overall Strength   (4-/5)   LUE Assessment   LUE Assessment WFL   LUE Strength   LUE Overall Strength   (4-/5)   RLE Assessment   RLE Assessment WFL    LLE Assessment   LLE Assessment WFL   Vision-Basic Assessment   Current Vision Wears glasses all the time   Bed Mobility   Supine to Sit 5  Supervision   Additional items HOB elevated;Bedrails;Impulsive   Additional Comments pt denied dizziness with transitional movement   Transfers   Sit to Stand 5  Supervision   Additional items Assist x 1;Increased time required;Impulsive;Verbal cues   Stand to Sit 5  Supervision   Additional items Assist x 1;Increased time required;Verbal cues   Additional Comments pt required cues for proper hand placement and to slow transitions   Ambulation/Elevation   Gait pattern Improper Weight shift;Decreased foot clearance   Gait Assistance   (CGA)   Additional items Verbal cues   Assistive Device None   Distance 25 ft   Ambulation/Elevation Additional Comments fair safety awareness noted  (2 LOB noted that pt was able to correct)   Balance   Static Sitting Good   Dynamic Sitting Fair +   Static Standing Fair   Dynamic Standing Fair -   Ambulatory Fair -   Endurance Deficit   Endurance Deficit No   Activity Tolerance   Activity Tolerance Patient limited by fatigue   Assessment   Prognosis Good   Assessment Pt is 70 y.o. female seen for PT evaluation s/p admit to [facility] on 4/22/2025 w/ Sepsis (HCC). PT consulted to assess pt's functional mobility and d/c needs. Order placed for PT eval and tx, w/ ambulate pt order. Pt agreeable to PT  session upon arrival, pt found supine in bed.  PTA, pt was independent w/ all functional mobility w/ no AD, has 12 JUAN R, and lives w/ family in 1 level home .  Pt to benefit from continued PT tx to address deficits and maximize level of functional independent mobility and consistency. Upon conclusion pt  seated in recliner. Complexity: Comorbidities affecting pt's physical performance at time of assessment include: COPD, htn, anxiety, and CKD. Personal factors affecting pt at time of IE include: limited mobility, steps to enter home, and anxiety.  Please find objective findings from PT assessment regarding body systems outlined above with impairments and limitations including impaired balance, decreased safety awareness, and fall risk.  Pt's clinical presentation is currently evolving seen in pt's presentation of abnormal WBC count, abnormal blood sugar levels, and new onset of O2 use. The patient's [facility] Basic Mobility Inpatient Short Form Raw Score is 20.  Based on patient presentations and impairments, pt would most appropriately benefit from Level 3 resource intensity upon discharge. A Raw score of greater than 16 suggests the patient may benefit from discharge to home. Please also refer to the recommendation of the Physical Therapist for safe discharge planning. RN verbalized pt appropriate for PT session. Pt seen as a co-eval with OT due to the patient's co-morbidities, clinically evolving presentation, and present impairments which are a regression from the patient's baseline.   Barriers to Discharge Inaccessible home environment   Goals   Patient Goals to go home   LTG Expiration Date 05/03/25   Long Term Goal #1 Pt will: Perform bed mobility tasks to modified I to improve ease of bed mobility. Perform transfers to modified I to improve ease of transfers. Perform ambulation with MI  for 250 feet to increase Indep in home environment. Increase dynamic standing balance to F+ to decrease fall risk. Increase OOB activity tolerance to 10 minutes without s/s of exertion to decrease fall risk. Navigate up and down 12 steps with MI so patient can enter and exit home.   Plan   Treatment/Interventions Functional transfer training;Therapeutic exercise;Endurance training;Gait training;Bed mobility;Equipment eval/education;Elevations   PT Frequency 1-2x/wk   Discharge Recommendation   Rehab Resource Intensity Level, PT III (Minimum Resource Intensity)   AM-PAC Basic Mobility Inpatient   Turning in Flat Bed Without Bedrails 4   Lying on Back to Sitting on Edge of  Flat Bed Without Bedrails 4   Moving Bed to Chair 3   Standing Up From Chair Using Arms 3   Walk in Room 3   Climb 3-5 Stairs With Railing 3   Basic Mobility Inpatient Raw Score 20   Basic Mobility Standardized Score 43.99   [facility] Highest Level Of Mobility   -HLM Goal 6: Walk 10 steps or more   -HLM Achieved 7: Walk 25 feet or more       Time In: 0908  Time Out: 0916  Total Evaluation Minutes: 8    Gabrielle Burton, PT

## 2025-04-23 NOTE — NURSING NOTE
Pt. Blood sugar 166. Pt refusing insulin because she does not want any needles. Pt. Educated on why we use insulin in the hospital and that she won't be taking her Metformin. Pt. States she takes the Metformin because she does not like needles and she will not take insulin. Provider ZACH Vaughn notified and aware.

## 2025-04-24 VITALS
HEIGHT: 60 IN | HEART RATE: 91 BPM | WEIGHT: 121.47 LBS | RESPIRATION RATE: 17 BRPM | BODY MASS INDEX: 23.85 KG/M2 | OXYGEN SATURATION: 98 % | DIASTOLIC BLOOD PRESSURE: 65 MMHG | TEMPERATURE: 99.3 F | SYSTOLIC BLOOD PRESSURE: 126 MMHG

## 2025-04-24 LAB
ANION GAP SERPL CALCULATED.3IONS-SCNC: 7 MMOL/L (ref 4–13)
BUN SERPL-MCNC: 19 MG/DL (ref 5–25)
CALCIUM SERPL-MCNC: 8.5 MG/DL (ref 8.4–10.2)
CHLORIDE SERPL-SCNC: 109 MMOL/L (ref 96–108)
CO2 SERPL-SCNC: 23 MMOL/L (ref 21–32)
CREAT SERPL-MCNC: 0.7 MG/DL (ref 0.6–1.3)
ERYTHROCYTE [DISTWIDTH] IN BLOOD BY AUTOMATED COUNT: 12.1 % (ref 11.6–15.1)
GFR SERPL CREATININE-BSD FRML MDRD: 87 ML/MIN/1.73SQ M
GLUCOSE SERPL-MCNC: 105 MG/DL (ref 65–140)
GLUCOSE SERPL-MCNC: 98 MG/DL (ref 65–140)
GLUCOSE SERPL-MCNC: 98 MG/DL (ref 65–140)
HCT VFR BLD AUTO: 34.9 % (ref 34.8–46.1)
HGB BLD-MCNC: 11.2 G/DL (ref 11.5–15.4)
MCH RBC QN AUTO: 29.3 PG (ref 26.8–34.3)
MCHC RBC AUTO-ENTMCNC: 32.1 G/DL (ref 31.4–37.4)
MCV RBC AUTO: 91 FL (ref 82–98)
PLATELET # BLD AUTO: 214 THOUSANDS/UL (ref 149–390)
PMV BLD AUTO: 12 FL (ref 8.9–12.7)
POTASSIUM SERPL-SCNC: 4.1 MMOL/L (ref 3.5–5.3)
RBC # BLD AUTO: 3.82 MILLION/UL (ref 3.81–5.12)
SODIUM SERPL-SCNC: 139 MMOL/L (ref 135–147)
WBC # BLD AUTO: 18.09 THOUSAND/UL (ref 4.31–10.16)

## 2025-04-24 PROCEDURE — 94640 AIRWAY INHALATION TREATMENT: CPT

## 2025-04-24 PROCEDURE — 94760 N-INVAS EAR/PLS OXIMETRY 1: CPT

## 2025-04-24 PROCEDURE — 85027 COMPLETE CBC AUTOMATED: CPT | Performed by: NURSE PRACTITIONER

## 2025-04-24 PROCEDURE — 99239 HOSP IP/OBS DSCHRG MGMT >30: CPT | Performed by: NURSE PRACTITIONER

## 2025-04-24 PROCEDURE — 82948 REAGENT STRIP/BLOOD GLUCOSE: CPT

## 2025-04-24 PROCEDURE — 80048 BASIC METABOLIC PNL TOTAL CA: CPT | Performed by: NURSE PRACTITIONER

## 2025-04-24 PROCEDURE — 94664 DEMO&/EVAL PT USE INHALER: CPT

## 2025-04-24 RX ORDER — CEFDINIR 300 MG/1
300 CAPSULE ORAL EVERY 12 HOURS SCHEDULED
Qty: 4 CAPSULE | Refills: 0 | Status: SHIPPED | OUTPATIENT
Start: 2025-04-25 | End: 2025-04-27

## 2025-04-24 RX ORDER — CEFDINIR 300 MG/1
300 CAPSULE ORAL EVERY 12 HOURS SCHEDULED
Status: DISCONTINUED | OUTPATIENT
Start: 2025-04-25 | End: 2025-04-24 | Stop reason: HOSPADM

## 2025-04-24 RX ADMIN — CEFTRIAXONE SODIUM 1000 MG: 10 INJECTION, POWDER, FOR SOLUTION INTRAVENOUS at 11:14

## 2025-04-24 RX ADMIN — LEVALBUTEROL HYDROCHLORIDE 1.25 MG: 1.25 SOLUTION RESPIRATORY (INHALATION) at 07:12

## 2025-04-24 RX ADMIN — ASPIRIN 81 MG: 81 TABLET, CHEWABLE ORAL at 09:24

## 2025-04-24 RX ADMIN — AMLODIPINE BESYLATE 5 MG: 5 TABLET ORAL at 09:24

## 2025-04-24 RX ADMIN — FLUTICASONE FUROATE AND VILANTEROL TRIFENATATE 1 PUFF: 200; 25 POWDER RESPIRATORY (INHALATION) at 09:23

## 2025-04-24 NOTE — ASSESSMENT & PLAN NOTE
No obvious signs of acute exacerbation  Continue home inhalers- Fluticasone/vilanterol substituted for patient's home Advair  Continue follow up with her pulmonologist outpatient- Dr. Velasquez

## 2025-04-24 NOTE — DISCHARGE SUMMARY
Discharge Summary - Hospitalist   Name: Lakeisha Trejo 70 y.o. female I MRN: [id number]  Unit/Bed#: -01 I Date of Admission: 4/22/2025   Date of Service: 4/24/2025 I Hospital Day: 2     Assessment & Plan  Sepsis (HCC)  Secondary to pneumonia with tachycardia and tachypnea on admission  CT imaging with multifocal pneumonia.  Given that there is a new low-grade mediastinal and hilar lymphadenopathy which is suspected to be related to reactive.  A contrast-enhanced follow-up chest CT in 4 to 6 weeks is recommended.  Procalcitonin 0.33, 0.25  Strep pneumo/urine Legionella- negative   Initial lactate normal  Blood cultures-  NGTD (24h)   Unable to obtain sputum   Received IV fluids according to sepsis protocol   Received 3 doses of ceftriaxone while admitted.  Transition to cefdinir 300 mg twice daily x 2 more days.      Moderate asthma  No obvious signs of acute exacerbation  Continue home inhalers- Fluticasone/vilanterol substituted for patient's home Advair  Continue follow up with her pulmonologist outpatient- Dr. Velasquez     Stage 2 chronic kidney disease  Lab Results   Component Value Date    EGFR 87 04/24/2025    EGFR 79 04/23/2025    EGFR 59 04/22/2025    CREATININE 0.70 04/24/2025    CREATININE 0.76 04/23/2025    CREATININE 0.96 04/22/2025     Creatinine seems to be around baseline.    Outpatient follow up with PCP   Essential hypertension  BP currently stable  Continue amlodipine    Type 2 diabetes mellitus without complication, without long-term current use of insulin ([facility])  Lab Results   Component Value Date    HGBA1C 5.4 04/22/2025       Recent Labs     04/23/25  1555 04/23/25  2057 04/24/25  0719 04/24/25  1119   POCGLU 145* 126 98 98       Blood Sugar Average: Last 72 hrs:  (P) [id number]  Home regimen-metformin  Placed on SSI  The patient declined to take insulin.  I discussed risks of having hyperglycemia during ongoing infection with the patient in details.  She is aware that we cannot  resume metformin until 48 hours post IV contrast from CAT scan.  Can resume metformin upon discharge     Discharging Physician / Practitioner: KAYCEE Small  PCP: KAYCEE Addison  Admission Date:   Admission Orders (From admission, onward)       Ordered        04/22/25 1706  INPATIENT ADMISSION  Once                          Discharge Date: 04/24/25    Reason for Admission: sepsis    Discharge Diagnoses:     Principal Problem:    Sepsis (HCC)  Active Problems:    Moderate asthma    Stage 2 chronic kidney disease    Essential hypertension    Type 2 diabetes mellitus without complication, without long-term current use of insulin (HCC)  Resolved Problems:    * No resolved hospital problems. *      Consultations During Hospital Stay:  IP CONSULT TO PULMONOLOGY    Procedures Performed:     None     Significant Findings / Test Results:     XR chest pa & lateral  Result Date: 4/23/2025  Increased bilateral bronchiolitis/pneumonia.      CT pe study w abdomen pelvis w contrast  Result Date: 4/22/2025  1.  Interval worsening of the diffuse centrilobular nodular and patchy nodular opacities throughout both lungs. This is consistent with nonspecific multifocal pneumonia. There is also new low-grade mediastinal and hilar lymphadenopathy, likely reactive. A contrast-enhanced follow-up chest CT in 4 to 6 weeks is recommended for further evaluation. 2.  Please refer to the report body for description of other incidental, chronic and/or benign findings.    Incidental Findings:   None      Test Results Pending at Discharge (will require follow up):   Final blood cultures      Outpatient Tests Requested:  Follow up with PCP and pulmonology     Complications:  none     Hospital Course:     Lakeisha Trejo is a 70 y.o. female patient who originally presented to the hospital on 4/22/2025 due to worsening shortness of breath.  The patient with past medical history of asthma/COPD, hypertension, diabetes and GERD.  She  presented with worsening shortness of breath over a few days prior to admissions.  She endorsed increasing cough with productive green/yellow sputum.  Patient found to have pneumonia on imaging.  She also met sepsis criteria and was started on IV antibiotics and IV fluids.  She received 1 dose of IV Solu-Medrol in the ED.  The patient initially required oxygen supplement for respiratory insufficiency without hypoxia and was able to titrated off to room air.  Her procalcitonin initially was slightly elevated but normalized after a repeat.  The patient is feeling significantly improved overall and she is medically stable for discharge home.  She will continue to complete 2 more days of antibiotics-cefdinir 300 mg twice daily upon discharge.  Discussed in details with her regarding elevated white blood cell count which can likely be multifactorial including the use of steroid and ongoing infection.  She is aware to follow-up with her PCP and have a CBC repeated next week.  Additionally, the patient will need to have a CT with contrast in 4 to 6 weeks to further evaluate for a finding of mediastinal and hilar lymphadenopathy.     Condition at Discharge: good     Discharge Day Visit / Exam:     Subjective: Patient was seen and examined.  She states that she is feeling better and is looking forward to going home.  Vitals: Blood Pressure: 126/65 (04/24/25 0924)  Pulse: 91 (04/24/25 0924)  Temperature: 99.3 °F (37.4 °C) (04/24/25 0719)  Temp Source: Oral (04/23/25 1539)  Respirations: 17 (04/24/25 0719)  Height: 5' (152.4 cm) (04/22/25 1751)  Weight - Scale: 55.1 kg (121 lb 7.6 oz) (04/22/25 1751)  SpO2: 98 % (04/24/25 0924)  Exam:   Physical Exam  Vitals and nursing note reviewed.   Constitutional:       General: She is not in acute distress.     Appearance: Normal appearance.   HENT:      Head: Normocephalic and atraumatic.      Right Ear: External ear normal.      Left Ear: External ear normal.      Nose: Nose normal. No  rhinorrhea.      Mouth/Throat:      Mouth: Mucous membranes are moist.      Pharynx: Oropharynx is clear.   Eyes:      General:         Right eye: No discharge.         Left eye: No discharge.      Pupils: Pupils are equal, round, and reactive to light.   Cardiovascular:      Rate and Rhythm: Normal rate and regular rhythm.      Pulses: Normal pulses.      Heart sounds: Normal heart sounds. No murmur heard.  Pulmonary:      Effort: Pulmonary effort is normal. No respiratory distress.      Breath sounds: Normal breath sounds.      Comments: Coarse breath sounds     Abdominal:      General: Bowel sounds are normal. There is no distension.      Palpations: Abdomen is soft. There is no mass.      Tenderness: There is no abdominal tenderness.   Musculoskeletal:         General: No swelling or tenderness. Normal range of motion.      Cervical back: Normal range of motion and neck supple. No muscular tenderness.   Skin:     General: Skin is warm and dry.      Capillary Refill: Capillary refill takes less than 2 seconds.      Findings: No erythema or rash.   Neurological:      General: No focal deficit present.      Mental Status: She is alert and oriented to person, place, and time. Mental status is at baseline.   Psychiatric:         Mood and Affect: Mood normal.         Behavior: Behavior normal.         Thought Content: Thought content normal.         Judgment: Judgment normal.         Discharge instructions/Information to patient and family:   See after visit summary for information provided to patient and family.      Provisions for Follow-Up Care:  See after visit summary for information related to follow-up care and any pertinent home health orders.      Disposition:     Home    Planned Readmission: No     Discharge Statement:  I spent 43 minutes discharging the patient. This time was spent on the day of discharge. I had direct contact with the patient on the day of discharge. Greater than 50% of the total time was  spent examining patient, answering all patient questions, arranging and discussing plan of care with patient as well as directly providing post-discharge instructions.  Additional time then spent on discharge activities.    Discharge Medications:  See after visit summary for reconciled discharge medications provided to patient and family.      ** Please Note: This note has been constructed using a voice recognition system **

## 2025-04-24 NOTE — DISCHARGE INSTR - AVS FIRST PAGE
Medication changes   Cefdinir 300 mg twice daily-antibiotic    Please follow-up with your PCP and pulmonology  Please discuss with your PCP regarding repeating CBC (complete blood count) to follow up with your elevated white blood cell count on admission.

## 2025-04-24 NOTE — ASSESSMENT & PLAN NOTE
Lab Results   Component Value Date    EGFR 87 04/24/2025    EGFR 79 04/23/2025    EGFR 59 04/22/2025    CREATININE 0.70 04/24/2025    CREATININE 0.76 04/23/2025    CREATININE 0.96 04/22/2025     Creatinine seems to be around baseline.    Outpatient follow up with PCP

## 2025-04-24 NOTE — UTILIZATION REVIEW
NOTIFICATION OF INPATIENT ADMISSION   AUTHORIZATION REQUEST   SERVICING FACILITY:   [place] [address]  Tax ID: [id number]  NPI: [id number]   ATTENDING PROVIDER:  Attending Name and NPI#: Isabel Nicolas Md [[id number]]  Address: [address] [place] [address]  Phone: [phone]     ADMISSION INFORMATION:  Place of Service: Inpatient Acute [facility]  Place of Service Code: 21  Inpatient Admission Date/Time: 4/22/25  5:06 PM  Discharge Date/Time: 4/24/2025 12:54 PM  Admitting Diagnosis Code/Description:  Hypomagnesemia [E83.42]  Weakness [R53.1]  SOB (shortness of breath) [R06.02]  COPD exacerbation (HCC) [J44.1]  Sepsis (HCC) [A41.9]  Multifocal pneumonia [J18.9]     UTILIZATION REVIEW CONTACT:  Stefania Kaiser, Utilization   Network Utilization Review Department  Phone: [phone]  Fax: [phone]  Email: [email or web address]  Contact for approvals/pending authorizations, clinical reviews, and discharge.     PHYSICIAN ADVISORY SERVICES:  Medical Necessity Denial & Wnme-xk-Punm Review  Phone: [phone]  Fax: [phone]  Email: [email or web address]     DISCHARGE SUPPORT TEAM:  For Patients Discharge Needs & Updates  Phone: [phone] opt. 2 Fax: [phone]  Email: [email or web address]

## 2025-04-24 NOTE — NURSING NOTE
Hospitalist was in to see and eval the pt and she is ok for dc to home, iv sites removed with the tips intact, AVS reviewed with the pt, all questions answered to her satisfaction, taken to friends car via wc and the pt was dcd from the hospital

## 2025-04-24 NOTE — INCIDENTAL FINDINGS
The following findings require follow up:  Radiographic finding   Finding:  Interval worsening of the diffuse centrilobular nodular and patchy nodular opacities throughout both lungs. This is consistent with nonspecific multifocal pneumonia. There is also new low-grade mediastinal and hilar lymphadenopathy, likely reactive.   A contrast-enhanced follow-up chest CT in 4 to 6 weeks is recommended for further evaluation.    Follow up required: CT chest with contrast    Follow up should be done within 4-6 week(s)    Please notify the following clinician to assist with the follow up:   KAYCEE Asif     Incidental finding results were discussed with the Patient by KAYCEE Small on 04/24/25.   They expressed understanding and all questions answered.

## 2025-04-24 NOTE — ASSESSMENT & PLAN NOTE
Secondary to pneumonia with tachycardia and tachypnea on admission  CT imaging with multifocal pneumonia.  Given that there is a new low-grade mediastinal and hilar lymphadenopathy which is suspected to be related to reactive.  A contrast-enhanced follow-up chest CT in 4 to 6 weeks is recommended.  Procalcitonin 0.33, 0.25  Strep pneumo/urine Legionella- negative   Initial lactate normal  Blood cultures-  NGTD (24h)   Unable to obtain sputum   Received IV fluids according to sepsis protocol   Received 3 doses of ceftriaxone while admitted.  Transition to cefdinir 300 mg twice daily x 2 more days.

## 2025-04-24 NOTE — CASE MANAGEMENT
Case Management Discharge Planning Note    Patient name Lakeisha Trejo  Location /-01 MRN [id number]  : 1955 Date 2025       Current Admission Date: 2025  Current Admission Diagnosis:Sepsis (HCC)   Patient Active Problem List    Diagnosis Date Noted Date Diagnosed    Type 2 diabetes mellitus without complication, without long-term current use of insulin (HCC) 2025     Sepsis (HCC) 2025     Atherosclerosis of both carotid arteries 2024     Prediabetes 2023     Bee sting allergy 2023     Chronic obstructive pulmonary disease (HCC) 2023     Wheezing      Vitamin D deficiency 2022     Elevated fasting glucose 2022     Left foot pain 2022     Allergic rhinitis due to pollen 2022     Small airways disease 2022     Hilar adenopathy 2022     Anxiety 2022     Elevated liver enzymes 12/15/2021     Hepatic steatosis 12/15/2021     Gastroesophageal reflux disease 12/15/2021     Hypophosphatemia 2021     Normocytic anemia 2021     Leukocytosis 2021     Diffuse idiopathic skeletal hyperostosis of cervical spine 2021     Right hip pain 2021     Elevated alkaline phosphatase level 2021     Osteopenia 2021     Non-seasonal allergic rhinitis 2021     Stage 2 chronic kidney disease 10/07/2019     Essential hypertension 10/07/2019     Hyperlipidemia 2019     Moderate asthma 2019       LOS (days): 2  Geometric Mean LOS (GMLOS) (days): 4.9  Days to GMLOS:3.2     OBJECTIVE:  Risk of Unplanned Readmission Score: 11.72         Current admission status: Inpatient   Preferred Pharmacy:   [id number] - [place] - [address]  [address]  [place] [address]  Phone: [phone] Fax: [phone]    Primary Care Provider: KAYCEE Addison    Primary Insurance: [facility]  Secondary Insurance:  FOR  LIFE    DISCHARGE DETAILS:    Additional Comments: CM met with pt at bedside for continued discharge planning discussions. CM reviewed level III therapy rec at this time. Pt declined HHC and OPPT as she reports she does not feel she needs any form of therapy on discharge. CM department to remain available for any discharge planning needs throughout this admission.

## 2025-04-24 NOTE — PLAN OF CARE
Problem: Potential for Falls  Goal: Patient will remain free of falls  Description: INTERVENTIONS:- Educate patient/family on patient safety including physical limitations- Instruct patient to call for assistance with activity - Consult OT/PT to assist with strengthening/mobility - Keep Call bell within reach- Keep bed low and locked with side rails adjusted as appropriate- Keep care items and personal belongings within reach- Initiate and maintain comfort rounds- Make Fall Risk Sign visible to staff- Offer Toileting every 2 Hours, in advance of need- Initiate/Maintain alarms- Obtain necessary fall risk management equipment: - Apply yellow socks and bracelet for high fall risk patients- Consider moving patient to room near nurses station    Outcome: Progressing     Problem: PAIN - ADULT  Goal: Verbalizes/displays adequate comfort level or baseline comfort level  Description: Interventions:- Encourage patient to monitor pain and request assistance- Assess pain using appropriate pain scale- Administer analgesics based on type and severity of pain and evaluate response- Implement non-pharmacological measures as appropriate and evaluate response- Consider cultural and social influences on pain and pain management- Notify physician/advanced practitioner if interventions unsuccessful or patient reports new pain  Outcome: Progressing     Problem: INFECTION - ADULT  Goal: Absence or prevention of progression during hospitalization  Description: INTERVENTIONS:- Assess and monitor for signs and symptoms of infection- Monitor lab/diagnostic results- Monitor all insertion sites, i.e. indwelling lines, tubes, and drains- Monitor endotracheal if appropriate and nasal secretions for changes in amount and color- [place] appropriate cooling/warming therapies per order- Administer medications as ordered- Instruct and encourage patient and family to use good hand hygiene technique- Identify and instruct in appropriate isolation  precautions for identified infection/condition  Outcome: Progressing  Goal: Absence of fever/infection during neutropenic period  Description: INTERVENTIONS:- Monitor WBC  Outcome: Progressing     Problem: SAFETY ADULT  Goal: Patient will remain free of falls  Description: INTERVENTIONS:- Educate patient/family on patient safety including physical limitations- Instruct patient to call for assistance with activity - Consult OT/PT to assist with strengthening/mobility - Keep Call bell within reach- Keep bed low and locked with side rails adjusted as appropriate- Keep care items and personal belongings within reach- Initiate and maintain comfort rounds- Make Fall Risk Sign visible to staff- Offer Toileting every 2 Hours, in advance of need- Initiate/Maintain alarms- Obtain necessary fall risk management equipment: - Apply yellow socks and bracelet for high fall risk patients- Consider moving patient to room near nurses station    Outcome: Progressing  Goal: Maintain or return to baseline ADL function  Description: INTERVENTIONS:-  Assess patient's ability to carry out ADLs; assess patient's baseline for ADL function and identify physical deficits which impact ability to perform ADLs (bathing, care of mouth/teeth, toileting, grooming, dressing, etc.)- Assess/evaluate cause of self-care deficits - Assess range of motion- Assess patient's mobility; develop plan if impaired- Assess patient's need for assistive devices and provide as appropriate- Encourage maximum independence but intervene and supervise when necessary- Involve family in performance of ADLs- Assess for home care needs following discharge - Consider OT consult to assist with ADL evaluation and planning for discharge- Provide patient education as appropriate  Outcome: Progressing  Goal: Maintains/Returns to pre admission functional level  Description: INTERVENTIONS:- Perform AM-PAC 6 Click Basic Mobility/ Daily Activity assessment daily.- Set and communicate  daily mobility goal to care team and patient/family/caregiver. - Collaborate with rehabilitation services on mobility goals if consulted- Perform Range of Motion 3 times a day.- Reposition patient every 2 hours.- Dangle patient 3 times a day- Stand patient 3 times a day- Ambulate patient 3 times a day- Out of bed to chair 3 times a day - Out of bed for meals 3 times a day- Out of bed for toileting- Record patient progress and toleration of activity level   Outcome: Progressing

## 2025-04-24 NOTE — ASSESSMENT & PLAN NOTE
Lab Results   Component Value Date    HGBA1C 5.4 04/22/2025       Recent Labs     04/23/25  1555 04/23/25 2057 04/24/25  0719 04/24/25  1119   POCGLU 145* 126 98 98       Blood Sugar Average: Last 72 hrs:  (P) [id number]  Home regimen-metformin  Placed on SSI  The patient declined to take insulin.  I discussed risks of having hyperglycemia during ongoing infection with the patient in details.  She is aware that we cannot resume metformin until 48 hours post IV contrast from CAT scan.  Can resume metformin upon discharge

## 2025-04-24 NOTE — PLAN OF CARE
Problem: Potential for Falls  Goal: Patient will remain free of falls  Description: INTERVENTIONS:- Educate patient/family on patient safety including physical limitations- Instruct patient to call for assistance with activity - Consult OT/PT to assist with strengthening/mobility - Keep Call bell within reach- Keep bed low and locked with side rails adjusted as appropriate- Keep care items and personal belongings within reach- Initiate and maintain comfort rounds- Make Fall Risk Sign visible to staff- Offer Toileting every 2 Hours, in advance of need- Initiate/Maintain alarms- Obtain necessary fall risk management equipment: - Apply yellow socks and bracelet for high fall risk patients- Consider moving patient to room near nurses station    4/24/2025 1220 by Juaquin Burton, RN  Outcome: Adequate for Discharge  4/24/2025 0850 by Juaquin Burton RN  Outcome: Progressing     Problem: PAIN - ADULT  Goal: Verbalizes/displays adequate comfort level or baseline comfort level  Description: Interventions:- Encourage patient to monitor pain and request assistance- Assess pain using appropriate pain scale- Administer analgesics based on type and severity of pain and evaluate response- Implement non-pharmacological measures as appropriate and evaluate response- Consider cultural and social influences on pain and pain management- Notify physician/advanced practitioner if interventions unsuccessful or patient reports new pain  4/24/2025 1220 by Juaquin Burton RN  Outcome: Adequate for Discharge  4/24/2025 0850 by Juaquin Burton RN  Outcome: Progressing     Problem: INFECTION - ADULT  Goal: Absence or prevention of progression during hospitalization  Description: INTERVENTIONS:- Assess and monitor for signs and symptoms of infection- Monitor lab/diagnostic results- Monitor all insertion sites, i.e. indwelling lines, tubes, and drains- Monitor endotracheal if appropriate and nasal secretions for changes in amount and  color- [place] appropriate cooling/warming therapies per order- Administer medications as ordered- Instruct and encourage patient and family to use good hand hygiene technique- Identify and instruct in appropriate isolation precautions for identified infection/condition  4/24/2025 1220 by Juaquin Burton RN  Outcome: Adequate for Discharge  4/24/2025 0850 by Juaquin Burton RN  Outcome: Progressing  Goal: Absence of fever/infection during neutropenic period  Description: INTERVENTIONS:- Monitor WBC  4/24/2025 1220 by Juaquin Burton RN  Outcome: Adequate for Discharge  4/24/2025 0850 by Juaquin Burton RN  Outcome: Progressing     Problem: SAFETY ADULT  Goal: Patient will remain free of falls  Description: INTERVENTIONS:- Educate patient/family on patient safety including physical limitations- Instruct patient to call for assistance with activity - Consult OT/PT to assist with strengthening/mobility - Keep Call bell within reach- Keep bed low and locked with side rails adjusted as appropriate- Keep care items and personal belongings within reach- Initiate and maintain comfort rounds- Make Fall Risk Sign visible to staff- Offer Toileting every 2 Hours, in advance of need- Initiate/Maintain alarms- Obtain necessary fall risk management equipment: - Apply yellow socks and bracelet for high fall risk patients- Consider moving patient to room near nurses station    4/24/2025 1220 by Juaquin Burton RN  Outcome: Adequate for Discharge  4/24/2025 0850 by Juaquin Burton RN  Outcome: Progressing  Goal: Maintain or return to baseline ADL function  Description: INTERVENTIONS:-  Assess patient's ability to carry out ADLs; assess patient's baseline for ADL function and identify physical deficits which impact ability to perform ADLs (bathing, care of mouth/teeth, toileting, grooming, dressing, etc.)- Assess/evaluate cause of self-care deficits - Assess range of motion- Assess patient's mobility; develop plan if  impaired- Assess patient's need for assistive devices and provide as appropriate- Encourage maximum independence but intervene and supervise when necessary- Involve family in performance of ADLs- Assess for home care needs following discharge - Consider OT consult to assist with ADL evaluation and planning for discharge- Provide patient education as appropriate  4/24/2025 1220 by Juaquin Burton RN  Outcome: Adequate for Discharge  4/24/2025 0850 by Juaquin Burton RN  Outcome: Progressing  Goal: Maintains/Returns to pre admission functional level  Description: INTERVENTIONS:- Perform AM-PAC 6 Click Basic Mobility/ Daily Activity assessment daily.- Set and communicate daily mobility goal to care team and patient/family/caregiver. - Collaborate with rehabilitation services on mobility goals if consulted- Perform Range of Motion 3 times a day.- Reposition patient every 2 hours.- Dangle patient 3 times a day- Stand patient 3 times a day- Ambulate patient 3 times a day- Out of bed to chair 3 times a day - Out of bed for meals 3 times a day- Out of bed for toileting- Record patient progress and toleration of activity level   4/24/2025 1220 by Juaquin Burton RN  Outcome: Adequate for Discharge  4/24/2025 0850 by Juaquin Burton RN  Outcome: Progressing     Problem: DISCHARGE PLANNING  Goal: Discharge to home or other facility with appropriate resources  Description: INTERVENTIONS:- Identify barriers to discharge w/patient and caregiver- Arrange for needed discharge resources and transportation as appropriate- Identify discharge learning needs (meds, wound care, etc.)-  Refer to Case Management Department for coordinating discharge planning if the patient needs post-hospital services based on physician/advanced practitioner order or complex needs related to functional status, cognitive ability, or social support system  4/24/2025 1220 by Juaquin Burton RN  Outcome: Adequate for Discharge  4/24/2025 0850 by  Juaquin Burton RN  Outcome: Progressing     Problem: Knowledge Deficit  Goal: Patient/family/caregiver demonstrates understanding of disease process, treatment plan, medications, and discharge instructions  Description: Complete learning assessment and assess knowledge base.Interventions:- Provide teaching at level of understanding- Provide teaching via preferred learning methods  4/24/2025 1220 by Juaquin Burton RN  Outcome: Adequate for Discharge  4/24/2025 0850 by Juaquin Butron RN  Outcome: Progressing     Problem: RESPIRATORY - ADULT  Goal: Achieves optimal ventilation and oxygenation  Description: INTERVENTIONS:- Assess for changes in respiratory status- Assess for changes in mentation and behavior- Position to facilitate oxygenation and minimize respiratory effort- Oxygen administered by appropriate delivery if ordered- Initiate smoking cessation education as indicated- Encourage broncho-pulmonary hygiene including cough, deep breathe, Incentive Spirometry- Assess the need for suctioning and aspirate as needed- Assess and instruct to report SOB or any respiratory difficulty- Respiratory Therapy support as indicated  4/24/2025 1220 by Juaquin Burton RN  Outcome: Adequate for Discharge  4/24/2025 0850 by Juaquin Burton RN  Outcome: Progressing     Problem: METABOLIC, FLUID AND ELECTROLYTES - ADULT  Goal: Glucose maintained within target range  Description: INTERVENTIONS:- Monitor Blood Glucose as ordered- Assess for signs and symptoms of hyperglycemia and hypoglycemia- Administer ordered medications to maintain glucose within target range- Assess nutritional intake and initiate nutrition service referral as needed  4/24/2025 1220 by Juaquin Burton RN  Outcome: Adequate for Discharge  4/24/2025 0850 by Juaquin Burton RN  Outcome: Progressing

## 2025-04-27 LAB
BACTERIA BLD CULT: NORMAL
BACTERIA BLD CULT: NORMAL

## 2025-04-29 ENCOUNTER — TRANSITIONAL CARE MANAGEMENT (OUTPATIENT)
Dept: FAMILY MEDICINE CLINIC | Facility: CLINIC | Age: 70
End: 2025-04-29

## 2025-05-01 ENCOUNTER — APPOINTMENT (OUTPATIENT)
Dept: LAB | Facility: CLINIC | Age: 70
End: 2025-05-01
Attending: NURSE PRACTITIONER
Payer: COMMERCIAL

## 2025-05-01 ENCOUNTER — OFFICE VISIT (OUTPATIENT)
Dept: FAMILY MEDICINE CLINIC | Facility: CLINIC | Age: 70
End: 2025-05-01
Payer: COMMERCIAL

## 2025-05-01 VITALS
WEIGHT: 120 LBS | DIASTOLIC BLOOD PRESSURE: 70 MMHG | BODY MASS INDEX: 23.56 KG/M2 | TEMPERATURE: 98.3 F | HEIGHT: 60 IN | OXYGEN SATURATION: 95 % | SYSTOLIC BLOOD PRESSURE: 128 MMHG | HEART RATE: 67 BPM

## 2025-05-01 DIAGNOSIS — E78.5 HYPERLIPIDEMIA, UNSPECIFIED HYPERLIPIDEMIA TYPE: ICD-10-CM

## 2025-05-01 DIAGNOSIS — A41.9 SEPSIS, DUE TO UNSPECIFIED ORGANISM, UNSPECIFIED WHETHER ACUTE ORGAN DYSFUNCTION PRESENT (HCC): ICD-10-CM

## 2025-05-01 DIAGNOSIS — I10 ESSENTIAL HYPERTENSION: ICD-10-CM

## 2025-05-01 DIAGNOSIS — D72.829 LEUKOCYTOSIS, UNSPECIFIED TYPE: ICD-10-CM

## 2025-05-01 DIAGNOSIS — N18.2 STAGE 2 CHRONIC KIDNEY DISEASE: ICD-10-CM

## 2025-05-01 DIAGNOSIS — D72.829 LEUKOCYTOSIS, UNSPECIFIED TYPE: Primary | ICD-10-CM

## 2025-05-01 DIAGNOSIS — R73.03 PREDIABETES: ICD-10-CM

## 2025-05-01 DIAGNOSIS — J44.9 CHRONIC OBSTRUCTIVE PULMONARY DISEASE, UNSPECIFIED COPD TYPE (HCC): ICD-10-CM

## 2025-05-01 PROBLEM — E11.9 TYPE 2 DIABETES MELLITUS WITHOUT COMPLICATION, WITHOUT LONG-TERM CURRENT USE OF INSULIN (HCC): Status: RESOLVED | Noted: 2025-04-23 | Resolved: 2025-05-01

## 2025-05-01 LAB
ALBUMIN SERPL BCG-MCNC: 3.6 G/DL (ref 3.5–5)
ALP SERPL-CCNC: 106 U/L (ref 34–104)
ALT SERPL W P-5'-P-CCNC: 21 U/L (ref 7–52)
ANION GAP SERPL CALCULATED.3IONS-SCNC: 11 MMOL/L (ref 4–13)
AST SERPL W P-5'-P-CCNC: 17 U/L (ref 13–39)
BASOPHILS # BLD AUTO: 0.07 THOUSANDS/ÂΜL (ref 0–0.1)
BASOPHILS NFR BLD AUTO: 1 % (ref 0–1)
BILIRUB SERPL-MCNC: 0.27 MG/DL (ref 0.2–1)
BUN SERPL-MCNC: 11 MG/DL (ref 5–25)
CALCIUM SERPL-MCNC: 9.4 MG/DL (ref 8.4–10.2)
CHLORIDE SERPL-SCNC: 104 MMOL/L (ref 96–108)
CHOLEST SERPL-MCNC: 185 MG/DL (ref ?–200)
CO2 SERPL-SCNC: 26 MMOL/L (ref 21–32)
CREAT SERPL-MCNC: 0.78 MG/DL (ref 0.6–1.3)
EOSINOPHIL # BLD AUTO: 0.31 THOUSAND/ÂΜL (ref 0–0.61)
EOSINOPHIL NFR BLD AUTO: 3 % (ref 0–6)
ERYTHROCYTE [DISTWIDTH] IN BLOOD BY AUTOMATED COUNT: 12.2 % (ref 11.6–15.1)
GFR SERPL CREATININE-BSD FRML MDRD: 76 ML/MIN/1.73SQ M
GLUCOSE P FAST SERPL-MCNC: 101 MG/DL (ref 65–99)
HCT VFR BLD AUTO: 40.5 % (ref 34.8–46.1)
HDLC SERPL-MCNC: 41 MG/DL
HGB BLD-MCNC: 12.7 G/DL (ref 11.5–15.4)
IMM GRANULOCYTES # BLD AUTO: 0.08 THOUSAND/UL (ref 0–0.2)
IMM GRANULOCYTES NFR BLD AUTO: 1 % (ref 0–2)
LDLC SERPL CALC-MCNC: 124 MG/DL (ref 0–100)
LYMPHOCYTES # BLD AUTO: 2.94 THOUSANDS/ÂΜL (ref 0.6–4.47)
LYMPHOCYTES NFR BLD AUTO: 30 % (ref 14–44)
MCH RBC QN AUTO: 29.2 PG (ref 26.8–34.3)
MCHC RBC AUTO-ENTMCNC: 31.4 G/DL (ref 31.4–37.4)
MCV RBC AUTO: 93 FL (ref 82–98)
MONOCYTES # BLD AUTO: 0.71 THOUSAND/ÂΜL (ref 0.17–1.22)
MONOCYTES NFR BLD AUTO: 7 % (ref 4–12)
NEUTROPHILS # BLD AUTO: 5.57 THOUSANDS/ÂΜL (ref 1.85–7.62)
NEUTS SEG NFR BLD AUTO: 58 % (ref 43–75)
NONHDLC SERPL-MCNC: 144 MG/DL
NRBC BLD AUTO-RTO: 0 /100 WBCS
PLATELET # BLD AUTO: 387 THOUSANDS/UL (ref 149–390)
PMV BLD AUTO: 11 FL (ref 8.9–12.7)
POTASSIUM SERPL-SCNC: 4.2 MMOL/L (ref 3.5–5.3)
PROT SERPL-MCNC: 6.7 G/DL (ref 6.4–8.4)
RBC # BLD AUTO: 4.35 MILLION/UL (ref 3.81–5.12)
SODIUM SERPL-SCNC: 141 MMOL/L (ref 135–147)
TRIGL SERPL-MCNC: 100 MG/DL (ref ?–150)
WBC # BLD AUTO: 9.68 THOUSAND/UL (ref 4.31–10.16)

## 2025-05-01 PROCEDURE — 80061 LIPID PANEL: CPT

## 2025-05-01 PROCEDURE — 83036 HEMOGLOBIN GLYCOSYLATED A1C: CPT

## 2025-05-01 PROCEDURE — 99495 TRANSJ CARE MGMT MOD F2F 14D: CPT | Performed by: NURSE PRACTITIONER

## 2025-05-01 PROCEDURE — 36415 COLL VENOUS BLD VENIPUNCTURE: CPT

## 2025-05-01 PROCEDURE — 80053 COMPREHEN METABOLIC PANEL: CPT

## 2025-05-01 PROCEDURE — 85025 COMPLETE CBC W/AUTO DIFF WBC: CPT

## 2025-05-01 NOTE — PROGRESS NOTES
Transition of Care Visit:  Name: Lakeisha Trejo      : 1955      MRN: [id number]  Encounter Provider: KAYCEE Addison  Encounter Date: 2025   Encounter department: [facility]    Assessment & Plan  Leukocytosis, unspecified type  - will repeat CBC last WBC 18.   Orders:  •  CBC and differential; Future    Prediabetes         Hyperlipidemia, unspecified hyperlipidemia type         Essential hypertension         Chronic obstructive pulmonary disease, unspecified COPD type (HCC)  Breathing improved.        Stage 2 chronic kidney disease  Lab Results   Component Value Date    EGFR 87 2025    EGFR 79 2025    EGFR 59 2025    CREATININE 0.70 2025    CREATININE 0.76 2025    CREATININE 0.96 2025            Sepsis, due to unspecified organism, unspecified whether acute organ dysfunction present (HCC)  Completed antibiotics. Feeling better.          Depression Screening and Follow-up Plan: Patient was screened for depression during today's encounter. They screened negative with a PHQ-2 score of 0.          History of Present Illness     Transitional Care Management Review:   Lakeisha Trejo is a 70 y.o. female here for TCM follow up.     During the TCM phone call patient stated:  TCM Call (since 2025)     Date and time call was made  2025  3:05 PM    Hospital care reviewed  Records reviewed    Patient was hospitialized at  [facility]    Date of Admission  25    Date of discharge  25    Disposition  Home    Were the patients medications reviewed and updated  Yes    Current Symptoms  Fatigue      TCM Call (since 2025)     Post hospital issues  Reduced activity    Scheduled for follow up?  Yes    Patients specialists  Other (comment)    Did you obtain your prescribed medications  Yes    Do you need help managing your prescriptions or medications  No    Is transportation to your appointment needed  No    I have  advised the patient to call PCP with any new or worsening symptoms  Teresa Chavarria    Living Arrangements  Family members    Support System  Family    The type of support provided  Emotional; Physical    Do you have social support  Yes, as much as I need    Are you recieving home care services  No        Discharge Summary - Hospitalist   Name: Lakeisha Trejo 70 y.o. female I MRN: [id number]  Unit/Bed#: -01 I Date of Admission: 4/22/2025   Date of Service: 4/24/2025 I Hospital Day: 2      Assessment & Plan  Sepsis (HCC)  · Secondary to pneumonia with tachycardia and tachypnea on admission  · CT imaging with multifocal pneumonia.  Given that there is a new low-grade mediastinal and hilar lymphadenopathy which is suspected to be related to reactive.  A contrast-enhanced follow-up chest CT in 4 to 6 weeks is recommended.  · Procalcitonin 0.33, 0.25  · Strep pneumo/urine Legionella- negative   · Initial lactate normal  · Blood cultures-  NGTD (24h)   · Unable to obtain sputum   · Received IV fluids according to sepsis protocol   · Received 3 doses of ceftriaxone while admitted.  Transition to cefdinir 300 mg twice daily x 2 more days.        Moderate asthma  · No obvious signs of acute exacerbation  · Continue home inhalers- Fluticasone/vilanterol substituted for patient's home Advair  · Continue follow up with her pulmonologist outpatient- Dr. Velasquez      Stage 2 chronic kidney disease  Lab Results  Component Value Date    EGFR 87 04/24/2025    EGFR 79 04/23/2025    EGFR 59 04/22/2025    CREATININE 0.70 04/24/2025    CREATININE 0.76 04/23/2025    CREATININE 0.96 04/22/2025     · Creatinine seems to be around baseline.    · Outpatient follow up with PCP   Essential hypertension  · BP currently stable  · Continue amlodipine    Type 2 diabetes mellitus without complication, without long-term current use of insulin (HCC)  Lab Results  Component Value Date    HGBA1C 5.4 04/22/2025        Recent Labs     04/23/25  1555 04/23/25  2057 04/24/25  0719 04/24/25  1119  POCGLU 145* 126 98 98        Blood Sugar Average: Last 72 hrs:  (P) [id number]  · Home regimen-metformin  · Placed on SSI  · The patient declined to take insulin.  I discussed risks of having hyperglycemia during ongoing infection with the patient in details.  She is aware that we cannot resume metformin until 48 hours post IV contrast from CAT scan.  · Can resume metformin upon discharge      Discharging Physician / Practitioner: KAYCEE Small  PCP: KAYCEE Addison  Admission Date:   Admission Orders (From admission, onward)           Ordered        04/22/25 1706     INPATIENT ADMISSION  Once                            Discharge Date: 04/24/25     Reason for Admission: sepsis     Discharge Diagnoses:      Principal Problem:    Sepsis (HCC)  Active Problems:    Moderate asthma    Stage 2 chronic kidney disease    Essential hypertension    Type 2 diabetes mellitus without complication, without long-term current use of insulin (HCC)  Resolved Problems:    * No resolved hospital problems. *        Consultations During Hospital Stay:  · IP CONSULT TO PULMONOLOGY     Procedures Performed:      · None      Significant Findings / Test Results:      · XR chest pa & lateral  · Result Date: 4/23/2025  · Increased bilateral bronchiolitis/pneumonia.       · CT pe study w abdomen pelvis w contrast  · Result Date: 4/22/2025  · 1.  Interval worsening of the diffuse centrilobular nodular and patchy nodular opacities throughout both lungs. This is consistent with nonspecific multifocal pneumonia. There is also new low-grade mediastinal and hilar lymphadenopathy, likely reactive. A contrast-enhanced follow-up chest CT in 4 to 6 weeks is recommended for further evaluation. 2.  Please refer to the report body for description of other incidental, chronic and/or benign findings.     Incidental Findings:   · None       Test Results Pending at Discharge  (will require follow up):   · Final blood cultures      Outpatient Tests Requested:  · Follow up with PCP and pulmonology      Complications:  none      Hospital Course:      Lakeisha Trejo is a 70 y.o. female patient who originally presented to the hospital on 4/22/2025 due to worsening shortness of breath.  The patient with past medical history of asthma/COPD, hypertension, diabetes and GERD.  She presented with worsening shortness of breath over a few days prior to admissions.  She endorsed increasing cough with productive green/yellow sputum.  Patient found to have pneumonia on imaging.  She also met sepsis criteria and was started on IV antibiotics and IV fluids.  She received 1 dose of IV Solu-Medrol in the ED.  The patient initially required oxygen supplement for respiratory insufficiency without hypoxia and was able to titrated off to room air.  Her procalcitonin initially was slightly elevated but normalized after a repeat.  The patient is feeling significantly improved overall and she is medically stable for discharge home.  She will continue to complete 2 more days of antibiotics-cefdinir 300 mg twice daily upon discharge.  Discussed in details with her regarding elevated white blood cell count which can likely be multifactorial including the use of steroid and ongoing infection.  She is aware to follow-up with her PCP and have a CBC repeated next week.  Additionally, the patient will need to have a CT with contrast in 4 to 6 weeks to further evaluate for a finding of mediastinal and hilar lymphadenopathy.      Condition at Discharge: good         Review of Systems  Objective   /70   Pulse 67   Temp 98.3 °F (36.8 °C)   Ht 5' (1.524 m)   Wt 54.4 kg (120 lb)   SpO2 95%   BMI 23.44 kg/m²     Physical Exam  Vitals and nursing note reviewed.   Constitutional:       General: She is not in acute distress.     Appearance: Normal appearance. She is well-developed. She is not diaphoretic.   HENT:       Head: Normocephalic and atraumatic.   Cardiovascular:      Rate and Rhythm: Normal rate and regular rhythm.      Heart sounds: No murmur heard.  Pulmonary:      Effort: Pulmonary effort is normal. No tachypnea or respiratory distress.      Breath sounds: Normal breath sounds.   Skin:     General: Skin is warm.      Findings: No rash.   Neurological:      General: No focal deficit present.      Mental Status: She is alert and oriented to person, place, and time.   Psychiatric:         Speech: Speech normal.         Behavior: Behavior normal. Behavior is cooperative.         Thought Content: Thought content normal.         Judgment: Judgment normal.       Medications have been reviewed by provider in current encounter

## 2025-05-01 NOTE — ASSESSMENT & PLAN NOTE
Lab Results   Component Value Date    EGFR 87 04/24/2025    EGFR 79 04/23/2025    EGFR 59 04/22/2025    CREATININE 0.70 04/24/2025    CREATININE 0.76 04/23/2025    CREATININE 0.96 04/22/2025

## 2025-05-01 NOTE — PATIENT INSTRUCTIONS
Get CBC done today.    Follow up with pulmonology, they will need to order CT chest follow up in 4-6 weeks.

## 2025-05-02 LAB
EST. AVERAGE GLUCOSE BLD GHB EST-MCNC: 114 MG/DL
HBA1C MFR BLD: 5.6 %

## 2025-05-21 ENCOUNTER — OFFICE VISIT (OUTPATIENT)
Dept: PULMONOLOGY | Facility: CLINIC | Age: 70
End: 2025-05-21
Payer: COMMERCIAL

## 2025-05-21 VITALS
OXYGEN SATURATION: 97 % | TEMPERATURE: 98.2 F | HEART RATE: 79 BPM | BODY MASS INDEX: 24.19 KG/M2 | WEIGHT: 123.2 LBS | SYSTOLIC BLOOD PRESSURE: 118 MMHG | RESPIRATION RATE: 20 BRPM | DIASTOLIC BLOOD PRESSURE: 60 MMHG | HEIGHT: 60 IN

## 2025-05-21 DIAGNOSIS — J45.40 MODERATE PERSISTENT ASTHMA WITHOUT COMPLICATION: Primary | ICD-10-CM

## 2025-05-21 DIAGNOSIS — R93.89 ABNORMAL CT OF THE CHEST: ICD-10-CM

## 2025-05-21 PROCEDURE — 99214 OFFICE O/P EST MOD 30 MIN: CPT

## 2025-05-21 RX ORDER — ALBUTEROL SULFATE 0.83 MG/ML
2.5 SOLUTION RESPIRATORY (INHALATION) EVERY 6 HOURS PRN
Qty: 150 ML | Refills: 6 | Status: SHIPPED | OUTPATIENT
Start: 2025-05-21

## 2025-05-21 NOTE — ASSESSMENT & PLAN NOTE
- Prior PFTs in 2023 with moderate fixed obstruction, severe air trapping, and severely reduced diffusion capacity  -Has a longstanding history of asthma, she is a never smoker  -Does have peripheral eosinophils as high as 760 cells  -Asthma was not exacerbated during recent hospital admission for pneumonia.  Symptoms currently stable and at baseline.  Lungs are clear on exam    Plan:  -Continue Advair 500/50 mcg twice daily, albuterol HFA/nebs every 6 hours as needed, montelukast 10 mg nightly, OTC antihistamine daily  - Follow-up in 6 months or sooner if needed    Orders:    albuterol (2.5 mg/3 mL) 0.083 % nebulizer solution; Take 3 mL (2.5 mg total) by nebulization every 6 (six) hours as needed for wheezing or shortness of breath

## 2025-05-21 NOTE — PROGRESS NOTES
Follow-up  Visit - Pulmonary Medicine   Name: Lakeisha Trejo      : 1955      MRN: [id number]  Encounter Provider: KAYCEE Zaragoza  Encounter Date: 2025   Encounter department: [facility]  :  Assessment & Plan  Moderate persistent asthma without complication  - Prior PFTs in  with moderate fixed obstruction, severe air trapping, and severely reduced diffusion capacity  -Has a longstanding history of asthma, she is a never smoker  -Does have peripheral eosinophils as high as 760 cells  -Asthma was not exacerbated during recent hospital admission for pneumonia.  Symptoms currently stable and at baseline.  Lungs are clear on exam    Plan:  -Continue Advair 500/50 mcg twice daily, albuterol HFA/nebs every 6 hours as needed, montelukast 10 mg nightly, OTC antihistamine daily  - Follow-up in 6 months or sooner if needed    Orders:    albuterol (2.5 mg/3 mL) 0.083 % nebulizer solution; Take 3 mL (2.5 mg total) by nebulization every 6 (six) hours as needed for wheezing or shortness of breath    Abnormal CT of the chest  - S/p hospitalization for multifocal pneumonia with likely reactive mediastinal and hilar lymphadenopathy  -Symptoms resolved, back to her baseline.  No significant cough/mucus, no shortness of breath.  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    History of Present Illness   Lakeisha Trejo is a 70 y.o. female with a history of asthma, HTN, DM, and GERD who is here today for a follow-up visit. Last seen in the office in March.  At that time, was maintained on Advair 250-50 mcg, and Albuterol PRN.   Since her last office visit, she was hospitalized 4/22/25 for sepsis 2/2 pneumonia. CT chest showed multifocal pneumonia, mediastinal, and hilar lympadenopathy. Treated with IV antibiotics. Asthma was not exacerbated during this stay. Pulmonary not consulted during admission. Recommend CT chest w/ contrast in 4-6 weeks for the lymphadenopathy.     Patient returns today, reports symptoms have returned back to her baseline.  She is on high-dose Advair now.  Not requiring use of rescue inhaler often.  Has an occasional dry cough, no mucus.  Only gets short of breath with use of stairs.  Only has wheezing when she gets short of breath.  Denies any fevers, chills, night sweats, chest pain, or lower extremity swelling.    Review of Systems   Constitutional:  Negative for appetite change and fever.   HENT:  Negative for ear pain, postnasal drip, rhinorrhea, sneezing, sore throat and trouble swallowing.    Cardiovascular:  Negative for chest pain.   Musculoskeletal:  Negative for myalgias.   Neurological:  Negative for headaches.       Aside from what is mentioned in the HPI, ROS is otherwise negative         Medical History Reviewed by provider this encounter:     .    Objective   /60 (BP Location: Right arm, Patient Position: Sitting, Cuff Size: Standard)   Pulse 79   Temp 98.2 °F (36.8 °C) (Temporal)   Resp 20   Ht 5' (1.524 m)   Wt 55.9 kg (123 lb 3.2 oz)   SpO2 97%   BMI 24.06 kg/m²     Physical Exam  Vitals and nursing note reviewed.   Constitutional:       General: She is not in acute distress.     Appearance: Normal appearance. She is well-developed.     Cardiovascular:      Rate and Rhythm: Normal rate and regular rhythm.      Heart sounds: Normal heart sounds, S1 normal and S2 normal. No murmur heard.  Pulmonary:      Effort: Pulmonary effort is normal.      Breath sounds: Normal breath sounds. No decreased breath sounds, wheezing, rhonchi or rales.     Musculoskeletal:         General: No swelling.       Right lower leg: No edema.      Left lower leg: No edema.     Neurological:      Mental Status: She is alert.     Psychiatric:         Mood and Affect: Mood and affect normal.         Behavior: Behavior normal. Behavior is cooperative.           Diagnostic Data:  Labs: I personally reviewed the most recent laboratory data pertinent to today's visit.      Radiology results:  Radiology Results Review: I have reviewed radiology reports from 4/22/2025 including: CT chest.      PFT/spirometry results: Reviewed study from 5/10/202      Oximetry testing:      Other studies:      KAYCEE Zaragoza

## 2025-05-22 PROBLEM — A41.9 SEPSIS (HCC): Status: RESOLVED | Noted: 2025-04-22 | Resolved: 2025-05-22

## 2025-05-27 DIAGNOSIS — R73.03 PREDIABETES: ICD-10-CM

## 2025-05-27 NOTE — TELEPHONE ENCOUNTER
Reason for call:   [x] Refill   [] Prior Auth  [] Other:     Office:   [x] PCP/Provider -  K[facility]hal[facility]wal DO    [facility]   [] Specialty/Provider -     Medication: Metformin    Dose/Frequency: 500mg      2 times daily with meals     Quantity: 180    Pharmacy: [facility]   Does the patient have enough for 3 days?   [] Yes   [x] No - Send as HP to POD    Mail Away Pharmacy   Does the patient have enough for 10 days?   [] Yes   [] No - Send as HP to POD

## 2025-06-02 DIAGNOSIS — I10 ESSENTIAL HYPERTENSION: ICD-10-CM

## 2025-06-03 ENCOUNTER — HOSPITAL ENCOUNTER (OUTPATIENT)
Dept: CT IMAGING | Facility: HOSPITAL | Age: 70
Discharge: HOME | End: 2025-06-03
Payer: COMMERCIAL

## 2025-06-03 DIAGNOSIS — R93.89 ABNORMAL CT OF THE CHEST: ICD-10-CM

## 2025-06-03 PROCEDURE — 71260 CT THORAX DX C+: CPT

## 2025-06-03 RX ORDER — AMLODIPINE BESYLATE 5 MG/1
5 TABLET ORAL DAILY
Qty: 90 TABLET | Refills: 1 | Status: SHIPPED | OUTPATIENT
Start: 2025-06-03

## 2025-06-03 RX ADMIN — IOHEXOL 85 ML: 350 INJECTION, SOLUTION INTRAVENOUS at 19:56

## 2025-06-12 ENCOUNTER — RESULTS FOLLOW-UP (OUTPATIENT)
Dept: PULMONOLOGY | Facility: CLINIC | Age: 70
End: 2025-06-12

## 2025-06-30 ENCOUNTER — TELEPHONE (OUTPATIENT)
Dept: FAMILY MEDICINE CLINIC | Facility: CLINIC | Age: 70
End: 2025-06-30

## 2025-06-30 NOTE — TELEPHONE ENCOUNTER
Tried calling number has calling restrictions mailed letter need to reschedule 9/18 appt with [place]

## 2025-07-06 DIAGNOSIS — J45.40 MODERATE PERSISTENT ASTHMA WITHOUT COMPLICATION: ICD-10-CM

## 2025-07-07 RX ORDER — ALBUTEROL SULFATE 90 UG/1
2 INHALANT RESPIRATORY (INHALATION) EVERY 6 HOURS PRN
Qty: 8.5 G | Refills: 0 | Status: SHIPPED | OUTPATIENT
Start: 2025-07-07